# Patient Record
Sex: FEMALE | Race: WHITE | NOT HISPANIC OR LATINO | Employment: OTHER | URBAN - METROPOLITAN AREA
[De-identification: names, ages, dates, MRNs, and addresses within clinical notes are randomized per-mention and may not be internally consistent; named-entity substitution may affect disease eponyms.]

---

## 2020-10-19 ENCOUNTER — HOSPITAL ENCOUNTER (OUTPATIENT)
Facility: HOSPITAL | Age: 70
Discharge: HOME | End: 2020-10-20
Attending: EMERGENCY MEDICINE
Payer: MEDICARE

## 2020-10-19 DIAGNOSIS — I48.91 AF (ATRIAL FIBRILLATION): ICD-10-CM

## 2020-10-19 DIAGNOSIS — R06.02 SOB (SHORTNESS OF BREATH): ICD-10-CM

## 2020-10-19 DIAGNOSIS — R07.9 CHEST PAIN, UNSPECIFIED TYPE: Primary | ICD-10-CM

## 2020-10-19 DIAGNOSIS — R00.2 PALPITATIONS: ICD-10-CM

## 2020-10-19 DIAGNOSIS — I48.91 ATRIAL FIBRILLATION: ICD-10-CM

## 2020-10-19 LAB
ALBUMIN SERPL BCP-MCNC: 3.8 G/DL (ref 3.5–5.2)
ALP SERPL-CCNC: 56 U/L (ref 55–135)
ALT SERPL W/O P-5'-P-CCNC: 15 U/L (ref 10–44)
ANION GAP SERPL CALC-SCNC: 9 MMOL/L (ref 8–16)
AORTIC ROOT ANNULUS: 3.1 CM
AST SERPL-CCNC: 14 U/L (ref 10–40)
AV INDEX (PROSTH): 0.87
AV MEAN GRADIENT: 7 MMHG
AV PEAK GRADIENT: 14 MMHG
AV VALVE AREA: 2.72 CM2
AV VELOCITY RATIO: 0.72
BACTERIA #/AREA URNS AUTO: NORMAL /HPF
BASOPHILS # BLD AUTO: 0.04 K/UL (ref 0–0.2)
BASOPHILS NFR BLD: 0.4 % (ref 0–1.9)
BILIRUB SERPL-MCNC: 0.5 MG/DL (ref 0.1–1)
BILIRUB UR QL STRIP: NEGATIVE
BNP SERPL-MCNC: 127 PG/ML (ref 0–99)
BSA FOR ECHO PROCEDURE: 2.11 M2
BUN SERPL-MCNC: 17 MG/DL (ref 8–23)
CALCIUM SERPL-MCNC: 9.3 MG/DL (ref 8.7–10.5)
CHLORIDE SERPL-SCNC: 108 MMOL/L (ref 95–110)
CK SERPL-CCNC: 49 U/L (ref 20–180)
CLARITY UR REFRACT.AUTO: CLEAR
CO2 SERPL-SCNC: 27 MMOL/L (ref 23–29)
COLOR UR AUTO: YELLOW
CREAT SERPL-MCNC: 1.1 MG/DL (ref 0.5–1.4)
CV ECHO LV RWT: 0.47 CM
DIFFERENTIAL METHOD: ABNORMAL
DOP CALC AO PEAK VEL: 1.89 M/S
DOP CALC AO VTI: 35.38 CM
DOP CALC LVOT AREA: 3.1 CM2
DOP CALC LVOT DIAMETER: 2 CM
DOP CALC LVOT PEAK VEL: 1.37 M/S
DOP CALC LVOT STROKE VOLUME: 96.24 CM3
DOP CALCLVOT PEAK VEL VTI: 30.65 CM
E WAVE DECELERATION TIME: 222.92 MSEC
E/A RATIO: 0.8
E/E' RATIO: 7.88 M/S
ECHO LV POSTERIOR WALL: 1.08 CM (ref 0.6–1.1)
EOSINOPHIL # BLD AUTO: 0.4 K/UL (ref 0–0.5)
EOSINOPHIL NFR BLD: 4.2 % (ref 0–8)
ERYTHROCYTE [DISTWIDTH] IN BLOOD BY AUTOMATED COUNT: 13.3 % (ref 11.5–14.5)
EST. GFR  (AFRICAN AMERICAN): 58.4 ML/MIN/1.73 M^2
EST. GFR  (NON AFRICAN AMERICAN): 50.6 ML/MIN/1.73 M^2
FRACTIONAL SHORTENING: 47 % (ref 28–44)
GLUCOSE SERPL-MCNC: 88 MG/DL (ref 70–110)
GLUCOSE UR QL STRIP: NEGATIVE
HCT VFR BLD AUTO: 43.8 % (ref 37–48.5)
HGB BLD-MCNC: 13.9 G/DL (ref 12–16)
HGB UR QL STRIP: NEGATIVE
IMM GRANULOCYTES # BLD AUTO: 0.02 K/UL (ref 0–0.04)
IMM GRANULOCYTES NFR BLD AUTO: 0.2 % (ref 0–0.5)
INTERVENTRICULAR SEPTUM: 0.59 CM (ref 0.6–1.1)
KETONES UR QL STRIP: NEGATIVE
LA MAJOR: 6 CM
LA MINOR: 5.3 CM
LA WIDTH: 3.99 CM
LEFT ATRIUM SIZE: 4.04 CM
LEFT ATRIUM VOLUME INDEX: 38.3 ML/M2
LEFT ATRIUM VOLUME: 77.12 CM3
LEFT INTERNAL DIMENSION IN SYSTOLE: 2.4 CM (ref 2.1–4)
LEFT VENTRICLE DIASTOLIC VOLUME INDEX: 47 ML/M2
LEFT VENTRICLE DIASTOLIC VOLUME: 94.65 ML
LEFT VENTRICLE MASS INDEX: 61 G/M2
LEFT VENTRICLE SYSTOLIC VOLUME INDEX: 10 ML/M2
LEFT VENTRICLE SYSTOLIC VOLUME: 20.07 ML
LEFT VENTRICULAR INTERNAL DIMENSION IN DIASTOLE: 4.55 CM (ref 3.5–6)
LEFT VENTRICULAR MASS: 122.44 G
LEUKOCYTE ESTERASE UR QL STRIP: ABNORMAL
LV LATERAL E/E' RATIO: 9 M/S
LV SEPTAL E/E' RATIO: 7 M/S
LYMPHOCYTES # BLD AUTO: 1.9 K/UL (ref 1–4.8)
LYMPHOCYTES NFR BLD: 19.3 % (ref 18–48)
MCH RBC QN AUTO: 27.9 PG (ref 27–31)
MCHC RBC AUTO-ENTMCNC: 31.7 G/DL (ref 32–36)
MCV RBC AUTO: 88 FL (ref 82–98)
MICROSCOPIC COMMENT: NORMAL
MONOCYTES # BLD AUTO: 0.8 K/UL (ref 0.3–1)
MONOCYTES NFR BLD: 8.1 % (ref 4–15)
MV PEAK A VEL: 0.79 M/S
MV PEAK E VEL: 0.63 M/S
MV STENOSIS PRESSURE HALF TIME: 64.65 MS
MV VALVE AREA P 1/2 METHOD: 3.4 CM2
NEUTROPHILS # BLD AUTO: 6.8 K/UL (ref 1.8–7.7)
NEUTROPHILS NFR BLD: 67.8 % (ref 38–73)
NITRITE UR QL STRIP: NEGATIVE
NRBC BLD-RTO: 0 /100 WBC
PH UR STRIP: 5 [PH] (ref 5–8)
PISA TR MAX VEL: 2.54 M/S
PLATELET # BLD AUTO: 200 K/UL (ref 150–350)
PMV BLD AUTO: 10.7 FL (ref 9.2–12.9)
POTASSIUM SERPL-SCNC: 4.2 MMOL/L (ref 3.5–5.1)
PROT SERPL-MCNC: 6.5 G/DL (ref 6–8.4)
PROT UR QL STRIP: NEGATIVE
RA MAJOR: 4.73 CM
RA PRESSURE: 3 MMHG
RA WIDTH: 4.43 CM
RBC # BLD AUTO: 4.98 M/UL (ref 4–5.4)
SARS-COV-2 RDRP RESP QL NAA+PROBE: NEGATIVE
SODIUM SERPL-SCNC: 144 MMOL/L (ref 136–145)
SP GR UR STRIP: 1.01 (ref 1–1.03)
SQUAMOUS #/AREA URNS AUTO: 2 /HPF
STJ: 2.32 CM
TDI LATERAL: 0.07 M/S
TDI SEPTAL: 0.09 M/S
TDI: 0.08 M/S
TR MAX PG: 26 MMHG
TRICUSPID ANNULAR PLANE SYSTOLIC EXCURSION: 1.77 CM
TROPONIN I SERPL DL<=0.01 NG/ML-MCNC: 0.01 NG/ML (ref 0–0.03)
TROPONIN I SERPL DL<=0.01 NG/ML-MCNC: <0.006 NG/ML (ref 0–0.03)
TV REST PULMONARY ARTERY PRESSURE: 29 MMHG
URN SPEC COLLECT METH UR: ABNORMAL
UROBILINOGEN UR STRIP-ACNC: NEGATIVE EU/DL
WBC # BLD AUTO: 9.99 K/UL (ref 3.9–12.7)
WBC #/AREA URNS AUTO: 5 /HPF (ref 0–5)
WBC CLUMPS UR QL AUTO: NORMAL

## 2020-10-19 PROCEDURE — 94640 AIRWAY INHALATION TREATMENT: CPT | Mod: HCWC

## 2020-10-19 PROCEDURE — 93010 ELECTROCARDIOGRAM REPORT: CPT | Mod: HCWC,,, | Performed by: INTERNAL MEDICINE

## 2020-10-19 PROCEDURE — 81000 URINALYSIS NONAUTO W/SCOPE: CPT | Mod: HCWC,ER

## 2020-10-19 PROCEDURE — 85025 COMPLETE CBC W/AUTO DIFF WBC: CPT | Mod: HCWC,ER

## 2020-10-19 PROCEDURE — U0002 COVID-19 LAB TEST NON-CDC: HCPCS | Mod: HCWC,ER

## 2020-10-19 PROCEDURE — 25000003 PHARM REV CODE 250: Mod: HCWC | Performed by: NURSE PRACTITIONER

## 2020-10-19 PROCEDURE — G0378 HOSPITAL OBSERVATION PER HR: HCPCS | Mod: HCWC,CS

## 2020-10-19 PROCEDURE — 94799 UNLISTED PULMONARY SVC/PX: CPT | Mod: HCWC

## 2020-10-19 PROCEDURE — 82550 ASSAY OF CK (CPK): CPT | Mod: HCWC,ER

## 2020-10-19 PROCEDURE — 93010 EKG 12-LEAD: ICD-10-PCS | Mod: HCWC,,, | Performed by: INTERNAL MEDICINE

## 2020-10-19 PROCEDURE — 84484 ASSAY OF TROPONIN QUANT: CPT | Mod: 91,HCWC,ER

## 2020-10-19 PROCEDURE — 93005 ELECTROCARDIOGRAM TRACING: CPT | Mod: HCWC,ER

## 2020-10-19 PROCEDURE — 25000003 PHARM REV CODE 250: Mod: HCWC,ER | Performed by: EMERGENCY MEDICINE

## 2020-10-19 PROCEDURE — 84484 ASSAY OF TROPONIN QUANT: CPT | Mod: HCWC

## 2020-10-19 PROCEDURE — 83880 ASSAY OF NATRIURETIC PEPTIDE: CPT | Mod: HCWC,ER

## 2020-10-19 PROCEDURE — 80053 COMPREHEN METABOLIC PANEL: CPT | Mod: HCWC,ER

## 2020-10-19 PROCEDURE — 94761 N-INVAS EAR/PLS OXIMETRY MLT: CPT | Mod: HCWC

## 2020-10-19 PROCEDURE — 25000242 PHARM REV CODE 250 ALT 637 W/ HCPCS: Mod: HCWC | Performed by: INTERNAL MEDICINE

## 2020-10-19 PROCEDURE — 36415 COLL VENOUS BLD VENIPUNCTURE: CPT | Mod: HCWC

## 2020-10-19 PROCEDURE — 27000221 HC OXYGEN, UP TO 24 HOURS: Mod: HCWC

## 2020-10-19 PROCEDURE — 99285 EMERGENCY DEPT VISIT HI MDM: CPT | Mod: 25,HCWC,ER

## 2020-10-19 PROCEDURE — G0378 HOSPITAL OBSERVATION PER HR: HCPCS | Mod: HCWC,ER

## 2020-10-19 RX ORDER — IBUPROFEN 400 MG/1
400 TABLET ORAL EVERY 12 HOURS
Status: COMPLETED | OUTPATIENT
Start: 2020-10-19 | End: 2020-10-20

## 2020-10-19 RX ORDER — PRAVASTATIN SODIUM 20 MG/1
40 TABLET ORAL DAILY
Status: DISCONTINUED | OUTPATIENT
Start: 2020-10-20 | End: 2020-10-20 | Stop reason: HOSPADM

## 2020-10-19 RX ORDER — LORAZEPAM 0.5 MG/1
0.5 TABLET ORAL NIGHTLY PRN
Status: DISCONTINUED | OUTPATIENT
Start: 2020-10-19 | End: 2020-10-20 | Stop reason: HOSPADM

## 2020-10-19 RX ORDER — ARFORMOTEROL TARTRATE 15 UG/2ML
15 SOLUTION RESPIRATORY (INHALATION) EVERY 12 HOURS
Status: DISCONTINUED | OUTPATIENT
Start: 2020-10-19 | End: 2020-10-20 | Stop reason: HOSPADM

## 2020-10-19 RX ORDER — ARFORMOTEROL TARTRATE 15 UG/2ML
15 SOLUTION RESPIRATORY (INHALATION) EVERY 12 HOURS
Status: DISCONTINUED | OUTPATIENT
Start: 2020-10-19 | End: 2020-10-19

## 2020-10-19 RX ORDER — DRONEDARONE 400 MG/1
400 TABLET, FILM COATED ORAL 2 TIMES DAILY WITH MEALS
COMMUNITY
End: 2020-11-11 | Stop reason: SDUPTHER

## 2020-10-19 RX ORDER — ARFORMOTEROL TARTRATE 15 UG/2ML
15 SOLUTION RESPIRATORY (INHALATION)
Status: DISCONTINUED | OUTPATIENT
Start: 2020-10-19 | End: 2020-10-19

## 2020-10-19 RX ORDER — BUDESONIDE 0.5 MG/2ML
0.5 INHALANT ORAL EVERY 12 HOURS
Status: DISCONTINUED | OUTPATIENT
Start: 2020-10-19 | End: 2020-10-19

## 2020-10-19 RX ORDER — MAG HYDROX/ALUMINUM HYD/SIMETH 200-200-20
30 SUSPENSION, ORAL (FINAL DOSE FORM) ORAL
Status: COMPLETED | OUTPATIENT
Start: 2020-10-19 | End: 2020-10-19

## 2020-10-19 RX ORDER — AMOXICILLIN 500 MG
1 CAPSULE ORAL DAILY
COMMUNITY

## 2020-10-19 RX ORDER — MORPHINE SULFATE 2 MG/ML
2 INJECTION, SOLUTION INTRAMUSCULAR; INTRAVENOUS EVERY 4 HOURS PRN
Status: DISCONTINUED | OUTPATIENT
Start: 2020-10-19 | End: 2020-10-20 | Stop reason: HOSPADM

## 2020-10-19 RX ORDER — LISINOPRIL 10 MG/1
10 TABLET ORAL NIGHTLY
Status: DISCONTINUED | OUTPATIENT
Start: 2020-10-19 | End: 2020-10-20 | Stop reason: HOSPADM

## 2020-10-19 RX ORDER — LISINOPRIL 10 MG/1
10 TABLET ORAL DAILY
COMMUNITY
End: 2021-01-22

## 2020-10-19 RX ORDER — PRAVASTATIN SODIUM 40 MG/1
40 TABLET ORAL DAILY
COMMUNITY
End: 2020-11-09 | Stop reason: SDUPTHER

## 2020-10-19 RX ORDER — LORAZEPAM 0.5 MG/1
0.5 TABLET ORAL DAILY PRN
COMMUNITY

## 2020-10-19 RX ORDER — CETIRIZINE HYDROCHLORIDE 10 MG/1
10 TABLET ORAL DAILY
Status: DISCONTINUED | OUTPATIENT
Start: 2020-10-20 | End: 2020-10-20 | Stop reason: HOSPADM

## 2020-10-19 RX ORDER — BUDESONIDE 0.5 MG/2ML
0.5 INHALANT ORAL EVERY 12 HOURS
Status: DISCONTINUED | OUTPATIENT
Start: 2020-10-19 | End: 2020-10-20 | Stop reason: HOSPADM

## 2020-10-19 RX ADMIN — ALUMINUM HYDROXIDE, MAGNESIUM HYDROXIDE, AND SIMETHICONE 30 ML: 200; 200; 20 SUSPENSION ORAL at 07:10

## 2020-10-19 RX ADMIN — IBUPROFEN 400 MG: 400 TABLET, FILM COATED ORAL at 04:10

## 2020-10-19 RX ADMIN — APIXABAN 5 MG: 2.5 TABLET, FILM COATED ORAL at 08:10

## 2020-10-19 RX ADMIN — DRONEDARONE 400 MG: 400 TABLET, FILM COATED ORAL at 05:10

## 2020-10-19 RX ADMIN — BUDESONIDE 0.5 MG: 0.5 INHALANT RESPIRATORY (INHALATION) at 07:10

## 2020-10-19 RX ADMIN — LORAZEPAM 0.5 MG: 0.5 TABLET ORAL at 08:10

## 2020-10-19 RX ADMIN — LISINOPRIL 10 MG: 10 TABLET ORAL at 08:10

## 2020-10-19 RX ADMIN — ARFORMOTEROL TARTRATE 15 MCG: 15 SOLUTION RESPIRATORY (INHALATION) at 07:10

## 2020-10-19 NOTE — ED PROVIDER NOTES
Encounter Date: 10/19/2020       History     Chief Complaint   Patient presents with    Shortness of Breath     The history is provided by the patient.   Shortness of Breath  This is a new problem. The current episode started 1 to 2 hours ago. Associated symptoms include chest pain. Pertinent negatives include no fever, no headaches, no rhinorrhea, no sore throat, no cough, no sputum production, no wheezing, no orthopnea, no abdominal pain, no rash, no leg pain, no leg swelling and no claudication.     Review of patient's allergies indicates:   Allergen Reactions    Lasix [furosemide]      Past Medical History:   Diagnosis Date    A-fib     A-fib     Pace maker put in 5/12/20    Allergy     Anxiety     Asthma     cough variant    Cancer 2018    basal cell c    Colon polyp     COPD (chronic obstructive pulmonary disease)     Hypertension      Past Surgical History:   Procedure Laterality Date    IMPLANTATION OF BIVENTRICULAR HEART PACEMAKER  05/12/2020    Loop recorder also in but not working right now     Family History   Problem Relation Age of Onset    Cancer Mother     Cancer Father     Cancer Brother      Social History     Tobacco Use    Smoking status: Former Smoker    Smokeless tobacco: Never Used    Tobacco comment: Quit 30 - 40 years ago   Substance Use Topics    Alcohol use: Yes     Comment: Wine Occasionally     Drug use: Never     Review of Systems   Constitutional: Negative for fever.   HENT: Negative for rhinorrhea and sore throat.    Respiratory: Positive for shortness of breath. Negative for cough, sputum production and wheezing.    Cardiovascular: Positive for chest pain. Negative for orthopnea, claudication and leg swelling.   Gastrointestinal: Negative for abdominal pain and nausea.   Genitourinary: Negative for dysuria.   Musculoskeletal: Negative for back pain.   Skin: Negative for rash.   Neurological: Negative for weakness and headaches.   Hematological: Does not  bruise/bleed easily.       Physical Exam     Initial Vitals [10/19/20 0634]   BP Pulse Resp Temp SpO2   (!) 130/97 76 (!) 28 98.9 °F (37.2 °C) 97 %      MAP       --         Physical Exam    Nursing note and vitals reviewed.  Constitutional: She appears well-developed and well-nourished. No distress.   HENT:   Head: Normocephalic and atraumatic.   Mouth/Throat: Oropharynx is clear and moist.   Eyes: Conjunctivae and EOM are normal. Pupils are equal, round, and reactive to light.   Neck: Normal range of motion. Neck supple.   Cardiovascular: Normal heart sounds. An irregularly irregular rhythm present.  Tachycardia present.  Exam reveals no gallop and no friction rub.    No murmur heard.  Pulmonary/Chest: Breath sounds normal. No respiratory distress. She has no wheezes. She has no rhonchi. She has no rales.   Abdominal: Soft. Bowel sounds are normal. She exhibits no distension and no mass. There is no abdominal tenderness. There is no rebound and no guarding.   Musculoskeletal: Normal range of motion. Edema (Trace bilateral LE) present. No tenderness.   Neurological: She is alert and oriented to person, place, and time. She has normal strength.   Skin: Skin is warm and dry. No rash noted.   Psychiatric: She has a normal mood and affect. Thought content normal.         ED Course   Procedures  Labs Reviewed   CBC W/ AUTO DIFFERENTIAL - Abnormal; Notable for the following components:       Result Value    Mean Corpuscular Hemoglobin Conc 31.7 (*)     All other components within normal limits   COMPREHENSIVE METABOLIC PANEL - Abnormal; Notable for the following components:    eGFR if  58.4 (*)     eGFR if non  50.6 (*)     All other components within normal limits   URINALYSIS, REFLEX TO URINE CULTURE - Abnormal; Notable for the following components:    Leukocytes, UA Trace (*)     All other components within normal limits    Narrative:     Specimen Source->Urine   B-TYPE NATRIURETIC  "PEPTIDE - Abnormal; Notable for the following components:     (*)     All other components within normal limits   CK   TROPONIN I   URINALYSIS MICROSCOPIC    Narrative:     Specimen Source->Urine   SARS-COV-2 RNA AMPLIFICATION, QUAL     EKG Readings: (Independently Interpreted)   Rhythm: Atrial Fibrillation. Heart Rate: 87. Ectopy: No Ectopy. Conduction: Normal. ST Segments: Normal ST Segments. T Waves: Normal. Clinical Impression: Atrial Fibrillation       Imaging Results          X-Ray Chest AP Portable (Final result)  Result time 10/19/20 07:28:31    Final result by Arnulfo Tinoco MD (10/19/20 07:28:31)                 Impression:      No acute process seen.      Electronically signed by: Arnulfo Tinoco MD  Date:    10/19/2020  Time:    07:28             Narrative:    EXAMINATION:  XR CHEST AP PORTABLE    CLINICAL HISTORY:  palpitations;    FINDINGS:  Single view of the chest.    Cardiac silhouette is enlarged.  Aorta demonstrates atherosclerotic disease.  Left-sided pacing wires are noted.  The lungs demonstrate no evidence of active disease.  No evidence of pleural effusion or pneumothorax.  Bones demonstrate degenerative change.                                  ED Vital Signs:  Vitals:    10/19/20 0634 10/19/20 0640 10/19/20 0648 10/19/20 0725   BP: (!) 130/97  (!) 143/64    Pulse: 76 89 93 88   Resp: (!) 28  (!) 22 20   Temp: 98.9 °F (37.2 °C)      TempSrc: Oral      SpO2: 97%  95% (!) 93%   Weight: 100 kg (220 lb 7.4 oz)      Height: 5' 3" (1.6 m)       10/19/20 0731 10/19/20 0738 10/19/20 0801   BP:      Pulse: 60 61 68   Resp: 19 18 19   Temp:      TempSrc:      SpO2: 95% (!) 94% 95%   Weight:      Height:            Abnormal Lab Results:  Labs Reviewed   CBC W/ AUTO DIFFERENTIAL - Abnormal; Notable for the following components:       Result Value    Mean Corpuscular Hemoglobin Conc 31.7 (*)     All other components within normal limits   COMPREHENSIVE METABOLIC PANEL - Abnormal; Notable for the " following components:    eGFR if  58.4 (*)     eGFR if non  50.6 (*)     All other components within normal limits   URINALYSIS, REFLEX TO URINE CULTURE - Abnormal; Notable for the following components:    Leukocytes, UA Trace (*)     All other components within normal limits    Narrative:     Specimen Source->Urine   B-TYPE NATRIURETIC PEPTIDE - Abnormal; Notable for the following components:     (*)     All other components within normal limits   CK   TROPONIN I   URINALYSIS MICROSCOPIC    Narrative:     Specimen Source->Urine   SARS-COV-2 RNA AMPLIFICATION, QUAL          All Lab Results:  Results for orders placed or performed during the hospital encounter of 10/19/20   CBC auto differential   Result Value Ref Range    WBC 9.99 3.90 - 12.70 K/uL    RBC 4.98 4.00 - 5.40 M/uL    Hemoglobin 13.9 12.0 - 16.0 g/dL    Hematocrit 43.8 37.0 - 48.5 %    Mean Corpuscular Volume 88 82 - 98 fL    Mean Corpuscular Hemoglobin 27.9 27.0 - 31.0 pg    Mean Corpuscular Hemoglobin Conc 31.7 (L) 32.0 - 36.0 g/dL    RDW 13.3 11.5 - 14.5 %    Platelets 200 150 - 350 K/uL    MPV 10.7 9.2 - 12.9 fL    Immature Granulocytes 0.2 0.0 - 0.5 %    Gran # (ANC) 6.8 1.8 - 7.7 K/uL    Immature Grans (Abs) 0.02 0.00 - 0.04 K/uL    Lymph # 1.9 1.0 - 4.8 K/uL    Mono # 0.8 0.3 - 1.0 K/uL    Eos # 0.4 0.0 - 0.5 K/uL    Baso # 0.04 0.00 - 0.20 K/uL    nRBC 0 0 /100 WBC    Gran% 67.8 38.0 - 73.0 %    Lymph% 19.3 18.0 - 48.0 %    Mono% 8.1 4.0 - 15.0 %    Eosinophil% 4.2 0.0 - 8.0 %    Basophil% 0.4 0.0 - 1.9 %    Differential Method Automated    Comprehensive Metabolic Panel   Result Value Ref Range    Sodium 144 136 - 145 mmol/L    Potassium 4.2 3.5 - 5.1 mmol/L    Chloride 108 95 - 110 mmol/L    CO2 27 23 - 29 mmol/L    Glucose 88 70 - 110 mg/dL    BUN, Bld 17 8 - 23 mg/dL    Creatinine 1.1 0.5 - 1.4 mg/dL    Calcium 9.3 8.7 - 10.5 mg/dL    Total Protein 6.5 6.0 - 8.4 g/dL    Albumin 3.8 3.5 - 5.2 g/dL    Total  Bilirubin 0.5 0.1 - 1.0 mg/dL    Alkaline Phosphatase 56 55 - 135 U/L    AST 14 10 - 40 U/L    ALT 15 10 - 44 U/L    Anion Gap 9 8 - 16 mmol/L    eGFR if African American 58.4 (A) >60 mL/min/1.73 m^2    eGFR if non African American 50.6 (A) >60 mL/min/1.73 m^2   Urinalysis, Reflex to Urine Culture Urine, Clean Catch    Specimen: Urine   Result Value Ref Range    Specimen UA Urine, Clean Catch     Color, UA Yellow Yellow, Straw, Tena    Appearance, UA Clear Clear    pH, UA 5.0 5.0 - 8.0    Specific Gravity, UA 1.010 1.005 - 1.030    Protein, UA Negative Negative    Glucose, UA Negative Negative    Ketones, UA Negative Negative    Bilirubin (UA) Negative Negative    Occult Blood UA Negative Negative    Nitrite, UA Negative Negative    Urobilinogen, UA Negative <2.0 EU/dL    Leukocytes, UA Trace (A) Negative   BNP   Result Value Ref Range     (H) 0 - 99 pg/mL   CK   Result Value Ref Range    CPK 49 20 - 180 U/L   Troponin I   Result Value Ref Range    Troponin I 0.014 0.000 - 0.026 ng/mL   Urinalysis Microscopic   Result Value Ref Range    WBC, UA 5 0 - 5 /hpf    WBC Clumps, UA Rare None-Rare    Bacteria Rare None-Occ /hpf    Squam Epithel, UA 2 /hpf    Microscopic Comment SEE COMMENT            Imaging Results:  Imaging Results          X-Ray Chest AP Portable (Final result)  Result time 10/19/20 07:28:31    Final result by Arnulfo Tinoco MD (10/19/20 07:28:31)                 Impression:      No acute process seen.      Electronically signed by: Anrulfo Tinoco MD  Date:    10/19/2020  Time:    07:28             Narrative:    EXAMINATION:  XR CHEST AP PORTABLE    CLINICAL HISTORY:  palpitations;    FINDINGS:  Single view of the chest.    Cardiac silhouette is enlarged.  Aorta demonstrates atherosclerotic disease.  Left-sided pacing wires are noted.  The lungs demonstrate no evidence of active disease.  No evidence of pleural effusion or pneumothorax.  Bones demonstrate degenerative change.                                    The Emergency Provider reviewed the vital signs and test results, which are outlined above.    ED Discussions:  8:18 AM: Re-evaluated pt. I have discussed test results, shared treatment plan, and the need for admission with patient and family at bedside. Pt and family express understanding at this time and agree with all information. All questions answered. Pt and family have no further questions or concerns at this time. Pt is ready for admit.      All historical, clinical, radiographic, and laboratory findings were reviewed with the patient/family in detail along with the indications for transport to the facility in [place] in order to receive repeat troponin and cardiac monitoring.  All remaining questions and concerns were addressed at this time and the patient/family communicates understanding and agrees to proceed accordingly.  Similarly, all pertinent details of the encounter were discussed with Dr. Resendez who agrees to receive the patient at [facility] for further care as outlined above.  The patient will be transferred by [facility] ambulance services secondary to a need for ongoing cardiac monitoring en route.  Tre Izquierdo MD  8:19 AM                                    Clinical Impression:       ICD-10-CM ICD-9-CM   1. Chest pain, unspecified type  R07.9 786.50   2. AF (atrial fibrillation)  I48.91 427.31   3. Palpitations  R00.2 785.1   4. SOB (shortness of breath)  R06.02 786.05                      Disposition:   Disposition: Placed in Observation  Condition: Fair     ED Disposition Condition    Observation                             Tre Izquierdo MD  10/19/20 0819

## 2020-10-19 NOTE — HPI
Antonio Urena is a 70 year old female with history of asthma, atrial fibrillation,  S/p PPM, COPD, and hypertension who presents to ED for further evaluation of chest pain onset of symptoms that began 1 day ago. Pain is located mid chest and is described as sharp. Worsens with deep breath and is reproducible. She's denies injury, incrased activity, fever, chills, and cough.She recently moved from [place], [place] to [place]. She is also looking to establish a Cardiologist as well.     In ED, intiial troponin negative. Initial EKG shows atrial fibrillation. Labs are stable. Last Stress test and Echocardiogram in 2018. She was noted to have a low grade fever of 100.4. Hospital medicine has been asked to place patient in observation for further work up.

## 2020-10-19 NOTE — SUBJECTIVE & OBJECTIVE
Past Medical History:   Diagnosis Date    A-fib     A-fib     Pace maker put in 5/12/20    Allergy     Anxiety     Asthma     cough variant    Cancer 2018    basal cell c    Colon polyp     COPD (chronic obstructive pulmonary disease)     Hypertension        Past Surgical History:   Procedure Laterality Date    IMPLANTATION OF BIVENTRICULAR HEART PACEMAKER  05/12/2020    Loop recorder also in but not working right now       Review of patient's allergies indicates:   Allergen Reactions    Lasix [furosemide]        No current facility-administered medications on file prior to encounter.      Current Outpatient Medications on File Prior to Encounter   Medication Sig    apixaban (ELIQUIS) 5 mg Tab Take 5 mg by mouth 2 (two) times daily.    dronedarone (MULTAQ) 400 mg Tab Take 400 mg by mouth 2 (two) times daily with meals.    lisinopriL 10 MG tablet Take 10 mg by mouth once daily.    LORazepam (ATIVAN) 0.5 MG tablet Take 0.5 mg by mouth daily as needed for Anxiety.    omega-3 fatty acids/fish oil (FISH OIL-OMEGA-3 FATTY ACIDS) 300-1,000 mg capsule Take 1 capsule by mouth once daily.    pravastatin (PRAVACHOL) 40 MG tablet Take 40 mg by mouth once daily.    albuterol (PROVENTIL/VENTOLIN HFA) 90 mcg/actuation inhaler Inhale into the lungs.    cetirizine (ZYRTEC) 10 MG tablet Take 10 mg by mouth.    fluticasone-salmeterol diskus inhaler 250-50 mcg Inhale 1 puff into the lungs.    [DISCONTINUED] aspirin (ECOTRIN) 81 MG EC tablet Take 81 mg by mouth.    [DISCONTINUED] diltiaZEM (CARDIZEM) 30 MG tablet Take 30 mg by mouth.     Family History     Problem Relation (Age of Onset)    Cancer Mother, Father, Brother        Tobacco Use    Smoking status: Former Smoker    Smokeless tobacco: Never Used    Tobacco comment: Quit 30 - 40 years ago   Substance and Sexual Activity    Alcohol use: Yes     Comment: Wine Occasionally     Drug use: Never    Sexual activity: Not Currently     Review of Systems    Constitutional: Negative for activity change, diaphoresis, fatigue and unexpected weight change.   HENT: Negative for congestion, ear pain and sore throat.    Eyes: Negative.    Respiratory: Negative for shortness of breath (shallow breaths related to pleurisy) and wheezing.    Cardiovascular: Positive for chest pain. Negative for palpitations.   Gastrointestinal: Negative for abdominal pain, constipation, diarrhea and vomiting.   Endocrine: Negative.    Genitourinary: Negative for flank pain, hematuria and urgency.   Musculoskeletal: Negative for joint swelling and neck pain.   Skin: Negative for pallor.   Neurological: Negative for seizures, syncope and light-headedness.   Hematological: Negative.    Psychiatric/Behavioral: Negative.      Objective:     Vital Signs (Most Recent):  Temp: 99.6 °F (37.6 °C) (10/19/20 1522)  Pulse: 76 (10/19/20 1522)  Resp: 18 (10/19/20 1522)  BP: (!) 117/59 (10/19/20 1522)  SpO2: 96 % (10/19/20 1522) Vital Signs (24h Range):  Temp:  [98.9 °F (37.2 °C)-100.4 °F (38 °C)] 99.6 °F (37.6 °C)  Pulse:  [60-93] 76  Resp:  [18-28] 18  SpO2:  [93 %-97 %] 96 %  BP: (106-143)/(55-97) 117/59     Weight: 99.8 kg (220 lb)  Body mass index is 38.97 kg/m².    Physical Exam  Constitutional:       Appearance: She is well-developed.   HENT:      Head: Normocephalic and atraumatic.   Neck:      Musculoskeletal: Normal range of motion and neck supple.   Cardiovascular:      Rate and Rhythm: Normal rate. Rhythm irregularly irregular.      Heart sounds: Normal heart sounds. No murmur.   Pulmonary:      Effort: Pulmonary effort is normal. No respiratory distress.      Breath sounds: Normal breath sounds.   Abdominal:      General: Bowel sounds are normal. There is no distension.      Palpations: Abdomen is soft.      Tenderness: There is no abdominal tenderness.   Musculoskeletal: Normal range of motion.   Skin:     General: Skin is warm and dry.   Neurological:      Mental Status: She is alert and  oriented to person, place, and time.             Significant Labs:   CBC:   Recent Labs   Lab 10/19/20  0642   WBC 9.99   HGB 13.9   HCT 43.8        CMP:   Recent Labs   Lab 10/19/20  0642      K 4.2      CO2 27   GLU 88   BUN 17   CREATININE 1.1   CALCIUM 9.3   PROT 6.5   ALBUMIN 3.8   BILITOT 0.5   ALKPHOS 56   AST 14   ALT 15   ANIONGAP 9   EGFRNONAA 50.6*       Significant Imaging:   Imaging Results          X-Ray Chest AP Portable (Final result)  Result time 10/19/20 07:28:31    Final result by Arnulfo Tinoco MD (10/19/20 07:28:31)                 Impression:      No acute process seen.      Electronically signed by: Arnulfo Tinoco MD  Date:    10/19/2020  Time:    07:28             Narrative:    EXAMINATION:  XR CHEST AP PORTABLE    CLINICAL HISTORY:  palpitations;    FINDINGS:  Single view of the chest.    Cardiac silhouette is enlarged.  Aorta demonstrates atherosclerotic disease.  Left-sided pacing wires are noted.  The lungs demonstrate no evidence of active disease.  No evidence of pleural effusion or pneumothorax.  Bones demonstrate degenerative change.

## 2020-10-19 NOTE — ASSESSMENT & PLAN NOTE
Atypical in presentation with pleurisy and is reproducile.   --CXR negative, but has low grade fever; IS hourly  --initial cardiac enzymes negative   --trend troponin  --Echocardiogram  --cardiac monitoring  --AFID rate currently controlled  --scheduled antiinflammatories x 2 doses

## 2020-10-20 VITALS
WEIGHT: 222.88 LBS | DIASTOLIC BLOOD PRESSURE: 57 MMHG | TEMPERATURE: 98 F | HEIGHT: 63 IN | RESPIRATION RATE: 18 BRPM | SYSTOLIC BLOOD PRESSURE: 100 MMHG | HEART RATE: 104 BPM | BODY MASS INDEX: 39.49 KG/M2 | OXYGEN SATURATION: 93 %

## 2020-10-20 LAB
ANION GAP SERPL CALC-SCNC: 10 MMOL/L (ref 8–16)
BASOPHILS # BLD AUTO: 0.02 K/UL (ref 0–0.2)
BASOPHILS NFR BLD: 0.2 % (ref 0–1.9)
BUN SERPL-MCNC: 17 MG/DL (ref 8–23)
CALCIUM SERPL-MCNC: 8.8 MG/DL (ref 8.7–10.5)
CHLORIDE SERPL-SCNC: 107 MMOL/L (ref 95–110)
CO2 SERPL-SCNC: 21 MMOL/L (ref 23–29)
CREAT SERPL-MCNC: 1 MG/DL (ref 0.5–1.4)
DIFFERENTIAL METHOD: ABNORMAL
EOSINOPHIL # BLD AUTO: 0.2 K/UL (ref 0–0.5)
EOSINOPHIL NFR BLD: 2.8 % (ref 0–8)
ERYTHROCYTE [DISTWIDTH] IN BLOOD BY AUTOMATED COUNT: 13.8 % (ref 11.5–14.5)
EST. GFR  (AFRICAN AMERICAN): >60 ML/MIN/1.73 M^2
EST. GFR  (NON AFRICAN AMERICAN): 57 ML/MIN/1.73 M^2
GLUCOSE SERPL-MCNC: 85 MG/DL (ref 70–110)
HCT VFR BLD AUTO: 40.1 % (ref 37–48.5)
HGB BLD-MCNC: 12.2 G/DL (ref 12–16)
IMM GRANULOCYTES # BLD AUTO: 0.02 K/UL (ref 0–0.04)
IMM GRANULOCYTES NFR BLD AUTO: 0.2 % (ref 0–0.5)
LYMPHOCYTES # BLD AUTO: 1.8 K/UL (ref 1–4.8)
LYMPHOCYTES NFR BLD: 20.6 % (ref 18–48)
MCH RBC QN AUTO: 27.4 PG (ref 27–31)
MCHC RBC AUTO-ENTMCNC: 30.4 G/DL (ref 32–36)
MCV RBC AUTO: 90 FL (ref 82–98)
MONOCYTES # BLD AUTO: 0.8 K/UL (ref 0.3–1)
MONOCYTES NFR BLD: 9.4 % (ref 4–15)
NEUTROPHILS # BLD AUTO: 5.7 K/UL (ref 1.8–7.7)
NEUTROPHILS NFR BLD: 66.8 % (ref 38–73)
NRBC BLD-RTO: 0 /100 WBC
PLATELET # BLD AUTO: 154 K/UL (ref 150–350)
PMV BLD AUTO: 11.3 FL (ref 9.2–12.9)
POTASSIUM SERPL-SCNC: 4.4 MMOL/L (ref 3.5–5.1)
RBC # BLD AUTO: 4.46 M/UL (ref 4–5.4)
SODIUM SERPL-SCNC: 138 MMOL/L (ref 136–145)
WBC # BLD AUTO: 8.5 K/UL (ref 3.9–12.7)

## 2020-10-20 PROCEDURE — 94640 AIRWAY INHALATION TREATMENT: CPT | Mod: HCWC

## 2020-10-20 PROCEDURE — 63600175 PHARM REV CODE 636 W HCPCS: Mod: HCWC | Performed by: INTERNAL MEDICINE

## 2020-10-20 PROCEDURE — 80048 BASIC METABOLIC PNL TOTAL CA: CPT | Mod: HCWC

## 2020-10-20 PROCEDURE — 90670 PCV13 VACCINE IM: CPT | Mod: HCWC | Performed by: INTERNAL MEDICINE

## 2020-10-20 PROCEDURE — 85025 COMPLETE CBC W/AUTO DIFF WBC: CPT | Mod: HCWC

## 2020-10-20 PROCEDURE — 90471 IMMUNIZATION ADMIN: CPT | Mod: HCWC | Performed by: INTERNAL MEDICINE

## 2020-10-20 PROCEDURE — 25000003 PHARM REV CODE 250: Mod: HCWC | Performed by: NURSE PRACTITIONER

## 2020-10-20 PROCEDURE — 90694 VACC AIIV4 NO PRSRV 0.5ML IM: CPT | Mod: HCWC | Performed by: INTERNAL MEDICINE

## 2020-10-20 PROCEDURE — G0378 HOSPITAL OBSERVATION PER HR: HCPCS | Mod: HCWC

## 2020-10-20 PROCEDURE — G0008 ADMIN INFLUENZA VIRUS VAC: HCPCS | Mod: HCWC | Performed by: INTERNAL MEDICINE

## 2020-10-20 PROCEDURE — G0009 ADMIN PNEUMOCOCCAL VACCINE: HCPCS | Mod: HCWC | Performed by: INTERNAL MEDICINE

## 2020-10-20 PROCEDURE — 94799 UNLISTED PULMONARY SVC/PX: CPT | Mod: HCWC

## 2020-10-20 PROCEDURE — 25000242 PHARM REV CODE 250 ALT 637 W/ HCPCS: Mod: HCWC | Performed by: INTERNAL MEDICINE

## 2020-10-20 PROCEDURE — 36415 COLL VENOUS BLD VENIPUNCTURE: CPT | Mod: HCWC

## 2020-10-20 PROCEDURE — 90472 IMMUNIZATION ADMIN EACH ADD: CPT | Mod: HCWC | Performed by: INTERNAL MEDICINE

## 2020-10-20 RX ADMIN — A/SINGAPORE/GP1908/2015 IVR-180 (AN A/MICHIGAN/45/2015 (H1N1)PDM09-LIKE VIRUS, A/HONG KONG/4801/2014, NYMC X-263B (H3N2) (AN A/HONG KONG/4801/2014-LIKE VIRUS), AND B/BRISBANE/60/2008, WILD TYPE (A B/BRISBANE/60/2008-LIKE VIRUS) 0.5 ML: 15; 15; 15 INJECTION, SUSPENSION INTRAMUSCULAR at 02:10

## 2020-10-20 RX ADMIN — BUDESONIDE 0.5 MG: 0.5 INHALANT RESPIRATORY (INHALATION) at 08:10

## 2020-10-20 RX ADMIN — IBUPROFEN 400 MG: 400 TABLET, FILM COATED ORAL at 08:10

## 2020-10-20 RX ADMIN — PNEUMOCOCCAL 13-VALENT CONJUGATE VACCINE 0.5 ML: 2.2; 2.2; 2.2; 2.2; 2.2; 4.4; 2.2; 2.2; 2.2; 2.2; 2.2; 2.2; 2.2 INJECTION, SUSPENSION INTRAMUSCULAR at 02:10

## 2020-10-20 RX ADMIN — PRAVASTATIN SODIUM 40 MG: 20 TABLET ORAL at 08:10

## 2020-10-20 RX ADMIN — APIXABAN 5 MG: 2.5 TABLET, FILM COATED ORAL at 08:10

## 2020-10-20 RX ADMIN — DRONEDARONE 400 MG: 400 TABLET, FILM COATED ORAL at 08:10

## 2020-10-20 RX ADMIN — CETIRIZINE HYDROCHLORIDE 10 MG: 10 TABLET, FILM COATED ORAL at 08:10

## 2020-10-20 RX ADMIN — ARFORMOTEROL TARTRATE 15 MCG: 15 SOLUTION RESPIRATORY (INHALATION) at 08:10

## 2020-10-20 NOTE — NURSING
Discharge instructions, medications and appointments reviewed with patient, pt verbalized understanding of all instructions given and all questions answered  PIV and Telemetry removed pt karel well, VSS, denies any discomfort and appears to be in no distress  Pt left floor via wheelchair per hospital staff

## 2020-10-20 NOTE — PLAN OF CARE
SW met with pt at bedside to complete discharge assessment. Pt lives at home alone. Pt help at home is her daughter. Pt daughter will pick her up when she discharge. Pt is ambulatory and ADLs. No needs identified at this time. SW provided a transitional care folder, information on advanced directives, information on pharmacy bedside delivery, and discharge planning begins on admission with contact information for any needs/questions. Pt board updated with CM name and number.     Payor: HU[employer] MANAGED MEDICARE / Plan: HU[employer] TOTAL CARE ADVANTAGE / Product Type: Medicare Advantage /   PCP: Primary Doctor No  Pharmacy:   [id number] - [place], [place] - [address] AT [facility] & [address]  [place] [address]  Phone: [phone] Fax: [phone]  Bedside Delivery: Yes  MyChart: Active       10/20/20 1018   Discharge Assessment   Assessment Type Discharge Planning Assessment   Confirmed/corrected address and phone number on facesheet? Yes   Assessment information obtained from? Patient   Expected Length of Stay (days)   (TBD)   Communicated expected length of stay with patient/caregiver yes   Prior to hospitilization cognitive status: Alert/Oriented   Prior to hospitalization functional status: Independent   Current cognitive status: Alert/Oriented   Current Functional Status: Independent   Facility Arrived From: home   Lives With alone   Able to Return to Prior Arrangements yes   Is patient able to care for self after discharge? Yes   Who are your caregiver(s) and their phone number(s)? Charmaine Rashid (daughter) [phone]   Patient's perception of discharge disposition home or selfcare   Readmission Within the Last 30 Days no previous admission in last 30 days   Patient currently being followed by outpatient case management? No   Patient currently receives any other outside agency services? No   Equipment Currently Used at Home none   Part D Coverage n/a   Do you have  any problems affording any of your prescribed medications? No   Is the patient taking medications as prescribed? yes   Does the patient have transportation home? Yes   Transportation Anticipated family or friend will provide   Dialysis Name and Scheduled days n/a   Does the patient receive services at the [facility]? No   Discharge Plan A Home   Discharge Plan B Home   DME Needed Upon Discharge  none   Patient/Family in Agreement with Plan yes       Rocky Sotelo LMSW 10/20/2020 11:22 AM

## 2020-10-20 NOTE — PLAN OF CARE
Ongoing (interventions implemented as appropriate)   VSS  Pt able to make needs known.  Pt remained afebrile throughout this shift.   Pt remained free of falls this shift.   Pt denies pain this shift.  Plan of care reviewed. Patient verbalizes understanding.   Pt moving/turing independent. Frequent weight shifting encouraged.  Patient sinus rhythm/ and Afib at times on monitor.   Bed low, side rails up x 2, wheels locked, call light in reach.   Hourly rounding completed.   Will continue to monitor.

## 2020-10-20 NOTE — H&P
[facility] Hospital Medicine  History & Physical    Patient Name: Antonio Urena  MRN: [id number]  Admission Date: 10/19/2020  Attending Physician: Kwabena Augustine MD   Primary Care Provider: Primary Doctor No      Patient information was obtained from patient and ER records.     Subjective:     Principal Problem:<principal problem not specified>    Chief Complaint:   Chief Complaint   Patient presents with    Shortness of Breath        HPI: Antonio Urena is a 70 year old female with history of asthma, atrial fibrillation,  S/p PPM, COPD, and hypertension who presents to ED for further evaluation of chest pain onset of symptoms that began 1 day ago. Pain is located mid chest and is described as sharp. Worsens with deep breath and is reproducible. She's denies injury, incrased activity, fever, chills, and cough.She recently moved from [place], [place] to [place]. She is also looking to establish a Cardiologist as well.     In ED, intiial troponin negative. Initial EKG shows atrial fibrillation. Labs are stable. Last Stress test and Echocardiogram in 2018. She was noted to have a low grade fever of 100.4. Hospital medicine has been asked to place patient in observation for further work up.     Past Medical History:   Diagnosis Date    A-fib     A-fib     Pace maker put in 5/12/20    Allergy     Anxiety     Asthma     cough variant    Cancer 2018    basal cell c    Colon polyp     COPD (chronic obstructive pulmonary disease)     Hypertension        Past Surgical History:   Procedure Laterality Date    IMPLANTATION OF BIVENTRICULAR HEART PACEMAKER  05/12/2020    Loop recorder also in but not working right now       Review of patient's allergies indicates:   Allergen Reactions    Lasix [furosemide]        No current facility-administered medications on file prior to encounter.      Current Outpatient Medications on File Prior to Encounter   Medication Sig    apixaban (ELIQUIS)  5 mg Tab Take 5 mg by mouth 2 (two) times daily.    dronedarone (MULTAQ) 400 mg Tab Take 400 mg by mouth 2 (two) times daily with meals.    lisinopriL 10 MG tablet Take 10 mg by mouth once daily.    LORazepam (ATIVAN) 0.5 MG tablet Take 0.5 mg by mouth daily as needed for Anxiety.    omega-3 fatty acids/fish oil (FISH OIL-OMEGA-3 FATTY ACIDS) 300-1,000 mg capsule Take 1 capsule by mouth once daily.    pravastatin (PRAVACHOL) 40 MG tablet Take 40 mg by mouth once daily.    albuterol (PROVENTIL/VENTOLIN HFA) 90 mcg/actuation inhaler Inhale into the lungs.    cetirizine (ZYRTEC) 10 MG tablet Take 10 mg by mouth.    fluticasone-salmeterol diskus inhaler 250-50 mcg Inhale 1 puff into the lungs.    [DISCONTINUED] aspirin (ECOTRIN) 81 MG EC tablet Take 81 mg by mouth.    [DISCONTINUED] diltiaZEM (CARDIZEM) 30 MG tablet Take 30 mg by mouth.     Family History     Problem Relation (Age of Onset)    Cancer Mother, Father, Brother        Tobacco Use    Smoking status: Former Smoker    Smokeless tobacco: Never Used    Tobacco comment: Quit 30 - 40 years ago   Substance and Sexual Activity    Alcohol use: Yes     Comment: Wine Occasionally     Drug use: Never    Sexual activity: Not Currently     Review of Systems   Constitutional: Negative for activity change, diaphoresis, fatigue and unexpected weight change.   HENT: Negative for congestion, ear pain and sore throat.    Eyes: Negative.    Respiratory: Negative for shortness of breath (shallow breaths related to pleurisy) and wheezing.    Cardiovascular: Positive for chest pain. Negative for palpitations.   Gastrointestinal: Negative for abdominal pain, constipation, diarrhea and vomiting.   Endocrine: Negative.    Genitourinary: Negative for flank pain, hematuria and urgency.   Musculoskeletal: Negative for joint swelling and neck pain.   Skin: Negative for pallor.   Neurological: Negative for seizures, syncope and light-headedness.   Hematological: Negative.     Psychiatric/Behavioral: Negative.      Objective:     Vital Signs (Most Recent):  Temp: 99.6 °F (37.6 °C) (10/19/20 1522)  Pulse: 76 (10/19/20 1522)  Resp: 18 (10/19/20 1522)  BP: (!) 117/59 (10/19/20 1522)  SpO2: 96 % (10/19/20 1522) Vital Signs (24h Range):  Temp:  [98.9 °F (37.2 °C)-100.4 °F (38 °C)] 99.6 °F (37.6 °C)  Pulse:  [60-93] 76  Resp:  [18-28] 18  SpO2:  [93 %-97 %] 96 %  BP: (106-143)/(55-97) 117/59     Weight: 99.8 kg (220 lb)  Body mass index is 38.97 kg/m².    Physical Exam  Constitutional:       Appearance: She is well-developed.   HENT:      Head: Normocephalic and atraumatic.   Neck:      Musculoskeletal: Normal range of motion and neck supple.   Cardiovascular:      Rate and Rhythm: Normal rate. Rhythm irregularly irregular.      Heart sounds: Normal heart sounds. No murmur.   Pulmonary:      Effort: Pulmonary effort is normal. No respiratory distress.      Breath sounds: Normal breath sounds.   Abdominal:      General: Bowel sounds are normal. There is no distension.      Palpations: Abdomen is soft.      Tenderness: There is no abdominal tenderness.   Musculoskeletal: Normal range of motion.   Skin:     General: Skin is warm and dry.   Neurological:      Mental Status: She is alert and oriented to person, place, and time.             Significant Labs:   CBC:   Recent Labs   Lab 10/19/20  0642   WBC 9.99   HGB 13.9   HCT 43.8        CMP:   Recent Labs   Lab 10/19/20  0642      K 4.2      CO2 27   GLU 88   BUN 17   CREATININE 1.1   CALCIUM 9.3   PROT 6.5   ALBUMIN 3.8   BILITOT 0.5   ALKPHOS 56   AST 14   ALT 15   ANIONGAP 9   EGFRNONAA 50.6*       Significant Imaging:   Imaging Results          X-Ray Chest AP Portable (Final result)  Result time 10/19/20 07:28:31    Final result by Arnulfo Tinoco MD (10/19/20 07:28:31)                 Impression:      No acute process seen.      Electronically signed by: Arnulfo Tinoco MD  Date:    10/19/2020  Time:    07:28              Narrative:    EXAMINATION:  XR CHEST AP PORTABLE    CLINICAL HISTORY:  palpitations;    FINDINGS:  Single view of the chest.    Cardiac silhouette is enlarged.  Aorta demonstrates atherosclerotic disease.  Left-sided pacing wires are noted.  The lungs demonstrate no evidence of active disease.  No evidence of pleural effusion or pneumothorax.  Bones demonstrate degenerative change.                                  Assessment/Plan:     Atrial fibrillation  --resume Multaq and Eliquis  --will need to establish a cardiologist outpatient        Chest pain  Atypical in presentation with pleurisy and is reproducile.   --CXR negative, but has low grade fever; IS hourly  --initial cardiac enzymes negative   --trend troponin  --Echocardiogram  --cardiac monitoring  --AFID rate currently controlled  --scheduled antiinflammatories x 2 doses      HTN (hypertension)  --resume lisinopril      Hyperlipidemia  --resume STATIN        VTE Risk Mitigation (From admission, onward)         Ordered     apixaban tablet 5 mg  2 times daily      10/19/20 2326                Jesús De Los Santos NP  Department of Hospital Medicine   [facility]

## 2020-10-20 NOTE — PLAN OF CARE
POC reviewed with pt; pt able to verbalize understanding  Pt able to verbalize needs; denies pain or discomfort at this time  AAOx4; VSS; NSR on tele monitor  NC 2L  Pacemaker in place since 5/2020  Pt free of falls  Safety precautions maintained

## 2020-10-21 NOTE — DISCHARGE SUMMARY
[facility] Hospital Medicine  Discharge Summary      Patient Name: Antonio Urena  MRN: [id number]  Admission Date: 10/19/2020  Hospital Length of Stay: 0 days  Discharge Date and Time: 10/20/2020  3:38 PM  Attending Physician:  Kwabena Augustine MD  Discharging Provider: Kwabena Augustine MD  Primary Care Provider: Primary Doctor No      HPI:   Antonio Urena is a 70 year old female with history of asthma, atrial fibrillation,  S/p PPM, COPD, and hypertension who presents to ED for further evaluation of chest pain onset of symptoms that began 1 day ago. Pain is located mid chest and is described as sharp. Worsens with deep breath and is reproducible. She's denies injury, incrased activity, fever, chills, and cough.She recently moved from [place], [place] to [place]. She is also looking to establish a Cardiologist as well.     In ED, intiial troponin negative. Initial EKG shows atrial fibrillation. Labs are stable. Last Stress test and Echocardiogram in 2018. She was noted to have a low grade fever of 100.4. Hospital medicine has been asked to place patient in observation for further work up.     * No surgery found *      Hospital Course:   Place an observation for evaluation and treatment of acute chest pain.  EKG showed atrial fibrillation. No ischemic changes. Serial cardiac enzymes remained within normal limits. Patient's chest pain resolved. Discharge plan to return home resume home medication regimen follow up with cardiology to establish care.     Consults:     No new Assessment & Plan notes have been filed under this hospital service since the last note was generated.  Service: Hospital Medicine    Final Active Diagnoses:    Diagnosis Date Noted POA    PRINCIPAL PROBLEM:  Chest pain [R07.9] 10/19/2020 Yes    Atrial fibrillation [I48.91] 10/19/2020 Yes    HTN (hypertension) [I10] 09/10/2012 Yes    Hyperlipidemia [E78.5] 09/10/2012 Yes      Problems Resolved During this  Admission:       Discharged Condition: good    Disposition: Home or Self Care    Follow Up:  Follow-up Information     Reza Ling MD In 1 week.    Specialty: Cardiology  Why: Hospital follow up management of atrial fibrillation.  Contact information:  [address] [facility]  [place] [address] [phone]                 Patient Instructions:      Diet Cardiac     Activity as tolerated       Significant Diagnostic Studies: Labs: All labs within the past 24 hours have been reviewed    Pending Diagnostic Studies:     None         Medications:  Reconciled Home Medications:      Medication List      CONTINUE taking these medications    albuterol 90 mcg/actuation inhaler  Commonly known as: PROVENTIL/VENTOLIN HFA  Inhale into the lungs.     cetirizine 10 MG tablet  Commonly known as: ZYRTEC  Take 10 mg by mouth.     ELIQUIS 5 mg Tab  Generic drug: apixaban  Take 5 mg by mouth 2 (two) times daily.     fish oil-omega-3 fatty acids 300-1,000 mg capsule  Take 1 capsule by mouth once daily.     fluticasone-salmeterol 250-50 mcg/dose 250-50 mcg/dose diskus inhaler  Commonly known as: ADVAIR  Inhale 1 puff into the lungs.     lisinopriL 10 MG tablet  Take 10 mg by mouth once daily.     LORazepam 0.5 MG tablet  Commonly known as: ATIVAN  Take 0.5 mg by mouth daily as needed for Anxiety.     MULTAQ 400 mg Tab  Generic drug: dronedarone  Take 400 mg by mouth 2 (two) times daily with meals.     pravastatin 40 MG tablet  Commonly known as: PRAVACHOL  Take 40 mg by mouth once daily.        STOP taking these medications    aspirin 81 MG EC tablet  Commonly known as: ECOTRIN            Indwelling Lines/Drains at time of discharge:   Lines/Drains/Airways     None                 Time spent on the discharge of patient: This30 minutes  Patient was seen and examined on the date of discharge and determined to be suitable for discharge.         Kwabena Augustine MD  Department of Hospital Medicine  [facility]

## 2020-10-21 NOTE — HOSPITAL COURSE
Place an observation for evaluation and treatment of acute chest pain.  EKG showed atrial fibrillation. No ischemic changes. Serial cardiac enzymes remained within normal limits. Patient's chest pain resolved. Discharge plan to return home resume home medication regimen follow up with cardiology to establish care.

## 2020-10-23 NOTE — PROGRESS NOTES
Subjective:   Patient ID:  Antonio Urena is a 70 y.o. female who presents for evaluation of No chief complaint on file.  Patient denies chest pain, angina or symptoms associated with anginal equivalent.  Patient otherwise doing well on chronic medications.  Patient doing well on current medications and recheck a pacemaker today shows normal function. PAF and on multaq.  Intermittent irregular heart rate since moving to this area. Also, complains of chest pain with PAF  And some other times.    Pleasant patient presents for new patient visit as she has moved from [place]. Stable with history of afib  And pacemaker.    Family History   Problem Relation Age of Onset    Cancer Mother     Cancer Father     Cancer Brother      Past Medical History:   Diagnosis Date    A-fib     A-fib     Pace maker put in 5/12/20    Allergy     Anxiety     Asthma     cough variant    Cancer 2018    basal cell c    Colon polyp     COPD (chronic obstructive pulmonary disease)     Hypertension      Social History     Socioeconomic History    Marital status:      Spouse name: Not on file    Number of children: Not on file    Years of education: Not on file    Highest education level: Not on file   Occupational History    Not on file   Social Needs    Financial resource strain: Not on file    Food insecurity     Worry: Not on file     Inability: Not on file    Transportation needs     Medical: Not on file     Non-medical: Not on file   Tobacco Use    Smoking status: Former Smoker    Smokeless tobacco: Never Used    Tobacco comment: Quit 30 - 40 years ago   Substance and Sexual Activity    Alcohol use: Yes     Comment: Wine Occasionally     Drug use: Never    Sexual activity: Not Currently   Lifestyle    Physical activity     Days per week: Not on file     Minutes per session: Not on file    Stress: Not on file   Relationships    Social connections     Talks on phone: Not on file     Gets together: Not  on file     Attends [employer] service: Not on file     Active member of club or organization: Not on file     Attends meetings of clubs or organizations: Not on file     Relationship status: Not on file   Other Topics Concern    Not on file   Social History Narrative    Not on file     Current Outpatient Medications on File Prior to Visit   Medication Sig Dispense Refill    albuterol (PROVENTIL/VENTOLIN HFA) 90 mcg/actuation inhaler Inhale into the lungs.      apixaban (ELIQUIS) 5 mg Tab Take 5 mg by mouth 2 (two) times daily.      cetirizine (ZYRTEC) 10 MG tablet Take 10 mg by mouth.      dronedarone (MULTAQ) 400 mg Tab Take 400 mg by mouth 2 (two) times daily with meals.      fluticasone-salmeterol diskus inhaler 250-50 mcg Inhale 1 puff into the lungs.      lisinopriL 10 MG tablet Take 10 mg by mouth once daily.      LORazepam (ATIVAN) 0.5 MG tablet Take 0.5 mg by mouth daily as needed for Anxiety.      omega-3 fatty acids/fish oil (FISH OIL-OMEGA-3 FATTY ACIDS) 300-1,000 mg capsule Take 1 capsule by mouth once daily.      pravastatin (PRAVACHOL) 40 MG tablet Take 40 mg by mouth once daily.       No current facility-administered medications on file prior to visit.      Review of patient's allergies indicates:   Allergen Reactions    Lasix [furosemide]        Review of Systems   Constitution: Negative for chills, diaphoresis, night sweats, weight gain and weight loss.   HENT: Negative for congestion, hoarse voice, sore throat and stridor.    Eyes: Negative for double vision and pain.   Cardiovascular: Negative for chest pain, claudication, cyanosis, dyspnea on exertion, irregular heartbeat, leg swelling, near-syncope, orthopnea, palpitations, paroxysmal nocturnal dyspnea and syncope.   Respiratory: Negative for cough, hemoptysis, shortness of breath, sleep disturbances due to breathing, snoring, sputum production and wheezing.    Endocrine: Negative for cold intolerance, heat intolerance and  polydipsia.   Hematologic/Lymphatic: Negative for bleeding problem. Does not bruise/bleed easily.   Skin: Negative for color change, dry skin and rash.   Musculoskeletal: Negative for joint swelling and muscle cramps.   Gastrointestinal: Negative for bloating, abdominal pain, constipation, diarrhea, dysphagia, melena, nausea and vomiting.   Genitourinary: Negative for flank pain and urgency.   Neurological: Negative for dizziness, focal weakness, headaches, light-headedness, loss of balance, seizures and weakness.   Psychiatric/Behavioral: Negative for altered mental status and memory loss. The patient is not nervous/anxious.          Objective:     Physical Exam   Constitutional: She is oriented to person, place, and time. She appears well-developed and well-nourished.   HENT:   Head: Normocephalic.   Eyes: Pupils are equal, round, and reactive to light.   Neck: Normal range of motion. No tracheal deviation present.   Cardiovascular: Normal rate, regular rhythm, normal heart sounds and intact distal pulses. Exam reveals no gallop and no friction rub.   No murmur heard.  Pulses:       Carotid pulses are 2+ on the right side and 2+ on the left side.       Radial pulses are 2+ on the right side and 2+ on the left side.        Femoral pulses are 2+ on the right side and 2+ on the left side.       Popliteal pulses are 2+ on the right side and 2+ on the left side.        Dorsalis pedis pulses are 2+ on the right side and 2+ on the left side.        Posterior tibial pulses are 2+ on the right side and 2+ on the left side.   Pulmonary/Chest: Effort normal and breath sounds normal. No stridor. No respiratory distress. She has no wheezes. She has no rales. She exhibits no tenderness.   Abdominal: She exhibits no distension. There is no abdominal tenderness. There is no rebound.   Musculoskeletal:         General: No tenderness or edema.   Neurological: She is alert and oriented to person, place, and time.   Skin: Skin is  warm and dry.      Conclusion Cardiac echo 10/20    · The left ventricle is normal in size with normal systolic function. The estimated ejection fraction is 55%.  · There is left ventricular concentric remodeling.  · Mild left atrial enlargement.  · Indeterminate diastolic function.  · Normal right ventricular systolic function.  · Normal central venous pressure (3 mmHg).  · The estimated PA systolic pressure is 29 mmHg.  · Small circumferential pericardial effusion. Effusion is fluid.            Assessment:     1. Permanent atrial fibrillation    2. Palpitations    3. Chest pain, unspecified type    4. SOB (shortness of breath)    5. Cardiac pacemaker in situ    6. Hyperlipidemia, unspecified hyperlipidemia type          Plan:     1.  Permanent atrial fibrillation:  Patient will continue on current rate control medications and anticoagulation.  2.  2.  Palpitations patient under control this time, will follow  3.  Chest discomfort nonspecified:  Planned follow-up stress test with stress echo in the future.  The patient has had stress testing done in the past which has shown normal function.  4.  Cardiac pacemaker:  Evaluation today reveals normal function, patient entered a cardiac pacemaker clinic.  This is a Biotronik pacemaker.  Holter monitor be done to assess irregularity.  5.  Hyperlipidemia:  Plan recheck a lipid profile.  Patient might have irregular heart rhythms and PAF and will decide on medical management in the near future.  The patient was on metoprolol in the past and this was held due to recurrence of asthmatic events.  I will see the patient back within several weeks.  Will evaluate heart rate response.Also evaluation of the Biotronik pacemaker.

## 2020-10-26 ENCOUNTER — OFFICE VISIT (OUTPATIENT)
Dept: CARDIOLOGY | Facility: CLINIC | Age: 70
End: 2020-10-26
Payer: MEDICARE

## 2020-10-26 ENCOUNTER — OFFICE VISIT (OUTPATIENT)
Dept: DERMATOLOGY | Facility: CLINIC | Age: 70
End: 2020-10-26
Payer: MEDICARE

## 2020-10-26 VITALS
BODY MASS INDEX: 39.18 KG/M2 | OXYGEN SATURATION: 97 % | SYSTOLIC BLOOD PRESSURE: 140 MMHG | HEIGHT: 63 IN | RESPIRATION RATE: 16 BRPM | DIASTOLIC BLOOD PRESSURE: 88 MMHG | HEART RATE: 84 BPM | WEIGHT: 221.13 LBS

## 2020-10-26 DIAGNOSIS — R00.2 PALPITATIONS: ICD-10-CM

## 2020-10-26 DIAGNOSIS — R06.02 SOB (SHORTNESS OF BREATH): ICD-10-CM

## 2020-10-26 DIAGNOSIS — T82.9XXS DISORDER OF CARDIAC PACEMAKER SYSTEM, SEQUELA: ICD-10-CM

## 2020-10-26 DIAGNOSIS — Z85.828 HISTORY OF NONMELANOMA SKIN CANCER: Primary | ICD-10-CM

## 2020-10-26 DIAGNOSIS — D18.00 HEMANGIOMA, UNSPECIFIED SITE: ICD-10-CM

## 2020-10-26 DIAGNOSIS — Z95.0 CARDIAC PACEMAKER IN SITU: Primary | ICD-10-CM

## 2020-10-26 DIAGNOSIS — R07.9 CHEST PAIN, UNSPECIFIED TYPE: ICD-10-CM

## 2020-10-26 DIAGNOSIS — Z12.83 SCREENING EXAM FOR SKIN CANCER: ICD-10-CM

## 2020-10-26 DIAGNOSIS — L82.1 SEBORRHEIC KERATOSES: ICD-10-CM

## 2020-10-26 DIAGNOSIS — I48.21 PERMANENT ATRIAL FIBRILLATION: ICD-10-CM

## 2020-10-26 DIAGNOSIS — E78.5 HYPERLIPIDEMIA, UNSPECIFIED HYPERLIPIDEMIA TYPE: ICD-10-CM

## 2020-10-26 DIAGNOSIS — D22.9 MULTIPLE BENIGN NEVI: ICD-10-CM

## 2020-10-26 DIAGNOSIS — L81.4 SOLAR LENTIGO: ICD-10-CM

## 2020-10-26 PROCEDURE — 99205 OFFICE O/P NEW HI 60 MIN: CPT | Mod: HCWC,S$GLB,, | Performed by: INTERNAL MEDICINE

## 2020-10-26 PROCEDURE — 99203 PR OFFICE/OUTPT VISIT, NEW, LEVL III, 30-44 MIN: ICD-10-PCS | Mod: HCWC,S$GLB,, | Performed by: STUDENT IN AN ORGANIZED HEALTH CARE EDUCATION/TRAINING PROGRAM

## 2020-10-26 PROCEDURE — 1159F PR MEDICATION LIST DOCUMENTED IN MEDICAL RECORD: ICD-10-PCS | Mod: HCWC,S$GLB,, | Performed by: STUDENT IN AN ORGANIZED HEALTH CARE EDUCATION/TRAINING PROGRAM

## 2020-10-26 PROCEDURE — 1159F PR MEDICATION LIST DOCUMENTED IN MEDICAL RECORD: ICD-10-PCS | Mod: HCWC,S$GLB,, | Performed by: INTERNAL MEDICINE

## 2020-10-26 PROCEDURE — 99203 OFFICE O/P NEW LOW 30 MIN: CPT | Mod: HCWC,S$GLB,, | Performed by: STUDENT IN AN ORGANIZED HEALTH CARE EDUCATION/TRAINING PROGRAM

## 2020-10-26 PROCEDURE — 3008F PR BODY MASS INDEX (BMI) DOCUMENTED: ICD-10-PCS | Mod: HCWC,CPTII,S$GLB, | Performed by: INTERNAL MEDICINE

## 2020-10-26 PROCEDURE — 99999 PR PBB SHADOW E&M-EST. PATIENT-LVL III: ICD-10-PCS | Mod: PBBFAC,HCWC,, | Performed by: STUDENT IN AN ORGANIZED HEALTH CARE EDUCATION/TRAINING PROGRAM

## 2020-10-26 PROCEDURE — 99999 PR PBB SHADOW E&M-EST. PATIENT-LVL IV: ICD-10-PCS | Mod: PBBFAC,HCWC,, | Performed by: INTERNAL MEDICINE

## 2020-10-26 PROCEDURE — 1159F MED LIST DOCD IN RCRD: CPT | Mod: HCWC,S$GLB,, | Performed by: INTERNAL MEDICINE

## 2020-10-26 PROCEDURE — 99205 PR OFFICE/OUTPT VISIT, NEW, LEVL V, 60-74 MIN: ICD-10-PCS | Mod: HCWC,S$GLB,, | Performed by: INTERNAL MEDICINE

## 2020-10-26 PROCEDURE — 99999 PR PBB SHADOW E&M-EST. PATIENT-LVL III: CPT | Mod: PBBFAC,HCWC,, | Performed by: STUDENT IN AN ORGANIZED HEALTH CARE EDUCATION/TRAINING PROGRAM

## 2020-10-26 PROCEDURE — 99999 PR PBB SHADOW E&M-EST. PATIENT-LVL IV: CPT | Mod: PBBFAC,HCWC,, | Performed by: INTERNAL MEDICINE

## 2020-10-26 PROCEDURE — 3008F BODY MASS INDEX DOCD: CPT | Mod: HCWC,CPTII,S$GLB, | Performed by: INTERNAL MEDICINE

## 2020-10-26 PROCEDURE — 1159F MED LIST DOCD IN RCRD: CPT | Mod: HCWC,S$GLB,, | Performed by: STUDENT IN AN ORGANIZED HEALTH CARE EDUCATION/TRAINING PROGRAM

## 2020-10-26 NOTE — LETTER
October 26, 2020      Delmar Méndez MD  [id number] [facility] [address] [facility] [place] [address]           [facility]  [address]  [place] [address]  Phone: [phone]  Fax: [phone]          Patient: Antonio Urena   MR Number: [id number]   YOB: 1950   Date of Visit: 10/26/2020       Dear Dr. Delmar Méndez:    Thank you for referring Antonio Urena to me for evaluation. Attached you will find relevant portions of my assessment and plan of care.    If you have questions, please do not hesitate to call me. I look forward to following Antonio Urena along with you.    Sincerely,    Reza Ling MD    Enclosure  CC:  No Recipients    If you would like to receive this communication electronically, please contact [email or web address] or [phone] to request more information on [employer] Link access.    For providers and/or their staff who would like to refer a patient to [facility], please contact us through our one-stop-shop provider referral line, [facility], at [phone].    If you feel you have received this communication in error or would no longer like to receive these types of communications, please e-mail [email or web address]

## 2020-10-26 NOTE — LETTER
October 26, 2020      Delmar Méndez MD  [address] [facility] [place] [address]           [facility]at[facility]  [address] [facility] [address]  Phone: [phone]  Fax: [phone]          Patient: Antonio Urena   MR Number: [id number]   YOB: 1950   Date of Visit: 10/26/2020       Dear Dr. Delmar Méndez:    Thank you for referring Antonio Urena to me for evaluation. Attached you will find relevant portions of my assessment and plan of care.    If you have questions, please do not hesitate to call me. I look forward to following Antonio Urena along with you.    Sincerely,    Ansley Cobos MD    Enclosure  CC:  No Recipients    If you would like to receive this communication electronically, please contact [email or web address] or [phone] to request more information on [employer] Link access.    For providers and/or their staff who would like to refer a patient to [facility], please contact us through our one-stop-shop provider referral line, [facility], at [phone].    If you feel you have received this communication in error or would no longer like to receive these types of communications, please e-mail [email or web address]

## 2020-10-26 NOTE — PATIENT INSTRUCTIONS
Living with a Pacemaker  When you have a pacemaker, you can do almost everything you did before your surgery. Here are tips for living well with a pacemaker.    Carry an ID card  When you first get your pacemaker, youll be given an ID card to carry. This card contains important information about the device. Show it to any doctor, dentist, or other provider you visit. Pacemakers may set off metal detectors. So you may need to show your card to security personnel, such as those in the airport security checkpoint.  What to avoid  · Be careful when using a cell phone. Hold it to the ear farthest from your pacemaker, or use a headset. Dont carry the phone in your breast pocket, over the pacemaker, even when its turned off.  · Avoid very strong magnets. These include those used for an MRI or in hand-held security wands. Some pacemaker devices are considered safer for having an MRI (MR-conditional devices) but safety precautions must still be used. Show your ID card when you go through security.  · Avoid strong electrical fields. These are made by radio transmitting towers and ham radios. They are also made by heavy-duty electrical equipment. A running engine makes an electrical field. Don't lean over the open leung of a running car. Most household and yard appliances will not cause any problems. If you use any large power tools, such as an industrial , talk to your doctor.   · Call your doctor if you have any symptoms. These include dizziness or palpitations.  Whats OK  Below are some of the many things that are safe to use:  · Microwave ovens  · Computers  · Hair dryers  · Power tools  · Radios, televisions, and CD players  · Electric blankets and heating pads  · Vacuum   · Cars  Follow up  Plan to have periodic checkps with your healthcare provider to evaluate the battery life of your pacemaker. Depending on your device and how much your body uses the pacing functions of the pacemaker, you will  need a new pacemaker generator implanted at some point, usually about every 5 to 7 years. On average, this monitoring should happen every 6 months, or as advised by your healthcare provider.  For some devices, the monitoring of the device function and battery-life can be done with a remote monitor that can be set up in your home. Remote monitoring systems use the internet or telephone to communicate the information from your device to your healthcare provider.  Date Last Reviewed: 5/1/2016  © 7863-3147 [employer] [address] [place] [address]. All rights reserved. This information is not intended as a substitute for professional medical care. Always follow your healthcare professional's instructions.

## 2020-10-26 NOTE — PROGRESS NOTES
Subjective:       Patient ID:  Antonio Urena is a 70 y.o. female who presents for   Chief Complaint   Patient presents with    Skin Check     History of Present Illness: The patient presents with chief complaint of skin check.  Location: right arm  Duration: 1 year  Signs/Symptoms: scaly  Prior treatments: none    Patient with hx of BCCs (one on left temple, and one on left jawline). Patient has had long hx of heavy sun exposure. This is a high risk patient here to check for the development of new lesions.        Review of Systems   Skin: Positive for daily sunscreen use (SPF 30), activity-related sunscreen use and wears hat. Negative for itching, rash and recent sunburn.   Hematologic/Lymphatic: Bruises/bleeds easily.        Objective:    Physical Exam   Constitutional: She appears well-developed and well-nourished. No distress.   Neurological: She is alert and oriented to person, place, and time. She is not disoriented.   Psychiatric: She has a normal mood and affect.   Skin:   Areas Examined (abnormalities noted in diagram):   Scalp / Hair Palpated and Inspected  Head / Face Inspection Performed  Neck Inspection Performed  Chest / Axilla Inspection Performed  Abdomen Inspection Performed  Genitals / Buttocks / Groin Inspection Performed  Back Inspection Performed  RUE Inspected  LUE Inspection Performed  RLE Inspected  LLE Inspection Performed  Nails and Digits Inspection Performed                   Diagram Legend     Erythematous scaling macule/papule c/w actinic keratosis       Vascular papule c/w angioma      Pigmented verrucoid papule/plaque c/w seborrheic keratosis      Yellow umbilicated papule c/w sebaceous hyperplasia      Irregularly shaped tan macule c/w lentigo     1-2 mm smooth white papules consistent with Milia      Movable subcutaneous cyst with punctum c/w epidermal inclusion cyst      Subcutaneous movable cyst c/w pilar cyst      Firm pink to brown papule c/w dermatofibroma       Pedunculated fleshy papule(s) c/w skin tag(s)      Evenly pigmented macule c/w junctional nevus     Mildly variegated pigmented, slightly irregular-bordered macule c/w mildly atypical nevus      Flesh colored to evenly pigmented papule c/w intradermal nevus       Pink pearly papule/plaque c/w basal cell carcinoma      Erythematous hyperkeratotic cursted plaque c/w SCC      Surgical scar with no sign of skin cancer recurrence      Open and closed comedones      Inflammatory papules and pustules      Verrucoid papule consistent consistent with wart     Erythematous eczematous patches and plaques     Dystrophic onycholytic nail with subungual debris c/w onychomycosis     Umbilicated papule    Erythematous-base heme-crusted tan verrucoid plaque consistent with inflamed seborrheic keratosis     Erythematous Silvery Scaling Plaque c/w Psoriasis     See annotation      Assessment / Plan:        History of nonmelanoma skin cancer  Area of previous nonmelanoma examined. Site well healed with no signs of recurrence.  Total body skin examination performed today including at least 12 points as noted in physical examination. No lesions suspicious for malignancy noted.    Screening exam for skin cancer  - Continue sun protection    Hemangioma, unspecified site  This is a benign vascular lesion. Reassurance given. No treatment required.     Solar lentigo  This is a benign hyperpigmented sun induced lesion. Daily sun protection will reduce the number of new lesions. Treatment of these benign lesions are considered cosmetic.    Seborrheic keratoses  These are benign inherited growths without a malignant potential. Reassurance given to patient. No treatment is necessary.     Multiple benign nevi  Discussed ABCDE's of nevi.  Monitor for new mole or moles that are becoming bigger, darker, irritated, or developing irregular borders. Brochure provided.             Follow up in about 1 year (around 10/26/2021).

## 2020-10-29 ENCOUNTER — TELEPHONE (OUTPATIENT)
Dept: CARDIOLOGY | Facility: CLINIC | Age: 70
End: 2020-10-29

## 2020-10-29 NOTE — TELEPHONE ENCOUNTER
Talked to pt about starting steroids, per Dr Ling she could start steroids, pt vu        ----- Message from Reza Ling MD sent at 10/29/2020  8:12 AM CDT -----  Called , no answer, she can use steroids if you can reach the patient.  ----- Message -----  From: Arlet Morales LPN  Sent: 10/28/2020  10:32 AM CDT  To: Reza Ling MD    Please advise, Thanks      ----- Message -----  From: Hayde Art  Sent: 10/28/2020  10:14 AM CDT  To: Sushil MORGAN Staff    Pt would like to know if she can start of steroids for SOB. She wants to know if this ok? Please call pt back  [phone].

## 2020-11-02 ENCOUNTER — HOSPITAL ENCOUNTER (OUTPATIENT)
Dept: CARDIOLOGY | Facility: HOSPITAL | Age: 70
Discharge: HOME | End: 2020-11-02
Attending: INTERNAL MEDICINE
Payer: MEDICARE

## 2020-11-02 DIAGNOSIS — Z95.0 CARDIAC PACEMAKER IN SITU: ICD-10-CM

## 2020-11-02 DIAGNOSIS — R00.2 PALPITATIONS: ICD-10-CM

## 2020-11-02 DIAGNOSIS — R07.9 CHEST PAIN, UNSPECIFIED TYPE: ICD-10-CM

## 2020-11-02 DIAGNOSIS — T82.9XXS DISORDER OF CARDIAC PACEMAKER SYSTEM, SEQUELA: ICD-10-CM

## 2020-11-02 DIAGNOSIS — I48.21 PERMANENT ATRIAL FIBRILLATION: ICD-10-CM

## 2020-11-02 DIAGNOSIS — E78.5 HYPERLIPIDEMIA, UNSPECIFIED HYPERLIPIDEMIA TYPE: ICD-10-CM

## 2020-11-02 DIAGNOSIS — R06.02 SOB (SHORTNESS OF BREATH): ICD-10-CM

## 2020-11-02 PROCEDURE — 93225 XTRNL ECG REC<48 HRS REC: CPT | Mod: HCWC,PO

## 2020-11-02 PROCEDURE — 93227 XTRNL ECG REC<48 HR R&I: CPT | Mod: HCWC,,, | Performed by: INTERNAL MEDICINE

## 2020-11-02 PROCEDURE — 93227 HOLTER MONITOR - 48 HOUR (CUPID ONLY): ICD-10-PCS | Mod: HCWC,,, | Performed by: INTERNAL MEDICINE

## 2020-11-03 NOTE — PROGRESS NOTES
Subjective:   Patient ID:  Antonio Urena is a 70 y.o. female who presents for follow-up of No chief complaint on file.  Patient denies chest pain, angina or symptoms associated with anginal equivalent.  Has greatly improved with less shortness of breath.  She has been on steroid medication and this has improved her asthma.  No recurrence symptoms chest pain or acute shortness breath.  Holter monitor is pending.  The patient also in a enters into the pacemaker clinic.    This patient presents for evaluation of cardiac echo which showed normal LV function and Holter monitor which is in process.      Review of Systems   Constitution: Negative for chills, diaphoresis, night sweats, weight gain and weight loss.   HENT: Negative for congestion, hoarse voice, sore throat and stridor.    Eyes: Negative for double vision and pain.   Cardiovascular: Negative for chest pain, claudication, cyanosis, dyspnea on exertion, irregular heartbeat, leg swelling, near-syncope, orthopnea, palpitations, paroxysmal nocturnal dyspnea and syncope.   Respiratory: Negative for cough, hemoptysis, shortness of breath, sleep disturbances due to breathing, snoring, sputum production and wheezing.    Endocrine: Negative for cold intolerance, heat intolerance and polydipsia.   Hematologic/Lymphatic: Negative for bleeding problem. Does not bruise/bleed easily.   Skin: Negative for color change, dry skin and rash.   Musculoskeletal: Negative for joint swelling and muscle cramps.   Gastrointestinal: Negative for bloating, abdominal pain, constipation, diarrhea, dysphagia, melena, nausea and vomiting.   Genitourinary: Negative for flank pain and urgency.   Neurological: Negative for dizziness, focal weakness, headaches, light-headedness, loss of balance, seizures and weakness.   Psychiatric/Behavioral: Negative for altered mental status and memory loss. The patient is not nervous/anxious.      Family History   Problem Relation Age of Onset    Cancer  Mother     Cancer Father     Cancer Brother      Past Medical History:   Diagnosis Date    A-fib     A-fib     Pace maker put in 5/12/20    Allergy     Anxiety     Asthma     cough variant    Cancer 2018    basal cell c    Colon polyp     COPD (chronic obstructive pulmonary disease)     Hypertension      Social History     Socioeconomic History    Marital status:      Spouse name: Not on file    Number of children: Not on file    Years of education: Not on file    Highest education level: Not on file   Occupational History    Not on file   Social Needs    Financial resource strain: Not on file    Food insecurity     Worry: Not on file     Inability: Not on file    Transportation needs     Medical: Not on file     Non-medical: Not on file   Tobacco Use    Smoking status: Former Smoker    Smokeless tobacco: Never Used    Tobacco comment: Quit 30 - 40 years ago   Substance and Sexual Activity    Alcohol use: Yes     Comment: Wine Occasionally     Drug use: Never    Sexual activity: Not Currently   Lifestyle    Physical activity     Days per week: Not on file     Minutes per session: Not on file    Stress: Not on file   Relationships    Social connections     Talks on phone: Not on file     Gets together: Not on file     Attends Synagogue service: Not on file     Active member of club or organization: Not on file     Attends meetings of clubs or organizations: Not on file     Relationship status: Not on file   Other Topics Concern    Not on file   Social History Narrative    Not on file     Current Outpatient Medications on File Prior to Visit   Medication Sig Dispense Refill    albuterol (PROVENTIL/VENTOLIN HFA) 90 mcg/actuation inhaler Inhale into the lungs.      apixaban (ELIQUIS) 5 mg Tab Take 5 mg by mouth 2 (two) times daily.      cetirizine (ZYRTEC) 10 MG tablet Take 10 mg by mouth.      dronedarone (MULTAQ) 400 mg Tab Take 400 mg by mouth 2 (two) times daily with meals.       fluticasone-salmeterol diskus inhaler 250-50 mcg Inhale 1 puff into the lungs.      lisinopriL 10 MG tablet Take 10 mg by mouth once daily.      LORazepam (ATIVAN) 0.5 MG tablet Take 0.5 mg by mouth daily as needed for Anxiety.      omega-3 fatty acids/fish oil (FISH OIL-OMEGA-3 FATTY ACIDS) 300-1,000 mg capsule Take 1 capsule by mouth once daily.      pravastatin (PRAVACHOL) 40 MG tablet Take 40 mg by mouth once daily.       No current facility-administered medications on file prior to visit.      Review of patient's allergies indicates:   Allergen Reactions    Flecainide      Intolerance    Lasix [furosemide]        Objective:     Physical Exam   Constitutional: She is oriented to person, place, and time.   Eyes: Pupils are equal, round, and reactive to light.   Neck: Normal range of motion. No tracheal deviation present.   Cardiovascular: Normal rate, regular rhythm, normal heart sounds and intact distal pulses. Exam reveals no gallop and no friction rub.   No murmur heard.  Pulses:       Carotid pulses are 2+ on the right side and 2+ on the left side.       Radial pulses are 2+ on the right side and 2+ on the left side.        Femoral pulses are 2+ on the right side and 2+ on the left side.       Popliteal pulses are 2+ on the right side and 2+ on the left side.        Dorsalis pedis pulses are 2+ on the right side and 2+ on the left side.        Posterior tibial pulses are 2+ on the right side and 2+ on the left side.   Pulmonary/Chest: Effort normal and breath sounds normal. No stridor. No respiratory distress. She has no wheezes. She has no rales. She exhibits no tenderness.   Abdominal: She exhibits no distension. There is no abdominal tenderness. There is no rebound.   Musculoskeletal:         General: No tenderness or edema.   Neurological: She is alert and oriented to person, place, and time.   Skin: Skin is warm and dry.       Conclusion cardiac echo of 10/19/2020:    · The left ventricle is  normal in size with normal systolic function. The estimated ejection fraction is 55%.  · There is left ventricular concentric remodeling.  · Mild left atrial enlargement.  · Indeterminate diastolic function.  · Normal right ventricular systolic function.  · Normal central venous pressure (3 mmHg).  · The estimated PA systolic pressure is 29 mmHg.  · Small circumferential pericardial effusion. Effusion is fluid.             Troponin I 0.000 - 0.026 ng/mL <0.006  0.014 CM          Assessment:     1. Permanent atrial fibrillation    2. Hyperlipidemia, unspecified hyperlipidemia type    3. Palpitations    4. Disorder of cardiac pacemaker system, sequela    5. Chest pain, unspecified type    6. SOB (shortness of breath)    7. Cardiac pacemaker in situ        Plan:     Permanent atrial fibrillation    Hyperlipidemia, unspecified hyperlipidemia type    Palpitations    Disorder of cardiac pacemaker system, sequela    Chest pain, unspecified type    SOB (shortness of breath)    Cardiac pacemaker in situ      Patient stable and improved.  Less short of breath with no chest discomfort.  Cardiac echo did show small pericardial effusion I think it might been some slight degree of pericarditis involved as well.  Clinically patient is stable this time.  Patient will whole results of the Holter monitor today and I will call her with the results.  I will enter the patient pacemaker clinic.  Due to the tachycardia that she has had also started on diltiazem CD 1 20 mg daily.  Follow-up in about a month.

## 2020-11-05 ENCOUNTER — OFFICE VISIT (OUTPATIENT)
Dept: CARDIOLOGY | Facility: CLINIC | Age: 70
End: 2020-11-05
Payer: MEDICARE

## 2020-11-05 VITALS
WEIGHT: 223.13 LBS | DIASTOLIC BLOOD PRESSURE: 80 MMHG | OXYGEN SATURATION: 94 % | SYSTOLIC BLOOD PRESSURE: 130 MMHG | BODY MASS INDEX: 39.52 KG/M2 | HEART RATE: 109 BPM

## 2020-11-05 DIAGNOSIS — T82.9XXS DISORDER OF CARDIAC PACEMAKER SYSTEM, SEQUELA: ICD-10-CM

## 2020-11-05 DIAGNOSIS — I48.21 PERMANENT ATRIAL FIBRILLATION: ICD-10-CM

## 2020-11-05 DIAGNOSIS — E78.5 HYPERLIPIDEMIA, UNSPECIFIED HYPERLIPIDEMIA TYPE: ICD-10-CM

## 2020-11-05 DIAGNOSIS — R00.2 PALPITATIONS: ICD-10-CM

## 2020-11-05 DIAGNOSIS — R07.9 CHEST PAIN, UNSPECIFIED TYPE: ICD-10-CM

## 2020-11-05 DIAGNOSIS — Z95.0 CARDIAC PACEMAKER IN SITU: Primary | ICD-10-CM

## 2020-11-05 DIAGNOSIS — R06.02 SOB (SHORTNESS OF BREATH): ICD-10-CM

## 2020-11-05 PROCEDURE — 1159F PR MEDICATION LIST DOCUMENTED IN MEDICAL RECORD: ICD-10-PCS | Mod: HCWC,S$GLB,, | Performed by: INTERNAL MEDICINE

## 2020-11-05 PROCEDURE — 3008F BODY MASS INDEX DOCD: CPT | Mod: HCWC,CPTII,S$GLB, | Performed by: INTERNAL MEDICINE

## 2020-11-05 PROCEDURE — 1159F MED LIST DOCD IN RCRD: CPT | Mod: HCWC,S$GLB,, | Performed by: INTERNAL MEDICINE

## 2020-11-05 PROCEDURE — 3008F PR BODY MASS INDEX (BMI) DOCUMENTED: ICD-10-PCS | Mod: HCWC,CPTII,S$GLB, | Performed by: INTERNAL MEDICINE

## 2020-11-05 PROCEDURE — 99999 PR PBB SHADOW E&M-EST. PATIENT-LVL IV: CPT | Mod: PBBFAC,HCWC,, | Performed by: INTERNAL MEDICINE

## 2020-11-05 PROCEDURE — 99214 PR OFFICE/OUTPT VISIT, EST, LEVL IV, 30-39 MIN: ICD-10-PCS | Mod: HCWC,S$GLB,, | Performed by: INTERNAL MEDICINE

## 2020-11-05 PROCEDURE — 99214 OFFICE O/P EST MOD 30 MIN: CPT | Mod: HCWC,S$GLB,, | Performed by: INTERNAL MEDICINE

## 2020-11-05 PROCEDURE — 99999 PR PBB SHADOW E&M-EST. PATIENT-LVL IV: ICD-10-PCS | Mod: PBBFAC,HCWC,, | Performed by: INTERNAL MEDICINE

## 2020-11-05 RX ORDER — DILTIAZEM HYDROCHLORIDE 120 MG/1
120 CAPSULE, EXTENDED RELEASE ORAL DAILY
Qty: 30 CAPSULE | Refills: 11 | Status: SHIPPED | OUTPATIENT
Start: 2020-11-05 | End: 2020-11-11 | Stop reason: SDUPTHER

## 2020-11-05 RX ORDER — LANOLIN ALCOHOL/MO/W.PET/CERES
1 CREAM (GRAM) TOPICAL 2 TIMES DAILY
COMMUNITY
End: 2021-08-29 | Stop reason: CLARIF

## 2020-11-10 ENCOUNTER — TELEPHONE (OUTPATIENT)
Dept: CARDIOLOGY | Facility: CLINIC | Age: 70
End: 2020-11-10

## 2020-11-10 LAB
OHS CV EVENT MONITOR DAY: 2
OHS CV HOLTER LENGTH DECIMAL HOURS: 96
OHS CV HOLTER LENGTH HOURS: 48
OHS CV HOLTER LENGTH MINUTES: 0

## 2020-11-10 NOTE — TELEPHONE ENCOUNTER
Contacted pt about Holter monitor results, and that Dr Ling will go over at next appointment, pt vu        ----- Message from Reza Ling MD sent at 11/10/2020 12:59 PM CST -----  The patient know that the heart rate was elevated at times.  I will address at next visit

## 2020-11-11 DIAGNOSIS — I10 ESSENTIAL HYPERTENSION: Primary | ICD-10-CM

## 2020-11-11 DIAGNOSIS — T82.9XXS DISORDER OF CARDIAC PACEMAKER SYSTEM, SEQUELA: ICD-10-CM

## 2020-11-11 DIAGNOSIS — I48.21 PERMANENT ATRIAL FIBRILLATION: ICD-10-CM

## 2020-11-11 DIAGNOSIS — Z95.0 CARDIAC PACEMAKER IN SITU: ICD-10-CM

## 2020-11-11 DIAGNOSIS — E78.5 HYPERLIPIDEMIA, UNSPECIFIED HYPERLIPIDEMIA TYPE: ICD-10-CM

## 2020-11-11 DIAGNOSIS — R07.9 CHEST PAIN, UNSPECIFIED TYPE: ICD-10-CM

## 2020-11-11 DIAGNOSIS — R00.2 PALPITATIONS: ICD-10-CM

## 2020-11-11 DIAGNOSIS — R06.02 SOB (SHORTNESS OF BREATH): ICD-10-CM

## 2020-11-11 RX ORDER — DILTIAZEM HYDROCHLORIDE 120 MG/1
120 CAPSULE, EXTENDED RELEASE ORAL DAILY
Qty: 90 CAPSULE | Refills: 3 | Status: SHIPPED | OUTPATIENT
Start: 2020-11-11 | End: 2020-11-18

## 2020-11-11 RX ORDER — DRONEDARONE 400 MG/1
400 TABLET, FILM COATED ORAL 2 TIMES DAILY WITH MEALS
Qty: 180 TABLET | Refills: 3 | Status: SHIPPED | OUTPATIENT
Start: 2020-11-11 | End: 2020-11-18 | Stop reason: DRUGHIGH

## 2020-11-13 ENCOUNTER — TELEPHONE (OUTPATIENT)
Dept: CARDIOLOGY | Facility: CLINIC | Age: 70
End: 2020-11-13

## 2020-11-13 ENCOUNTER — CLINICAL SUPPORT (OUTPATIENT)
Dept: CARDIOLOGY | Facility: HOSPITAL | Age: 70
End: 2020-11-13
Attending: INTERNAL MEDICINE
Payer: MEDICARE

## 2020-11-13 DIAGNOSIS — Z95.0 CARDIAC PACEMAKER IN SITU: ICD-10-CM

## 2020-11-13 DIAGNOSIS — I48.0 PAROXYSMAL ATRIAL FIBRILLATION: Primary | ICD-10-CM

## 2020-11-13 LAB
IMPEDANCE RA LEAD: 546 OHMS
OHS CV DC PP MS1: 0.4 MS
OHS CV DC PP V1: NORMAL V
P/R-WAVE RA LEAD: 10.5 MV
THRESHOLD MS RA LEAD: 0.4 MS
THRESHOLD V RA LEAD: 0.9 V

## 2020-11-13 PROCEDURE — 99999 PR PBB SHADOW E&M-EST. PATIENT-LVL I: CPT | Mod: PBBFAC,HCWC,,

## 2020-11-13 PROCEDURE — 93280 PM DEVICE PROGR EVAL DUAL: CPT | Mod: 26,HCWC,, | Performed by: INTERNAL MEDICINE

## 2020-11-13 PROCEDURE — 93280 CARDIAC DEVICE CHECK - IN CLINIC & HOSPITAL: ICD-10-PCS | Mod: 26,HCWC,, | Performed by: INTERNAL MEDICINE

## 2020-11-13 PROCEDURE — 99999 PR PBB SHADOW E&M-EST. PATIENT-LVL I: ICD-10-PCS | Mod: PBBFAC,HCWC,,

## 2020-11-13 PROCEDURE — 93280 PM DEVICE PROGR EVAL DUAL: CPT | Mod: HCWC

## 2020-11-13 NOTE — PROGRESS NOTES
Received a note from pacemaker clinic.  Heart rate is under better control by changing parameter on the pacemaker.  Atrial lead seems to be out of position.  She is in chronic atrial fibrillation.

## 2020-11-13 NOTE — TELEPHONE ENCOUNTER
Contacted pt about f/u appointment after pacemaker check, on Wednesday Nove 18, pt vu      ----- Message from Reza Ling MD sent at 11/13/2020 11:46 AM CST -----  This patient had a pacemaker check in the lead is at a position.  On the to see her in the office early next week.  Tell her that we will recheck the pacemaker by EKG and heart rate on new medication.

## 2020-11-16 ENCOUNTER — TELEPHONE (OUTPATIENT)
Dept: CARDIOLOGY | Facility: CLINIC | Age: 70
End: 2020-11-16

## 2020-11-16 NOTE — TELEPHONE ENCOUNTER
Talked to pt about continuing diltiazem and to recheck BP, if any problems to call clinic, pt vu      ----- Message from Reza Ling MD sent at 11/16/2020  9:57 AM CST -----  Contact: pt  Have the patient continue current diltiazem recheck blood pressure.  Follow-up this week thank you  ----- Message -----  From: Arlet Morales LPN  Sent: 11/13/2020   3:49 PM CST  To: Reza Ling MD    Please advise, Thanks      ----- Message -----  From: Dana Vazquez  Sent: 11/13/2020   9:24 AM CST  To: Sushil MORGAN Staff    Pt requesting a call back regarding her dilteziam (per pt). She states that the medication is lowering her BP too low, last read was 106/57. Pt is scheduled to see the pacemaker clinic at 11. She would like to be called before or after that appt. Please call pt back at [phone]

## 2020-11-17 NOTE — PROGRESS NOTES
Subjective:   Patient ID:  Antonio Urena is a 70 y.o. female who presents for follow-up of No chief complaint on file.   The patient intermittently feels like she is having tachycardia.  No chest discomfort noted.  No lightheadedness or dizziness.  In the office today heart rate is 68 beats per minute blood pressure 130/80.  Evaluation of patient with atrial fibrillation.  Patient has had a pacemaker with now finding of lead dislodgement of the atrial lead.  Evaluation for potential removal or continue with medical management.      Review of Systems   Constitution: Negative for chills, diaphoresis, night sweats, weight gain and weight loss.   HENT: Negative for congestion, hoarse voice, sore throat and stridor.    Eyes: Negative for double vision and pain.   Cardiovascular: Negative for chest pain, claudication, cyanosis, dyspnea on exertion, irregular heartbeat, leg swelling, near-syncope, orthopnea, palpitations, paroxysmal nocturnal dyspnea and syncope.   Respiratory: Negative for cough, hemoptysis, shortness of breath, sleep disturbances due to breathing, snoring, sputum production and wheezing.    Endocrine: Negative for cold intolerance, heat intolerance and polydipsia.   Hematologic/Lymphatic: Negative for bleeding problem. Does not bruise/bleed easily.   Skin: Negative for color change, dry skin and rash.   Musculoskeletal: Negative for joint swelling and muscle cramps.   Gastrointestinal: Negative for bloating, abdominal pain, constipation, diarrhea, dysphagia, melena, nausea and vomiting.   Genitourinary: Negative for flank pain and urgency.   Neurological: Negative for dizziness, focal weakness, headaches, light-headedness, loss of balance, seizures and weakness.   Psychiatric/Behavioral: Negative for altered mental status and memory loss. The patient is not nervous/anxious.      Family History   Problem Relation Age of Onset    Cancer Mother     Cancer Father     Cancer Brother      Past Medical  History:   Diagnosis Date    A-fib     A-fib     Pace maker put in 5/12/20    Allergy     Anxiety     Asthma     cough variant    Cancer 2018    basal cell c    Colon polyp     COPD (chronic obstructive pulmonary disease)     Hypertension      Social History     Socioeconomic History    Marital status:      Spouse name: Not on file    Number of children: Not on file    Years of education: Not on file    Highest education level: Not on file   Occupational History    Not on file   Social Needs    Financial resource strain: Not on file    Food insecurity     Worry: Not on file     Inability: Not on file    Transportation needs     Medical: Not on file     Non-medical: Not on file   Tobacco Use    Smoking status: Former Smoker    Smokeless tobacco: Never Used    Tobacco comment: Quit 30 - 40 years ago   Substance and Sexual Activity    Alcohol use: Yes     Comment: Wine Occasionally     Drug use: Never    Sexual activity: Not Currently   Lifestyle    Physical activity     Days per week: Not on file     Minutes per session: Not on file    Stress: Not on file   Relationships    Social connections     Talks on phone: Not on file     Gets together: Not on file     Attends [employer] service: Not on file     Active member of club or organization: Not on file     Attends meetings of clubs or organizations: Not on file     Relationship status: Not on file   Other Topics Concern    Not on file   Social History Narrative    Not on file     Current Outpatient Medications on File Prior to Visit   Medication Sig Dispense Refill    albuterol (PROVENTIL/VENTOLIN HFA) 90 mcg/actuation inhaler Inhale into the lungs.      apixaban (ELIQUIS) 5 mg Tab Take 1 tablet (5 mg total) by mouth 2 (two) times daily. 180 tablet 3    calcium citrate-vitamin D3 315-200 mg (CITRACAL+D) 315 mg-5 mcg (200 unit) per tablet Take 1 tablet by mouth 2 (two) times daily.      cetirizine (ZYRTEC) 10 MG tablet Take 10 mg  by mouth.      diltiaZEM (DILACOR XR) 120 MG CDCR Take 1 capsule (120 mg total) by mouth once daily. 90 capsule 3    diltiaZEM HCl (TIAZAC) 120 mg 24 hr capsule       dronedarone (MULTAQ) 400 mg Tab Take 1 tablet (400 mg total) by mouth 2 (two) times daily with meals. 180 tablet 3    fluticasone propionate (FLONASE) 50 mcg/actuation nasal spray 2 sprays (100 mcg total) by Each Nostril route daily as needed. 16 g 3    fluticasone-salmeterol diskus inhaler 250-50 mcg Inhale 1 puff into the lungs.      lisinopriL 10 MG tablet Take 10 mg by mouth once daily.      LORazepam (ATIVAN) 0.5 MG tablet Take 0.5 mg by mouth daily as needed for Anxiety.      omega-3 fatty acids/fish oil (FISH OIL-OMEGA-3 FATTY ACIDS) 300-1,000 mg capsule Take 1 capsule by mouth once daily.      pravastatin (PRAVACHOL) 40 MG tablet Take 1 tablet (40 mg total) by mouth once daily. 90 tablet 1     No current facility-administered medications on file prior to visit.      Review of patient's allergies indicates:   Allergen Reactions    Flecainide      Intolerance    Lasix [furosemide]        Objective:     Physical Exam   Constitutional: She is oriented to person, place, and time.   Eyes: Pupils are equal, round, and reactive to light.   Neck: Normal range of motion. No tracheal deviation present.   Cardiovascular: Normal rate, regular rhythm, normal heart sounds and intact distal pulses. Exam reveals no gallop and no friction rub.   No murmur heard.  Pulses:       Carotid pulses are 2+ on the right side and 2+ on the left side.       Radial pulses are 2+ on the right side and 2+ on the left side.        Femoral pulses are 2+ on the right side and 2+ on the left side.       Popliteal pulses are 2+ on the right side and 2+ on the left side.        Dorsalis pedis pulses are 2+ on the right side and 2+ on the left side.        Posterior tibial pulses are 2+ on the right side and 2+ on the left side.   Pulmonary/Chest: Effort normal and breath  sounds normal. No stridor. No respiratory distress. She has no wheezes. She has no rales. She exhibits no tenderness.   Abdominal: She exhibits no distension. There is no abdominal tenderness. There is no rebound.   Musculoskeletal:         General: No tenderness or edema.   Neurological: She is alert and oriented to person, place, and time.   Skin: Skin is warm and dry.       Assessment:     1. Paroxysmal atrial fibrillation    2. Cardiac pacemaker in situ    3. Permanent atrial fibrillation    4. Disorder of cardiac pacemaker system, sequela    5. Chest pain, unspecified type    6. SOB (shortness of breath)    7. Hyperlipidemia, unspecified hyperlipidemia type    8. Palpitations    9. Displacement of atrial pacemaker leads, initial encounter    10. Tachycardia        Plan:     Paroxysmal atrial fibrillation    Cardiac pacemaker in situ    Permanent atrial fibrillation    Disorder of cardiac pacemaker system, sequela    Chest pain, unspecified type    SOB (shortness of breath)    Hyperlipidemia, unspecified hyperlipidemia type    Palpitations    Displacement of atrial pacemaker leads, initial encounter    Tachycardia      At this point the patient has improved overall.  Will discontinue Multaq.  I will increase the diltiazem CD to 180 mg daily.  Follow-up in 2 weeks for review heart rate blood pressure response.  The patient will continue with Eliquis anticoagulation.  The patient has choices involving taking the atrial lead out if to remain in atrial fibrillation or readjusting lead if we proceed with cardioversion to try to control heart rate blood pressure response better in normal sinus rhythm.  The patient was trialed on Multaq and had failed that medication.  We will have a discussion again when she comes back in 2 weeks.  This point pacemaker is working on ventricular lead only in is stable.  Plan EKG when she returns in 2 weeks.

## 2020-11-18 ENCOUNTER — OFFICE VISIT (OUTPATIENT)
Dept: CARDIOLOGY | Facility: CLINIC | Age: 70
End: 2020-11-18
Payer: MEDICARE

## 2020-11-18 VITALS
SYSTOLIC BLOOD PRESSURE: 122 MMHG | BODY MASS INDEX: 38.9 KG/M2 | HEART RATE: 85 BPM | OXYGEN SATURATION: 96 % | DIASTOLIC BLOOD PRESSURE: 70 MMHG | WEIGHT: 219.56 LBS

## 2020-11-18 DIAGNOSIS — T82.9XXS DISORDER OF CARDIAC PACEMAKER SYSTEM, SEQUELA: ICD-10-CM

## 2020-11-18 DIAGNOSIS — Z95.0 CARDIAC PACEMAKER IN SITU: ICD-10-CM

## 2020-11-18 DIAGNOSIS — I10 ESSENTIAL HYPERTENSION: ICD-10-CM

## 2020-11-18 DIAGNOSIS — I48.21 PERMANENT ATRIAL FIBRILLATION: Primary | ICD-10-CM

## 2020-11-18 DIAGNOSIS — I48.0 PAROXYSMAL ATRIAL FIBRILLATION: ICD-10-CM

## 2020-11-18 DIAGNOSIS — T82.120A DISPLACEMENT OF ATRIAL PACEMAKER LEADS, INITIAL ENCOUNTER: ICD-10-CM

## 2020-11-18 DIAGNOSIS — R00.2 PALPITATIONS: ICD-10-CM

## 2020-11-18 DIAGNOSIS — R00.0 TACHYCARDIA: ICD-10-CM

## 2020-11-18 DIAGNOSIS — R06.02 SOB (SHORTNESS OF BREATH): ICD-10-CM

## 2020-11-18 DIAGNOSIS — E78.5 HYPERLIPIDEMIA, UNSPECIFIED HYPERLIPIDEMIA TYPE: ICD-10-CM

## 2020-11-18 DIAGNOSIS — R07.9 CHEST PAIN, UNSPECIFIED TYPE: ICD-10-CM

## 2020-11-18 PROCEDURE — 3008F PR BODY MASS INDEX (BMI) DOCUMENTED: ICD-10-PCS | Mod: HCWC,CPTII,S$GLB, | Performed by: INTERNAL MEDICINE

## 2020-11-18 PROCEDURE — 99999 PR PBB SHADOW E&M-EST. PATIENT-LVL IV: ICD-10-PCS | Mod: PBBFAC,HCWC,, | Performed by: INTERNAL MEDICINE

## 2020-11-18 PROCEDURE — 3008F BODY MASS INDEX DOCD: CPT | Mod: HCWC,CPTII,S$GLB, | Performed by: INTERNAL MEDICINE

## 2020-11-18 PROCEDURE — 1159F MED LIST DOCD IN RCRD: CPT | Mod: HCWC,S$GLB,, | Performed by: INTERNAL MEDICINE

## 2020-11-18 PROCEDURE — 1159F PR MEDICATION LIST DOCUMENTED IN MEDICAL RECORD: ICD-10-PCS | Mod: HCWC,S$GLB,, | Performed by: INTERNAL MEDICINE

## 2020-11-18 PROCEDURE — 99214 OFFICE O/P EST MOD 30 MIN: CPT | Mod: HCWC,S$GLB,, | Performed by: INTERNAL MEDICINE

## 2020-11-18 PROCEDURE — 99214 PR OFFICE/OUTPT VISIT, EST, LEVL IV, 30-39 MIN: ICD-10-PCS | Mod: HCWC,S$GLB,, | Performed by: INTERNAL MEDICINE

## 2020-11-18 PROCEDURE — 99999 PR PBB SHADOW E&M-EST. PATIENT-LVL IV: CPT | Mod: PBBFAC,HCWC,, | Performed by: INTERNAL MEDICINE

## 2020-11-18 RX ORDER — DILTIAZEM HYDROCHLORIDE 180 MG/1
180 CAPSULE, EXTENDED RELEASE ORAL DAILY
Qty: 90 CAPSULE | Refills: 3 | Status: SHIPPED | OUTPATIENT
Start: 2020-11-18 | End: 2021-09-29

## 2020-11-18 NOTE — PATIENT INSTRUCTIONS
Controlling High Blood Pressure  High blood pressure (hypertension) is often called the silent killer. This is because many people who have it dont know it. High blood pressure is defined as 140/90 mm Hg or higher. Know your blood pressure and remember to check it regularly. Doing so can save your life. Here are some things you can do to help control your blood pressure.    Choose heart-healthy foods  · Select low-salt, low-fat foods. Limit sodium intake to 2,400 mg per day or the amount suggested by your healthcare provider.  · Limit canned, dried, cured, packaged, and fast foods. These can contain a lot of salt.  · Eat 8 to 10 servings of fruits and vegetables every day.  · Choose lean meats, fish, or chicken.  · Eat whole-grain pasta, brown rice, and beans.  · Eat 2 to 3 servings of low-fat or fat-free dairy products.  · Ask your doctor about the DASH eating plan. This plan helps reduce blood pressure.  · When you go to a restaurant, ask that your meal be prepared with no added salt.  Maintain a healthy weight  · Ask your healthcare provider how many calories to eat a day. Then stick to that number.  · Ask your healthcare provider what weight range is healthiest for you. If you are overweight, a weight loss of only 3% to 5% of your body weight can help lower blood pressure. Generally, a good weight loss goal is to lose 10% of your body weight in a year.  · Limit snacks and sweets.  · Get regular exercise.  Get up and get active  · Choose activities you enjoy. Find ones you can do with friends or family. This includes bicycling, dancing, walking, and jogging.  · Park farther away from building entrances.  · Use stairs instead of the elevator.  · When you can, walk or bike instead of driving.  · [place] leaves, garden, or do household repairs.  · Be active at a moderate to vigorous level of physical activity for at least 40 minutes for a minimum of 3 to 4 days a week.   Manage stress  · Make time to relax and enjoy  life. Find time to laugh.  · Communicate your concerns with your loved ones and your healthcare provider.  · Visit with family and friends, and keep up with hobbies.  Limit alcohol and quit smoking  · Men should have no more than 2 drinks per day.  · Women should have no more than 1 drink per day.  · Talk with your healthcare provider about quitting smoking. Smoking significantly increases your risk for heart disease and stroke. Ask your healthcare provider about community smoking cessation programs and other options.  Medicines  If lifestyle changes arent enough, your healthcare provider may prescribe high blood pressure medicine. Take all medicines as prescribed. If you have any questions about your medicines, ask your healthcare provider before stopping or changing them.   Date Last Reviewed: 4/27/2016  © 0011-7459 [employer]. [address] [place] [address], [place] [address]. All rights reserved. This information is not intended as a substitute for professional medical care. Always follow your healthcare professional's instructions.

## 2020-11-19 ENCOUNTER — OFFICE VISIT (OUTPATIENT)
Dept: PULMONOLOGY | Facility: CLINIC | Age: 70
End: 2020-11-19
Payer: MEDICARE

## 2020-11-19 VITALS
BODY MASS INDEX: 38.45 KG/M2 | SYSTOLIC BLOOD PRESSURE: 128 MMHG | HEART RATE: 58 BPM | WEIGHT: 217 LBS | DIASTOLIC BLOOD PRESSURE: 63 MMHG | HEIGHT: 63 IN | OXYGEN SATURATION: 92 % | RESPIRATION RATE: 18 BRPM

## 2020-11-19 DIAGNOSIS — E66.01 SEVERE OBESITY (BMI 35.0-39.9) WITH COMORBIDITY: ICD-10-CM

## 2020-11-19 DIAGNOSIS — Z95.0 CARDIAC PACEMAKER IN SITU: ICD-10-CM

## 2020-11-19 DIAGNOSIS — R06.02 SHORTNESS OF BREATH: ICD-10-CM

## 2020-11-19 DIAGNOSIS — J44.89 ASTHMA WITH COPD: Primary | ICD-10-CM

## 2020-11-19 DIAGNOSIS — J44.9 CHRONIC OBSTRUCTIVE PULMONARY DISEASE, UNSPECIFIED COPD TYPE: ICD-10-CM

## 2020-11-19 PROCEDURE — 99999 PR PBB SHADOW E&M-EST. PATIENT-LVL IV: CPT | Mod: PBBFAC,HCWC,, | Performed by: NURSE PRACTITIONER

## 2020-11-19 PROCEDURE — 1101F PR PT FALLS ASSESS DOC 0-1 FALLS W/OUT INJ PAST YR: ICD-10-PCS | Mod: HCWC,CPTII,S$GLB, | Performed by: NURSE PRACTITIONER

## 2020-11-19 PROCEDURE — 1101F PT FALLS ASSESS-DOCD LE1/YR: CPT | Mod: HCWC,CPTII,S$GLB, | Performed by: NURSE PRACTITIONER

## 2020-11-19 PROCEDURE — 99204 OFFICE O/P NEW MOD 45 MIN: CPT | Mod: HCWC,S$GLB,, | Performed by: NURSE PRACTITIONER

## 2020-11-19 PROCEDURE — 1159F MED LIST DOCD IN RCRD: CPT | Mod: HCWC,S$GLB,, | Performed by: NURSE PRACTITIONER

## 2020-11-19 PROCEDURE — 1159F PR MEDICATION LIST DOCUMENTED IN MEDICAL RECORD: ICD-10-PCS | Mod: HCWC,S$GLB,, | Performed by: NURSE PRACTITIONER

## 2020-11-19 PROCEDURE — 3008F PR BODY MASS INDEX (BMI) DOCUMENTED: ICD-10-PCS | Mod: HCWC,CPTII,S$GLB, | Performed by: NURSE PRACTITIONER

## 2020-11-19 PROCEDURE — 99204 PR OFFICE/OUTPT VISIT, NEW, LEVL IV, 45-59 MIN: ICD-10-PCS | Mod: HCWC,S$GLB,, | Performed by: NURSE PRACTITIONER

## 2020-11-19 PROCEDURE — 3288F FALL RISK ASSESSMENT DOCD: CPT | Mod: HCWC,CPTII,S$GLB, | Performed by: NURSE PRACTITIONER

## 2020-11-19 PROCEDURE — 3008F BODY MASS INDEX DOCD: CPT | Mod: HCWC,CPTII,S$GLB, | Performed by: NURSE PRACTITIONER

## 2020-11-19 PROCEDURE — 3288F PR FALLS RISK ASSESSMENT DOCUMENTED: ICD-10-PCS | Mod: HCWC,CPTII,S$GLB, | Performed by: NURSE PRACTITIONER

## 2020-11-19 PROCEDURE — 99999 PR PBB SHADOW E&M-EST. PATIENT-LVL IV: ICD-10-PCS | Mod: PBBFAC,HCWC,, | Performed by: NURSE PRACTITIONER

## 2020-11-19 RX ORDER — TIOTROPIUM BROMIDE INHALATION SPRAY 3.12 UG/1
2 SPRAY, METERED RESPIRATORY (INHALATION) DAILY
Qty: 12 G | Refills: 3 | Status: SHIPPED | OUTPATIENT
Start: 2020-11-19 | End: 2021-08-02 | Stop reason: SDUPTHER

## 2020-11-19 RX ORDER — METHYLPREDNISOLONE 4 MG/1
TABLET ORAL
Qty: 1 PACKAGE | Refills: 0 | Status: SHIPPED | OUTPATIENT
Start: 2020-11-19 | End: 2021-04-15

## 2020-11-19 RX ORDER — FLUTICASONE PROPIONATE 250 UG/1
1 POWDER, METERED RESPIRATORY (INHALATION) 2 TIMES DAILY
Qty: 90 EACH | Refills: 3 | Status: SHIPPED | OUTPATIENT
Start: 2020-11-19 | End: 2021-08-02 | Stop reason: ALTCHOICE

## 2020-11-19 NOTE — PROGRESS NOTES
"Subjective:      Patient ID: Antonio Urena is a 70 y.o. female.    Chief Complaint: COPD    HPI  Patient referred for asthma with COPD.  Patient has an extensive history of asthma.  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  She has had multiple exacerbations and hospital visits.  Positive allergy testing.  Allergy shots as a teenager and in the 1990s.  She has been on long-term prednisone for years in the 1990s.  Pacemaker insertion May 2020.  She is planning on repair dislodged lead on her pacemaker soon.She states she has a hard time differentiating between cardiac and pulmonary dyspnea  She states over the last few years asthma more controlled but takes xopenex daily. Her regimen at this time is Flovent, spiriva, xopenex.     Patient Active Problem List   Diagnosis    Hypertension    COPD (chronic obstructive pulmonary disease)    Anxiety    Allergy    Hyperlipidemia    HTN (hypertension)    Chest pain    Atrial fibrillation         /63   Pulse (!) 58   Resp 18   Ht 5' 3" (1.6 m)   Wt 98.4 kg (217 lb)   SpO2 (!) 92%   BMI 38.44 kg/m²   Body mass index is 38.44 kg/m².    Review of Systems   Constitutional: Negative.    HENT: Negative.    Respiratory: Positive for dyspnea on extertion.    Cardiovascular: Negative.    Musculoskeletal: Negative.    Gastrointestinal: Negative.    Neurological: Negative.    Psychiatric/Behavioral: Negative.      Objective:      Physical Exam  Constitutional:       Appearance: She is well-developed.   HENT:      Head: Normocephalic and atraumatic.      Mouth/Throat:      Comments: Wearing mask due to covid concerns  Neck:      Musculoskeletal: Normal range of motion and neck supple.   Cardiovascular:      Rate and Rhythm: Normal rate and regular rhythm.      Heart sounds: No murmur. No gallop.    Pulmonary:      Effort: Pulmonary effort is normal.      Breath sounds: Normal breath sounds.   Abdominal:      Palpations: Abdomen is soft. There is no mass.      " Tenderness: There is no abdominal tenderness.   Musculoskeletal: Normal range of motion.      Right lower leg: Edema present.   Skin:     General: Skin is warm and dry.   Neurological:      Mental Status: She is alert and oriented to person, place, and time.   Psychiatric:         Mood and Affect: Mood normal.         Behavior: Behavior normal.       Personal Diagnostic Review    Details    Reading Physician Reading Date Result Priority   Arnulfo Tinoco MD  [phone]  [phone] 10/19/2020 STAT      Narrative & Impression     EXAMINATION:  XR CHEST AP PORTABLE     CLINICAL HISTORY:  palpitations;     FINDINGS:  Single view of the chest.     Cardiac silhouette is enlarged.  Aorta demonstrates atherosclerotic disease.  Left-sided pacing wires are noted.  The lungs demonstrate no evidence of active disease.  No evidence of pleural effusion or pneumothorax.  Bones demonstrate degenerative change.     Impression:     No acute process seen.               Assessment:       1. Asthma with COPD    2. Chronic obstructive pulmonary disease, unspecified COPD type        Outpatient Encounter Medications as of 11/19/2020   Medication Sig Dispense Refill    albuterol (PROVENTIL/VENTOLIN HFA) 90 mcg/actuation inhaler Inhale into the lungs.      apixaban (ELIQUIS) 5 mg Tab Take 1 tablet (5 mg total) by mouth 2 (two) times daily. 180 tablet 3    calcium citrate-vitamin D3 315-200 mg (CITRACAL+D) 315 mg-5 mcg (200 unit) per tablet Take 1 tablet by mouth 2 (two) times daily.      cetirizine (ZYRTEC) 10 MG tablet Take 10 mg by mouth.      diltiaZEM (DILACOR XR) 180 MG CDCR Take 1 capsule (180 mg total) by mouth once daily. 90 capsule 3    fluticasone propionate (FLONASE) 50 mcg/actuation nasal spray 2 sprays (100 mcg total) by Each Nostril route daily as needed. 16 g 3    lisinopriL 10 MG tablet Take 10 mg by mouth once daily.      LORazepam (ATIVAN) 0.5 MG tablet Take 0.5 mg by mouth daily as needed for Anxiety.       omega-3 fatty acids/fish oil (FISH OIL-OMEGA-3 FATTY ACIDS) 300-1,000 mg capsule Take 1 capsule by mouth once daily.      pravastatin (PRAVACHOL) 40 MG tablet Take 1 tablet (40 mg total) by mouth once daily. 90 tablet 1    [DISCONTINUED] fluticasone-salmeterol diskus inhaler 250-50 mcg Inhale 1 puff into the lungs.      fluticasone propionate (FLOVENT DISKUS) 250 mcg/actuation DsDv Inhale 1 puff into the lungs 2 (two) times a day. Controller 90 each 3    methylPREDNISolone (MEDROL DOSEPACK) 4 mg tablet use as directed 1 Package 0    tiotropium bromide (SPIRIVA RESPIMAT) 2.5 mcg/actuation Mist Inhale 2 puffs into the lungs once daily. 12 g 3    [DISCONTINUED] diltiaZEM (DILACOR XR) 120 MG CDCR Take 1 capsule (120 mg total) by mouth once daily. 90 capsule 3    [DISCONTINUED] diltiaZEM HCl (TIAZAC) 120 mg 24 hr capsule       [DISCONTINUED] dronedarone (MULTAQ) 400 mg Tab Take 1 tablet (400 mg total) by mouth 2 (two) times daily with meals. 180 tablet 3     No facility-administered encounter medications on file as of 11/19/2020.      Orders Placed This Encounter   Procedures    Complete PFT without bronchodilator - Clinic     Standing Status:   Future     Standing Expiration Date:   11/19/2021    PULM - Arterial Blood Gases--in addition to PFT only     Standing Status:   Future     Standing Expiration Date:   11/19/2021     Plan:   Follow up after pft, abg       Problem List Items Addressed This Visit        Pulmonary    COPD (chronic obstructive pulmonary disease)    Relevant Medications    tiotropium bromide (SPIRIVA RESPIMAT) 2.5 mcg/actuation Mist    fluticasone propionate (FLOVENT DISKUS) 250 mcg/actuation DsDv      Other Visit Diagnoses     Asthma with COPD    -  Primary    Relevant Medications    methylPREDNISolone (MEDROL DOSEPACK) 4 mg tablet    tiotropium bromide (SPIRIVA RESPIMAT) 2.5 mcg/actuation Mist    fluticasone propionate (FLOVENT DISKUS) 250 mcg/actuation DsDv    Other Relevant Orders     Complete PFT without bronchodilator - Clinic    PULM - Arterial Blood Gases--in addition to PFT only

## 2020-11-19 NOTE — LETTER
November 19, 2020      Delmar Méndez MD  [address] [facility] [place] [address]           [facility]  [address] [place] [address]  Phone: [phone]  Fax: [phone]          Patient: Antonio Urena   MR Number: [id number]   YOB: 1950   Date of Visit: 11/19/2020       Dear Dr. Delmar Méndez:    Thank you for referring Antonio Urena to me for evaluation. Attached you will find relevant portions of my assessment and plan of care.    If you have questions, please do not hesitate to call me. I look forward to following Antonio Urena along with you.    Sincerely,    Elizabeth Lejeune, NP    Enclosure  CC:  No Recipients    If you would like to receive this communication electronically, please contact [email or web address] or [phone] to request more information on [employer] access.    For providers and/or their staff who would like to refer a patient to [facility], please contact us through our one-stop-shop provider referral line, [facility], at [phone].    If you feel you have received this communication in error or would no longer like to receive these types of communications, please e-mail [email or web address]

## 2020-12-01 ENCOUNTER — LAB VISIT (OUTPATIENT)
Dept: OTOLARYNGOLOGY | Facility: CLINIC | Age: 70
End: 2020-12-01
Payer: MEDICARE

## 2020-12-01 DIAGNOSIS — R06.02 SHORTNESS OF BREATH: ICD-10-CM

## 2020-12-01 PROCEDURE — U0003 INFECTIOUS AGENT DETECTION BY NUCLEIC ACID (DNA OR RNA); SEVERE ACUTE RESPIRATORY SYNDROME CORONAVIRUS 2 (SARS-COV-2) (CORONAVIRUS DISEASE [COVID-19]), AMPLIFIED PROBE TECHNIQUE, MAKING USE OF HIGH THROUGHPUT TECHNOLOGIES AS DESCRIBED BY CMS-2020-01-R: HCPCS | Mod: HCWC

## 2020-12-02 LAB — SARS-COV-2 RNA RESP QL NAA+PROBE: NOT DETECTED

## 2020-12-03 ENCOUNTER — TELEPHONE (OUTPATIENT)
Dept: PULMONOLOGY | Facility: CLINIC | Age: 70
End: 2020-12-03

## 2020-12-03 NOTE — TELEPHONE ENCOUNTER
[facility]oned [facility] on 2020 no answer lvm    [facility]oned [facility] on 2020 office is closed due to holiday    Phoned [facility] on 2020 forwarded to voicemail; vm left of pts name,  , information needed, date release of information was faxed.    [facility]oned [facility] on 12/3/2020 to get records on pt, no answer. Lvm         ----- Message from Lily Ferraro LPN sent at 2020  1:11 PM CST -----  [facility] and last office visit note.   Phone number [phone]  Fax number [phone]

## 2020-12-03 NOTE — TELEPHONE ENCOUNTER
----- Message from Lily Ferraro LPN sent at 11/19/2020  1:11 PM CST -----  Fax sent to alex REAGAN at [facility] for  last pft and last office visit note.   Phone number [phone]  Fax number [phone]

## 2020-12-04 ENCOUNTER — CLINICAL SUPPORT (OUTPATIENT)
Dept: PULMONOLOGY | Facility: CLINIC | Age: 70
End: 2020-12-04
Payer: MEDICARE

## 2020-12-04 ENCOUNTER — OFFICE VISIT (OUTPATIENT)
Dept: PULMONOLOGY | Facility: CLINIC | Age: 70
End: 2020-12-04
Payer: MEDICARE

## 2020-12-04 VITALS
DIASTOLIC BLOOD PRESSURE: 70 MMHG | HEART RATE: 81 BPM | OXYGEN SATURATION: 97 % | HEIGHT: 63 IN | RESPIRATION RATE: 18 BRPM | SYSTOLIC BLOOD PRESSURE: 114 MMHG | BODY MASS INDEX: 38.13 KG/M2 | WEIGHT: 215.19 LBS

## 2020-12-04 DIAGNOSIS — J44.89 ASTHMA WITH COPD: ICD-10-CM

## 2020-12-04 DIAGNOSIS — Z95.0 CARDIAC PACEMAKER IN SITU: ICD-10-CM

## 2020-12-04 DIAGNOSIS — I48.0 PAROXYSMAL ATRIAL FIBRILLATION: ICD-10-CM

## 2020-12-04 DIAGNOSIS — J44.89 ASTHMA WITH COPD: Primary | ICD-10-CM

## 2020-12-04 LAB
ALLENS TEST: NORMAL
BRPFT: NORMAL
DELSYS: NORMAL
DLCO ADJ PRE: 19.17 ML/(MIN*MMHG)
DLCO SINGLE BREATH LLN: 14.78
DLCO SINGLE BREATH PRE REF: 89.8 %
DLCO SINGLE BREATH REF: 20.51
DLCOC SBVA LLN: 2.8
DLCOC SBVA PRE REF: 139 %
DLCOC SBVA REF: 4.3
DLCOC SINGLE BREATH LLN: 14.78
DLCOC SINGLE BREATH PRE REF: 93.5 %
DLCOC SINGLE BREATH REF: 20.51
DLCOVA LLN: 2.8
DLCOVA PRE REF: 133.6 %
DLCOVA PRE: 5.74 ML/(MIN*MMHG*L)
DLCOVA REF: 4.3
DLVAADJ PRE: 5.98 ML/(MIN*MMHG*L)
ERV LLN: -16449.38
ERV PRE REF: 26.2 %
ERV REF: 0.62
FEF 25 75 LLN: 0.8
FEF 25 75 PRE REF: 17.9 %
FEF 25 75 REF: 1.77
FEV1 FVC LLN: 65
FEV1 FVC PRE REF: 60.8 %
FEV1 FVC REF: 78
FEV1 LLN: 1.51
FEV1 PRE REF: 44.5 %
FEV1 REF: 2.09
FRCPLETH LLN: 1.83
FRCPLETH PREREF: 101.6 %
FRCPLETH REF: 2.66
FVC LLN: 1.95
FVC PRE REF: 72.7 %
FVC REF: 2.69
HCO3 UR-SCNC: 24.9 MMOL/L (ref 24–28)
IVC PRE: 1.98 L
IVC SINGLE BREATH LLN: 1.95
IVC SINGLE BREATH PRE REF: 73.6 %
IVC SINGLE BREATH REF: 2.69
MVV LLN: 66
MVV PRE REF: 56.6 %
MVV REF: 78
PCO2 BLDA: 42.3 MMHG (ref 35–45)
PEF LLN: 3.76
PEF PRE REF: 80.8 %
PEF REF: 5.42
PH SMN: 7.38 [PH] (ref 7.35–7.45)
PO2 BLDA: 81 MMHG (ref 80–100)
POC BE: 0 MMOL/L
POC SATURATED O2: 96 % (ref 95–100)
PRE DLCO: 18.42 ML/(MIN*MMHG)
PRE ERV: 0.16 L
PRE FEF 25 75: 0.32 L/S
PRE FET 100: 9.92 SEC
PRE FEV1 FVC: 47.45 %
PRE FEV1: 0.93 L
PRE FRC PL: 2.7 L
PRE FVC: 1.96 L
PRE MVV: 44 L/MIN
PRE PEF: 4.38 L/S
PRE RV: 2.57 L
PRE TLC: 4.64 L
RAW LLN: 3.06
RAW PRE REF: 164.5 %
RAW PRE: 5.03 CMH2O*S/L
RAW REF: 3.06
RV LLN: 1.46
RV PRE REF: 126.4 %
RV REF: 2.03
RVTLC LLN: 34
RVTLC PRE REF: 128.5 %
RVTLC PRE: 55.37 %
RVTLC REF: 43
SAMPLE: NORMAL
SITE: NORMAL
TLC LLN: 3.78
TLC PRE REF: 97.2 %
TLC REF: 4.77
VA PRE: 3.23 L
VA SINGLE BREATH LLN: 4.62
VA SINGLE BREATH PRE REF: 69.9 %
VA SINGLE BREATH REF: 4.62
VC LLN: 1.95
VC PRE REF: 76.8 %
VC PRE: 2.07 L
VC REF: 2.69
VTGRAWPRE: 3.89 L

## 2020-12-04 PROCEDURE — 82803 BLOOD GASES ANY COMBINATION: CPT | Mod: HCWC,S$GLB,, | Performed by: INTERNAL MEDICINE

## 2020-12-04 PROCEDURE — 1159F PR MEDICATION LIST DOCUMENTED IN MEDICAL RECORD: ICD-10-PCS | Mod: HCWC,S$GLB,, | Performed by: NURSE PRACTITIONER

## 2020-12-04 PROCEDURE — 3008F BODY MASS INDEX DOCD: CPT | Mod: HCWC,CPTII,S$GLB, | Performed by: NURSE PRACTITIONER

## 2020-12-04 PROCEDURE — 94010 BREATHING CAPACITY TEST: ICD-10-PCS | Mod: HCWC,S$GLB,, | Performed by: INTERNAL MEDICINE

## 2020-12-04 PROCEDURE — 1101F PR PT FALLS ASSESS DOC 0-1 FALLS W/OUT INJ PAST YR: ICD-10-PCS | Mod: HCWC,CPTII,S$GLB, | Performed by: NURSE PRACTITIONER

## 2020-12-04 PROCEDURE — 36600 WITHDRAWAL OF ARTERIAL BLOOD: CPT | Mod: HCWC,S$GLB,, | Performed by: INTERNAL MEDICINE

## 2020-12-04 PROCEDURE — 99999 PR PBB SHADOW E&M-EST. PATIENT-LVL IV: CPT | Mod: PBBFAC,HCWC,, | Performed by: NURSE PRACTITIONER

## 2020-12-04 PROCEDURE — 1101F PT FALLS ASSESS-DOCD LE1/YR: CPT | Mod: HCWC,CPTII,S$GLB, | Performed by: NURSE PRACTITIONER

## 2020-12-04 PROCEDURE — 94726 PLETHYSMOGRAPHY LUNG VOLUMES: CPT | Mod: HCWC,S$GLB,, | Performed by: INTERNAL MEDICINE

## 2020-12-04 PROCEDURE — 94729 PR C02/MEMBANE DIFFUSE CAPACITY: ICD-10-PCS | Mod: HCWC,S$GLB,, | Performed by: INTERNAL MEDICINE

## 2020-12-04 PROCEDURE — 36600 PR WITHDRAWAL OF ARTERIAL BLOOD: ICD-10-PCS | Mod: HCWC,S$GLB,, | Performed by: INTERNAL MEDICINE

## 2020-12-04 PROCEDURE — 1159F MED LIST DOCD IN RCRD: CPT | Mod: HCWC,S$GLB,, | Performed by: NURSE PRACTITIONER

## 2020-12-04 PROCEDURE — 99214 OFFICE O/P EST MOD 30 MIN: CPT | Mod: 25,HCWC,S$GLB, | Performed by: NURSE PRACTITIONER

## 2020-12-04 PROCEDURE — 94729 DIFFUSING CAPACITY: CPT | Mod: HCWC,S$GLB,, | Performed by: INTERNAL MEDICINE

## 2020-12-04 PROCEDURE — 99999 PR PBB SHADOW E&M-EST. PATIENT-LVL IV: ICD-10-PCS | Mod: PBBFAC,HCWC,, | Performed by: NURSE PRACTITIONER

## 2020-12-04 PROCEDURE — 3288F FALL RISK ASSESSMENT DOCD: CPT | Mod: HCWC,CPTII,S$GLB, | Performed by: NURSE PRACTITIONER

## 2020-12-04 PROCEDURE — 94010 BREATHING CAPACITY TEST: CPT | Mod: HCWC,S$GLB,, | Performed by: INTERNAL MEDICINE

## 2020-12-04 PROCEDURE — 99214 PR OFFICE/OUTPT VISIT, EST, LEVL IV, 30-39 MIN: ICD-10-PCS | Mod: 25,HCWC,S$GLB, | Performed by: NURSE PRACTITIONER

## 2020-12-04 PROCEDURE — 3008F PR BODY MASS INDEX (BMI) DOCUMENTED: ICD-10-PCS | Mod: HCWC,CPTII,S$GLB, | Performed by: NURSE PRACTITIONER

## 2020-12-04 PROCEDURE — 94726 PULM FUNCT TST PLETHYSMOGRAP: ICD-10-PCS | Mod: HCWC,S$GLB,, | Performed by: INTERNAL MEDICINE

## 2020-12-04 PROCEDURE — 82803 PR  BLOOD GASES: PH, PO2 & PCO2: ICD-10-PCS | Mod: HCWC,S$GLB,, | Performed by: INTERNAL MEDICINE

## 2020-12-04 PROCEDURE — 3288F PR FALLS RISK ASSESSMENT DOCUMENTED: ICD-10-PCS | Mod: HCWC,CPTII,S$GLB, | Performed by: NURSE PRACTITIONER

## 2020-12-04 NOTE — PROGRESS NOTES
"Subjective:      Patient ID: Antonio Urena is a 70 y.o. female.    Chief Complaint: COPD and Asthma    HPI  Follow up for asthma with COPD.  Patient has an extensive history of asthma.  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  She has had multiple exacerbations and hospital visits.  Positive allergy testing.  Allergy shots as a teenager and in the 1990s.  She has been on long-term prednisone for years in the 1990s.  Pacemaker insertion May 2020.  She is planning on possible repair dislodged lead on her pacemaker soon. She states she feels fantastic after adjustment in her cardiac medication.   Her regimen at this time is Flovent, spiriva, xopenex. (Possible side effect of LABA). She feels as though her asthma has improved since her original visit. Improvement on her peek flow meter- 300    Patient Active Problem List   Diagnosis    Hypertension    Asthma with COPD    Anxiety    Allergy    Hyperlipidemia    HTN (hypertension)    Chest pain    Atrial fibrillation         /70   Pulse 81   Resp 18   Ht 5' 3" (1.6 m)   Wt 97.6 kg (215 lb 2.7 oz)   SpO2 97%   BMI 38.12 kg/m²   Body mass index is 38.12 kg/m².    Review of Systems   Constitutional: Negative.    HENT: Negative.    Respiratory: Negative.    Cardiovascular: Negative.    Musculoskeletal: Negative.    Gastrointestinal: Negative.    Neurological: Negative.    Psychiatric/Behavioral: Negative.      Objective:      Physical Exam  Constitutional:       Appearance: She is well-developed.   HENT:      Head: Normocephalic and atraumatic.      Mouth/Throat:      Comments: Wearing mask due to covid concerns  Neck:      Musculoskeletal: Normal range of motion and neck supple.   Cardiovascular:      Rate and Rhythm: Normal rate and regular rhythm.      Heart sounds: No murmur. No gallop.    Pulmonary:      Effort: Pulmonary effort is normal.      Breath sounds: Normal breath sounds.   Abdominal:      Palpations: Abdomen is soft. There is no mass.      " Tenderness: There is no abdominal tenderness.   Musculoskeletal: Normal range of motion.   Skin:     General: Skin is warm and dry.   Neurological:      Mental Status: She is alert and oriented to person, place, and time.   Psychiatric:         Mood and Affect: Mood normal.         Behavior: Behavior normal.       Personal Diagnostic Review  PFT: Decreased FVC and FEV1.   FEV1 44.5% of predicted.   Normal TLC and DLCO    Assessment:       1. Asthma with COPD    2. Cardiac pacemaker in situ    3. Paroxysmal atrial fibrillation        Outpatient Encounter Medications as of 12/4/2020   Medication Sig Dispense Refill    albuterol (PROVENTIL/VENTOLIN HFA) 90 mcg/actuation inhaler Inhale into the lungs.      apixaban (ELIQUIS) 5 mg Tab Take 1 tablet (5 mg total) by mouth 2 (two) times daily. 180 tablet 3    calcium citrate-vitamin D3 315-200 mg (CITRACAL+D) 315 mg-5 mcg (200 unit) per tablet Take 1 tablet by mouth 2 (two) times daily.      cetirizine (ZYRTEC) 10 MG tablet Take 10 mg by mouth.      diltiaZEM (DILACOR XR) 180 MG CDCR Take 1 capsule (180 mg total) by mouth once daily. 90 capsule 3    fluticasone propionate (FLONASE) 50 mcg/actuation nasal spray 2 sprays (100 mcg total) by Each Nostril route daily as needed. 16 g 3    fluticasone propionate (FLOVENT DISKUS) 250 mcg/actuation DsDv Inhale 1 puff into the lungs 2 (two) times a day. Controller 90 each 3    lisinopriL 10 MG tablet Take 10 mg by mouth once daily.      LORazepam (ATIVAN) 0.5 MG tablet Take 0.5 mg by mouth daily as needed for Anxiety.      methylPREDNISolone (MEDROL DOSEPACK) 4 mg tablet use as directed 1 Package 0    omega-3 fatty acids/fish oil (FISH OIL-OMEGA-3 FATTY ACIDS) 300-1,000 mg capsule Take 1 capsule by mouth once daily.      pravastatin (PRAVACHOL) 40 MG tablet Take 1 tablet (40 mg total) by mouth once daily. 90 tablet 1    tiotropium bromide (SPIRIVA RESPIMAT) 2.5 mcg/actuation Mist Inhale 2 puffs into the lungs once daily.  12 g 3     No facility-administered encounter medications on file as of 12/4/2020.      Orders Placed This Encounter   Procedures    Spirometry without Bronchodilator     Standing Status:   Future     Standing Expiration Date:   12/4/2021     Plan:        Problem List Items Addressed This Visit        Pulmonary    Asthma with COPD - Primary    Overview     Flovent, Spiriva.          Relevant Orders    Spirometry without Bronchodilator       Cardiac/Vascular    Atrial fibrillation      Other Visit Diagnoses     Cardiac pacemaker in situ          Follow up on Monday with cardiology  Follow up 6 months with spirometry

## 2020-12-04 NOTE — PROCEDURES
PHYSICIAN INTERPRETATION AND COMMENTS:    Recent Labs     12/04/20  0818   PH 7.378   PCO2 42.3   PO2 81   HCO3 24.9   POCSATURATED 96   BE 0       Patient is acidemic.  Primary respiratory acidosis.  Secondary metabolic alkalosis.  Appropriate compensation.

## 2020-12-06 NOTE — PROGRESS NOTES
Subjective:   Patient ID:  Antonio Urena is a 70 y.o. female who presents for follow-up of No chief complaint on file.   This patient has recent pacemaker malfunctioning atrial lead which was turned off.  Regular lead is working well and atrial fibrillation.  Multaq was discontinued.  She continues on Eliquis.  The heart rate is well controlled she can stand this rhythm for now.  Otherwise we can adjust the atrial lead back into position.    Evaluation of pacemaker and chronic atrial fibrillation.      Review of Systems   Constitution: Negative for chills, diaphoresis, night sweats, weight gain and weight loss.   HENT: Negative for congestion, hoarse voice, sore throat and stridor.    Eyes: Negative for double vision and pain.   Cardiovascular: Negative for chest pain, claudication, cyanosis, dyspnea on exertion, irregular heartbeat, leg swelling, near-syncope, orthopnea, palpitations, paroxysmal nocturnal dyspnea and syncope.   Respiratory: Negative for cough, hemoptysis, shortness of breath, sleep disturbances due to breathing, snoring, sputum production and wheezing.    Endocrine: Negative for cold intolerance, heat intolerance and polydipsia.   Hematologic/Lymphatic: Negative for bleeding problem. Does not bruise/bleed easily.   Skin: Negative for color change, dry skin and rash.   Musculoskeletal: Negative for joint swelling and muscle cramps.   Gastrointestinal: Negative for bloating, abdominal pain, constipation, diarrhea, dysphagia, melena, nausea and vomiting.   Genitourinary: Negative for flank pain and urgency.   Neurological: Negative for dizziness, focal weakness, headaches, light-headedness, loss of balance, seizures and weakness.   Psychiatric/Behavioral: Negative for altered mental status and memory loss. The patient is not nervous/anxious.      Family History   Problem Relation Age of Onset    Cancer Mother     Cancer Father     Cancer Brother      Past Medical History:   Diagnosis Date     A-fib     A-fib     Pace maker put in 5/12/20    Allergy     Anxiety     Asthma     cough variant    Cancer 2018    basal cell c    Colon polyp     COPD (chronic obstructive pulmonary disease)     Hypertension      Social History     Socioeconomic History    Marital status:      Spouse name: Not on file    Number of children: Not on file    Years of education: Not on file    Highest education level: Not on file   Occupational History    Not on file   Social Needs    Financial resource strain: Not on file    Food insecurity     Worry: Not on file     Inability: Not on file    Transportation needs     Medical: Not on file     Non-medical: Not on file   Tobacco Use    Smoking status: Former Smoker    Smokeless tobacco: Never Used    Tobacco comment: Quit 30 - 40 years ago   Substance and Sexual Activity    Alcohol use: Yes     Comment: Wine Occasionally     Drug use: Never    Sexual activity: Not Currently   Lifestyle    Physical activity     Days per week: Not on file     Minutes per session: Not on file    Stress: Not on file   Relationships    Social connections     Talks on phone: Not on file     Gets together: Not on file     Attends Mosque service: Not on file     Active member of club or organization: Not on file     Attends meetings of clubs or organizations: Not on file     Relationship status: Not on file   Other Topics Concern    Not on file   Social History Narrative    Not on file     Current Outpatient Medications on File Prior to Visit   Medication Sig Dispense Refill    albuterol (PROVENTIL/VENTOLIN HFA) 90 mcg/actuation inhaler Inhale into the lungs.      apixaban (ELIQUIS) 5 mg Tab Take 1 tablet (5 mg total) by mouth 2 (two) times daily. 180 tablet 3    calcium citrate-vitamin D3 315-200 mg (CITRACAL+D) 315 mg-5 mcg (200 unit) per tablet Take 1 tablet by mouth 2 (two) times daily.      cetirizine (ZYRTEC) 10 MG tablet Take 10 mg by mouth.      diltiaZEM  (DILACOR XR) 180 MG CDCR Take 1 capsule (180 mg total) by mouth once daily. 90 capsule 3    fluticasone propionate (FLONASE) 50 mcg/actuation nasal spray 2 sprays (100 mcg total) by Each Nostril route daily as needed. 16 g 3    fluticasone propionate (FLOVENT DISKUS) 250 mcg/actuation DsDv Inhale 1 puff into the lungs 2 (two) times a day. Controller 90 each 3    lisinopriL 10 MG tablet Take 10 mg by mouth once daily.      LORazepam (ATIVAN) 0.5 MG tablet Take 0.5 mg by mouth daily as needed for Anxiety.      methylPREDNISolone (MEDROL DOSEPACK) 4 mg tablet use as directed 1 Package 0    omega-3 fatty acids/fish oil (FISH OIL-OMEGA-3 FATTY ACIDS) 300-1,000 mg capsule Take 1 capsule by mouth once daily.      pravastatin (PRAVACHOL) 40 MG tablet Take 1 tablet (40 mg total) by mouth once daily. 90 tablet 1    tiotropium bromide (SPIRIVA RESPIMAT) 2.5 mcg/actuation Mist Inhale 2 puffs into the lungs once daily. 12 g 3     No current facility-administered medications on file prior to visit.      Review of patient's allergies indicates:   Allergen Reactions    Flecainide      Intolerance    Lasix [furosemide]        Objective:     Physical Exam   Constitutional: She is oriented to person, place, and time.   Eyes: Pupils are equal, round, and reactive to light.   Neck: Normal range of motion. No tracheal deviation present.   Cardiovascular: Normal rate, regular rhythm, normal heart sounds and intact distal pulses. Exam reveals no gallop and no friction rub.   No murmur heard.  Pulses:       Carotid pulses are 2+ on the right side and 2+ on the left side.       Radial pulses are 2+ on the right side and 2+ on the left side.        Femoral pulses are 2+ on the right side and 2+ on the left side.       Popliteal pulses are 2+ on the right side and 2+ on the left side.        Dorsalis pedis pulses are 2+ on the right side and 2+ on the left side.        Posterior tibial pulses are 2+ on the right side and 2+ on the  left side.   Pulmonary/Chest: Effort normal and breath sounds normal. No stridor. No respiratory distress. She has no wheezes. She has no rales. She exhibits no tenderness.   Abdominal: She exhibits no distension. There is no abdominal tenderness. There is no rebound.   Musculoskeletal:         General: No tenderness or edema.   Neurological: She is alert and oriented to person, place, and time.   Skin: Skin is warm and dry.       Assessment:     1. Paroxysmal atrial fibrillation    2. SOB (shortness of breath)    3. Essential hypertension    4. Cardiac pacemaker in situ    5. Hyperlipidemia, unspecified hyperlipidemia type    6. Permanent atrial fibrillation    7. Disorder of cardiac pacemaker system, sequela    8. Palpitations    9. Displacement of atrial pacemaker leads, initial encounter    10. Chest pain, unspecified type    11. Tachycardia        Plan:     Paroxysmal atrial fibrillation    SOB (shortness of breath)    Essential hypertension    Cardiac pacemaker in situ    Hyperlipidemia, unspecified hyperlipidemia type    Permanent atrial fibrillation    Disorder of cardiac pacemaker system, sequela    Palpitations    Displacement of atrial pacemaker leads, initial encounter    Chest pain, unspecified type    Tachycardia    Stable and improved, will continue all medications and start to walk.  Will consider lead extraction or lead revision after the Holidays in January.

## 2020-12-07 ENCOUNTER — OFFICE VISIT (OUTPATIENT)
Dept: CARDIOLOGY | Facility: CLINIC | Age: 70
End: 2020-12-07
Payer: MEDICARE

## 2020-12-07 ENCOUNTER — HOSPITAL ENCOUNTER (OUTPATIENT)
Dept: CARDIOLOGY | Facility: HOSPITAL | Age: 70
Discharge: HOME | End: 2020-12-07
Attending: INTERNAL MEDICINE
Payer: MEDICARE

## 2020-12-07 VITALS
WEIGHT: 215.38 LBS | DIASTOLIC BLOOD PRESSURE: 66 MMHG | SYSTOLIC BLOOD PRESSURE: 110 MMHG | OXYGEN SATURATION: 94 % | BODY MASS INDEX: 38.15 KG/M2 | HEART RATE: 80 BPM

## 2020-12-07 DIAGNOSIS — R00.0 TACHYCARDIA: ICD-10-CM

## 2020-12-07 DIAGNOSIS — I48.21 PERMANENT ATRIAL FIBRILLATION: ICD-10-CM

## 2020-12-07 DIAGNOSIS — T82.9XXS DISORDER OF CARDIAC PACEMAKER SYSTEM, SEQUELA: ICD-10-CM

## 2020-12-07 DIAGNOSIS — R00.2 PALPITATIONS: ICD-10-CM

## 2020-12-07 DIAGNOSIS — I10 ESSENTIAL HYPERTENSION: ICD-10-CM

## 2020-12-07 DIAGNOSIS — R06.02 SOB (SHORTNESS OF BREATH): ICD-10-CM

## 2020-12-07 DIAGNOSIS — E78.5 HYPERLIPIDEMIA, UNSPECIFIED HYPERLIPIDEMIA TYPE: ICD-10-CM

## 2020-12-07 DIAGNOSIS — I48.0 PAROXYSMAL ATRIAL FIBRILLATION: Primary | ICD-10-CM

## 2020-12-07 DIAGNOSIS — Z95.0 CARDIAC PACEMAKER IN SITU: ICD-10-CM

## 2020-12-07 DIAGNOSIS — T82.120A DISPLACEMENT OF ATRIAL PACEMAKER LEADS, INITIAL ENCOUNTER: ICD-10-CM

## 2020-12-07 DIAGNOSIS — R07.9 CHEST PAIN, UNSPECIFIED TYPE: ICD-10-CM

## 2020-12-07 DIAGNOSIS — R00.2 PALPITATIONS: Primary | ICD-10-CM

## 2020-12-07 PROCEDURE — 1159F PR MEDICATION LIST DOCUMENTED IN MEDICAL RECORD: ICD-10-PCS | Mod: HCWC,S$GLB,, | Performed by: INTERNAL MEDICINE

## 2020-12-07 PROCEDURE — 1159F MED LIST DOCD IN RCRD: CPT | Mod: HCWC,S$GLB,, | Performed by: INTERNAL MEDICINE

## 2020-12-07 PROCEDURE — 99999 PR PBB SHADOW E&M-EST. PATIENT-LVL IV: CPT | Mod: PBBFAC,HCWC,, | Performed by: INTERNAL MEDICINE

## 2020-12-07 PROCEDURE — 99214 OFFICE O/P EST MOD 30 MIN: CPT | Mod: HCWC,S$GLB,, | Performed by: INTERNAL MEDICINE

## 2020-12-07 PROCEDURE — 99999 PR PBB SHADOW E&M-EST. PATIENT-LVL IV: ICD-10-PCS | Mod: PBBFAC,HCWC,, | Performed by: INTERNAL MEDICINE

## 2020-12-07 PROCEDURE — 3008F BODY MASS INDEX DOCD: CPT | Mod: HCWC,CPTII,S$GLB, | Performed by: INTERNAL MEDICINE

## 2020-12-07 PROCEDURE — 93005 ELECTROCARDIOGRAM TRACING: CPT | Mod: HCWC

## 2020-12-07 PROCEDURE — 3008F PR BODY MASS INDEX (BMI) DOCUMENTED: ICD-10-PCS | Mod: HCWC,CPTII,S$GLB, | Performed by: INTERNAL MEDICINE

## 2020-12-07 PROCEDURE — 93010 EKG 12-LEAD: ICD-10-PCS | Mod: HCWC,,, | Performed by: INTERNAL MEDICINE

## 2020-12-07 PROCEDURE — 93010 ELECTROCARDIOGRAM REPORT: CPT | Mod: HCWC,,, | Performed by: INTERNAL MEDICINE

## 2020-12-07 PROCEDURE — 99214 PR OFFICE/OUTPT VISIT, EST, LEVL IV, 30-39 MIN: ICD-10-PCS | Mod: HCWC,S$GLB,, | Performed by: INTERNAL MEDICINE

## 2020-12-10 ENCOUNTER — TELEPHONE (OUTPATIENT)
Dept: PULMONOLOGY | Facility: CLINIC | Age: 70
End: 2020-12-10

## 2020-12-10 NOTE — TELEPHONE ENCOUNTER
[facility]oned [facility] on 2020 no answer lvm    [facility]oned [facility] on 2020 office is closed due to holiday    Phoned [facility] on 2020 forwarded to voicemail; vm left of pts name,  , information needed, date release of information was faxed.    Phoned [facility] on 12/3/2020 to get records on pt, no answer. Lvm    Phoned [facility] at [phone] pressed option 8 to get last pft and office visit note faxed over to [phone]. No answer. Left vm with pts name,  , fax number, info needed.             ----- Message from Lily Ferraro LPN sent at 2020  1:11 PM CST -----  Fax sent to alex REAGAN at [facility] for  last pft and last office visit note.   Phone number [phone]  Fax number [phone]

## 2021-01-14 ENCOUNTER — PATIENT MESSAGE (OUTPATIENT)
Dept: PULMONOLOGY | Facility: CLINIC | Age: 71
End: 2021-01-14

## 2021-01-21 ENCOUNTER — OFFICE VISIT (OUTPATIENT)
Dept: CARDIOLOGY | Facility: CLINIC | Age: 71
End: 2021-01-21
Payer: MEDICARE

## 2021-01-21 VITALS
HEART RATE: 70 BPM | DIASTOLIC BLOOD PRESSURE: 70 MMHG | SYSTOLIC BLOOD PRESSURE: 116 MMHG | WEIGHT: 213.19 LBS | OXYGEN SATURATION: 96 % | BODY MASS INDEX: 37.76 KG/M2

## 2021-01-21 DIAGNOSIS — I10 ESSENTIAL HYPERTENSION: ICD-10-CM

## 2021-01-21 DIAGNOSIS — R06.02 SOB (SHORTNESS OF BREATH): ICD-10-CM

## 2021-01-21 DIAGNOSIS — T82.9XXS DISORDER OF CARDIAC PACEMAKER SYSTEM, SEQUELA: ICD-10-CM

## 2021-01-21 DIAGNOSIS — E78.5 HYPERLIPIDEMIA, UNSPECIFIED HYPERLIPIDEMIA TYPE: ICD-10-CM

## 2021-01-21 DIAGNOSIS — I48.0 PAROXYSMAL ATRIAL FIBRILLATION: ICD-10-CM

## 2021-01-21 DIAGNOSIS — R00.0 TACHYCARDIA: ICD-10-CM

## 2021-01-21 DIAGNOSIS — T82.120A DISPLACEMENT OF ATRIAL PACEMAKER LEADS, INITIAL ENCOUNTER: ICD-10-CM

## 2021-01-21 DIAGNOSIS — R07.9 CHEST PAIN, UNSPECIFIED TYPE: ICD-10-CM

## 2021-01-21 DIAGNOSIS — I48.21 PERMANENT ATRIAL FIBRILLATION: ICD-10-CM

## 2021-01-21 DIAGNOSIS — Z95.0 CARDIAC PACEMAKER IN SITU: ICD-10-CM

## 2021-01-21 DIAGNOSIS — R00.2 PALPITATIONS: Primary | ICD-10-CM

## 2021-01-21 PROCEDURE — 99999 PR PBB SHADOW E&M-EST. PATIENT-LVL IV: CPT | Mod: PBBFAC,,, | Performed by: INTERNAL MEDICINE

## 2021-01-21 PROCEDURE — 3078F DIAST BP <80 MM HG: CPT | Mod: CPTII,S$GLB,, | Performed by: INTERNAL MEDICINE

## 2021-01-21 PROCEDURE — 3074F SYST BP LT 130 MM HG: CPT | Mod: CPTII,S$GLB,, | Performed by: INTERNAL MEDICINE

## 2021-01-21 PROCEDURE — 99214 OFFICE O/P EST MOD 30 MIN: CPT | Mod: S$GLB,,, | Performed by: INTERNAL MEDICINE

## 2021-01-21 PROCEDURE — 1159F PR MEDICATION LIST DOCUMENTED IN MEDICAL RECORD: ICD-10-PCS | Mod: S$GLB,,, | Performed by: INTERNAL MEDICINE

## 2021-01-21 PROCEDURE — 3078F PR MOST RECENT DIASTOLIC BLOOD PRESSURE < 80 MM HG: ICD-10-PCS | Mod: CPTII,S$GLB,, | Performed by: INTERNAL MEDICINE

## 2021-01-21 PROCEDURE — 1159F MED LIST DOCD IN RCRD: CPT | Mod: S$GLB,,, | Performed by: INTERNAL MEDICINE

## 2021-01-21 PROCEDURE — 3074F PR MOST RECENT SYSTOLIC BLOOD PRESSURE < 130 MM HG: ICD-10-PCS | Mod: CPTII,S$GLB,, | Performed by: INTERNAL MEDICINE

## 2021-01-21 PROCEDURE — 3008F PR BODY MASS INDEX (BMI) DOCUMENTED: ICD-10-PCS | Mod: CPTII,S$GLB,, | Performed by: INTERNAL MEDICINE

## 2021-01-21 PROCEDURE — 3008F BODY MASS INDEX DOCD: CPT | Mod: CPTII,S$GLB,, | Performed by: INTERNAL MEDICINE

## 2021-01-21 PROCEDURE — 99214 PR OFFICE/OUTPT VISIT, EST, LEVL IV, 30-39 MIN: ICD-10-PCS | Mod: S$GLB,,, | Performed by: INTERNAL MEDICINE

## 2021-01-21 PROCEDURE — 99999 PR PBB SHADOW E&M-EST. PATIENT-LVL IV: ICD-10-PCS | Mod: PBBFAC,,, | Performed by: INTERNAL MEDICINE

## 2021-02-03 ENCOUNTER — PATIENT MESSAGE (OUTPATIENT)
Dept: CARDIOLOGY | Facility: CLINIC | Age: 71
End: 2021-02-03

## 2021-02-03 ENCOUNTER — TELEPHONE (OUTPATIENT)
Dept: CARDIOLOGY | Facility: CLINIC | Age: 71
End: 2021-02-03

## 2021-02-11 ENCOUNTER — PATIENT MESSAGE (OUTPATIENT)
Dept: CARDIOLOGY | Facility: CLINIC | Age: 71
End: 2021-02-11

## 2021-02-17 ENCOUNTER — CLINICAL SUPPORT (OUTPATIENT)
Dept: CARDIOLOGY | Facility: HOSPITAL | Age: 71
End: 2021-02-17
Payer: MEDICARE

## 2021-02-17 DIAGNOSIS — Z95.0 PRESENCE OF CARDIAC PACEMAKER: ICD-10-CM

## 2021-02-17 DIAGNOSIS — Z95.0 CARDIAC PACEMAKER IN SITU: Primary | ICD-10-CM

## 2021-02-17 DIAGNOSIS — T82.9XXS DISORDER OF CARDIAC PACEMAKER SYSTEM, SEQUELA: ICD-10-CM

## 2021-02-17 PROCEDURE — 93296 REM INTERROG EVL PM/IDS: CPT | Performed by: INTERNAL MEDICINE

## 2021-02-17 PROCEDURE — 93294 REM INTERROG EVL PM/LDLS PM: CPT | Mod: ,,, | Performed by: INTERNAL MEDICINE

## 2021-02-17 PROCEDURE — 93294 CARDIAC DEVICE CHECK - REMOTE: ICD-10-PCS | Mod: ,,, | Performed by: INTERNAL MEDICINE

## 2021-02-17 RX ORDER — AMOXICILLIN 500 MG/1
2000 TABLET, FILM COATED ORAL
Qty: 12 TABLET | Refills: 0 | Status: SHIPPED | OUTPATIENT
Start: 2021-02-17 | End: 2021-02-17 | Stop reason: SDUPTHER

## 2021-02-17 RX ORDER — AMOXICILLIN 500 MG/1
2000 TABLET, FILM COATED ORAL
Qty: 12 TABLET | Refills: 0 | Status: SHIPPED | OUTPATIENT
Start: 2021-02-17 | End: 2021-02-27

## 2021-03-02 ENCOUNTER — PATIENT MESSAGE (OUTPATIENT)
Dept: CARDIOLOGY | Facility: CLINIC | Age: 71
End: 2021-03-02

## 2021-03-23 ENCOUNTER — TELEPHONE (OUTPATIENT)
Dept: CARDIOLOGY | Facility: CLINIC | Age: 71
End: 2021-03-23

## 2021-04-15 ENCOUNTER — OFFICE VISIT (OUTPATIENT)
Dept: INTERNAL MEDICINE | Facility: CLINIC | Age: 71
End: 2021-04-15
Payer: MEDICARE

## 2021-04-15 VITALS
TEMPERATURE: 98 F | HEIGHT: 63 IN | DIASTOLIC BLOOD PRESSURE: 72 MMHG | OXYGEN SATURATION: 97 % | HEART RATE: 71 BPM | BODY MASS INDEX: 37.62 KG/M2 | SYSTOLIC BLOOD PRESSURE: 138 MMHG | WEIGHT: 212.31 LBS

## 2021-04-15 DIAGNOSIS — Z12.11 SCREENING FOR COLON CANCER: ICD-10-CM

## 2021-04-15 DIAGNOSIS — Z13.820 SCREENING FOR OSTEOPOROSIS: ICD-10-CM

## 2021-04-15 DIAGNOSIS — E78.00 PURE HYPERCHOLESTEROLEMIA: ICD-10-CM

## 2021-04-15 DIAGNOSIS — I48.0 PAROXYSMAL ATRIAL FIBRILLATION: ICD-10-CM

## 2021-04-15 DIAGNOSIS — F41.9 ANXIETY: ICD-10-CM

## 2021-04-15 DIAGNOSIS — J30.89 NON-SEASONAL ALLERGIC RHINITIS, UNSPECIFIED TRIGGER: ICD-10-CM

## 2021-04-15 DIAGNOSIS — I10 ESSENTIAL HYPERTENSION: Primary | ICD-10-CM

## 2021-04-15 DIAGNOSIS — J44.89 ASTHMA WITH COPD: ICD-10-CM

## 2021-04-15 DIAGNOSIS — Z95.0 HISTORY OF CARDIAC PACEMAKER: ICD-10-CM

## 2021-04-15 DIAGNOSIS — Z12.31 ENCOUNTER FOR SCREENING MAMMOGRAM FOR MALIGNANT NEOPLASM OF BREAST: ICD-10-CM

## 2021-04-15 PROBLEM — R07.9 CHEST PAIN: Status: RESOLVED | Noted: 2020-10-19 | Resolved: 2021-04-15

## 2021-04-15 PROBLEM — I49.5 SICK SINUS SYNDROME: Status: ACTIVE | Noted: 2021-04-15

## 2021-04-15 PROBLEM — I49.5 SICK SINUS SYNDROME: Chronic | Status: ACTIVE | Noted: 2021-04-15

## 2021-04-15 PROBLEM — I49.5 SICK SINUS SYNDROME: Chronic | Status: RESOLVED | Noted: 2021-04-15 | Resolved: 2021-04-15

## 2021-04-15 PROCEDURE — 3075F SYST BP GE 130 - 139MM HG: CPT | Mod: CPTII,S$GLB,, | Performed by: FAMILY MEDICINE

## 2021-04-15 PROCEDURE — 99999 PR PBB SHADOW E&M-EST. PATIENT-LVL V: CPT | Mod: PBBFAC,,, | Performed by: FAMILY MEDICINE

## 2021-04-15 PROCEDURE — 1126F AMNT PAIN NOTED NONE PRSNT: CPT | Mod: S$GLB,,, | Performed by: FAMILY MEDICINE

## 2021-04-15 PROCEDURE — 3288F FALL RISK ASSESSMENT DOCD: CPT | Mod: CPTII,S$GLB,, | Performed by: FAMILY MEDICINE

## 2021-04-15 PROCEDURE — 1101F PR PT FALLS ASSESS DOC 0-1 FALLS W/OUT INJ PAST YR: ICD-10-PCS | Mod: CPTII,S$GLB,, | Performed by: FAMILY MEDICINE

## 2021-04-15 PROCEDURE — 3075F PR MOST RECENT SYSTOLIC BLOOD PRESS GE 130-139MM HG: ICD-10-PCS | Mod: CPTII,S$GLB,, | Performed by: FAMILY MEDICINE

## 2021-04-15 PROCEDURE — 1159F MED LIST DOCD IN RCRD: CPT | Mod: S$GLB,,, | Performed by: FAMILY MEDICINE

## 2021-04-15 PROCEDURE — 3288F PR FALLS RISK ASSESSMENT DOCUMENTED: ICD-10-PCS | Mod: CPTII,S$GLB,, | Performed by: FAMILY MEDICINE

## 2021-04-15 PROCEDURE — 1101F PT FALLS ASSESS-DOCD LE1/YR: CPT | Mod: CPTII,S$GLB,, | Performed by: FAMILY MEDICINE

## 2021-04-15 PROCEDURE — 3078F DIAST BP <80 MM HG: CPT | Mod: CPTII,S$GLB,, | Performed by: FAMILY MEDICINE

## 2021-04-15 PROCEDURE — 3008F PR BODY MASS INDEX (BMI) DOCUMENTED: ICD-10-PCS | Mod: CPTII,S$GLB,, | Performed by: FAMILY MEDICINE

## 2021-04-15 PROCEDURE — 99204 OFFICE O/P NEW MOD 45 MIN: CPT | Mod: S$GLB,,, | Performed by: FAMILY MEDICINE

## 2021-04-15 PROCEDURE — 1159F PR MEDICATION LIST DOCUMENTED IN MEDICAL RECORD: ICD-10-PCS | Mod: S$GLB,,, | Performed by: FAMILY MEDICINE

## 2021-04-15 PROCEDURE — 99204 PR OFFICE/OUTPT VISIT, NEW, LEVL IV, 45-59 MIN: ICD-10-PCS | Mod: S$GLB,,, | Performed by: FAMILY MEDICINE

## 2021-04-15 PROCEDURE — 3078F PR MOST RECENT DIASTOLIC BLOOD PRESSURE < 80 MM HG: ICD-10-PCS | Mod: CPTII,S$GLB,, | Performed by: FAMILY MEDICINE

## 2021-04-15 PROCEDURE — 99999 PR PBB SHADOW E&M-EST. PATIENT-LVL V: ICD-10-PCS | Mod: PBBFAC,,, | Performed by: FAMILY MEDICINE

## 2021-04-15 PROCEDURE — 1126F PR PAIN SEVERITY QUANTIFIED, NO PAIN PRESENT: ICD-10-PCS | Mod: S$GLB,,, | Performed by: FAMILY MEDICINE

## 2021-04-15 PROCEDURE — 3008F BODY MASS INDEX DOCD: CPT | Mod: CPTII,S$GLB,, | Performed by: FAMILY MEDICINE

## 2021-04-15 RX ORDER — LEVALBUTEROL TARTRATE 45 UG/1
AEROSOL, METERED ORAL DAILY PRN
COMMUNITY
End: 2021-08-02 | Stop reason: SDUPTHER

## 2021-04-16 ENCOUNTER — PATIENT MESSAGE (OUTPATIENT)
Dept: ENDOSCOPY | Facility: HOSPITAL | Age: 71
End: 2021-04-16

## 2021-04-16 ENCOUNTER — PATIENT MESSAGE (OUTPATIENT)
Dept: CARDIOLOGY | Facility: CLINIC | Age: 71
End: 2021-04-16

## 2021-05-10 ENCOUNTER — PATIENT MESSAGE (OUTPATIENT)
Dept: INTERNAL MEDICINE | Facility: CLINIC | Age: 71
End: 2021-05-10

## 2021-05-10 ENCOUNTER — HOSPITAL ENCOUNTER (OUTPATIENT)
Dept: CARDIOLOGY | Facility: HOSPITAL | Age: 71
Discharge: HOME | End: 2021-05-10
Attending: INTERNAL MEDICINE
Payer: MEDICARE

## 2021-05-10 ENCOUNTER — HOSPITAL ENCOUNTER (OUTPATIENT)
Dept: RADIOLOGY | Facility: HOSPITAL | Age: 71
Discharge: HOME | End: 2021-05-10
Attending: FAMILY MEDICINE
Payer: MEDICARE

## 2021-05-10 ENCOUNTER — OFFICE VISIT (OUTPATIENT)
Dept: CARDIOLOGY | Facility: CLINIC | Age: 71
End: 2021-05-10
Payer: MEDICARE

## 2021-05-10 VITALS
HEART RATE: 72 BPM | DIASTOLIC BLOOD PRESSURE: 86 MMHG | RESPIRATION RATE: 16 BRPM | WEIGHT: 214.75 LBS | SYSTOLIC BLOOD PRESSURE: 118 MMHG | BODY MASS INDEX: 38.05 KG/M2 | HEIGHT: 63 IN | OXYGEN SATURATION: 94 %

## 2021-05-10 DIAGNOSIS — Z95.0 PRESENCE OF CARDIAC PACEMAKER: ICD-10-CM

## 2021-05-10 DIAGNOSIS — R06.02 SOB (SHORTNESS OF BREATH): ICD-10-CM

## 2021-05-10 DIAGNOSIS — R07.9 CHEST PAIN, UNSPECIFIED TYPE: ICD-10-CM

## 2021-05-10 DIAGNOSIS — I48.21 PERMANENT ATRIAL FIBRILLATION: ICD-10-CM

## 2021-05-10 DIAGNOSIS — Z12.31 ENCOUNTER FOR SCREENING MAMMOGRAM FOR MALIGNANT NEOPLASM OF BREAST: ICD-10-CM

## 2021-05-10 DIAGNOSIS — E78.5 HYPERLIPIDEMIA, UNSPECIFIED HYPERLIPIDEMIA TYPE: ICD-10-CM

## 2021-05-10 DIAGNOSIS — R00.2 PALPITATIONS: ICD-10-CM

## 2021-05-10 DIAGNOSIS — T82.9XXS DISORDER OF CARDIAC PACEMAKER SYSTEM, SEQUELA: ICD-10-CM

## 2021-05-10 DIAGNOSIS — R00.0 TACHYCARDIA: Primary | ICD-10-CM

## 2021-05-10 DIAGNOSIS — Z95.0 CARDIAC PACEMAKER IN SITU: ICD-10-CM

## 2021-05-10 DIAGNOSIS — R07.9 CHEST PAIN, UNSPECIFIED TYPE: Primary | ICD-10-CM

## 2021-05-10 DIAGNOSIS — I48.0 PAROXYSMAL ATRIAL FIBRILLATION: ICD-10-CM

## 2021-05-10 DIAGNOSIS — I10 ESSENTIAL HYPERTENSION: ICD-10-CM

## 2021-05-10 DIAGNOSIS — T82.120A DISPLACEMENT OF ATRIAL PACEMAKER LEADS, INITIAL ENCOUNTER: ICD-10-CM

## 2021-05-10 PROCEDURE — 99215 PR OFFICE/OUTPT VISIT, EST, LEVL V, 40-54 MIN: ICD-10-PCS | Mod: S$GLB,,, | Performed by: INTERNAL MEDICINE

## 2021-05-10 PROCEDURE — 77067 SCR MAMMO BI INCL CAD: CPT | Mod: 26,,, | Performed by: RADIOLOGY

## 2021-05-10 PROCEDURE — 1159F MED LIST DOCD IN RCRD: CPT | Mod: S$GLB,,, | Performed by: INTERNAL MEDICINE

## 2021-05-10 PROCEDURE — 77067 MAMMO DIGITAL SCREENING BILAT WITH TOMO: ICD-10-PCS | Mod: 26,,, | Performed by: RADIOLOGY

## 2021-05-10 PROCEDURE — 93010 EKG 12-LEAD: ICD-10-PCS | Mod: ,,, | Performed by: INTERNAL MEDICINE

## 2021-05-10 PROCEDURE — 77067 SCR MAMMO BI INCL CAD: CPT | Mod: TC

## 2021-05-10 PROCEDURE — 77063 MAMMO DIGITAL SCREENING BILAT WITH TOMO: ICD-10-PCS | Mod: 26,,, | Performed by: RADIOLOGY

## 2021-05-10 PROCEDURE — 1101F PR PT FALLS ASSESS DOC 0-1 FALLS W/OUT INJ PAST YR: ICD-10-PCS | Mod: CPTII,S$GLB,, | Performed by: INTERNAL MEDICINE

## 2021-05-10 PROCEDURE — 93005 ELECTROCARDIOGRAM TRACING: CPT

## 2021-05-10 PROCEDURE — 1101F PT FALLS ASSESS-DOCD LE1/YR: CPT | Mod: CPTII,S$GLB,, | Performed by: INTERNAL MEDICINE

## 2021-05-10 PROCEDURE — 99215 OFFICE O/P EST HI 40 MIN: CPT | Mod: S$GLB,,, | Performed by: INTERNAL MEDICINE

## 2021-05-10 PROCEDURE — 77063 BREAST TOMOSYNTHESIS BI: CPT | Mod: 26,,, | Performed by: RADIOLOGY

## 2021-05-10 PROCEDURE — 3008F BODY MASS INDEX DOCD: CPT | Mod: CPTII,S$GLB,, | Performed by: INTERNAL MEDICINE

## 2021-05-10 PROCEDURE — 1159F PR MEDICATION LIST DOCUMENTED IN MEDICAL RECORD: ICD-10-PCS | Mod: S$GLB,,, | Performed by: INTERNAL MEDICINE

## 2021-05-10 PROCEDURE — 3288F PR FALLS RISK ASSESSMENT DOCUMENTED: ICD-10-PCS | Mod: CPTII,S$GLB,, | Performed by: INTERNAL MEDICINE

## 2021-05-10 PROCEDURE — 3008F PR BODY MASS INDEX (BMI) DOCUMENTED: ICD-10-PCS | Mod: CPTII,S$GLB,, | Performed by: INTERNAL MEDICINE

## 2021-05-10 PROCEDURE — 99999 PR PBB SHADOW E&M-EST. PATIENT-LVL IV: CPT | Mod: PBBFAC,,, | Performed by: INTERNAL MEDICINE

## 2021-05-10 PROCEDURE — 93010 ELECTROCARDIOGRAM REPORT: CPT | Mod: ,,, | Performed by: INTERNAL MEDICINE

## 2021-05-10 PROCEDURE — 3288F FALL RISK ASSESSMENT DOCD: CPT | Mod: CPTII,S$GLB,, | Performed by: INTERNAL MEDICINE

## 2021-05-10 PROCEDURE — 99999 PR PBB SHADOW E&M-EST. PATIENT-LVL IV: ICD-10-PCS | Mod: PBBFAC,,, | Performed by: INTERNAL MEDICINE

## 2021-05-11 DIAGNOSIS — M85.80 OSTEOPENIA, UNSPECIFIED LOCATION: Primary | ICD-10-CM

## 2021-05-12 ENCOUNTER — HOSPITAL ENCOUNTER (OUTPATIENT)
Dept: CARDIOLOGY | Facility: HOSPITAL | Age: 71
Discharge: HOME | End: 2021-05-12
Attending: INTERNAL MEDICINE
Payer: MEDICARE

## 2021-05-12 DIAGNOSIS — Z95.0 CARDIAC PACEMAKER IN SITU: ICD-10-CM

## 2021-05-12 DIAGNOSIS — I48.21 PERMANENT ATRIAL FIBRILLATION: ICD-10-CM

## 2021-05-12 DIAGNOSIS — R06.02 SOB (SHORTNESS OF BREATH): ICD-10-CM

## 2021-05-12 DIAGNOSIS — E78.5 HYPERLIPIDEMIA, UNSPECIFIED HYPERLIPIDEMIA TYPE: ICD-10-CM

## 2021-05-12 DIAGNOSIS — R00.0 TACHYCARDIA: ICD-10-CM

## 2021-05-12 DIAGNOSIS — T82.120A DISPLACEMENT OF ATRIAL PACEMAKER LEADS, INITIAL ENCOUNTER: ICD-10-CM

## 2021-05-12 DIAGNOSIS — I48.0 PAROXYSMAL ATRIAL FIBRILLATION: ICD-10-CM

## 2021-05-12 DIAGNOSIS — T82.9XXS DISORDER OF CARDIAC PACEMAKER SYSTEM, SEQUELA: ICD-10-CM

## 2021-05-12 DIAGNOSIS — Z95.0 PRESENCE OF CARDIAC PACEMAKER: ICD-10-CM

## 2021-05-12 DIAGNOSIS — I10 ESSENTIAL HYPERTENSION: ICD-10-CM

## 2021-05-12 DIAGNOSIS — R00.2 PALPITATIONS: ICD-10-CM

## 2021-05-12 PROCEDURE — 93227 HOLTER MONITOR - 48 HOUR (CUPID ONLY): ICD-10-PCS | Mod: ,,, | Performed by: INTERNAL MEDICINE

## 2021-05-12 PROCEDURE — 93227 XTRNL ECG REC<48 HR R&I: CPT | Mod: ,,, | Performed by: INTERNAL MEDICINE

## 2021-05-12 PROCEDURE — 93226 XTRNL ECG REC<48 HR SCAN A/R: CPT | Mod: PO

## 2021-05-14 ENCOUNTER — HOSPITAL ENCOUNTER (EMERGENCY)
Facility: HOSPITAL | Age: 71
Discharge: HOME | End: 2021-05-15
Attending: EMERGENCY MEDICINE
Payer: MEDICARE

## 2021-05-14 ENCOUNTER — TELEPHONE (OUTPATIENT)
Dept: PREADMISSION TESTING | Facility: HOSPITAL | Age: 71
End: 2021-05-14

## 2021-05-14 VITALS
WEIGHT: 215.63 LBS | OXYGEN SATURATION: 98 % | RESPIRATION RATE: 17 BRPM | DIASTOLIC BLOOD PRESSURE: 66 MMHG | HEART RATE: 64 BPM | SYSTOLIC BLOOD PRESSURE: 136 MMHG | TEMPERATURE: 98 F | BODY MASS INDEX: 38.19 KG/M2

## 2021-05-14 DIAGNOSIS — I49.3 PVC (PREMATURE VENTRICULAR CONTRACTION): Primary | ICD-10-CM

## 2021-05-14 DIAGNOSIS — Z01.818 PRE-OP TESTING: ICD-10-CM

## 2021-05-14 DIAGNOSIS — I48.91 ATRIAL FIBRILLATION, UNSPECIFIED TYPE: ICD-10-CM

## 2021-05-14 DIAGNOSIS — R06.02 SHORTNESS OF BREATH: ICD-10-CM

## 2021-05-14 LAB
ALBUMIN SERPL BCP-MCNC: 3.7 G/DL (ref 3.5–5.2)
ALP SERPL-CCNC: 53 U/L (ref 55–135)
ALT SERPL W/O P-5'-P-CCNC: 12 U/L (ref 10–44)
ANION GAP SERPL CALC-SCNC: 11 MMOL/L (ref 8–16)
AST SERPL-CCNC: 11 U/L (ref 10–40)
BASOPHILS # BLD AUTO: 0.04 K/UL (ref 0–0.2)
BASOPHILS NFR BLD: 0.5 % (ref 0–1.9)
BILIRUB SERPL-MCNC: 0.3 MG/DL (ref 0.1–1)
BNP SERPL-MCNC: 147 PG/ML (ref 0–99)
BUN SERPL-MCNC: 15 MG/DL (ref 8–23)
CALCIUM SERPL-MCNC: 9.5 MG/DL (ref 8.7–10.5)
CHLORIDE SERPL-SCNC: 108 MMOL/L (ref 95–110)
CO2 SERPL-SCNC: 24 MMOL/L (ref 23–29)
CREAT SERPL-MCNC: 0.8 MG/DL (ref 0.5–1.4)
CTP QC/QA: YES
D DIMER PPP IA.FEU-MCNC: 0.38 MG/L FEU
DIFFERENTIAL METHOD: NORMAL
EOSINOPHIL # BLD AUTO: 0.3 K/UL (ref 0–0.5)
EOSINOPHIL NFR BLD: 4.2 % (ref 0–8)
ERYTHROCYTE [DISTWIDTH] IN BLOOD BY AUTOMATED COUNT: 13.3 % (ref 11.5–14.5)
EST. GFR  (AFRICAN AMERICAN): >60 ML/MIN/1.73 M^2
EST. GFR  (NON AFRICAN AMERICAN): >60 ML/MIN/1.73 M^2
GLUCOSE SERPL-MCNC: 89 MG/DL (ref 70–110)
HCT VFR BLD AUTO: 40.5 % (ref 37–48.5)
HGB BLD-MCNC: 13.2 G/DL (ref 12–16)
IMM GRANULOCYTES # BLD AUTO: 0.02 K/UL (ref 0–0.04)
IMM GRANULOCYTES NFR BLD AUTO: 0.3 % (ref 0–0.5)
LYMPHOCYTES # BLD AUTO: 1.6 K/UL (ref 1–4.8)
LYMPHOCYTES NFR BLD: 20.8 % (ref 18–48)
MCH RBC QN AUTO: 28.1 PG (ref 27–31)
MCHC RBC AUTO-ENTMCNC: 32.6 G/DL (ref 32–36)
MCV RBC AUTO: 86 FL (ref 82–98)
MONOCYTES # BLD AUTO: 0.6 K/UL (ref 0.3–1)
MONOCYTES NFR BLD: 7.3 % (ref 4–15)
NEUTROPHILS # BLD AUTO: 5.2 K/UL (ref 1.8–7.7)
NEUTROPHILS NFR BLD: 66.9 % (ref 38–73)
NRBC BLD-RTO: 0 /100 WBC
PLATELET # BLD AUTO: 207 K/UL (ref 150–450)
PMV BLD AUTO: 11.3 FL (ref 9.2–12.9)
POTASSIUM SERPL-SCNC: 4 MMOL/L (ref 3.5–5.1)
PROT SERPL-MCNC: 6.5 G/DL (ref 6–8.4)
RBC # BLD AUTO: 4.7 M/UL (ref 4–5.4)
SARS-COV-2 RDRP RESP QL NAA+PROBE: NEGATIVE
SODIUM SERPL-SCNC: 143 MMOL/L (ref 136–145)
TROPONIN I SERPL DL<=0.01 NG/ML-MCNC: <0.006 NG/ML (ref 0–0.03)
TROPONIN I SERPL DL<=0.01 NG/ML-MCNC: <0.006 NG/ML (ref 0–0.03)
WBC # BLD AUTO: 7.83 K/UL (ref 3.9–12.7)

## 2021-05-14 PROCEDURE — U0002 COVID-19 LAB TEST NON-CDC: HCPCS | Mod: ER | Performed by: EMERGENCY MEDICINE

## 2021-05-14 PROCEDURE — 85025 COMPLETE CBC W/AUTO DIFF WBC: CPT | Mod: ER | Performed by: EMERGENCY MEDICINE

## 2021-05-14 PROCEDURE — 85379 FIBRIN DEGRADATION QUANT: CPT | Mod: ER | Performed by: EMERGENCY MEDICINE

## 2021-05-14 PROCEDURE — 80053 COMPREHEN METABOLIC PANEL: CPT | Mod: ER | Performed by: EMERGENCY MEDICINE

## 2021-05-14 PROCEDURE — 93010 ELECTROCARDIOGRAM REPORT: CPT | Mod: ,,, | Performed by: INTERNAL MEDICINE

## 2021-05-14 PROCEDURE — 99285 EMERGENCY DEPT VISIT HI MDM: CPT | Mod: 25,ER

## 2021-05-14 PROCEDURE — 83880 ASSAY OF NATRIURETIC PEPTIDE: CPT | Mod: ER | Performed by: EMERGENCY MEDICINE

## 2021-05-14 PROCEDURE — 93005 ELECTROCARDIOGRAM TRACING: CPT | Mod: ER

## 2021-05-14 PROCEDURE — 93010 EKG 12-LEAD: ICD-10-PCS | Mod: ,,, | Performed by: INTERNAL MEDICINE

## 2021-05-14 PROCEDURE — 84484 ASSAY OF TROPONIN QUANT: CPT | Mod: ER | Performed by: EMERGENCY MEDICINE

## 2021-05-14 RX ORDER — POLYETHYLENE GLYCOL 3350, SODIUM SULFATE ANHYDROUS, SODIUM BICARBONATE, SODIUM CHLORIDE, POTASSIUM CHLORIDE 236; 22.74; 6.74; 5.86; 2.97 G/4L; G/4L; G/4L; G/4L; G/4L
4 POWDER, FOR SOLUTION ORAL ONCE
Qty: 4000 ML | Refills: 0 | Status: SHIPPED | OUTPATIENT
Start: 2021-05-14 | End: 2021-05-14

## 2021-05-15 ENCOUNTER — PATIENT MESSAGE (OUTPATIENT)
Dept: CARDIOLOGY | Facility: CLINIC | Age: 71
End: 2021-05-15

## 2021-05-17 ENCOUNTER — OFFICE VISIT (OUTPATIENT)
Dept: CARDIOLOGY | Facility: CLINIC | Age: 71
End: 2021-05-17
Payer: MEDICARE

## 2021-05-17 ENCOUNTER — TELEPHONE (OUTPATIENT)
Dept: CARDIOLOGY | Facility: CLINIC | Age: 71
End: 2021-05-17

## 2021-05-17 VITALS
BODY MASS INDEX: 38.66 KG/M2 | SYSTOLIC BLOOD PRESSURE: 114 MMHG | HEART RATE: 64 BPM | WEIGHT: 218.25 LBS | DIASTOLIC BLOOD PRESSURE: 70 MMHG | OXYGEN SATURATION: 98 %

## 2021-05-17 VITALS
HEART RATE: 59 BPM | OXYGEN SATURATION: 95 % | WEIGHT: 217.63 LBS | SYSTOLIC BLOOD PRESSURE: 126 MMHG | BODY MASS INDEX: 38.56 KG/M2 | HEIGHT: 63 IN | DIASTOLIC BLOOD PRESSURE: 76 MMHG

## 2021-05-17 DIAGNOSIS — Z95.0 PRESENCE OF CARDIAC PACEMAKER: Primary | ICD-10-CM

## 2021-05-17 DIAGNOSIS — I48.21 PERMANENT ATRIAL FIBRILLATION: ICD-10-CM

## 2021-05-17 DIAGNOSIS — T82.9XXS DISORDER OF CARDIAC PACEMAKER SYSTEM, SEQUELA: ICD-10-CM

## 2021-05-17 DIAGNOSIS — E78.00 PURE HYPERCHOLESTEROLEMIA: Chronic | ICD-10-CM

## 2021-05-17 DIAGNOSIS — R00.0 TACHYCARDIA: Primary | ICD-10-CM

## 2021-05-17 DIAGNOSIS — T82.120A ATRIAL PACEMAKER LEAD DISPLACEMENT, INITIAL ENCOUNTER: ICD-10-CM

## 2021-05-17 DIAGNOSIS — R00.2 PALPITATIONS: ICD-10-CM

## 2021-05-17 DIAGNOSIS — Z95.0 HISTORY OF CARDIAC PACEMAKER: Chronic | ICD-10-CM

## 2021-05-17 DIAGNOSIS — T82.120A DISPLACEMENT OF ATRIAL PACEMAKER LEADS, INITIAL ENCOUNTER: ICD-10-CM

## 2021-05-17 DIAGNOSIS — I48.0 PAROXYSMAL ATRIAL FIBRILLATION: ICD-10-CM

## 2021-05-17 DIAGNOSIS — I10 ESSENTIAL HYPERTENSION: Chronic | ICD-10-CM

## 2021-05-17 DIAGNOSIS — R06.02 SOB (SHORTNESS OF BREATH): ICD-10-CM

## 2021-05-17 DIAGNOSIS — E78.5 HYPERLIPIDEMIA, UNSPECIFIED HYPERLIPIDEMIA TYPE: ICD-10-CM

## 2021-05-17 DIAGNOSIS — Z95.0 CARDIAC PACEMAKER IN SITU: ICD-10-CM

## 2021-05-17 DIAGNOSIS — R00.0 TACHYCARDIA: ICD-10-CM

## 2021-05-17 DIAGNOSIS — I48.0 PAROXYSMAL ATRIAL FIBRILLATION: Primary | Chronic | ICD-10-CM

## 2021-05-17 DIAGNOSIS — J44.89 ASTHMA WITH COPD: Chronic | ICD-10-CM

## 2021-05-17 PROCEDURE — 3008F BODY MASS INDEX DOCD: CPT | Mod: CPTII,S$GLB,, | Performed by: INTERNAL MEDICINE

## 2021-05-17 PROCEDURE — 1159F MED LIST DOCD IN RCRD: CPT | Mod: S$GLB,,, | Performed by: INTERNAL MEDICINE

## 2021-05-17 PROCEDURE — 1126F PR PAIN SEVERITY QUANTIFIED, NO PAIN PRESENT: ICD-10-PCS | Mod: S$GLB,,, | Performed by: INTERNAL MEDICINE

## 2021-05-17 PROCEDURE — 1126F AMNT PAIN NOTED NONE PRSNT: CPT | Mod: S$GLB,,, | Performed by: INTERNAL MEDICINE

## 2021-05-17 PROCEDURE — 99214 OFFICE O/P EST MOD 30 MIN: CPT | Mod: S$GLB,,, | Performed by: INTERNAL MEDICINE

## 2021-05-17 PROCEDURE — 99999 PR PBB SHADOW E&M-EST. PATIENT-LVL IV: CPT | Mod: PBBFAC,,, | Performed by: INTERNAL MEDICINE

## 2021-05-17 PROCEDURE — 99214 PR OFFICE/OUTPT VISIT, EST, LEVL IV, 30-39 MIN: ICD-10-PCS | Mod: S$GLB,,, | Performed by: INTERNAL MEDICINE

## 2021-05-17 PROCEDURE — 99999 PR PBB SHADOW E&M-EST. PATIENT-LVL IV: ICD-10-PCS | Mod: PBBFAC,,, | Performed by: INTERNAL MEDICINE

## 2021-05-17 PROCEDURE — 99999 PR PBB SHADOW E&M-EST. PATIENT-LVL III: CPT | Mod: PBBFAC,,, | Performed by: INTERNAL MEDICINE

## 2021-05-17 PROCEDURE — 1159F PR MEDICATION LIST DOCUMENTED IN MEDICAL RECORD: ICD-10-PCS | Mod: S$GLB,,, | Performed by: INTERNAL MEDICINE

## 2021-05-17 PROCEDURE — 3008F PR BODY MASS INDEX (BMI) DOCUMENTED: ICD-10-PCS | Mod: CPTII,S$GLB,, | Performed by: INTERNAL MEDICINE

## 2021-05-17 PROCEDURE — 99999 PR PBB SHADOW E&M-EST. PATIENT-LVL III: ICD-10-PCS | Mod: PBBFAC,,, | Performed by: INTERNAL MEDICINE

## 2021-05-18 ENCOUNTER — CLINICAL SUPPORT (OUTPATIENT)
Dept: CARDIOLOGY | Facility: HOSPITAL | Age: 71
End: 2021-05-18
Payer: MEDICARE

## 2021-05-18 ENCOUNTER — PATIENT MESSAGE (OUTPATIENT)
Dept: CARDIOLOGY | Facility: CLINIC | Age: 71
End: 2021-05-18

## 2021-05-18 DIAGNOSIS — Z95.0 PRESENCE OF CARDIAC PACEMAKER: ICD-10-CM

## 2021-05-18 PROCEDURE — 93294 CARDIAC DEVICE CHECK - REMOTE: ICD-10-PCS | Mod: S$GLB,,, | Performed by: INTERNAL MEDICINE

## 2021-05-18 PROCEDURE — 93296 REM INTERROG EVL PM/IDS: CPT | Performed by: INTERNAL MEDICINE

## 2021-05-18 PROCEDURE — 93294 REM INTERROG EVL PM/LDLS PM: CPT | Mod: S$GLB,,, | Performed by: INTERNAL MEDICINE

## 2021-05-19 ENCOUNTER — TELEPHONE (OUTPATIENT)
Dept: ENDOSCOPY | Facility: HOSPITAL | Age: 71
End: 2021-05-19

## 2021-05-19 ENCOUNTER — PATIENT MESSAGE (OUTPATIENT)
Dept: ENDOSCOPY | Facility: HOSPITAL | Age: 71
End: 2021-05-19

## 2021-05-20 PROBLEM — T82.120A ATRIAL PACEMAKER LEAD DISPLACEMENT, INITIAL ENCOUNTER: Status: ACTIVE | Noted: 2021-05-20

## 2021-05-21 ENCOUNTER — TELEPHONE (OUTPATIENT)
Dept: CARDIOLOGY | Facility: CLINIC | Age: 71
End: 2021-05-21

## 2021-05-24 ENCOUNTER — PATIENT MESSAGE (OUTPATIENT)
Dept: CARDIOLOGY | Facility: CLINIC | Age: 71
End: 2021-05-24

## 2021-05-25 ENCOUNTER — TELEPHONE (OUTPATIENT)
Dept: CARDIOLOGY | Facility: CLINIC | Age: 71
End: 2021-05-25

## 2021-05-27 DIAGNOSIS — I48.91 ATRIAL FIBRILLATION AND FLUTTER: Primary | ICD-10-CM

## 2021-05-27 DIAGNOSIS — I48.92 ATRIAL FIBRILLATION AND FLUTTER: Primary | ICD-10-CM

## 2021-05-27 DIAGNOSIS — T82.120A DISPLACEMENT OF ATRIAL PACEMAKER LEADS, INITIAL ENCOUNTER: ICD-10-CM

## 2021-05-31 ENCOUNTER — LAB VISIT (OUTPATIENT)
Dept: LAB | Facility: HOSPITAL | Age: 71
End: 2021-05-31
Attending: INTERNAL MEDICINE
Payer: MEDICARE

## 2021-05-31 DIAGNOSIS — I48.21 PERMANENT ATRIAL FIBRILLATION: ICD-10-CM

## 2021-05-31 DIAGNOSIS — R00.0 TACHYCARDIA: ICD-10-CM

## 2021-05-31 DIAGNOSIS — T82.9XXS DISORDER OF CARDIAC PACEMAKER SYSTEM, SEQUELA: ICD-10-CM

## 2021-05-31 DIAGNOSIS — R00.2 PALPITATIONS: ICD-10-CM

## 2021-05-31 LAB
ALBUMIN SERPL BCP-MCNC: 3.7 G/DL (ref 3.5–5.2)
ALP SERPL-CCNC: 59 U/L (ref 55–135)
ALT SERPL W/O P-5'-P-CCNC: 13 U/L (ref 10–44)
ANION GAP SERPL CALC-SCNC: 8 MMOL/L (ref 8–16)
APTT BLDCRRT: 31.1 SEC (ref 21–32)
AST SERPL-CCNC: 12 U/L (ref 10–40)
BASOPHILS # BLD AUTO: 0.04 K/UL (ref 0–0.2)
BASOPHILS NFR BLD: 0.5 % (ref 0–1.9)
BILIRUB SERPL-MCNC: 0.4 MG/DL (ref 0.1–1)
BUN SERPL-MCNC: 16 MG/DL (ref 8–23)
CALCIUM SERPL-MCNC: 9.8 MG/DL (ref 8.7–10.5)
CHLORIDE SERPL-SCNC: 109 MMOL/L (ref 95–110)
CO2 SERPL-SCNC: 26 MMOL/L (ref 23–29)
CREAT SERPL-MCNC: 0.9 MG/DL (ref 0.5–1.4)
DIFFERENTIAL METHOD: ABNORMAL
EOSINOPHIL # BLD AUTO: 0.4 K/UL (ref 0–0.5)
EOSINOPHIL NFR BLD: 4.9 % (ref 0–8)
ERYTHROCYTE [DISTWIDTH] IN BLOOD BY AUTOMATED COUNT: 13.6 % (ref 11.5–14.5)
EST. GFR  (AFRICAN AMERICAN): >60 ML/MIN/1.73 M^2
EST. GFR  (NON AFRICAN AMERICAN): >60 ML/MIN/1.73 M^2
GLUCOSE SERPL-MCNC: 69 MG/DL (ref 70–110)
HCT VFR BLD AUTO: 41.9 % (ref 37–48.5)
HGB BLD-MCNC: 13 G/DL (ref 12–16)
IMM GRANULOCYTES # BLD AUTO: 0.02 K/UL (ref 0–0.04)
IMM GRANULOCYTES NFR BLD AUTO: 0.3 % (ref 0–0.5)
INR PPP: 1 (ref 0.8–1.2)
LYMPHOCYTES # BLD AUTO: 1.8 K/UL (ref 1–4.8)
LYMPHOCYTES NFR BLD: 23.2 % (ref 18–48)
MCH RBC QN AUTO: 27.7 PG (ref 27–31)
MCHC RBC AUTO-ENTMCNC: 31 G/DL (ref 32–36)
MCV RBC AUTO: 89 FL (ref 82–98)
MONOCYTES # BLD AUTO: 0.6 K/UL (ref 0.3–1)
MONOCYTES NFR BLD: 7.3 % (ref 4–15)
NEUTROPHILS # BLD AUTO: 4.8 K/UL (ref 1.8–7.7)
NEUTROPHILS NFR BLD: 63.8 % (ref 38–73)
NRBC BLD-RTO: 0 /100 WBC
PLATELET # BLD AUTO: 217 K/UL (ref 150–450)
PMV BLD AUTO: 12.4 FL (ref 9.2–12.9)
POTASSIUM SERPL-SCNC: 4.5 MMOL/L (ref 3.5–5.1)
PROT SERPL-MCNC: 6.7 G/DL (ref 6–8.4)
PROTHROMBIN TIME: 10.7 SEC (ref 9–12.5)
RBC # BLD AUTO: 4.69 M/UL (ref 4–5.4)
SODIUM SERPL-SCNC: 143 MMOL/L (ref 136–145)
WBC # BLD AUTO: 7.54 K/UL (ref 3.9–12.7)

## 2021-05-31 PROCEDURE — 80053 COMPREHEN METABOLIC PANEL: CPT | Performed by: INTERNAL MEDICINE

## 2021-05-31 PROCEDURE — 85730 THROMBOPLASTIN TIME PARTIAL: CPT | Performed by: INTERNAL MEDICINE

## 2021-05-31 PROCEDURE — 85025 COMPLETE CBC W/AUTO DIFF WBC: CPT | Performed by: INTERNAL MEDICINE

## 2021-05-31 PROCEDURE — 36415 COLL VENOUS BLD VENIPUNCTURE: CPT | Performed by: INTERNAL MEDICINE

## 2021-05-31 PROCEDURE — 85610 PROTHROMBIN TIME: CPT | Performed by: INTERNAL MEDICINE

## 2021-06-03 ENCOUNTER — HOSPITAL ENCOUNTER (OUTPATIENT)
Facility: HOSPITAL | Age: 71
Discharge: HOME | End: 2021-06-03
Attending: INTERNAL MEDICINE | Admitting: INTERNAL MEDICINE
Payer: MEDICARE

## 2021-06-03 ENCOUNTER — ANESTHESIA (OUTPATIENT)
Dept: CARDIOLOGY | Facility: HOSPITAL | Age: 71
End: 2021-06-03
Payer: MEDICARE

## 2021-06-03 ENCOUNTER — ANESTHESIA EVENT (OUTPATIENT)
Dept: CARDIOLOGY | Facility: HOSPITAL | Age: 71
End: 2021-06-03
Payer: MEDICARE

## 2021-06-03 DIAGNOSIS — I48.0 PAF (PAROXYSMAL ATRIAL FIBRILLATION): Primary | ICD-10-CM

## 2021-06-03 DIAGNOSIS — I48.3 TYPICAL ATRIAL FLUTTER: ICD-10-CM

## 2021-06-03 DIAGNOSIS — I48.91 ATRIAL FIBRILLATION AND FLUTTER: ICD-10-CM

## 2021-06-03 DIAGNOSIS — I48.91 ATRIAL FIBRILLATION: ICD-10-CM

## 2021-06-03 DIAGNOSIS — T82.120A DISPLACEMENT OF ATRIAL PACEMAKER LEADS, INITIAL ENCOUNTER: ICD-10-CM

## 2021-06-03 DIAGNOSIS — I48.0 PAROXYSMAL ATRIAL FIBRILLATION: Primary | ICD-10-CM

## 2021-06-03 DIAGNOSIS — I48.0 PAF (PAROXYSMAL ATRIAL FIBRILLATION): ICD-10-CM

## 2021-06-03 DIAGNOSIS — I48.92 ATRIAL FIBRILLATION AND FLUTTER: ICD-10-CM

## 2021-06-03 PROCEDURE — 93613 INTRACARDIAC EPHYS 3D MAPG: CPT | Mod: ,,, | Performed by: INTERNAL MEDICINE

## 2021-06-03 PROCEDURE — 93653 COMPRE EP EVAL TX SVT: CPT | Mod: ,,, | Performed by: INTERNAL MEDICINE

## 2021-06-03 PROCEDURE — C1730 CATH, EP, 19 OR FEW ELECT: HCPCS | Performed by: INTERNAL MEDICINE

## 2021-06-03 PROCEDURE — 27201423 OPTIME MED/SURG SUP & DEVICES STERILE SUPPLY: Performed by: INTERNAL MEDICINE

## 2021-06-03 PROCEDURE — 93613 PR INTRACARD ELECTROPHYS 3-DIMENS MAPPING: ICD-10-PCS | Mod: ,,, | Performed by: INTERNAL MEDICINE

## 2021-06-03 PROCEDURE — 99499 UNLISTED E&M SERVICE: CPT | Mod: ,,, | Performed by: INTERNAL MEDICINE

## 2021-06-03 PROCEDURE — 93613 INTRACARDIAC EPHYS 3D MAPG: CPT | Performed by: INTERNAL MEDICINE

## 2021-06-03 PROCEDURE — 37000009 HC ANESTHESIA EA ADD 15 MINS: Performed by: INTERNAL MEDICINE

## 2021-06-03 PROCEDURE — 93005 ELECTROCARDIOGRAM TRACING: CPT | Mod: 59

## 2021-06-03 PROCEDURE — 25000003 PHARM REV CODE 250: Performed by: INTERNAL MEDICINE

## 2021-06-03 PROCEDURE — 37000008 HC ANESTHESIA 1ST 15 MINUTES: Performed by: INTERNAL MEDICINE

## 2021-06-03 PROCEDURE — 93010 ELECTROCARDIOGRAM REPORT: CPT | Mod: ,,, | Performed by: INTERNAL MEDICINE

## 2021-06-03 PROCEDURE — C1894 INTRO/SHEATH, NON-LASER: HCPCS | Performed by: INTERNAL MEDICINE

## 2021-06-03 PROCEDURE — 63600175 PHARM REV CODE 636 W HCPCS: Performed by: NURSE ANESTHETIST, CERTIFIED REGISTERED

## 2021-06-03 PROCEDURE — 93621: ICD-10-PCS | Mod: 26,,, | Performed by: INTERNAL MEDICINE

## 2021-06-03 PROCEDURE — 93010 EKG 12-LEAD: ICD-10-PCS | Mod: ,,, | Performed by: INTERNAL MEDICINE

## 2021-06-03 PROCEDURE — 93621 COMP EP EVL L PAC&REC C SINS: CPT | Performed by: INTERNAL MEDICINE

## 2021-06-03 PROCEDURE — C1733 CATH, EP, OTHR THAN COOL-TIP: HCPCS | Performed by: INTERNAL MEDICINE

## 2021-06-03 PROCEDURE — 93653 COMPRE EP EVAL TX SVT: CPT | Performed by: INTERNAL MEDICINE

## 2021-06-03 PROCEDURE — 93653 PR ELECTROPHYS EVAL, COMPREHEN, W/SUPRAVENT TACHYCARD TRMT: ICD-10-PCS | Mod: ,,, | Performed by: INTERNAL MEDICINE

## 2021-06-03 PROCEDURE — 63600175 PHARM REV CODE 636 W HCPCS: Performed by: ANESTHESIOLOGY

## 2021-06-03 PROCEDURE — 25000003 PHARM REV CODE 250: Performed by: NURSE ANESTHETIST, CERTIFIED REGISTERED

## 2021-06-03 PROCEDURE — 99499 NO LOS: ICD-10-PCS | Mod: ,,, | Performed by: INTERNAL MEDICINE

## 2021-06-03 PROCEDURE — 93621 COMP EP EVL L PAC&REC C SINS: CPT | Mod: 26,,, | Performed by: INTERNAL MEDICINE

## 2021-06-03 RX ORDER — MIDAZOLAM HYDROCHLORIDE 1 MG/ML
INJECTION INTRAMUSCULAR; INTRAVENOUS
Status: DISCONTINUED | OUTPATIENT
Start: 2021-06-03 | End: 2021-06-03

## 2021-06-03 RX ORDER — FENTANYL CITRATE 50 UG/ML
INJECTION, SOLUTION INTRAMUSCULAR; INTRAVENOUS
Status: DISCONTINUED | OUTPATIENT
Start: 2021-06-03 | End: 2021-06-03

## 2021-06-03 RX ORDER — FLECAINIDE ACETATE 100 MG/1
300 TABLET ORAL ONCE
Status: COMPLETED | OUTPATIENT
Start: 2021-06-03 | End: 2021-06-03

## 2021-06-03 RX ORDER — PROPOFOL 10 MG/ML
VIAL (ML) INTRAVENOUS
Status: DISCONTINUED | OUTPATIENT
Start: 2021-06-03 | End: 2021-06-03

## 2021-06-03 RX ORDER — FLECAINIDE ACETATE 100 MG/1
100 TABLET ORAL EVERY 12 HOURS
Qty: 180 TABLET | Refills: 3 | Status: SHIPPED | OUTPATIENT
Start: 2021-06-03 | End: 2021-08-23

## 2021-06-03 RX ORDER — HYDROCODONE BITARTRATE AND ACETAMINOPHEN 10; 325 MG/1; MG/1
1 TABLET ORAL EVERY 4 HOURS PRN
Status: DISCONTINUED | OUTPATIENT
Start: 2021-06-03 | End: 2021-06-04 | Stop reason: HOSPADM

## 2021-06-03 RX ORDER — ONDANSETRON 2 MG/ML
INJECTION INTRAMUSCULAR; INTRAVENOUS
Status: DISCONTINUED | OUTPATIENT
Start: 2021-06-03 | End: 2021-06-03

## 2021-06-03 RX ORDER — HYDROCODONE BITARTRATE AND ACETAMINOPHEN 5; 325 MG/1; MG/1
1 TABLET ORAL EVERY 4 HOURS PRN
Status: DISCONTINUED | OUTPATIENT
Start: 2021-06-03 | End: 2021-06-04 | Stop reason: HOSPADM

## 2021-06-03 RX ORDER — LIDOCAINE HYDROCHLORIDE 20 MG/ML
INJECTION, SOLUTION EPIDURAL; INFILTRATION; INTRACAUDAL; PERINEURAL
Status: DISCONTINUED | OUTPATIENT
Start: 2021-06-03 | End: 2021-06-04 | Stop reason: HOSPADM

## 2021-06-03 RX ORDER — ACETAMINOPHEN 325 MG/1
650 TABLET ORAL EVERY 4 HOURS PRN
Status: DISCONTINUED | OUTPATIENT
Start: 2021-06-03 | End: 2021-06-04 | Stop reason: HOSPADM

## 2021-06-03 RX ADMIN — FENTANYL CITRATE 25 MCG: 50 INJECTION, SOLUTION INTRAMUSCULAR; INTRAVENOUS at 12:06

## 2021-06-03 RX ADMIN — SODIUM CHLORIDE: 9 INJECTION, SOLUTION INTRAVENOUS at 11:06

## 2021-06-03 RX ADMIN — FENTANYL CITRATE 50 MCG: 50 INJECTION, SOLUTION INTRAMUSCULAR; INTRAVENOUS at 12:06

## 2021-06-03 RX ADMIN — MIDAZOLAM HYDROCHLORIDE 1 MG: 1 INJECTION, SOLUTION INTRAMUSCULAR; INTRAVENOUS at 11:06

## 2021-06-03 RX ADMIN — FENTANYL CITRATE 25 MCG: 50 INJECTION, SOLUTION INTRAMUSCULAR; INTRAVENOUS at 11:06

## 2021-06-03 RX ADMIN — PROPOFOL 30 MG: 10 INJECTION, EMULSION INTRAVENOUS at 12:06

## 2021-06-03 RX ADMIN — MIDAZOLAM HYDROCHLORIDE 1 MG: 1 INJECTION, SOLUTION INTRAMUSCULAR; INTRAVENOUS at 12:06

## 2021-06-03 RX ADMIN — PROPOFOL 20 MG: 10 INJECTION, EMULSION INTRAVENOUS at 12:06

## 2021-06-03 RX ADMIN — FLECAINIDE ACETATE 300 MG: 100 TABLET ORAL at 02:06

## 2021-06-03 RX ADMIN — ONDANSETRON 4 MG: 2 INJECTION, SOLUTION INTRAMUSCULAR; INTRAVENOUS at 12:06

## 2021-06-04 VITALS
OXYGEN SATURATION: 97 % | WEIGHT: 218 LBS | RESPIRATION RATE: 12 BRPM | BODY MASS INDEX: 38.62 KG/M2 | HEIGHT: 63 IN | HEART RATE: 62 BPM | SYSTOLIC BLOOD PRESSURE: 125 MMHG | TEMPERATURE: 98 F | DIASTOLIC BLOOD PRESSURE: 80 MMHG

## 2021-06-06 ENCOUNTER — HOSPITAL ENCOUNTER (INPATIENT)
Facility: HOSPITAL | Age: 71
LOS: 1 days | Discharge: HOME | DRG: 261 | End: 2021-06-09
Attending: SPECIALIST | Admitting: INTERNAL MEDICINE
Payer: MEDICARE

## 2021-06-06 ENCOUNTER — NURSE TRIAGE (OUTPATIENT)
Dept: ADMINISTRATIVE | Facility: CLINIC | Age: 71
End: 2021-06-06

## 2021-06-06 DIAGNOSIS — I48.21 PERMANENT ATRIAL FIBRILLATION: ICD-10-CM

## 2021-06-06 DIAGNOSIS — R00.1 SYMPTOMATIC BRADYCARDIA: Primary | ICD-10-CM

## 2021-06-06 DIAGNOSIS — Z95.0 PACEMAKER: ICD-10-CM

## 2021-06-06 DIAGNOSIS — R07.9 CHEST PAIN: ICD-10-CM

## 2021-06-06 DIAGNOSIS — R00.2 PALPITATIONS: ICD-10-CM

## 2021-06-06 DIAGNOSIS — T82.9XXS DISORDER OF CARDIAC PACEMAKER SYSTEM, SEQUELA: ICD-10-CM

## 2021-06-06 DIAGNOSIS — Z95.0 CARDIAC PACEMAKER IN SITU: ICD-10-CM

## 2021-06-06 DIAGNOSIS — R07.9 CHEST PAIN, UNSPECIFIED TYPE: ICD-10-CM

## 2021-06-06 DIAGNOSIS — E78.5 HYPERLIPIDEMIA, UNSPECIFIED HYPERLIPIDEMIA TYPE: ICD-10-CM

## 2021-06-06 DIAGNOSIS — I10 ESSENTIAL HYPERTENSION: ICD-10-CM

## 2021-06-06 DIAGNOSIS — J44.89 ASTHMA WITH COPD: ICD-10-CM

## 2021-06-06 DIAGNOSIS — T82.110A PACEMAKER LEAD MALFUNCTION, INITIAL ENCOUNTER: ICD-10-CM

## 2021-06-06 DIAGNOSIS — R06.02 SOB (SHORTNESS OF BREATH): ICD-10-CM

## 2021-06-06 LAB
ALBUMIN SERPL BCP-MCNC: 3.7 G/DL (ref 3.5–5.2)
ALP SERPL-CCNC: 47 U/L (ref 55–135)
ALT SERPL W/O P-5'-P-CCNC: 18 U/L (ref 10–44)
ANION GAP SERPL CALC-SCNC: 8 MMOL/L (ref 8–16)
APTT BLDCRRT: 30.2 SEC (ref 21–32)
APTT BLDCRRT: 37.6 SEC (ref 21–32)
AST SERPL-CCNC: 16 U/L (ref 10–40)
BASOPHILS # BLD AUTO: 0.03 K/UL (ref 0–0.2)
BASOPHILS # BLD AUTO: 0.05 K/UL (ref 0–0.2)
BASOPHILS NFR BLD: 0.4 % (ref 0–1.9)
BASOPHILS NFR BLD: 0.5 % (ref 0–1.9)
BILIRUB SERPL-MCNC: 0.3 MG/DL (ref 0.1–1)
BNP SERPL-MCNC: 663 PG/ML (ref 0–99)
BUN SERPL-MCNC: 32 MG/DL (ref 8–23)
CALCIUM SERPL-MCNC: 9.1 MG/DL (ref 8.7–10.5)
CHLORIDE SERPL-SCNC: 108 MMOL/L (ref 95–110)
CO2 SERPL-SCNC: 23 MMOL/L (ref 23–29)
CREAT SERPL-MCNC: 1.1 MG/DL (ref 0.5–1.4)
CTP QC/QA: YES
DIFFERENTIAL METHOD: ABNORMAL
DIFFERENTIAL METHOD: ABNORMAL
EOSINOPHIL # BLD AUTO: 0.1 K/UL (ref 0–0.5)
EOSINOPHIL # BLD AUTO: 0.1 K/UL (ref 0–0.5)
EOSINOPHIL NFR BLD: 1.3 % (ref 0–8)
EOSINOPHIL NFR BLD: 1.6 % (ref 0–8)
ERYTHROCYTE [DISTWIDTH] IN BLOOD BY AUTOMATED COUNT: 13.2 % (ref 11.5–14.5)
ERYTHROCYTE [DISTWIDTH] IN BLOOD BY AUTOMATED COUNT: 13.3 % (ref 11.5–14.5)
EST. GFR  (AFRICAN AMERICAN): 58 ML/MIN/1.73 M^2
EST. GFR  (NON AFRICAN AMERICAN): 50 ML/MIN/1.73 M^2
GLUCOSE SERPL-MCNC: 100 MG/DL (ref 70–110)
HCT VFR BLD AUTO: 38.7 % (ref 37–48.5)
HCT VFR BLD AUTO: 39.2 % (ref 37–48.5)
HGB BLD-MCNC: 12.1 G/DL (ref 12–16)
HGB BLD-MCNC: 12.3 G/DL (ref 12–16)
IMM GRANULOCYTES # BLD AUTO: 0.04 K/UL (ref 0–0.04)
IMM GRANULOCYTES # BLD AUTO: 0.05 K/UL (ref 0–0.04)
IMM GRANULOCYTES NFR BLD AUTO: 0.4 % (ref 0–0.5)
IMM GRANULOCYTES NFR BLD AUTO: 0.6 % (ref 0–0.5)
INR PPP: 1 (ref 0.8–1.2)
LYMPHOCYTES # BLD AUTO: 1.7 K/UL (ref 1–4.8)
LYMPHOCYTES # BLD AUTO: 1.9 K/UL (ref 1–4.8)
LYMPHOCYTES NFR BLD: 17.2 % (ref 18–48)
LYMPHOCYTES NFR BLD: 23.6 % (ref 18–48)
MCH RBC QN AUTO: 28.2 PG (ref 27–31)
MCH RBC QN AUTO: 28.4 PG (ref 27–31)
MCHC RBC AUTO-ENTMCNC: 31.3 G/DL (ref 32–36)
MCHC RBC AUTO-ENTMCNC: 31.4 G/DL (ref 32–36)
MCV RBC AUTO: 90 FL (ref 82–98)
MCV RBC AUTO: 91 FL (ref 82–98)
MONOCYTES # BLD AUTO: 0.5 K/UL (ref 0.3–1)
MONOCYTES # BLD AUTO: 0.7 K/UL (ref 0.3–1)
MONOCYTES NFR BLD: 6.3 % (ref 4–15)
MONOCYTES NFR BLD: 7.1 % (ref 4–15)
NEUTROPHILS # BLD AUTO: 5.4 K/UL (ref 1.8–7.7)
NEUTROPHILS # BLD AUTO: 7.1 K/UL (ref 1.8–7.7)
NEUTROPHILS NFR BLD: 67.5 % (ref 38–73)
NEUTROPHILS NFR BLD: 73.5 % (ref 38–73)
NRBC BLD-RTO: 0 /100 WBC
NRBC BLD-RTO: 0 /100 WBC
PLATELET # BLD AUTO: 198 K/UL (ref 150–450)
PLATELET # BLD AUTO: 217 K/UL (ref 150–450)
PMV BLD AUTO: 12 FL (ref 9.2–12.9)
PMV BLD AUTO: 12 FL (ref 9.2–12.9)
POTASSIUM SERPL-SCNC: 4.7 MMOL/L (ref 3.5–5.1)
PROT SERPL-MCNC: 6.3 G/DL (ref 6–8.4)
PROTHROMBIN TIME: 11.1 SEC (ref 9–12.5)
RBC # BLD AUTO: 4.29 M/UL (ref 4–5.4)
RBC # BLD AUTO: 4.33 M/UL (ref 4–5.4)
SARS-COV-2 RDRP RESP QL NAA+PROBE: NEGATIVE
SODIUM SERPL-SCNC: 139 MMOL/L (ref 136–145)
TROPONIN I SERPL DL<=0.01 NG/ML-MCNC: 0.25 NG/ML (ref 0–0.03)
TROPONIN I SERPL DL<=0.01 NG/ML-MCNC: 0.26 NG/ML (ref 0–0.03)
WBC # BLD AUTO: 7.98 K/UL (ref 3.9–12.7)
WBC # BLD AUTO: 9.67 K/UL (ref 3.9–12.7)

## 2021-06-06 PROCEDURE — G0378 HOSPITAL OBSERVATION PER HR: HCPCS

## 2021-06-06 PROCEDURE — 85730 THROMBOPLASTIN TIME PARTIAL: CPT | Performed by: HOSPITALIST

## 2021-06-06 PROCEDURE — 36415 COLL VENOUS BLD VENIPUNCTURE: CPT | Performed by: HOSPITALIST

## 2021-06-06 PROCEDURE — 25000242 PHARM REV CODE 250 ALT 637 W/ HCPCS: Performed by: INTERNAL MEDICINE

## 2021-06-06 PROCEDURE — U0002 COVID-19 LAB TEST NON-CDC: HCPCS | Performed by: SPECIALIST

## 2021-06-06 PROCEDURE — 99223 1ST HOSP IP/OBS HIGH 75: CPT | Mod: ,,, | Performed by: INTERNAL MEDICINE

## 2021-06-06 PROCEDURE — 85025 COMPLETE CBC W/AUTO DIFF WBC: CPT | Mod: 91 | Performed by: HOSPITALIST

## 2021-06-06 PROCEDURE — 83880 ASSAY OF NATRIURETIC PEPTIDE: CPT | Performed by: SPECIALIST

## 2021-06-06 PROCEDURE — 99291 CRITICAL CARE FIRST HOUR: CPT

## 2021-06-06 PROCEDURE — 85025 COMPLETE CBC W/AUTO DIFF WBC: CPT | Performed by: SPECIALIST

## 2021-06-06 PROCEDURE — 84484 ASSAY OF TROPONIN QUANT: CPT | Mod: 91 | Performed by: SPECIALIST

## 2021-06-06 PROCEDURE — 96366 THER/PROPH/DIAG IV INF ADDON: CPT

## 2021-06-06 PROCEDURE — 94640 AIRWAY INHALATION TREATMENT: CPT

## 2021-06-06 PROCEDURE — 93005 ELECTROCARDIOGRAM TRACING: CPT

## 2021-06-06 PROCEDURE — 25000242 PHARM REV CODE 250 ALT 637 W/ HCPCS: Performed by: SPECIALIST

## 2021-06-06 PROCEDURE — 36415 COLL VENOUS BLD VENIPUNCTURE: CPT | Performed by: INTERNAL MEDICINE

## 2021-06-06 PROCEDURE — 93010 ELECTROCARDIOGRAM REPORT: CPT | Mod: ,,, | Performed by: INTERNAL MEDICINE

## 2021-06-06 PROCEDURE — 85610 PROTHROMBIN TIME: CPT | Performed by: HOSPITALIST

## 2021-06-06 PROCEDURE — 99223 PR INITIAL HOSPITAL CARE,LEVL III: ICD-10-PCS | Mod: ,,, | Performed by: INTERNAL MEDICINE

## 2021-06-06 PROCEDURE — 63600175 PHARM REV CODE 636 W HCPCS: Performed by: HOSPITALIST

## 2021-06-06 PROCEDURE — 85730 THROMBOPLASTIN TIME PARTIAL: CPT | Mod: 91 | Performed by: INTERNAL MEDICINE

## 2021-06-06 PROCEDURE — 25000003 PHARM REV CODE 250: Performed by: SPECIALIST

## 2021-06-06 PROCEDURE — 25000003 PHARM REV CODE 250: Performed by: HOSPITALIST

## 2021-06-06 PROCEDURE — 93010 EKG 12-LEAD: ICD-10-PCS | Mod: ,,, | Performed by: INTERNAL MEDICINE

## 2021-06-06 PROCEDURE — 94761 N-INVAS EAR/PLS OXIMETRY MLT: CPT

## 2021-06-06 PROCEDURE — 80053 COMPREHEN METABOLIC PANEL: CPT | Performed by: SPECIALIST

## 2021-06-06 PROCEDURE — 96365 THER/PROPH/DIAG IV INF INIT: CPT

## 2021-06-06 RX ORDER — SODIUM CHLORIDE 0.9 % (FLUSH) 0.9 %
10 SYRINGE (ML) INJECTION
Status: DISCONTINUED | OUTPATIENT
Start: 2021-06-06 | End: 2021-06-09 | Stop reason: HOSPADM

## 2021-06-06 RX ORDER — HEPARIN SODIUM,PORCINE/D5W 25000/250
0-40 INTRAVENOUS SOLUTION INTRAVENOUS CONTINUOUS
Status: DISCONTINUED | OUTPATIENT
Start: 2021-06-06 | End: 2021-06-07

## 2021-06-06 RX ORDER — PRAVASTATIN SODIUM 20 MG/1
40 TABLET ORAL NIGHTLY
Status: DISCONTINUED | OUTPATIENT
Start: 2021-06-06 | End: 2021-06-09 | Stop reason: HOSPADM

## 2021-06-06 RX ORDER — LISINOPRIL 10 MG/1
10 TABLET ORAL DAILY
Status: DISCONTINUED | OUTPATIENT
Start: 2021-06-06 | End: 2021-06-09 | Stop reason: HOSPADM

## 2021-06-06 RX ORDER — IPRATROPIUM BROMIDE 0.5 MG/2.5ML
0.5 SOLUTION RESPIRATORY (INHALATION) EVERY 6 HOURS
Status: DISCONTINUED | OUTPATIENT
Start: 2021-06-06 | End: 2021-06-09 | Stop reason: HOSPADM

## 2021-06-06 RX ORDER — FLUTICASONE PROPIONATE 250 UG/1
1 POWDER, METERED RESPIRATORY (INHALATION) 2 TIMES DAILY
Status: DISCONTINUED | OUTPATIENT
Start: 2021-06-06 | End: 2021-06-06

## 2021-06-06 RX ORDER — BUDESONIDE 0.5 MG/2ML
0.5 INHALANT ORAL EVERY 12 HOURS
Status: DISCONTINUED | OUTPATIENT
Start: 2021-06-06 | End: 2021-06-09 | Stop reason: HOSPADM

## 2021-06-06 RX ORDER — TALC
6 POWDER (GRAM) TOPICAL NIGHTLY PRN
Status: DISCONTINUED | OUTPATIENT
Start: 2021-06-06 | End: 2021-06-09 | Stop reason: HOSPADM

## 2021-06-06 RX ORDER — ASPIRIN 325 MG
325 TABLET ORAL
Status: COMPLETED | OUTPATIENT
Start: 2021-06-06 | End: 2021-06-06

## 2021-06-06 RX ADMIN — PRAVASTATIN SODIUM 40 MG: 20 TABLET ORAL at 08:06

## 2021-06-06 RX ADMIN — LISINOPRIL 10 MG: 10 TABLET ORAL at 02:06

## 2021-06-06 RX ADMIN — ASPIRIN 325 MG ORAL TABLET 325 MG: 325 PILL ORAL at 11:06

## 2021-06-06 RX ADMIN — BUDESONIDE 0.5 MG: 0.5 SUSPENSION RESPIRATORY (INHALATION) at 07:06

## 2021-06-06 RX ADMIN — HEPARIN SODIUM 12 UNITS/KG/HR: 10000 INJECTION, SOLUTION INTRAVENOUS at 02:06

## 2021-06-06 RX ADMIN — IPRATROPIUM BROMIDE 0.5 MG: 0.5 SOLUTION RESPIRATORY (INHALATION) at 07:06

## 2021-06-07 ENCOUNTER — TELEPHONE (OUTPATIENT)
Dept: CARDIOLOGY | Facility: CLINIC | Age: 71
End: 2021-06-07

## 2021-06-07 ENCOUNTER — ANESTHESIA EVENT (OUTPATIENT)
Dept: CARDIOLOGY | Facility: HOSPITAL | Age: 71
DRG: 261 | End: 2021-06-07
Payer: MEDICARE

## 2021-06-07 LAB
ALBUMIN SERPL BCP-MCNC: 3.6 G/DL (ref 3.5–5.2)
ALP SERPL-CCNC: 48 U/L (ref 55–135)
ALT SERPL W/O P-5'-P-CCNC: 17 U/L (ref 10–44)
ANION GAP SERPL CALC-SCNC: 10 MMOL/L (ref 8–16)
APTT BLDCRRT: 27.3 SEC (ref 21–32)
APTT BLDCRRT: 37.4 SEC (ref 21–32)
APTT BLDCRRT: 41.5 SEC (ref 21–32)
AST SERPL-CCNC: 15 U/L (ref 10–40)
BASOPHILS # BLD AUTO: 0.04 K/UL (ref 0–0.2)
BASOPHILS NFR BLD: 0.6 % (ref 0–1.9)
BILIRUB SERPL-MCNC: 0.5 MG/DL (ref 0.1–1)
BUN SERPL-MCNC: 20 MG/DL (ref 8–23)
CALCIUM SERPL-MCNC: 9.1 MG/DL (ref 8.7–10.5)
CHLORIDE SERPL-SCNC: 109 MMOL/L (ref 95–110)
CO2 SERPL-SCNC: 26 MMOL/L (ref 23–29)
CREAT SERPL-MCNC: 0.8 MG/DL (ref 0.5–1.4)
DIFFERENTIAL METHOD: NORMAL
EOSINOPHIL # BLD AUTO: 0.3 K/UL (ref 0–0.5)
EOSINOPHIL NFR BLD: 3.7 % (ref 0–8)
ERYTHROCYTE [DISTWIDTH] IN BLOOD BY AUTOMATED COUNT: 13.2 % (ref 11.5–14.5)
EST. GFR  (AFRICAN AMERICAN): >60 ML/MIN/1.73 M^2
EST. GFR  (NON AFRICAN AMERICAN): >60 ML/MIN/1.73 M^2
GLUCOSE SERPL-MCNC: 77 MG/DL (ref 70–110)
HCT VFR BLD AUTO: 37.5 % (ref 37–48.5)
HCT VFR BLD AUTO: 40 % (ref 37–48.5)
HGB BLD-MCNC: 12.1 G/DL (ref 12–16)
HGB BLD-MCNC: 12.8 G/DL (ref 12–16)
IMM GRANULOCYTES # BLD AUTO: 0.02 K/UL (ref 0–0.04)
IMM GRANULOCYTES NFR BLD AUTO: 0.3 % (ref 0–0.5)
LYMPHOCYTES # BLD AUTO: 2.4 K/UL (ref 1–4.8)
LYMPHOCYTES NFR BLD: 34.2 % (ref 18–48)
MCH RBC QN AUTO: 28.7 PG (ref 27–31)
MCHC RBC AUTO-ENTMCNC: 32.3 G/DL (ref 32–36)
MCV RBC AUTO: 89 FL (ref 82–98)
MONOCYTES # BLD AUTO: 0.5 K/UL (ref 0.3–1)
MONOCYTES NFR BLD: 7.2 % (ref 4–15)
NEUTROPHILS # BLD AUTO: 3.8 K/UL (ref 1.8–7.7)
NEUTROPHILS NFR BLD: 54 % (ref 38–73)
NRBC BLD-RTO: 0 /100 WBC
PLATELET # BLD AUTO: 184 K/UL (ref 150–450)
PMV BLD AUTO: 12.3 FL (ref 9.2–12.9)
POTASSIUM SERPL-SCNC: 4.3 MMOL/L (ref 3.5–5.1)
PROT SERPL-MCNC: 6.1 G/DL (ref 6–8.4)
RBC # BLD AUTO: 4.22 M/UL (ref 4–5.4)
SODIUM SERPL-SCNC: 145 MMOL/L (ref 136–145)
TROPONIN I SERPL DL<=0.01 NG/ML-MCNC: 0.15 NG/ML (ref 0–0.03)
WBC # BLD AUTO: 7.05 K/UL (ref 3.9–12.7)

## 2021-06-07 PROCEDURE — 94760 N-INVAS EAR/PLS OXIMETRY 1: CPT

## 2021-06-07 PROCEDURE — 99233 SBSQ HOSP IP/OBS HIGH 50: CPT | Mod: ,,, | Performed by: INTERNAL MEDICINE

## 2021-06-07 PROCEDURE — 25000003 PHARM REV CODE 250: Performed by: HOSPITALIST

## 2021-06-07 PROCEDURE — 85025 COMPLETE CBC W/AUTO DIFF WBC: CPT | Performed by: HOSPITALIST

## 2021-06-07 PROCEDURE — 25000242 PHARM REV CODE 250 ALT 637 W/ HCPCS: Performed by: SPECIALIST

## 2021-06-07 PROCEDURE — 99223 1ST HOSP IP/OBS HIGH 75: CPT | Mod: ,,, | Performed by: INTERNAL MEDICINE

## 2021-06-07 PROCEDURE — 80053 COMPREHEN METABOLIC PANEL: CPT | Performed by: HOSPITALIST

## 2021-06-07 PROCEDURE — 99233 PR SUBSEQUENT HOSPITAL CARE,LEVL III: ICD-10-PCS | Mod: ,,, | Performed by: INTERNAL MEDICINE

## 2021-06-07 PROCEDURE — G0378 HOSPITAL OBSERVATION PER HR: HCPCS

## 2021-06-07 PROCEDURE — 85730 THROMBOPLASTIN TIME PARTIAL: CPT | Performed by: INTERNAL MEDICINE

## 2021-06-07 PROCEDURE — 99223 PR INITIAL HOSPITAL CARE,LEVL III: ICD-10-PCS | Mod: ,,, | Performed by: INTERNAL MEDICINE

## 2021-06-07 PROCEDURE — 25000242 PHARM REV CODE 250 ALT 637 W/ HCPCS: Performed by: INTERNAL MEDICINE

## 2021-06-07 PROCEDURE — 85018 HEMOGLOBIN: CPT | Performed by: NURSE PRACTITIONER

## 2021-06-07 PROCEDURE — 96366 THER/PROPH/DIAG IV INF ADDON: CPT

## 2021-06-07 PROCEDURE — 94640 AIRWAY INHALATION TREATMENT: CPT

## 2021-06-07 PROCEDURE — 25000003 PHARM REV CODE 250: Performed by: NURSE PRACTITIONER

## 2021-06-07 PROCEDURE — 84484 ASSAY OF TROPONIN QUANT: CPT | Performed by: PHYSICIAN ASSISTANT

## 2021-06-07 PROCEDURE — 63600175 PHARM REV CODE 636 W HCPCS: Performed by: HOSPITALIST

## 2021-06-07 PROCEDURE — 85014 HEMATOCRIT: CPT | Performed by: NURSE PRACTITIONER

## 2021-06-07 PROCEDURE — 36415 COLL VENOUS BLD VENIPUNCTURE: CPT | Performed by: INTERNAL MEDICINE

## 2021-06-07 RX ORDER — POLYETHYLENE GLYCOL 3350 17 G/17G
17 POWDER, FOR SOLUTION ORAL 2 TIMES DAILY
Status: DISCONTINUED | OUTPATIENT
Start: 2021-06-07 | End: 2021-06-09 | Stop reason: HOSPADM

## 2021-06-07 RX ORDER — DOCUSATE SODIUM 100 MG/1
100 CAPSULE, LIQUID FILLED ORAL 2 TIMES DAILY
Status: DISCONTINUED | OUTPATIENT
Start: 2021-06-07 | End: 2021-06-09 | Stop reason: HOSPADM

## 2021-06-07 RX ORDER — CEFAZOLIN SODIUM 2 G/50ML
2 SOLUTION INTRAVENOUS
Status: CANCELLED | OUTPATIENT
Start: 2021-06-07

## 2021-06-07 RX ADMIN — HEPARIN SODIUM 16 UNITS/KG/HR: 10000 INJECTION, SOLUTION INTRAVENOUS at 11:06

## 2021-06-07 RX ADMIN — IPRATROPIUM BROMIDE 0.5 MG: 0.5 SOLUTION RESPIRATORY (INHALATION) at 12:06

## 2021-06-07 RX ADMIN — LISINOPRIL 10 MG: 10 TABLET ORAL at 08:06

## 2021-06-07 RX ADMIN — IPRATROPIUM BROMIDE 0.5 MG: 0.5 SOLUTION RESPIRATORY (INHALATION) at 07:06

## 2021-06-07 RX ADMIN — IPRATROPIUM BROMIDE 0.5 MG: 0.5 SOLUTION RESPIRATORY (INHALATION) at 01:06

## 2021-06-07 RX ADMIN — BUDESONIDE 0.5 MG: 0.5 SUSPENSION RESPIRATORY (INHALATION) at 07:06

## 2021-06-07 RX ADMIN — PRAVASTATIN SODIUM 40 MG: 20 TABLET ORAL at 08:06

## 2021-06-07 RX ADMIN — POLYETHYLENE GLYCOL 3350 17 G: 17 POWDER, FOR SOLUTION ORAL at 04:06

## 2021-06-07 RX ADMIN — DOCUSATE SODIUM 100 MG: 100 CAPSULE, LIQUID FILLED ORAL at 04:06

## 2021-06-08 ENCOUNTER — ANESTHESIA (OUTPATIENT)
Dept: CARDIOLOGY | Facility: HOSPITAL | Age: 71
DRG: 261 | End: 2021-06-08
Payer: MEDICARE

## 2021-06-08 DIAGNOSIS — J44.89 ASTHMA WITH COPD: Primary | ICD-10-CM

## 2021-06-08 LAB
ALBUMIN SERPL BCP-MCNC: 4 G/DL (ref 3.5–5.2)
ALP SERPL-CCNC: 59 U/L (ref 55–135)
ALT SERPL W/O P-5'-P-CCNC: 17 U/L (ref 10–44)
ANION GAP SERPL CALC-SCNC: 13 MMOL/L (ref 8–16)
AST SERPL-CCNC: 20 U/L (ref 10–40)
BASOPHILS # BLD AUTO: 0.04 K/UL (ref 0–0.2)
BASOPHILS NFR BLD: 0.4 % (ref 0–1.9)
BILIRUB SERPL-MCNC: 0.7 MG/DL (ref 0.1–1)
BUN SERPL-MCNC: 15 MG/DL (ref 8–23)
CALCIUM SERPL-MCNC: 9.6 MG/DL (ref 8.7–10.5)
CHLORIDE SERPL-SCNC: 106 MMOL/L (ref 95–110)
CO2 SERPL-SCNC: 22 MMOL/L (ref 23–29)
CREAT SERPL-MCNC: 1 MG/DL (ref 0.5–1.4)
DIFFERENTIAL METHOD: ABNORMAL
EOSINOPHIL # BLD AUTO: 0.4 K/UL (ref 0–0.5)
EOSINOPHIL NFR BLD: 3.9 % (ref 0–8)
ERYTHROCYTE [DISTWIDTH] IN BLOOD BY AUTOMATED COUNT: 13.1 % (ref 11.5–14.5)
EST. GFR  (AFRICAN AMERICAN): >60 ML/MIN/1.73 M^2
EST. GFR  (NON AFRICAN AMERICAN): 56 ML/MIN/1.73 M^2
GLUCOSE SERPL-MCNC: 84 MG/DL (ref 70–110)
HCT VFR BLD AUTO: 46.6 % (ref 37–48.5)
HGB BLD-MCNC: 14.8 G/DL (ref 12–16)
IMM GRANULOCYTES # BLD AUTO: 0.03 K/UL (ref 0–0.04)
IMM GRANULOCYTES NFR BLD AUTO: 0.3 % (ref 0–0.5)
LYMPHOCYTES # BLD AUTO: 2.2 K/UL (ref 1–4.8)
LYMPHOCYTES NFR BLD: 24 % (ref 18–48)
MCH RBC QN AUTO: 28.1 PG (ref 27–31)
MCHC RBC AUTO-ENTMCNC: 31.8 G/DL (ref 32–36)
MCV RBC AUTO: 89 FL (ref 82–98)
MONOCYTES # BLD AUTO: 0.6 K/UL (ref 0.3–1)
MONOCYTES NFR BLD: 6.6 % (ref 4–15)
NEUTROPHILS # BLD AUTO: 6 K/UL (ref 1.8–7.7)
NEUTROPHILS NFR BLD: 64.8 % (ref 38–73)
NRBC BLD-RTO: 0 /100 WBC
PLATELET # BLD AUTO: 250 K/UL (ref 150–450)
PLATELET BLD QL SMEAR: ABNORMAL
PMV BLD AUTO: 11.7 FL (ref 9.2–12.9)
POTASSIUM SERPL-SCNC: 4.9 MMOL/L (ref 3.5–5.1)
PROT SERPL-MCNC: 7.4 G/DL (ref 6–8.4)
RBC # BLD AUTO: 5.26 M/UL (ref 4–5.4)
SODIUM SERPL-SCNC: 141 MMOL/L (ref 136–145)
WBC # BLD AUTO: 9.24 K/UL (ref 3.9–12.7)

## 2021-06-08 PROCEDURE — C1894 INTRO/SHEATH, NON-LASER: HCPCS | Performed by: INTERNAL MEDICINE

## 2021-06-08 PROCEDURE — 63600175 PHARM REV CODE 636 W HCPCS: Performed by: INTERNAL MEDICINE

## 2021-06-08 PROCEDURE — 27201423 OPTIME MED/SURG SUP & DEVICES STERILE SUPPLY: Performed by: INTERNAL MEDICINE

## 2021-06-08 PROCEDURE — 93010 ELECTROCARDIOGRAM REPORT: CPT | Mod: ,,, | Performed by: INTERNAL MEDICINE

## 2021-06-08 PROCEDURE — 33234 REMOVAL OF PACEMAKER SYSTEM: CPT | Performed by: INTERNAL MEDICINE

## 2021-06-08 PROCEDURE — 85025 COMPLETE CBC W/AUTO DIFF WBC: CPT | Performed by: HOSPITALIST

## 2021-06-08 PROCEDURE — 33234 REMOVAL OF PACEMAKER SYSTEM: CPT | Mod: ,,, | Performed by: INTERNAL MEDICINE

## 2021-06-08 PROCEDURE — 25000242 PHARM REV CODE 250 ALT 637 W/ HCPCS: Performed by: INTERNAL MEDICINE

## 2021-06-08 PROCEDURE — 94760 N-INVAS EAR/PLS OXIMETRY 1: CPT

## 2021-06-08 PROCEDURE — 63600175 PHARM REV CODE 636 W HCPCS: Performed by: NURSE ANESTHETIST, CERTIFIED REGISTERED

## 2021-06-08 PROCEDURE — 21400001 HC TELEMETRY ROOM

## 2021-06-08 PROCEDURE — 93005 ELECTROCARDIOGRAM TRACING: CPT

## 2021-06-08 PROCEDURE — 25000003 PHARM REV CODE 250: Performed by: INTERNAL MEDICINE

## 2021-06-08 PROCEDURE — 94640 AIRWAY INHALATION TREATMENT: CPT

## 2021-06-08 PROCEDURE — 93010 EKG 12-LEAD: ICD-10-PCS | Mod: ,,, | Performed by: INTERNAL MEDICINE

## 2021-06-08 PROCEDURE — 25000003 PHARM REV CODE 250: Performed by: HOSPITALIST

## 2021-06-08 PROCEDURE — 25000242 PHARM REV CODE 250 ALT 637 W/ HCPCS: Performed by: SPECIALIST

## 2021-06-08 PROCEDURE — 25000003 PHARM REV CODE 250: Performed by: NURSE PRACTITIONER

## 2021-06-08 PROCEDURE — 80053 COMPREHEN METABOLIC PANEL: CPT | Performed by: HOSPITALIST

## 2021-06-08 PROCEDURE — 94761 N-INVAS EAR/PLS OXIMETRY MLT: CPT

## 2021-06-08 PROCEDURE — 63600175 PHARM REV CODE 636 W HCPCS: Performed by: ANESTHESIOLOGY

## 2021-06-08 PROCEDURE — 99232 PR SUBSEQUENT HOSPITAL CARE,LEVL II: ICD-10-PCS | Mod: ,,, | Performed by: INTERNAL MEDICINE

## 2021-06-08 PROCEDURE — 33216 INSERT 1 ELECTRODE PM-DEFIB: CPT | Performed by: INTERNAL MEDICINE

## 2021-06-08 PROCEDURE — 25000003 PHARM REV CODE 250: Performed by: NURSE ANESTHETIST, CERTIFIED REGISTERED

## 2021-06-08 PROCEDURE — 33234 PR REMV TRANSVEN PACER ELECTRODE,1 LEAD: ICD-10-PCS | Mod: ,,, | Performed by: INTERNAL MEDICINE

## 2021-06-08 PROCEDURE — C1769 GUIDE WIRE: HCPCS | Performed by: INTERNAL MEDICINE

## 2021-06-08 PROCEDURE — 36415 COLL VENOUS BLD VENIPUNCTURE: CPT | Performed by: HOSPITALIST

## 2021-06-08 PROCEDURE — 33216 PR INSERT TRANSVEN ELECTRODE,SING CHAMBER: ICD-10-PCS | Mod: 51,,, | Performed by: INTERNAL MEDICINE

## 2021-06-08 PROCEDURE — 33216 INSERT 1 ELECTRODE PM-DEFIB: CPT | Mod: 51,,, | Performed by: INTERNAL MEDICINE

## 2021-06-08 PROCEDURE — C1898 LEAD, PMKR, OTHER THAN TRANS: HCPCS | Performed by: INTERNAL MEDICINE

## 2021-06-08 PROCEDURE — 99232 SBSQ HOSP IP/OBS MODERATE 35: CPT | Mod: ,,, | Performed by: INTERNAL MEDICINE

## 2021-06-08 PROCEDURE — 37000009 HC ANESTHESIA EA ADD 15 MINS: Performed by: INTERNAL MEDICINE

## 2021-06-08 PROCEDURE — 37000008 HC ANESTHESIA 1ST 15 MINUTES: Performed by: INTERNAL MEDICINE

## 2021-06-08 DEVICE — IMPLANTABLE DEVICE
Type: IMPLANTABLE DEVICE | Site: HEART | Status: FUNCTIONAL
Brand: SOLIA

## 2021-06-08 RX ORDER — HYDROCODONE BITARTRATE AND ACETAMINOPHEN 5; 325 MG/1; MG/1
1 TABLET ORAL EVERY 4 HOURS PRN
Status: DISCONTINUED | OUTPATIENT
Start: 2021-06-08 | End: 2021-06-09 | Stop reason: HOSPADM

## 2021-06-08 RX ORDER — FENTANYL CITRATE 50 UG/ML
INJECTION, SOLUTION INTRAMUSCULAR; INTRAVENOUS
Status: DISCONTINUED | OUTPATIENT
Start: 2021-06-08 | End: 2021-06-08

## 2021-06-08 RX ORDER — CEFAZOLIN SODIUM 1 G/50ML
1 SOLUTION INTRAVENOUS
Status: COMPLETED | OUTPATIENT
Start: 2021-06-08 | End: 2021-06-09

## 2021-06-08 RX ORDER — FLECAINIDE ACETATE 100 MG/1
100 TABLET ORAL EVERY 12 HOURS
Status: DISCONTINUED | OUTPATIENT
Start: 2021-06-08 | End: 2021-06-09 | Stop reason: HOSPADM

## 2021-06-08 RX ORDER — MIDAZOLAM HYDROCHLORIDE 1 MG/ML
INJECTION INTRAMUSCULAR; INTRAVENOUS
Status: DISCONTINUED | OUTPATIENT
Start: 2021-06-08 | End: 2021-06-08

## 2021-06-08 RX ORDER — VANCOMYCIN HYDROCHLORIDE 1 G/20ML
INJECTION, POWDER, LYOPHILIZED, FOR SOLUTION INTRAVENOUS
Status: DISCONTINUED | OUTPATIENT
Start: 2021-06-08 | End: 2021-06-08 | Stop reason: HOSPADM

## 2021-06-08 RX ORDER — HYDROCODONE BITARTRATE AND ACETAMINOPHEN 10; 325 MG/1; MG/1
1 TABLET ORAL EVERY 4 HOURS PRN
Status: DISCONTINUED | OUTPATIENT
Start: 2021-06-08 | End: 2021-06-09 | Stop reason: HOSPADM

## 2021-06-08 RX ORDER — SODIUM CHLORIDE, SODIUM LACTATE, POTASSIUM CHLORIDE, CALCIUM CHLORIDE 600; 310; 30; 20 MG/100ML; MG/100ML; MG/100ML; MG/100ML
INJECTION, SOLUTION INTRAVENOUS CONTINUOUS PRN
Status: DISCONTINUED | OUTPATIENT
Start: 2021-06-08 | End: 2021-06-08

## 2021-06-08 RX ORDER — DILTIAZEM HYDROCHLORIDE 180 MG/1
180 CAPSULE, COATED, EXTENDED RELEASE ORAL DAILY
Status: DISCONTINUED | OUTPATIENT
Start: 2021-06-09 | End: 2021-06-09 | Stop reason: HOSPADM

## 2021-06-08 RX ORDER — ACETAMINOPHEN 325 MG/1
650 TABLET ORAL EVERY 4 HOURS PRN
Status: DISCONTINUED | OUTPATIENT
Start: 2021-06-08 | End: 2021-06-09 | Stop reason: HOSPADM

## 2021-06-08 RX ORDER — AMOXICILLIN 250 MG
1 CAPSULE ORAL DAILY PRN
Status: DISCONTINUED | OUTPATIENT
Start: 2021-06-08 | End: 2021-06-09 | Stop reason: HOSPADM

## 2021-06-08 RX ORDER — PROPOFOL 10 MG/ML
INJECTION, EMULSION INTRAVENOUS CONTINUOUS PRN
Status: DISCONTINUED | OUTPATIENT
Start: 2021-06-08 | End: 2021-06-08

## 2021-06-08 RX ADMIN — POLYETHYLENE GLYCOL 3350 17 G: 17 POWDER, FOR SOLUTION ORAL at 10:06

## 2021-06-08 RX ADMIN — FENTANYL CITRATE 50 MCG: 50 INJECTION, SOLUTION INTRAMUSCULAR; INTRAVENOUS at 03:06

## 2021-06-08 RX ADMIN — BUDESONIDE 0.5 MG: 0.5 SUSPENSION RESPIRATORY (INHALATION) at 07:06

## 2021-06-08 RX ADMIN — PROPOFOL 70 MCG/KG/MIN: 10 INJECTION, EMULSION INTRAVENOUS at 03:06

## 2021-06-08 RX ADMIN — STANDARDIZED SENNA CONCENTRATE AND DOCUSATE SODIUM 1 TABLET: 8.6; 5 TABLET ORAL at 10:06

## 2021-06-08 RX ADMIN — MIDAZOLAM HYDROCHLORIDE 2 MG: 1 INJECTION, SOLUTION INTRAMUSCULAR; INTRAVENOUS at 03:06

## 2021-06-08 RX ADMIN — IPRATROPIUM BROMIDE 0.5 MG: 0.5 SOLUTION RESPIRATORY (INHALATION) at 07:06

## 2021-06-08 RX ADMIN — IPRATROPIUM BROMIDE 0.5 MG: 0.5 SOLUTION RESPIRATORY (INHALATION) at 01:06

## 2021-06-08 RX ADMIN — LISINOPRIL 10 MG: 10 TABLET ORAL at 08:06

## 2021-06-08 RX ADMIN — FENTANYL CITRATE 50 MCG: 50 INJECTION, SOLUTION INTRAMUSCULAR; INTRAVENOUS at 05:06

## 2021-06-08 RX ADMIN — CEFAZOLIN 2 G: 1 INJECTION, POWDER, FOR SOLUTION INTRAMUSCULAR; INTRAVENOUS at 03:06

## 2021-06-08 RX ADMIN — DOCUSATE SODIUM 100 MG: 100 CAPSULE, LIQUID FILLED ORAL at 08:06

## 2021-06-08 RX ADMIN — IPRATROPIUM BROMIDE 0.5 MG: 0.5 SOLUTION RESPIRATORY (INHALATION) at 08:06

## 2021-06-08 RX ADMIN — SODIUM CHLORIDE, SODIUM LACTATE, POTASSIUM CHLORIDE, AND CALCIUM CHLORIDE: .6; .31; .03; .02 INJECTION, SOLUTION INTRAVENOUS at 03:06

## 2021-06-08 RX ADMIN — DOCUSATE SODIUM 100 MG: 100 CAPSULE, LIQUID FILLED ORAL at 10:06

## 2021-06-08 RX ADMIN — PRAVASTATIN SODIUM 40 MG: 20 TABLET ORAL at 10:06

## 2021-06-08 RX ADMIN — FLECAINIDE ACETATE 100 MG: 100 TABLET ORAL at 10:06

## 2021-06-08 RX ADMIN — CEFAZOLIN SODIUM 1 G: 1 SOLUTION INTRAVENOUS at 10:06

## 2021-06-08 RX ADMIN — BUDESONIDE 0.5 MG: 0.5 SUSPENSION RESPIRATORY (INHALATION) at 08:06

## 2021-06-09 ENCOUNTER — TELEPHONE (OUTPATIENT)
Dept: CARDIOLOGY | Facility: HOSPITAL | Age: 71
End: 2021-06-09

## 2021-06-09 VITALS
SYSTOLIC BLOOD PRESSURE: 102 MMHG | BODY MASS INDEX: 37.73 KG/M2 | HEART RATE: 69 BPM | TEMPERATURE: 97 F | HEIGHT: 63 IN | WEIGHT: 212.94 LBS | RESPIRATION RATE: 16 BRPM | DIASTOLIC BLOOD PRESSURE: 57 MMHG | OXYGEN SATURATION: 94 %

## 2021-06-09 LAB
ALBUMIN SERPL BCP-MCNC: 3.9 G/DL (ref 3.5–5.2)
ALP SERPL-CCNC: 58 U/L (ref 55–135)
ALT SERPL W/O P-5'-P-CCNC: 11 U/L (ref 10–44)
ANION GAP SERPL CALC-SCNC: 13 MMOL/L (ref 8–16)
AST SERPL-CCNC: 14 U/L (ref 10–40)
BASOPHILS # BLD AUTO: 0.04 K/UL (ref 0–0.2)
BASOPHILS NFR BLD: 0.4 % (ref 0–1.9)
BILIRUB SERPL-MCNC: 0.7 MG/DL (ref 0.1–1)
BUN SERPL-MCNC: 13 MG/DL (ref 8–23)
CALCIUM SERPL-MCNC: 9.2 MG/DL (ref 8.7–10.5)
CHLORIDE SERPL-SCNC: 104 MMOL/L (ref 95–110)
CO2 SERPL-SCNC: 23 MMOL/L (ref 23–29)
CREAT SERPL-MCNC: 0.8 MG/DL (ref 0.5–1.4)
DIFFERENTIAL METHOD: ABNORMAL
EOSINOPHIL # BLD AUTO: 0.3 K/UL (ref 0–0.5)
EOSINOPHIL NFR BLD: 2.6 % (ref 0–8)
ERYTHROCYTE [DISTWIDTH] IN BLOOD BY AUTOMATED COUNT: 13.2 % (ref 11.5–14.5)
EST. GFR  (AFRICAN AMERICAN): >60 ML/MIN/1.73 M^2
EST. GFR  (NON AFRICAN AMERICAN): >60 ML/MIN/1.73 M^2
GLUCOSE SERPL-MCNC: 76 MG/DL (ref 70–110)
HCT VFR BLD AUTO: 46.2 % (ref 37–48.5)
HGB BLD-MCNC: 14.8 G/DL (ref 12–16)
IMM GRANULOCYTES # BLD AUTO: 0.03 K/UL (ref 0–0.04)
IMM GRANULOCYTES NFR BLD AUTO: 0.3 % (ref 0–0.5)
LYMPHOCYTES # BLD AUTO: 1.4 K/UL (ref 1–4.8)
LYMPHOCYTES NFR BLD: 13.5 % (ref 18–48)
MCH RBC QN AUTO: 28.5 PG (ref 27–31)
MCHC RBC AUTO-ENTMCNC: 32 G/DL (ref 32–36)
MCV RBC AUTO: 89 FL (ref 82–98)
MONOCYTES # BLD AUTO: 0.7 K/UL (ref 0.3–1)
MONOCYTES NFR BLD: 6.4 % (ref 4–15)
NEUTROPHILS # BLD AUTO: 8.1 K/UL (ref 1.8–7.7)
NEUTROPHILS NFR BLD: 76.8 % (ref 38–73)
NRBC BLD-RTO: 0 /100 WBC
PLATELET # BLD AUTO: 247 K/UL (ref 150–450)
PMV BLD AUTO: 11.9 FL (ref 9.2–12.9)
POTASSIUM SERPL-SCNC: 4.2 MMOL/L (ref 3.5–5.1)
PROT SERPL-MCNC: 6.9 G/DL (ref 6–8.4)
RBC # BLD AUTO: 5.2 M/UL (ref 4–5.4)
SODIUM SERPL-SCNC: 140 MMOL/L (ref 136–145)
WBC # BLD AUTO: 10.57 K/UL (ref 3.9–12.7)

## 2021-06-09 PROCEDURE — 99232 SBSQ HOSP IP/OBS MODERATE 35: CPT | Mod: ,,, | Performed by: INTERNAL MEDICINE

## 2021-06-09 PROCEDURE — 63600175 PHARM REV CODE 636 W HCPCS: Performed by: INTERNAL MEDICINE

## 2021-06-09 PROCEDURE — 25000242 PHARM REV CODE 250 ALT 637 W/ HCPCS: Performed by: INTERNAL MEDICINE

## 2021-06-09 PROCEDURE — 80053 COMPREHEN METABOLIC PANEL: CPT | Performed by: HOSPITALIST

## 2021-06-09 PROCEDURE — 25000003 PHARM REV CODE 250: Performed by: INTERNAL MEDICINE

## 2021-06-09 PROCEDURE — 25000003 PHARM REV CODE 250: Performed by: HOSPITALIST

## 2021-06-09 PROCEDURE — 85025 COMPLETE CBC W/AUTO DIFF WBC: CPT | Performed by: HOSPITALIST

## 2021-06-09 PROCEDURE — 36415 COLL VENOUS BLD VENIPUNCTURE: CPT | Performed by: HOSPITALIST

## 2021-06-09 PROCEDURE — 94640 AIRWAY INHALATION TREATMENT: CPT

## 2021-06-09 PROCEDURE — 25000242 PHARM REV CODE 250 ALT 637 W/ HCPCS: Performed by: SPECIALIST

## 2021-06-09 PROCEDURE — 94760 N-INVAS EAR/PLS OXIMETRY 1: CPT

## 2021-06-09 PROCEDURE — 99232 PR SUBSEQUENT HOSPITAL CARE,LEVL II: ICD-10-PCS | Mod: ,,, | Performed by: INTERNAL MEDICINE

## 2021-06-09 PROCEDURE — 25000003 PHARM REV CODE 250: Performed by: NURSE PRACTITIONER

## 2021-06-09 RX ORDER — DOCUSATE SODIUM 100 MG/1
100 CAPSULE, LIQUID FILLED ORAL 2 TIMES DAILY
Qty: 20 CAPSULE | Refills: 0 | Status: SHIPPED | OUTPATIENT
Start: 2021-06-09 | End: 2021-06-19

## 2021-06-09 RX ORDER — ADHESIVE BANDAGE
30 BANDAGE TOPICAL DAILY PRN
Status: DISCONTINUED | OUTPATIENT
Start: 2021-06-09 | End: 2021-06-09 | Stop reason: HOSPADM

## 2021-06-09 RX ADMIN — DOCUSATE SODIUM 100 MG: 100 CAPSULE, LIQUID FILLED ORAL at 08:06

## 2021-06-09 RX ADMIN — FLECAINIDE ACETATE 100 MG: 100 TABLET ORAL at 08:06

## 2021-06-09 RX ADMIN — IPRATROPIUM BROMIDE 0.5 MG: 0.5 SOLUTION RESPIRATORY (INHALATION) at 12:06

## 2021-06-09 RX ADMIN — SODIUM PHOSPHATE, DIBASIC AND SODIUM PHOSPHATE, MONOBASIC 1 ENEMA: 7; 19 ENEMA RECTAL at 02:06

## 2021-06-09 RX ADMIN — MAGNESIUM HYDROXIDE 2400 MG: 400 SUSPENSION ORAL at 10:06

## 2021-06-09 RX ADMIN — BUDESONIDE 0.5 MG: 0.5 SUSPENSION RESPIRATORY (INHALATION) at 08:06

## 2021-06-09 RX ADMIN — DILTIAZEM HYDROCHLORIDE 180 MG: 180 CAPSULE, COATED, EXTENDED RELEASE ORAL at 08:06

## 2021-06-09 RX ADMIN — POLYETHYLENE GLYCOL 3350 17 G: 17 POWDER, FOR SOLUTION ORAL at 08:06

## 2021-06-09 RX ADMIN — CEFAZOLIN SODIUM 1 G: 1 SOLUTION INTRAVENOUS at 08:06

## 2021-06-09 RX ADMIN — LISINOPRIL 10 MG: 10 TABLET ORAL at 08:06

## 2021-06-09 RX ADMIN — IPRATROPIUM BROMIDE 0.5 MG: 0.5 SOLUTION RESPIRATORY (INHALATION) at 08:06

## 2021-06-10 ENCOUNTER — OUTPATIENT CASE MANAGEMENT (OUTPATIENT)
Dept: ADMINISTRATIVE | Facility: OTHER | Age: 71
End: 2021-06-10

## 2021-06-10 ENCOUNTER — TELEPHONE (OUTPATIENT)
Dept: PULMONOLOGY | Facility: CLINIC | Age: 71
End: 2021-06-10

## 2021-06-10 ENCOUNTER — TELEPHONE (OUTPATIENT)
Dept: CARDIOLOGY | Facility: CLINIC | Age: 71
End: 2021-06-10

## 2021-06-11 ENCOUNTER — TELEPHONE (OUTPATIENT)
Dept: CARDIOLOGY | Facility: HOSPITAL | Age: 71
End: 2021-06-11

## 2021-06-11 ENCOUNTER — PATIENT MESSAGE (OUTPATIENT)
Dept: CARDIOLOGY | Facility: CLINIC | Age: 71
End: 2021-06-11

## 2021-06-14 ENCOUNTER — CLINICAL SUPPORT (OUTPATIENT)
Dept: CARDIOLOGY | Facility: HOSPITAL | Age: 71
End: 2021-06-14
Attending: INTERNAL MEDICINE
Payer: MEDICARE

## 2021-06-14 ENCOUNTER — CLINICAL SUPPORT (OUTPATIENT)
Dept: PULMONOLOGY | Facility: CLINIC | Age: 71
End: 2021-06-14
Payer: MEDICARE

## 2021-06-14 ENCOUNTER — TELEPHONE (OUTPATIENT)
Dept: PULMONOLOGY | Facility: CLINIC | Age: 71
End: 2021-06-14

## 2021-06-14 ENCOUNTER — DOCUMENTATION ONLY (OUTPATIENT)
Dept: CARDIOLOGY | Facility: HOSPITAL | Age: 71
End: 2021-06-14

## 2021-06-14 DIAGNOSIS — J44.89 ASTHMA WITH COPD: ICD-10-CM

## 2021-06-14 DIAGNOSIS — Z95.0 CARDIAC PACEMAKER IN SITU: ICD-10-CM

## 2021-06-14 PROCEDURE — 93280 CARDIAC DEVICE CHECK - IN CLINIC & HOSPITAL: ICD-10-PCS | Mod: 26,,, | Performed by: INTERNAL MEDICINE

## 2021-06-14 PROCEDURE — 99999 PR PBB SHADOW E&M-EST. PATIENT-LVL I: CPT | Mod: PBBFAC,,,

## 2021-06-14 PROCEDURE — 99999 PR PBB SHADOW E&M-EST. PATIENT-LVL I: ICD-10-PCS | Mod: PBBFAC,,,

## 2021-06-14 PROCEDURE — 93280 PM DEVICE PROGR EVAL DUAL: CPT | Mod: 26,,, | Performed by: INTERNAL MEDICINE

## 2021-06-14 PROCEDURE — 93280 PM DEVICE PROGR EVAL DUAL: CPT

## 2021-06-15 ENCOUNTER — TELEPHONE (OUTPATIENT)
Dept: CARDIOLOGY | Facility: CLINIC | Age: 71
End: 2021-06-15

## 2021-06-16 ENCOUNTER — PATIENT MESSAGE (OUTPATIENT)
Dept: CARDIOLOGY | Facility: CLINIC | Age: 71
End: 2021-06-16

## 2021-06-16 ENCOUNTER — TELEPHONE (OUTPATIENT)
Dept: CARDIOLOGY | Facility: CLINIC | Age: 71
End: 2021-06-16

## 2021-06-16 ENCOUNTER — DOCUMENTATION ONLY (OUTPATIENT)
Dept: CARDIOLOGY | Facility: HOSPITAL | Age: 71
End: 2021-06-16

## 2021-06-16 ENCOUNTER — OUTPATIENT CASE MANAGEMENT (OUTPATIENT)
Dept: ADMINISTRATIVE | Facility: OTHER | Age: 71
End: 2021-06-16

## 2021-06-16 DIAGNOSIS — R06.02 SOB (SHORTNESS OF BREATH): ICD-10-CM

## 2021-06-16 DIAGNOSIS — T82.110D PACEMAKER LEAD MALFUNCTION, SUBSEQUENT ENCOUNTER: Primary | ICD-10-CM

## 2021-06-28 ENCOUNTER — OUTPATIENT CASE MANAGEMENT (OUTPATIENT)
Dept: ADMINISTRATIVE | Facility: OTHER | Age: 71
End: 2021-06-28

## 2021-07-01 ENCOUNTER — TELEPHONE (OUTPATIENT)
Dept: PULMONOLOGY | Facility: CLINIC | Age: 71
End: 2021-07-01

## 2021-07-12 ENCOUNTER — HOSPITAL ENCOUNTER (EMERGENCY)
Facility: HOSPITAL | Age: 71
Discharge: HOME | End: 2021-07-12
Attending: STUDENT IN AN ORGANIZED HEALTH CARE EDUCATION/TRAINING PROGRAM
Payer: MEDICARE

## 2021-07-12 VITALS
BODY MASS INDEX: 38.62 KG/M2 | TEMPERATURE: 98 F | HEIGHT: 63 IN | OXYGEN SATURATION: 95 % | WEIGHT: 218 LBS | DIASTOLIC BLOOD PRESSURE: 83 MMHG | HEART RATE: 65 BPM | RESPIRATION RATE: 18 BRPM | SYSTOLIC BLOOD PRESSURE: 135 MMHG

## 2021-07-12 DIAGNOSIS — S89.91XA RIGHT KNEE INJURY, INITIAL ENCOUNTER: ICD-10-CM

## 2021-07-12 PROCEDURE — 29515 APPLICATION SHORT LEG SPLINT: CPT

## 2021-07-12 PROCEDURE — 99283 EMERGENCY DEPT VISIT LOW MDM: CPT | Mod: 25,ER

## 2021-07-13 ENCOUNTER — TELEPHONE (OUTPATIENT)
Dept: CARDIOLOGY | Facility: CLINIC | Age: 71
End: 2021-07-13

## 2021-07-13 ENCOUNTER — OUTPATIENT CASE MANAGEMENT (OUTPATIENT)
Dept: ADMINISTRATIVE | Facility: OTHER | Age: 71
End: 2021-07-13

## 2021-07-14 DIAGNOSIS — M25.569 KNEE PAIN, UNSPECIFIED CHRONICITY, UNSPECIFIED LATERALITY: Primary | ICD-10-CM

## 2021-07-14 DIAGNOSIS — T82.120D: Primary | ICD-10-CM

## 2021-07-14 DIAGNOSIS — T82.120A: ICD-10-CM

## 2021-07-15 ENCOUNTER — ANESTHESIA EVENT (OUTPATIENT)
Dept: CARDIOLOGY | Facility: HOSPITAL | Age: 71
End: 2021-07-15
Payer: MEDICARE

## 2021-07-15 ENCOUNTER — ANESTHESIA (OUTPATIENT)
Dept: CARDIOLOGY | Facility: HOSPITAL | Age: 71
End: 2021-07-15
Payer: MEDICARE

## 2021-07-15 ENCOUNTER — HOSPITAL ENCOUNTER (OUTPATIENT)
Facility: HOSPITAL | Age: 71
Discharge: HOME | End: 2021-07-15
Attending: INTERNAL MEDICINE | Admitting: INTERNAL MEDICINE
Payer: MEDICARE

## 2021-07-15 DIAGNOSIS — I49.9 ARRHYTHMIA: ICD-10-CM

## 2021-07-15 DIAGNOSIS — T82.120A: ICD-10-CM

## 2021-07-15 DIAGNOSIS — T82.120D: ICD-10-CM

## 2021-07-15 DIAGNOSIS — Z95.0 S/P CARDIAC PACEMAKER PROCEDURE: ICD-10-CM

## 2021-07-15 DIAGNOSIS — T82.110S FAILURE OF PACEMAKER LEAD, SEQUELA: ICD-10-CM

## 2021-07-15 PROCEDURE — 33235 REMOVAL PACEMAKER ELECTRODE: CPT | Mod: 78,,, | Performed by: INTERNAL MEDICINE

## 2021-07-15 PROCEDURE — C1898 LEAD, PMKR, OTHER THAN TRANS: HCPCS | Performed by: INTERNAL MEDICINE

## 2021-07-15 PROCEDURE — 27201423 OPTIME MED/SURG SUP & DEVICES STERILE SUPPLY: Performed by: INTERNAL MEDICINE

## 2021-07-15 PROCEDURE — 93005 ELECTROCARDIOGRAM TRACING: CPT | Mod: 59

## 2021-07-15 PROCEDURE — 63600175 PHARM REV CODE 636 W HCPCS: Performed by: NURSE ANESTHETIST, CERTIFIED REGISTERED

## 2021-07-15 PROCEDURE — 93010 EKG 12-LEAD: ICD-10-PCS | Mod: ,,, | Performed by: INTERNAL MEDICINE

## 2021-07-15 PROCEDURE — C1894 INTRO/SHEATH, NON-LASER: HCPCS | Performed by: INTERNAL MEDICINE

## 2021-07-15 PROCEDURE — C1769 GUIDE WIRE: HCPCS | Performed by: INTERNAL MEDICINE

## 2021-07-15 PROCEDURE — 99214 PR OFFICE/OUTPT VISIT, EST, LEVL IV, 30-39 MIN: ICD-10-PCS | Mod: ,,, | Performed by: INTERNAL MEDICINE

## 2021-07-15 PROCEDURE — 99214 OFFICE O/P EST MOD 30 MIN: CPT | Mod: ,,, | Performed by: INTERNAL MEDICINE

## 2021-07-15 PROCEDURE — 99024 PR POST-OP FOLLOW-UP VISIT: ICD-10-PCS | Mod: ,,, | Performed by: INTERNAL MEDICINE

## 2021-07-15 PROCEDURE — 33235 REMOVAL PACEMAKER ELECTRODE: CPT | Mod: 78 | Performed by: INTERNAL MEDICINE

## 2021-07-15 PROCEDURE — 37000008 HC ANESTHESIA 1ST 15 MINUTES: Performed by: INTERNAL MEDICINE

## 2021-07-15 PROCEDURE — 33216 INSERT 1 ELECTRODE PM-DEFIB: CPT | Mod: 78 | Performed by: INTERNAL MEDICINE

## 2021-07-15 PROCEDURE — 25000003 PHARM REV CODE 250: Performed by: INTERNAL MEDICINE

## 2021-07-15 PROCEDURE — 63600175 PHARM REV CODE 636 W HCPCS: Performed by: INTERNAL MEDICINE

## 2021-07-15 PROCEDURE — 33235 PR REMV TRANSVEN PACER ELECTRODE,2 LEAD: ICD-10-PCS | Mod: 78,,, | Performed by: INTERNAL MEDICINE

## 2021-07-15 PROCEDURE — 93010 ELECTROCARDIOGRAM REPORT: CPT | Mod: 76,,, | Performed by: INTERNAL MEDICINE

## 2021-07-15 PROCEDURE — 33216 INSERT 1 ELECTRODE PM-DEFIB: CPT | Mod: 78,51,, | Performed by: INTERNAL MEDICINE

## 2021-07-15 PROCEDURE — 37000009 HC ANESTHESIA EA ADD 15 MINS: Performed by: INTERNAL MEDICINE

## 2021-07-15 PROCEDURE — 33216 PR INSERT TRANSVEN ELECTRODE,SING CHAMBER: ICD-10-PCS | Mod: 78,51,, | Performed by: INTERNAL MEDICINE

## 2021-07-15 PROCEDURE — 25000003 PHARM REV CODE 250: Performed by: NURSE ANESTHETIST, CERTIFIED REGISTERED

## 2021-07-15 PROCEDURE — 99024 POSTOP FOLLOW-UP VISIT: CPT | Mod: ,,, | Performed by: INTERNAL MEDICINE

## 2021-07-15 DEVICE — STEROX BIPOLAR IS-1 ATRIAL/VENTRICULAR
Type: IMPLANTABLE DEVICE | Site: HEART | Status: FUNCTIONAL
Brand: FINELINE® II EZ STEROX

## 2021-07-15 RX ORDER — SODIUM CHLORIDE, SODIUM LACTATE, POTASSIUM CHLORIDE, CALCIUM CHLORIDE 600; 310; 30; 20 MG/100ML; MG/100ML; MG/100ML; MG/100ML
INJECTION, SOLUTION INTRAVENOUS CONTINUOUS PRN
Status: DISCONTINUED | OUTPATIENT
Start: 2021-07-15 | End: 2021-07-15

## 2021-07-15 RX ORDER — HYDROCODONE BITARTRATE AND ACETAMINOPHEN 5; 325 MG/1; MG/1
1 TABLET ORAL EVERY 4 HOURS PRN
Status: DISCONTINUED | OUTPATIENT
Start: 2021-07-15 | End: 2021-07-15 | Stop reason: HOSPADM

## 2021-07-15 RX ORDER — CEFADROXIL 500 MG/1
500 CAPSULE ORAL EVERY 12 HOURS
Qty: 6 CAPSULE | Refills: 0 | Status: SHIPPED | OUTPATIENT
Start: 2021-07-15 | End: 2021-07-18

## 2021-07-15 RX ORDER — LIDOCAINE HYDROCHLORIDE 20 MG/ML
INJECTION, SOLUTION EPIDURAL; INFILTRATION; INTRACAUDAL; PERINEURAL
Status: DISCONTINUED | OUTPATIENT
Start: 2021-07-15 | End: 2021-07-15 | Stop reason: HOSPADM

## 2021-07-15 RX ORDER — FENTANYL CITRATE 50 UG/ML
INJECTION, SOLUTION INTRAMUSCULAR; INTRAVENOUS
Status: DISCONTINUED | OUTPATIENT
Start: 2021-07-15 | End: 2021-07-15

## 2021-07-15 RX ORDER — MIDAZOLAM HYDROCHLORIDE 1 MG/ML
INJECTION INTRAMUSCULAR; INTRAVENOUS
Status: DISCONTINUED | OUTPATIENT
Start: 2021-07-15 | End: 2021-07-15

## 2021-07-15 RX ORDER — KETOROLAC TROMETHAMINE 10 MG/1
10 TABLET, FILM COATED ORAL EVERY 6 HOURS
Qty: 20 TABLET | Refills: 0 | Status: SHIPPED | OUTPATIENT
Start: 2021-07-15 | End: 2021-07-20

## 2021-07-15 RX ORDER — HYDROCODONE BITARTRATE AND ACETAMINOPHEN 10; 325 MG/1; MG/1
1 TABLET ORAL EVERY 4 HOURS PRN
Status: DISCONTINUED | OUTPATIENT
Start: 2021-07-15 | End: 2021-07-15 | Stop reason: HOSPADM

## 2021-07-15 RX ORDER — PHENYLEPHRINE HYDROCHLORIDE 10 MG/ML
INJECTION INTRAVENOUS
Status: DISCONTINUED | OUTPATIENT
Start: 2021-07-15 | End: 2021-07-15

## 2021-07-15 RX ORDER — ACETAMINOPHEN 325 MG/1
650 TABLET ORAL EVERY 4 HOURS PRN
Status: DISCONTINUED | OUTPATIENT
Start: 2021-07-15 | End: 2021-07-15 | Stop reason: HOSPADM

## 2021-07-15 RX ORDER — CEFAZOLIN SODIUM 1 G/50ML
1 SOLUTION INTRAVENOUS
Status: DISCONTINUED | OUTPATIENT
Start: 2021-07-15 | End: 2021-07-15 | Stop reason: HOSPADM

## 2021-07-15 RX ORDER — LIDOCAINE HCL/PF 100 MG/5ML
SYRINGE (ML) INTRAVENOUS
Status: DISCONTINUED | OUTPATIENT
Start: 2021-07-15 | End: 2021-07-15

## 2021-07-15 RX ORDER — ONDANSETRON 2 MG/ML
INJECTION INTRAMUSCULAR; INTRAVENOUS
Status: DISCONTINUED | OUTPATIENT
Start: 2021-07-15 | End: 2021-07-15

## 2021-07-15 RX ORDER — PROPOFOL 10 MG/ML
INJECTION, EMULSION INTRAVENOUS
Status: DISCONTINUED | OUTPATIENT
Start: 2021-07-15 | End: 2021-07-15

## 2021-07-15 RX ORDER — VANCOMYCIN HYDROCHLORIDE 1 G/20ML
INJECTION, POWDER, LYOPHILIZED, FOR SOLUTION INTRAVENOUS
Status: DISCONTINUED | OUTPATIENT
Start: 2021-07-15 | End: 2021-07-15 | Stop reason: HOSPADM

## 2021-07-15 RX ORDER — PROPOFOL 10 MG/ML
INJECTION, EMULSION INTRAVENOUS CONTINUOUS PRN
Status: DISCONTINUED | OUTPATIENT
Start: 2021-07-15 | End: 2021-07-15

## 2021-07-15 RX ORDER — CEFAZOLIN SODIUM 2 G/50ML
2 SOLUTION INTRAVENOUS
Status: COMPLETED | OUTPATIENT
Start: 2021-07-15 | End: 2021-07-15

## 2021-07-15 RX ADMIN — SODIUM CHLORIDE, SODIUM LACTATE, POTASSIUM CHLORIDE, AND CALCIUM CHLORIDE: .6; .31; .03; .02 INJECTION, SOLUTION INTRAVENOUS at 08:07

## 2021-07-15 RX ADMIN — FENTANYL CITRATE 50 MCG: 50 INJECTION, SOLUTION INTRAMUSCULAR; INTRAVENOUS at 08:07

## 2021-07-15 RX ADMIN — PHENYLEPHRINE HYDROCHLORIDE 100 MCG: 10 INJECTION INTRAVENOUS at 08:07

## 2021-07-15 RX ADMIN — MIDAZOLAM HYDROCHLORIDE 4 MG: 1 INJECTION, SOLUTION INTRAMUSCULAR; INTRAVENOUS at 08:07

## 2021-07-15 RX ADMIN — PROPOFOL 20 MG: 10 INJECTION, EMULSION INTRAVENOUS at 08:07

## 2021-07-15 RX ADMIN — CEFAZOLIN SODIUM 2 G: 2 SOLUTION INTRAVENOUS at 08:07

## 2021-07-15 RX ADMIN — FENTANYL CITRATE 50 MCG: 50 INJECTION, SOLUTION INTRAMUSCULAR; INTRAVENOUS at 09:07

## 2021-07-15 RX ADMIN — ACETAMINOPHEN 650 MG: 325 TABLET ORAL at 11:07

## 2021-07-15 RX ADMIN — ONDANSETRON 4 MG: 2 INJECTION, SOLUTION INTRAMUSCULAR; INTRAVENOUS at 08:07

## 2021-07-15 RX ADMIN — LIDOCAINE HYDROCHLORIDE 60 MG: 20 INJECTION, SOLUTION INTRAVENOUS at 08:07

## 2021-07-15 RX ADMIN — PROPOFOL 70 MCG/KG/MIN: 10 INJECTION, EMULSION INTRAVENOUS at 08:07

## 2021-07-16 VITALS
OXYGEN SATURATION: 97 % | HEART RATE: 56 BPM | HEIGHT: 63 IN | BODY MASS INDEX: 38.62 KG/M2 | WEIGHT: 218 LBS | TEMPERATURE: 98 F | RESPIRATION RATE: 15 BRPM | DIASTOLIC BLOOD PRESSURE: 60 MMHG | SYSTOLIC BLOOD PRESSURE: 114 MMHG

## 2021-07-19 ENCOUNTER — CLINICAL SUPPORT (OUTPATIENT)
Dept: CARDIOLOGY | Facility: HOSPITAL | Age: 71
End: 2021-07-19
Attending: INTERNAL MEDICINE
Payer: MEDICARE

## 2021-07-19 ENCOUNTER — DOCUMENTATION ONLY (OUTPATIENT)
Dept: CARDIOLOGY | Facility: HOSPITAL | Age: 71
End: 2021-07-19

## 2021-07-19 DIAGNOSIS — Z95.0 CARDIAC PACEMAKER IN SITU: ICD-10-CM

## 2021-07-19 PROCEDURE — 99999 PR PBB SHADOW E&M-EST. PATIENT-LVL I: CPT | Mod: PBBFAC,,,

## 2021-07-19 PROCEDURE — 93280 PM DEVICE PROGR EVAL DUAL: CPT

## 2021-07-19 PROCEDURE — 93280 PM DEVICE PROGR EVAL DUAL: CPT | Mod: 26,,, | Performed by: INTERNAL MEDICINE

## 2021-07-19 PROCEDURE — 99999 PR PBB SHADOW E&M-EST. PATIENT-LVL I: ICD-10-PCS | Mod: PBBFAC,,,

## 2021-07-19 PROCEDURE — 93280 CARDIAC DEVICE CHECK - IN CLINIC & HOSPITAL: ICD-10-PCS | Mod: 26,,, | Performed by: INTERNAL MEDICINE

## 2021-07-21 ENCOUNTER — PATIENT MESSAGE (OUTPATIENT)
Dept: CARDIOLOGY | Facility: CLINIC | Age: 71
End: 2021-07-21

## 2021-07-21 ENCOUNTER — TELEPHONE (OUTPATIENT)
Dept: CARDIOLOGY | Facility: HOSPITAL | Age: 71
End: 2021-07-21

## 2021-07-21 RX ORDER — DOXYCYCLINE 100 MG/1
100 CAPSULE ORAL EVERY 12 HOURS
Qty: 30 CAPSULE | Refills: 0 | Status: SHIPPED | OUTPATIENT
Start: 2021-07-21 | End: 2021-08-29 | Stop reason: CLARIF

## 2021-07-23 ENCOUNTER — OUTPATIENT CASE MANAGEMENT (OUTPATIENT)
Dept: ADMINISTRATIVE | Facility: OTHER | Age: 71
End: 2021-07-23

## 2021-07-23 ENCOUNTER — DOCUMENTATION ONLY (OUTPATIENT)
Dept: CARDIOLOGY | Facility: HOSPITAL | Age: 71
End: 2021-07-23

## 2021-07-23 ENCOUNTER — HOSPITAL ENCOUNTER (OUTPATIENT)
Dept: CARDIOLOGY | Facility: HOSPITAL | Age: 71
Discharge: HOME | End: 2021-07-23
Attending: INTERNAL MEDICINE
Payer: MEDICARE

## 2021-07-23 DIAGNOSIS — Z95.0 CARDIAC PACEMAKER IN SITU: ICD-10-CM

## 2021-07-26 ENCOUNTER — DOCUMENTATION ONLY (OUTPATIENT)
Dept: CARDIOLOGY | Facility: HOSPITAL | Age: 71
End: 2021-07-26

## 2021-07-26 ENCOUNTER — PATIENT MESSAGE (OUTPATIENT)
Dept: CARDIOLOGY | Facility: HOSPITAL | Age: 71
End: 2021-07-26

## 2021-07-27 ENCOUNTER — TELEPHONE (OUTPATIENT)
Dept: PULMONOLOGY | Facility: CLINIC | Age: 71
End: 2021-07-27

## 2021-08-02 ENCOUNTER — OFFICE VISIT (OUTPATIENT)
Dept: INTERNAL MEDICINE | Facility: CLINIC | Age: 71
End: 2021-08-02
Payer: MEDICARE

## 2021-08-02 ENCOUNTER — PATIENT MESSAGE (OUTPATIENT)
Dept: INTERNAL MEDICINE | Facility: CLINIC | Age: 71
End: 2021-08-02

## 2021-08-02 ENCOUNTER — OFFICE VISIT (OUTPATIENT)
Dept: CARDIOLOGY | Facility: CLINIC | Age: 71
End: 2021-08-02
Payer: MEDICARE

## 2021-08-02 ENCOUNTER — HOSPITAL ENCOUNTER (OUTPATIENT)
Dept: CARDIOLOGY | Facility: HOSPITAL | Age: 71
Discharge: HOME | End: 2021-08-02
Attending: INTERNAL MEDICINE
Payer: MEDICARE

## 2021-08-02 ENCOUNTER — OFFICE VISIT (OUTPATIENT)
Dept: PULMONOLOGY | Facility: CLINIC | Age: 71
End: 2021-08-02
Payer: MEDICARE

## 2021-08-02 VITALS
BODY MASS INDEX: 38.05 KG/M2 | HEIGHT: 63 IN | SYSTOLIC BLOOD PRESSURE: 132 MMHG | OXYGEN SATURATION: 98 % | DIASTOLIC BLOOD PRESSURE: 77 MMHG | WEIGHT: 214.75 LBS | HEART RATE: 66 BPM | TEMPERATURE: 96 F

## 2021-08-02 VITALS
HEIGHT: 63 IN | HEART RATE: 60 BPM | SYSTOLIC BLOOD PRESSURE: 120 MMHG | DIASTOLIC BLOOD PRESSURE: 62 MMHG | RESPIRATION RATE: 18 BRPM | BODY MASS INDEX: 38.16 KG/M2 | WEIGHT: 215.38 LBS | OXYGEN SATURATION: 97 %

## 2021-08-02 VITALS
DIASTOLIC BLOOD PRESSURE: 77 MMHG | WEIGHT: 215.38 LBS | HEIGHT: 63 IN | SYSTOLIC BLOOD PRESSURE: 132 MMHG | OXYGEN SATURATION: 97 % | BODY MASS INDEX: 38.16 KG/M2 | HEART RATE: 51 BPM

## 2021-08-02 DIAGNOSIS — E78.00 PURE HYPERCHOLESTEROLEMIA: ICD-10-CM

## 2021-08-02 DIAGNOSIS — Z95.0 HISTORY OF CARDIAC PACEMAKER: ICD-10-CM

## 2021-08-02 DIAGNOSIS — T82.120D: ICD-10-CM

## 2021-08-02 DIAGNOSIS — Z86.010 HISTORY OF COLON POLYPS: ICD-10-CM

## 2021-08-02 DIAGNOSIS — J44.89 ASTHMA WITH COPD: Primary | ICD-10-CM

## 2021-08-02 DIAGNOSIS — J44.9 CHRONIC OBSTRUCTIVE PULMONARY DISEASE, UNSPECIFIED COPD TYPE: ICD-10-CM

## 2021-08-02 DIAGNOSIS — I49.9 CARDIAC ARRHYTHMIA, UNSPECIFIED CARDIAC ARRHYTHMIA TYPE: ICD-10-CM

## 2021-08-02 DIAGNOSIS — Z09 HOSPITAL DISCHARGE FOLLOW-UP: Primary | ICD-10-CM

## 2021-08-02 DIAGNOSIS — J30.89 NON-SEASONAL ALLERGIC RHINITIS, UNSPECIFIED TRIGGER: ICD-10-CM

## 2021-08-02 DIAGNOSIS — Z80.0 FAMILY HISTORY OF COLON CANCER: ICD-10-CM

## 2021-08-02 DIAGNOSIS — R07.9 CHEST PAIN, UNSPECIFIED TYPE: Primary | ICD-10-CM

## 2021-08-02 DIAGNOSIS — R06.02 SOB (SHORTNESS OF BREATH): ICD-10-CM

## 2021-08-02 DIAGNOSIS — J44.89 ASTHMA WITH COPD: ICD-10-CM

## 2021-08-02 DIAGNOSIS — R07.9 CHEST PAIN, UNSPECIFIED TYPE: ICD-10-CM

## 2021-08-02 DIAGNOSIS — R00.2 PALPITATIONS: ICD-10-CM

## 2021-08-02 DIAGNOSIS — I10 ESSENTIAL HYPERTENSION: ICD-10-CM

## 2021-08-02 DIAGNOSIS — I48.0 PAROXYSMAL ATRIAL FIBRILLATION: ICD-10-CM

## 2021-08-02 DIAGNOSIS — J30.89 NON-SEASONAL ALLERGIC RHINITIS DUE TO OTHER ALLERGIC TRIGGER: ICD-10-CM

## 2021-08-02 PROCEDURE — 99214 PR OFFICE/OUTPT VISIT, EST, LEVL IV, 30-39 MIN: ICD-10-PCS | Mod: S$GLB,,, | Performed by: INTERNAL MEDICINE

## 2021-08-02 PROCEDURE — 3288F FALL RISK ASSESSMENT DOCD: CPT | Mod: CPTII,S$GLB,, | Performed by: INTERNAL MEDICINE

## 2021-08-02 PROCEDURE — 3288F FALL RISK ASSESSMENT DOCD: CPT | Mod: CPTII,S$GLB,, | Performed by: FAMILY MEDICINE

## 2021-08-02 PROCEDURE — 3078F DIAST BP <80 MM HG: CPT | Mod: CPTII,S$GLB,, | Performed by: INTERNAL MEDICINE

## 2021-08-02 PROCEDURE — 99999 PR PBB SHADOW E&M-EST. PATIENT-LVL IV: ICD-10-PCS | Mod: PBBFAC,,, | Performed by: INTERNAL MEDICINE

## 2021-08-02 PROCEDURE — 3075F SYST BP GE 130 - 139MM HG: CPT | Mod: CPTII,S$GLB,, | Performed by: INTERNAL MEDICINE

## 2021-08-02 PROCEDURE — 3075F SYST BP GE 130 - 139MM HG: CPT | Mod: CPTII,S$GLB,, | Performed by: FAMILY MEDICINE

## 2021-08-02 PROCEDURE — 99999 PR PBB SHADOW E&M-EST. PATIENT-LVL III: ICD-10-PCS | Mod: PBBFAC,,, | Performed by: INTERNAL MEDICINE

## 2021-08-02 PROCEDURE — 1101F PT FALLS ASSESS-DOCD LE1/YR: CPT | Mod: CPTII,S$GLB,, | Performed by: FAMILY MEDICINE

## 2021-08-02 PROCEDURE — 4010F PR ACE/ARB THEARPY RXD/TAKEN: ICD-10-PCS | Mod: CPTII,S$GLB,, | Performed by: FAMILY MEDICINE

## 2021-08-02 PROCEDURE — 1160F PR REVIEW ALL MEDS BY PRESCRIBER/CLIN PHARMACIST DOCUMENTED: ICD-10-PCS | Mod: CPTII,S$GLB,, | Performed by: INTERNAL MEDICINE

## 2021-08-02 PROCEDURE — 3075F PR MOST RECENT SYSTOLIC BLOOD PRESS GE 130-139MM HG: ICD-10-PCS | Mod: CPTII,S$GLB,, | Performed by: INTERNAL MEDICINE

## 2021-08-02 PROCEDURE — 4010F ACE/ARB THERAPY RXD/TAKEN: CPT | Mod: CPTII,S$GLB,, | Performed by: FAMILY MEDICINE

## 2021-08-02 PROCEDURE — 93010 EKG 12-LEAD: ICD-10-PCS | Mod: ,,, | Performed by: INTERNAL MEDICINE

## 2021-08-02 PROCEDURE — 3008F BODY MASS INDEX DOCD: CPT | Mod: CPTII,S$GLB,, | Performed by: FAMILY MEDICINE

## 2021-08-02 PROCEDURE — 1101F PR PT FALLS ASSESS DOC 0-1 FALLS W/OUT INJ PAST YR: ICD-10-PCS | Mod: CPTII,S$GLB,, | Performed by: FAMILY MEDICINE

## 2021-08-02 PROCEDURE — 1101F PT FALLS ASSESS-DOCD LE1/YR: CPT | Mod: CPTII,S$GLB,, | Performed by: INTERNAL MEDICINE

## 2021-08-02 PROCEDURE — 93010 ELECTROCARDIOGRAM REPORT: CPT | Mod: ,,, | Performed by: INTERNAL MEDICINE

## 2021-08-02 PROCEDURE — 3078F PR MOST RECENT DIASTOLIC BLOOD PRESSURE < 80 MM HG: ICD-10-PCS | Mod: CPTII,S$GLB,, | Performed by: INTERNAL MEDICINE

## 2021-08-02 PROCEDURE — 3078F DIAST BP <80 MM HG: CPT | Mod: CPTII,S$GLB,, | Performed by: FAMILY MEDICINE

## 2021-08-02 PROCEDURE — 3008F BODY MASS INDEX DOCD: CPT | Mod: CPTII,S$GLB,, | Performed by: INTERNAL MEDICINE

## 2021-08-02 PROCEDURE — 99215 PR OFFICE/OUTPT VISIT, EST, LEVL V, 40-54 MIN: ICD-10-PCS | Mod: S$GLB,,, | Performed by: INTERNAL MEDICINE

## 2021-08-02 PROCEDURE — 1160F RVW MEDS BY RX/DR IN RCRD: CPT | Mod: CPTII,S$GLB,, | Performed by: INTERNAL MEDICINE

## 2021-08-02 PROCEDURE — 1126F AMNT PAIN NOTED NONE PRSNT: CPT | Mod: CPTII,S$GLB,, | Performed by: FAMILY MEDICINE

## 2021-08-02 PROCEDURE — 99214 PR OFFICE/OUTPT VISIT, EST, LEVL IV, 30-39 MIN: ICD-10-PCS | Mod: S$GLB,,, | Performed by: FAMILY MEDICINE

## 2021-08-02 PROCEDURE — 3075F PR MOST RECENT SYSTOLIC BLOOD PRESS GE 130-139MM HG: ICD-10-PCS | Mod: CPTII,S$GLB,, | Performed by: FAMILY MEDICINE

## 2021-08-02 PROCEDURE — 99214 OFFICE O/P EST MOD 30 MIN: CPT | Mod: S$GLB,,, | Performed by: INTERNAL MEDICINE

## 2021-08-02 PROCEDURE — 3074F SYST BP LT 130 MM HG: CPT | Mod: CPTII,S$GLB,, | Performed by: INTERNAL MEDICINE

## 2021-08-02 PROCEDURE — 3008F PR BODY MASS INDEX (BMI) DOCUMENTED: ICD-10-PCS | Mod: CPTII,S$GLB,, | Performed by: INTERNAL MEDICINE

## 2021-08-02 PROCEDURE — 1159F PR MEDICATION LIST DOCUMENTED IN MEDICAL RECORD: ICD-10-PCS | Mod: CPTII,S$GLB,, | Performed by: INTERNAL MEDICINE

## 2021-08-02 PROCEDURE — 1159F MED LIST DOCD IN RCRD: CPT | Mod: CPTII,S$GLB,, | Performed by: INTERNAL MEDICINE

## 2021-08-02 PROCEDURE — 3008F PR BODY MASS INDEX (BMI) DOCUMENTED: ICD-10-PCS | Mod: CPTII,S$GLB,, | Performed by: FAMILY MEDICINE

## 2021-08-02 PROCEDURE — 3288F PR FALLS RISK ASSESSMENT DOCUMENTED: ICD-10-PCS | Mod: CPTII,S$GLB,, | Performed by: INTERNAL MEDICINE

## 2021-08-02 PROCEDURE — 99999 PR PBB SHADOW E&M-EST. PATIENT-LVL V: CPT | Mod: PBBFAC,,, | Performed by: FAMILY MEDICINE

## 2021-08-02 PROCEDURE — 99215 OFFICE O/P EST HI 40 MIN: CPT | Mod: S$GLB,,, | Performed by: INTERNAL MEDICINE

## 2021-08-02 PROCEDURE — 1126F PR PAIN SEVERITY QUANTIFIED, NO PAIN PRESENT: ICD-10-PCS | Mod: CPTII,S$GLB,, | Performed by: FAMILY MEDICINE

## 2021-08-02 PROCEDURE — 99999 PR PBB SHADOW E&M-EST. PATIENT-LVL IV: CPT | Mod: PBBFAC,,, | Performed by: INTERNAL MEDICINE

## 2021-08-02 PROCEDURE — 1101F PR PT FALLS ASSESS DOC 0-1 FALLS W/OUT INJ PAST YR: ICD-10-PCS | Mod: CPTII,S$GLB,, | Performed by: INTERNAL MEDICINE

## 2021-08-02 PROCEDURE — 3078F PR MOST RECENT DIASTOLIC BLOOD PRESSURE < 80 MM HG: ICD-10-PCS | Mod: CPTII,S$GLB,, | Performed by: FAMILY MEDICINE

## 2021-08-02 PROCEDURE — 3288F PR FALLS RISK ASSESSMENT DOCUMENTED: ICD-10-PCS | Mod: CPTII,S$GLB,, | Performed by: FAMILY MEDICINE

## 2021-08-02 PROCEDURE — 93005 ELECTROCARDIOGRAM TRACING: CPT

## 2021-08-02 PROCEDURE — 3074F PR MOST RECENT SYSTOLIC BLOOD PRESSURE < 130 MM HG: ICD-10-PCS | Mod: CPTII,S$GLB,, | Performed by: INTERNAL MEDICINE

## 2021-08-02 PROCEDURE — 99999 PR PBB SHADOW E&M-EST. PATIENT-LVL V: ICD-10-PCS | Mod: PBBFAC,,, | Performed by: FAMILY MEDICINE

## 2021-08-02 PROCEDURE — 99999 PR PBB SHADOW E&M-EST. PATIENT-LVL III: CPT | Mod: PBBFAC,,, | Performed by: INTERNAL MEDICINE

## 2021-08-02 PROCEDURE — 99214 OFFICE O/P EST MOD 30 MIN: CPT | Mod: S$GLB,,, | Performed by: FAMILY MEDICINE

## 2021-08-02 RX ORDER — TIOTROPIUM BROMIDE INHALATION SPRAY 3.12 UG/1
2 SPRAY, METERED RESPIRATORY (INHALATION) DAILY
Qty: 12 G | Refills: 3 | Status: SHIPPED | OUTPATIENT
Start: 2021-08-02 | End: 2022-08-16

## 2021-08-02 RX ORDER — FLUTICASONE PROPIONATE AND SALMETEROL 250; 50 UG/1; UG/1
1 POWDER RESPIRATORY (INHALATION) 2 TIMES DAILY
Qty: 180 EACH | Refills: 3 | Status: SHIPPED | OUTPATIENT
Start: 2021-08-02 | End: 2022-06-22

## 2021-08-02 RX ORDER — FLUTICASONE PROPIONATE 50 MCG
2 SPRAY, SUSPENSION (ML) NASAL DAILY PRN
Qty: 48 G | Refills: 3 | Status: SHIPPED | OUTPATIENT
Start: 2021-08-02 | End: 2021-10-25 | Stop reason: ALTCHOICE

## 2021-08-02 RX ORDER — LEVALBUTEROL TARTRATE 45 UG/1
2 AEROSOL, METERED ORAL EVERY 6 HOURS PRN
Qty: 45 G | Refills: 3 | Status: SHIPPED | OUTPATIENT
Start: 2021-08-02 | End: 2024-01-30 | Stop reason: SDUPTHER

## 2021-08-03 ENCOUNTER — TELEPHONE (OUTPATIENT)
Dept: CARDIOLOGY | Facility: HOSPITAL | Age: 71
End: 2021-08-03

## 2021-08-06 ENCOUNTER — OUTPATIENT CASE MANAGEMENT (OUTPATIENT)
Dept: ADMINISTRATIVE | Facility: OTHER | Age: 71
End: 2021-08-06

## 2021-08-11 ENCOUNTER — TELEPHONE (OUTPATIENT)
Dept: CARDIOLOGY | Facility: CLINIC | Age: 71
End: 2021-08-11

## 2021-08-11 ENCOUNTER — HOSPITAL ENCOUNTER (EMERGENCY)
Facility: HOSPITAL | Age: 71
Discharge: HOME | End: 2021-08-11
Attending: EMERGENCY MEDICINE
Payer: MEDICARE

## 2021-08-11 VITALS
RESPIRATION RATE: 18 BRPM | BODY MASS INDEX: 38.51 KG/M2 | HEART RATE: 64 BPM | TEMPERATURE: 99 F | SYSTOLIC BLOOD PRESSURE: 195 MMHG | DIASTOLIC BLOOD PRESSURE: 91 MMHG | OXYGEN SATURATION: 95 % | WEIGHT: 217.38 LBS

## 2021-08-11 DIAGNOSIS — R07.9 CHEST PAIN: Primary | ICD-10-CM

## 2021-08-11 LAB
ALBUMIN SERPL BCP-MCNC: 4 G/DL (ref 3.5–5.2)
ALP SERPL-CCNC: 53 U/L (ref 55–135)
ALT SERPL W/O P-5'-P-CCNC: 16 U/L (ref 10–44)
ANION GAP SERPL CALC-SCNC: 12 MMOL/L (ref 8–16)
AST SERPL-CCNC: 17 U/L (ref 10–40)
BASOPHILS # BLD AUTO: 0.03 K/UL (ref 0–0.2)
BASOPHILS NFR BLD: 0.4 % (ref 0–1.9)
BILIRUB SERPL-MCNC: 0.4 MG/DL (ref 0.1–1)
BNP SERPL-MCNC: 205 PG/ML (ref 0–99)
BUN SERPL-MCNC: 14 MG/DL (ref 8–23)
CALCIUM SERPL-MCNC: 9.7 MG/DL (ref 8.7–10.5)
CHLORIDE SERPL-SCNC: 106 MMOL/L (ref 95–110)
CK SERPL-CCNC: 52 U/L (ref 20–180)
CO2 SERPL-SCNC: 27 MMOL/L (ref 23–29)
CREAT SERPL-MCNC: 1 MG/DL (ref 0.5–1.4)
DIFFERENTIAL METHOD: ABNORMAL
EOSINOPHIL # BLD AUTO: 0.3 K/UL (ref 0–0.5)
EOSINOPHIL NFR BLD: 4.5 % (ref 0–8)
ERYTHROCYTE [DISTWIDTH] IN BLOOD BY AUTOMATED COUNT: 13.6 % (ref 11.5–14.5)
EST. GFR  (AFRICAN AMERICAN): >60 ML/MIN/1.73 M^2
EST. GFR  (NON AFRICAN AMERICAN): 56.4 ML/MIN/1.73 M^2
GLUCOSE SERPL-MCNC: 84 MG/DL (ref 70–110)
HCT VFR BLD AUTO: 40 % (ref 37–48.5)
HGB BLD-MCNC: 12.7 G/DL (ref 12–16)
IMM GRANULOCYTES # BLD AUTO: 0.03 K/UL (ref 0–0.04)
IMM GRANULOCYTES NFR BLD AUTO: 0.4 % (ref 0–0.5)
LYMPHOCYTES # BLD AUTO: 1.4 K/UL (ref 1–4.8)
LYMPHOCYTES NFR BLD: 18.6 % (ref 18–48)
MCH RBC QN AUTO: 27.8 PG (ref 27–31)
MCHC RBC AUTO-ENTMCNC: 31.8 G/DL (ref 32–36)
MCV RBC AUTO: 88 FL (ref 82–98)
MONOCYTES # BLD AUTO: 0.6 K/UL (ref 0.3–1)
MONOCYTES NFR BLD: 8.1 % (ref 4–15)
NEUTROPHILS # BLD AUTO: 5 K/UL (ref 1.8–7.7)
NEUTROPHILS NFR BLD: 68 % (ref 38–73)
NRBC BLD-RTO: 0 /100 WBC
PLATELET # BLD AUTO: 209 K/UL (ref 150–450)
PMV BLD AUTO: 11.1 FL (ref 9.2–12.9)
POTASSIUM SERPL-SCNC: 4.3 MMOL/L (ref 3.5–5.1)
PROT SERPL-MCNC: 7 G/DL (ref 6–8.4)
RBC # BLD AUTO: 4.57 M/UL (ref 4–5.4)
SODIUM SERPL-SCNC: 145 MMOL/L (ref 136–145)
TROPONIN I SERPL DL<=0.01 NG/ML-MCNC: 0.01 NG/ML (ref 0–0.03)
WBC # BLD AUTO: 7.41 K/UL (ref 3.9–12.7)

## 2021-08-11 PROCEDURE — 85025 COMPLETE CBC W/AUTO DIFF WBC: CPT | Mod: ER | Performed by: EMERGENCY MEDICINE

## 2021-08-11 PROCEDURE — 93005 ELECTROCARDIOGRAM TRACING: CPT | Mod: ER

## 2021-08-11 PROCEDURE — 80053 COMPREHEN METABOLIC PANEL: CPT | Mod: ER | Performed by: EMERGENCY MEDICINE

## 2021-08-11 PROCEDURE — 93010 ELECTROCARDIOGRAM REPORT: CPT | Mod: ,,, | Performed by: INTERNAL MEDICINE

## 2021-08-11 PROCEDURE — 99285 EMERGENCY DEPT VISIT HI MDM: CPT | Mod: 25,ER

## 2021-08-11 PROCEDURE — 83880 ASSAY OF NATRIURETIC PEPTIDE: CPT | Mod: ER | Performed by: EMERGENCY MEDICINE

## 2021-08-11 PROCEDURE — 36000 PLACE NEEDLE IN VEIN: CPT | Mod: ER

## 2021-08-11 PROCEDURE — 82550 ASSAY OF CK (CPK): CPT | Mod: ER | Performed by: EMERGENCY MEDICINE

## 2021-08-11 PROCEDURE — 84484 ASSAY OF TROPONIN QUANT: CPT | Mod: ER | Performed by: EMERGENCY MEDICINE

## 2021-08-11 PROCEDURE — 93010 EKG 12-LEAD: ICD-10-PCS | Mod: ,,, | Performed by: INTERNAL MEDICINE

## 2021-08-12 ENCOUNTER — PATIENT MESSAGE (OUTPATIENT)
Dept: ADMINISTRATIVE | Facility: OTHER | Age: 71
End: 2021-08-12

## 2021-08-12 ENCOUNTER — PATIENT MESSAGE (OUTPATIENT)
Dept: CARDIOLOGY | Facility: CLINIC | Age: 71
End: 2021-08-12

## 2021-08-12 ENCOUNTER — OFFICE VISIT (OUTPATIENT)
Dept: CARDIOLOGY | Facility: CLINIC | Age: 71
End: 2021-08-12
Payer: MEDICARE

## 2021-08-12 DIAGNOSIS — T82.120D: ICD-10-CM

## 2021-08-12 DIAGNOSIS — T82.110S FAILURE OF PACEMAKER LEAD, SEQUELA: ICD-10-CM

## 2021-08-12 DIAGNOSIS — T82.110D PACEMAKER LEAD MALFUNCTION, SUBSEQUENT ENCOUNTER: ICD-10-CM

## 2021-08-12 DIAGNOSIS — R07.9 CHEST PAIN, UNSPECIFIED TYPE: Primary | ICD-10-CM

## 2021-08-12 DIAGNOSIS — Z95.0 CARDIAC PACEMAKER IN SITU: ICD-10-CM

## 2021-08-12 DIAGNOSIS — R06.02 SOB (SHORTNESS OF BREATH): ICD-10-CM

## 2021-08-12 PROCEDURE — 99214 OFFICE O/P EST MOD 30 MIN: CPT | Mod: 95,,, | Performed by: INTERNAL MEDICINE

## 2021-08-12 PROCEDURE — 99214 PR OFFICE/OUTPT VISIT, EST, LEVL IV, 30-39 MIN: ICD-10-PCS | Mod: 95,,, | Performed by: INTERNAL MEDICINE

## 2021-08-16 ENCOUNTER — CLINICAL SUPPORT (OUTPATIENT)
Dept: CARDIOLOGY | Facility: HOSPITAL | Age: 71
End: 2021-08-16
Payer: MEDICARE

## 2021-08-16 DIAGNOSIS — Z95.0 PRESENCE OF CARDIAC PACEMAKER: ICD-10-CM

## 2021-08-16 PROCEDURE — 93294 CARDIAC DEVICE CHECK - REMOTE: ICD-10-PCS | Mod: S$GLB,,, | Performed by: INTERNAL MEDICINE

## 2021-08-16 PROCEDURE — 93294 REM INTERROG EVL PM/LDLS PM: CPT | Mod: S$GLB,,, | Performed by: INTERNAL MEDICINE

## 2021-08-16 PROCEDURE — 93296 REM INTERROG EVL PM/IDS: CPT | Performed by: INTERNAL MEDICINE

## 2021-08-19 ENCOUNTER — OUTPATIENT CASE MANAGEMENT (OUTPATIENT)
Dept: ADMINISTRATIVE | Facility: OTHER | Age: 71
End: 2021-08-19

## 2021-08-29 ENCOUNTER — HOSPITAL ENCOUNTER (EMERGENCY)
Facility: HOSPITAL | Age: 71
Discharge: HOME | End: 2021-08-29
Attending: EMERGENCY MEDICINE
Payer: MEDICARE

## 2021-08-29 VITALS
TEMPERATURE: 98 F | BODY MASS INDEX: 38.87 KG/M2 | HEIGHT: 63 IN | WEIGHT: 219.38 LBS | HEART RATE: 57 BPM | RESPIRATION RATE: 20 BRPM | DIASTOLIC BLOOD PRESSURE: 67 MMHG | OXYGEN SATURATION: 96 % | SYSTOLIC BLOOD PRESSURE: 157 MMHG

## 2021-08-29 DIAGNOSIS — R00.2 PALPITATIONS: ICD-10-CM

## 2021-08-29 DIAGNOSIS — I63.9 STROKE: ICD-10-CM

## 2021-08-29 PROBLEM — G45.1 TIA INVOLVING CAROTID ARTERY: Status: ACTIVE | Noted: 2021-08-29

## 2021-08-29 LAB
ALBUMIN SERPL BCP-MCNC: 3.7 G/DL (ref 3.5–5.2)
ALP SERPL-CCNC: 55 U/L (ref 55–135)
ALT SERPL W/O P-5'-P-CCNC: 17 U/L (ref 10–44)
ANION GAP SERPL CALC-SCNC: 12 MMOL/L (ref 8–16)
APTT BLDCRRT: 34.6 SEC (ref 21–32)
AST SERPL-CCNC: 16 U/L (ref 10–40)
BASOPHILS # BLD AUTO: 0.04 K/UL (ref 0–0.2)
BASOPHILS NFR BLD: 0.4 % (ref 0–1.9)
BILIRUB SERPL-MCNC: 0.3 MG/DL (ref 0.1–1)
BUN SERPL-MCNC: 17 MG/DL (ref 8–23)
CALCIUM SERPL-MCNC: 10 MG/DL (ref 8.7–10.5)
CHLORIDE SERPL-SCNC: 108 MMOL/L (ref 95–110)
CO2 SERPL-SCNC: 22 MMOL/L (ref 23–29)
CREAT SERPL-MCNC: 0.9 MG/DL (ref 0.5–1.4)
DIFFERENTIAL METHOD: ABNORMAL
EOSINOPHIL # BLD AUTO: 0.3 K/UL (ref 0–0.5)
EOSINOPHIL NFR BLD: 3.1 % (ref 0–8)
ERYTHROCYTE [DISTWIDTH] IN BLOOD BY AUTOMATED COUNT: 14.1 % (ref 11.5–14.5)
EST. GFR  (AFRICAN AMERICAN): >60 ML/MIN/1.73 M^2
EST. GFR  (NON AFRICAN AMERICAN): >60 ML/MIN/1.73 M^2
GLUCOSE SERPL-MCNC: 93 MG/DL (ref 70–110)
HCT VFR BLD AUTO: 39.2 % (ref 37–48.5)
HGB BLD-MCNC: 12.6 G/DL (ref 12–16)
IMM GRANULOCYTES # BLD AUTO: 0.03 K/UL (ref 0–0.04)
IMM GRANULOCYTES NFR BLD AUTO: 0.3 % (ref 0–0.5)
INR PPP: 1.1 (ref 0.8–1.2)
LYMPHOCYTES # BLD AUTO: 1.5 K/UL (ref 1–4.8)
LYMPHOCYTES NFR BLD: 16.4 % (ref 18–48)
MCH RBC QN AUTO: 28.1 PG (ref 27–31)
MCHC RBC AUTO-ENTMCNC: 32.1 G/DL (ref 32–36)
MCV RBC AUTO: 88 FL (ref 82–98)
MONOCYTES # BLD AUTO: 0.7 K/UL (ref 0.3–1)
MONOCYTES NFR BLD: 7.1 % (ref 4–15)
NEUTROPHILS # BLD AUTO: 6.6 K/UL (ref 1.8–7.7)
NEUTROPHILS NFR BLD: 72.7 % (ref 38–73)
NRBC BLD-RTO: 0 /100 WBC
PLATELET # BLD AUTO: 227 K/UL (ref 150–450)
PMV BLD AUTO: 10.9 FL (ref 9.2–12.9)
POTASSIUM SERPL-SCNC: 4.2 MMOL/L (ref 3.5–5.1)
PROT SERPL-MCNC: 6.6 G/DL (ref 6–8.4)
PROTHROMBIN TIME: 11.3 SEC (ref 9–12.5)
RBC # BLD AUTO: 4.48 M/UL (ref 4–5.4)
SODIUM SERPL-SCNC: 142 MMOL/L (ref 136–145)
TROPONIN I SERPL DL<=0.01 NG/ML-MCNC: <0.006 NG/ML (ref 0–0.03)
TSH SERPL DL<=0.005 MIU/L-ACNC: 1.79 UIU/ML (ref 0.4–4)
WBC # BLD AUTO: 9.14 K/UL (ref 3.9–12.7)

## 2021-08-29 PROCEDURE — G0425 PR INPT TELEHEALTH CONSULT 30M: ICD-10-PCS | Mod: 95,,, | Performed by: STUDENT IN AN ORGANIZED HEALTH CARE EDUCATION/TRAINING PROGRAM

## 2021-08-29 PROCEDURE — 36000 PLACE NEEDLE IN VEIN: CPT | Mod: ER,59

## 2021-08-29 PROCEDURE — 80053 COMPREHEN METABOLIC PANEL: CPT | Mod: ER | Performed by: EMERGENCY MEDICINE

## 2021-08-29 PROCEDURE — 85730 THROMBOPLASTIN TIME PARTIAL: CPT | Mod: ER | Performed by: EMERGENCY MEDICINE

## 2021-08-29 PROCEDURE — 85025 COMPLETE CBC W/AUTO DIFF WBC: CPT | Mod: ER | Performed by: EMERGENCY MEDICINE

## 2021-08-29 PROCEDURE — 85610 PROTHROMBIN TIME: CPT | Mod: ER | Performed by: EMERGENCY MEDICINE

## 2021-08-29 PROCEDURE — 84484 ASSAY OF TROPONIN QUANT: CPT | Mod: ER | Performed by: EMERGENCY MEDICINE

## 2021-08-29 PROCEDURE — 80061 LIPID PANEL: CPT | Performed by: EMERGENCY MEDICINE

## 2021-08-29 PROCEDURE — G0425 INPT/ED TELECONSULT30: HCPCS | Mod: 95,,, | Performed by: STUDENT IN AN ORGANIZED HEALTH CARE EDUCATION/TRAINING PROGRAM

## 2021-08-29 PROCEDURE — 82962 GLUCOSE BLOOD TEST: CPT | Mod: ER

## 2021-08-29 PROCEDURE — 84443 ASSAY THYROID STIM HORMONE: CPT | Mod: ER | Performed by: EMERGENCY MEDICINE

## 2021-08-29 PROCEDURE — 93010 ELECTROCARDIOGRAM REPORT: CPT | Mod: ,,, | Performed by: INTERNAL MEDICINE

## 2021-08-29 PROCEDURE — 93010 EKG 12-LEAD: ICD-10-PCS | Mod: ,,, | Performed by: INTERNAL MEDICINE

## 2021-08-29 PROCEDURE — 25500020 PHARM REV CODE 255: Mod: ER | Performed by: EMERGENCY MEDICINE

## 2021-08-29 PROCEDURE — 93005 ELECTROCARDIOGRAM TRACING: CPT | Mod: ER

## 2021-08-29 PROCEDURE — 99285 EMERGENCY DEPT VISIT HI MDM: CPT | Mod: 25,ER

## 2021-08-29 RX ORDER — MIDAZOLAM HYDROCHLORIDE 1 MG/ML
INJECTION INTRAMUSCULAR; INTRAVENOUS
COMMUNITY
Start: 2021-06-03 | End: 2021-08-29 | Stop reason: CLARIF

## 2021-08-29 RX ORDER — FENTANYL CITRATE 50 UG/ML
INJECTION, SOLUTION INTRAMUSCULAR; INTRAVENOUS
COMMUNITY
Start: 2021-06-03 | End: 2021-08-29 | Stop reason: CLARIF

## 2021-08-29 RX ADMIN — IOHEXOL 100 ML: 350 INJECTION, SOLUTION INTRAVENOUS at 03:08

## 2021-08-31 ENCOUNTER — TELEPHONE (OUTPATIENT)
Dept: EMERGENCY MEDICINE | Facility: HOSPITAL | Age: 71
End: 2021-08-31

## 2021-08-31 ENCOUNTER — TELEPHONE (OUTPATIENT)
Dept: CARDIOLOGY | Facility: CLINIC | Age: 71
End: 2021-08-31

## 2021-08-31 LAB — POCT GLUCOSE: 91 MG/DL (ref 70–110)

## 2021-09-01 LAB
CHOLEST SERPL-MCNC: 181 MG/DL (ref 120–199)
CHOLEST/HDLC SERPL: 3 {RATIO} (ref 2–5)
HDLC SERPL-MCNC: 61 MG/DL (ref 40–75)
HDLC SERPL: 33.7 % (ref 20–50)
LDLC SERPL CALC-MCNC: 99.4 MG/DL (ref 63–159)
NONHDLC SERPL-MCNC: 120 MG/DL
TRIGL SERPL-MCNC: 103 MG/DL (ref 30–150)

## 2021-09-07 ENCOUNTER — CLINICAL SUPPORT (OUTPATIENT)
Dept: CARDIOLOGY | Facility: HOSPITAL | Age: 71
End: 2021-09-07
Attending: INTERNAL MEDICINE
Payer: MEDICARE

## 2021-09-07 ENCOUNTER — OFFICE VISIT (OUTPATIENT)
Dept: CARDIOLOGY | Facility: CLINIC | Age: 71
End: 2021-09-07
Payer: MEDICARE

## 2021-09-07 VITALS
HEART RATE: 50 BPM | DIASTOLIC BLOOD PRESSURE: 69 MMHG | OXYGEN SATURATION: 97 % | BODY MASS INDEX: 38.79 KG/M2 | SYSTOLIC BLOOD PRESSURE: 124 MMHG | WEIGHT: 219 LBS

## 2021-09-07 DIAGNOSIS — Z95.0 PRESENCE OF CARDIAC PACEMAKER: Primary | ICD-10-CM

## 2021-09-07 DIAGNOSIS — T82.120D: ICD-10-CM

## 2021-09-07 DIAGNOSIS — R00.2 PALPITATIONS: ICD-10-CM

## 2021-09-07 DIAGNOSIS — R07.9 CHEST PAIN, UNSPECIFIED TYPE: ICD-10-CM

## 2021-09-07 DIAGNOSIS — I49.9 CARDIAC ARRHYTHMIA, UNSPECIFIED CARDIAC ARRHYTHMIA TYPE: ICD-10-CM

## 2021-09-07 DIAGNOSIS — R06.02 SOB (SHORTNESS OF BREATH): ICD-10-CM

## 2021-09-07 PROCEDURE — 99999 PR PBB SHADOW E&M-EST. PATIENT-LVL III: ICD-10-PCS | Mod: PBBFAC,,, | Performed by: INTERNAL MEDICINE

## 2021-09-07 PROCEDURE — 99214 OFFICE O/P EST MOD 30 MIN: CPT | Mod: S$GLB,,, | Performed by: INTERNAL MEDICINE

## 2021-09-07 PROCEDURE — 1159F MED LIST DOCD IN RCRD: CPT | Mod: CPTII,S$GLB,, | Performed by: INTERNAL MEDICINE

## 2021-09-07 PROCEDURE — 3078F DIAST BP <80 MM HG: CPT | Mod: CPTII,S$GLB,, | Performed by: INTERNAL MEDICINE

## 2021-09-07 PROCEDURE — 1160F PR REVIEW ALL MEDS BY PRESCRIBER/CLIN PHARMACIST DOCUMENTED: ICD-10-PCS | Mod: CPTII,S$GLB,, | Performed by: INTERNAL MEDICINE

## 2021-09-07 PROCEDURE — 1159F PR MEDICATION LIST DOCUMENTED IN MEDICAL RECORD: ICD-10-PCS | Mod: CPTII,S$GLB,, | Performed by: INTERNAL MEDICINE

## 2021-09-07 PROCEDURE — 3074F SYST BP LT 130 MM HG: CPT | Mod: CPTII,S$GLB,, | Performed by: INTERNAL MEDICINE

## 2021-09-07 PROCEDURE — 4010F PR ACE/ARB THEARPY RXD/TAKEN: ICD-10-PCS | Mod: CPTII,S$GLB,, | Performed by: INTERNAL MEDICINE

## 2021-09-07 PROCEDURE — 93280 PM DEVICE PROGR EVAL DUAL: CPT

## 2021-09-07 PROCEDURE — 99999 PR PBB SHADOW E&M-EST. PATIENT-LVL III: CPT | Mod: PBBFAC,,, | Performed by: INTERNAL MEDICINE

## 2021-09-07 PROCEDURE — 99214 PR OFFICE/OUTPT VISIT, EST, LEVL IV, 30-39 MIN: ICD-10-PCS | Mod: S$GLB,,, | Performed by: INTERNAL MEDICINE

## 2021-09-07 PROCEDURE — 3074F PR MOST RECENT SYSTOLIC BLOOD PRESSURE < 130 MM HG: ICD-10-PCS | Mod: CPTII,S$GLB,, | Performed by: INTERNAL MEDICINE

## 2021-09-07 PROCEDURE — 4010F ACE/ARB THERAPY RXD/TAKEN: CPT | Mod: CPTII,S$GLB,, | Performed by: INTERNAL MEDICINE

## 2021-09-07 PROCEDURE — 1160F RVW MEDS BY RX/DR IN RCRD: CPT | Mod: CPTII,S$GLB,, | Performed by: INTERNAL MEDICINE

## 2021-09-07 PROCEDURE — 3008F PR BODY MASS INDEX (BMI) DOCUMENTED: ICD-10-PCS | Mod: CPTII,S$GLB,, | Performed by: INTERNAL MEDICINE

## 2021-09-07 PROCEDURE — 3008F BODY MASS INDEX DOCD: CPT | Mod: CPTII,S$GLB,, | Performed by: INTERNAL MEDICINE

## 2021-09-07 PROCEDURE — 3078F PR MOST RECENT DIASTOLIC BLOOD PRESSURE < 80 MM HG: ICD-10-PCS | Mod: CPTII,S$GLB,, | Performed by: INTERNAL MEDICINE

## 2021-09-09 ENCOUNTER — PATIENT MESSAGE (OUTPATIENT)
Dept: CARDIOLOGY | Facility: CLINIC | Age: 71
End: 2021-09-09

## 2021-09-09 ENCOUNTER — OFFICE VISIT (OUTPATIENT)
Dept: CARDIOLOGY | Facility: CLINIC | Age: 71
End: 2021-09-09
Payer: MEDICARE

## 2021-09-09 VITALS — SYSTOLIC BLOOD PRESSURE: 151 MMHG | HEART RATE: 54 BPM | DIASTOLIC BLOOD PRESSURE: 74 MMHG

## 2021-09-09 DIAGNOSIS — Z98.890 HISTORY OF CARDIAC RADIOFREQUENCY ABLATION (RFA): ICD-10-CM

## 2021-09-09 DIAGNOSIS — I48.0 PAF (PAROXYSMAL ATRIAL FIBRILLATION): Primary | ICD-10-CM

## 2021-09-09 DIAGNOSIS — R00.2 PALPITATIONS: Primary | ICD-10-CM

## 2021-09-09 DIAGNOSIS — G43.109 MIGRAINE WITH AURA AND WITHOUT STATUS MIGRAINOSUS, NOT INTRACTABLE: ICD-10-CM

## 2021-09-09 DIAGNOSIS — I48.3 TYPICAL ATRIAL FLUTTER: ICD-10-CM

## 2021-09-09 DIAGNOSIS — R00.1 SYMPTOMATIC BRADYCARDIA: ICD-10-CM

## 2021-09-09 DIAGNOSIS — I49.5 SSS (SICK SINUS SYNDROME): ICD-10-CM

## 2021-09-09 PROBLEM — T82.120A ATRIAL PACEMAKER LEAD DISPLACEMENT, INITIAL ENCOUNTER: Status: RESOLVED | Noted: 2021-05-20 | Resolved: 2021-09-09

## 2021-09-09 PROBLEM — T82.120A DISPLACEMENT OF ELECTRODE LEAD OF CARDIAC PACEMAKER: Status: RESOLVED | Noted: 2021-07-15 | Resolved: 2021-09-09

## 2021-09-09 PROCEDURE — 1160F PR REVIEW ALL MEDS BY PRESCRIBER/CLIN PHARMACIST DOCUMENTED: ICD-10-PCS | Mod: CPTII,95,, | Performed by: INTERNAL MEDICINE

## 2021-09-09 PROCEDURE — 99215 PR OFFICE/OUTPT VISIT, EST, LEVL V, 40-54 MIN: ICD-10-PCS | Mod: 95,,, | Performed by: INTERNAL MEDICINE

## 2021-09-09 PROCEDURE — 1159F MED LIST DOCD IN RCRD: CPT | Mod: CPTII,95,, | Performed by: INTERNAL MEDICINE

## 2021-09-09 PROCEDURE — 4010F ACE/ARB THERAPY RXD/TAKEN: CPT | Mod: CPTII,95,, | Performed by: INTERNAL MEDICINE

## 2021-09-09 PROCEDURE — 3077F PR MOST RECENT SYSTOLIC BLOOD PRESSURE >= 140 MM HG: ICD-10-PCS | Mod: CPTII,95,, | Performed by: INTERNAL MEDICINE

## 2021-09-09 PROCEDURE — 3077F SYST BP >= 140 MM HG: CPT | Mod: CPTII,95,, | Performed by: INTERNAL MEDICINE

## 2021-09-09 PROCEDURE — 1159F PR MEDICATION LIST DOCUMENTED IN MEDICAL RECORD: ICD-10-PCS | Mod: CPTII,95,, | Performed by: INTERNAL MEDICINE

## 2021-09-09 PROCEDURE — 99499 UNLISTED E&M SERVICE: CPT | Mod: HCNC,95,, | Performed by: INTERNAL MEDICINE

## 2021-09-09 PROCEDURE — 4010F PR ACE/ARB THEARPY RXD/TAKEN: ICD-10-PCS | Mod: CPTII,95,, | Performed by: INTERNAL MEDICINE

## 2021-09-09 PROCEDURE — 3078F DIAST BP <80 MM HG: CPT | Mod: CPTII,95,, | Performed by: INTERNAL MEDICINE

## 2021-09-09 PROCEDURE — 99499 RISK ADDL DX/OHS AUDIT: ICD-10-PCS | Mod: HCNC,95,, | Performed by: INTERNAL MEDICINE

## 2021-09-09 PROCEDURE — 3078F PR MOST RECENT DIASTOLIC BLOOD PRESSURE < 80 MM HG: ICD-10-PCS | Mod: CPTII,95,, | Performed by: INTERNAL MEDICINE

## 2021-09-09 PROCEDURE — 99215 OFFICE O/P EST HI 40 MIN: CPT | Mod: 95,,, | Performed by: INTERNAL MEDICINE

## 2021-09-09 PROCEDURE — 1160F RVW MEDS BY RX/DR IN RCRD: CPT | Mod: CPTII,95,, | Performed by: INTERNAL MEDICINE

## 2021-09-10 ENCOUNTER — PATIENT MESSAGE (OUTPATIENT)
Dept: CARDIOLOGY | Facility: CLINIC | Age: 71
End: 2021-09-10

## 2021-09-10 ENCOUNTER — NURSE TRIAGE (OUTPATIENT)
Dept: ADMINISTRATIVE | Facility: CLINIC | Age: 71
End: 2021-09-10

## 2021-09-13 ENCOUNTER — PATIENT MESSAGE (OUTPATIENT)
Dept: CARDIOLOGY | Facility: CLINIC | Age: 71
End: 2021-09-13

## 2021-09-14 DIAGNOSIS — Z98.890 HISTORY OF CARDIAC RADIOFREQUENCY ABLATION (RFA): ICD-10-CM

## 2021-09-14 DIAGNOSIS — Z95.0 CARDIAC PACEMAKER IN SITU: Primary | ICD-10-CM

## 2021-09-16 RX ORDER — AMOXICILLIN 500 MG/1
2000 CAPSULE ORAL
Qty: 4 CAPSULE | Refills: 0 | Status: SHIPPED | OUTPATIENT
Start: 2021-09-16 | End: 2021-09-17

## 2021-09-22 ENCOUNTER — HOSPITAL ENCOUNTER (OUTPATIENT)
Dept: CARDIOLOGY | Facility: HOSPITAL | Age: 71
Discharge: HOME | End: 2021-09-22
Attending: INTERNAL MEDICINE
Payer: MEDICARE

## 2021-09-22 DIAGNOSIS — R00.2 PALPITATIONS: ICD-10-CM

## 2021-09-22 PROCEDURE — 93227 XTRNL ECG REC<48 HR R&I: CPT | Mod: ,,, | Performed by: INTERNAL MEDICINE

## 2021-09-22 PROCEDURE — 93227 HOLTER MONITOR - 48 HOUR (CUPID ONLY): ICD-10-PCS | Mod: ,,, | Performed by: INTERNAL MEDICINE

## 2021-09-22 PROCEDURE — 93226 XTRNL ECG REC<48 HR SCAN A/R: CPT | Mod: PO

## 2021-09-29 ENCOUNTER — PATIENT MESSAGE (OUTPATIENT)
Dept: CARDIOLOGY | Facility: HOSPITAL | Age: 71
End: 2021-09-29

## 2021-09-29 DIAGNOSIS — R00.2 PALPITATIONS: ICD-10-CM

## 2021-09-29 DIAGNOSIS — R07.9 CHEST PAIN, UNSPECIFIED TYPE: ICD-10-CM

## 2021-09-29 DIAGNOSIS — E78.5 HYPERLIPIDEMIA, UNSPECIFIED HYPERLIPIDEMIA TYPE: ICD-10-CM

## 2021-09-29 DIAGNOSIS — Z95.0 CARDIAC PACEMAKER IN SITU: ICD-10-CM

## 2021-09-29 DIAGNOSIS — I10 ESSENTIAL HYPERTENSION: ICD-10-CM

## 2021-09-29 DIAGNOSIS — I48.21 PERMANENT ATRIAL FIBRILLATION: ICD-10-CM

## 2021-09-29 DIAGNOSIS — T82.9XXS DISORDER OF CARDIAC PACEMAKER SYSTEM, SEQUELA: ICD-10-CM

## 2021-09-29 DIAGNOSIS — R06.02 SOB (SHORTNESS OF BREATH): Primary | ICD-10-CM

## 2021-09-29 LAB
OHS CV EVENT MONITOR DAY: 2
OHS CV HOLTER LENGTH DECIMAL HOURS: 96
OHS CV HOLTER LENGTH HOURS: 48
OHS CV HOLTER LENGTH MINUTES: 0
OHS CV HOLTER SINUS AVERAGE HR: 59
OHS CV HOLTER SINUS MAX HR: 101
OHS CV HOLTER SINUS MIN HR: 48

## 2021-09-29 RX ORDER — DILTIAZEM HYDROCHLORIDE 240 MG/1
240 CAPSULE, EXTENDED RELEASE ORAL DAILY
Qty: 30 CAPSULE | Refills: 3 | Status: SHIPPED | OUTPATIENT
Start: 2021-09-29 | End: 2021-11-08 | Stop reason: SDUPTHER

## 2021-10-01 ENCOUNTER — PATIENT MESSAGE (OUTPATIENT)
Dept: CARDIOLOGY | Facility: HOSPITAL | Age: 71
End: 2021-10-01

## 2021-10-05 ENCOUNTER — TELEPHONE (OUTPATIENT)
Dept: CARDIOLOGY | Facility: HOSPITAL | Age: 71
End: 2021-10-05

## 2021-10-11 ENCOUNTER — CLINICAL SUPPORT (OUTPATIENT)
Dept: CARDIOLOGY | Facility: HOSPITAL | Age: 71
End: 2021-10-11
Attending: INTERNAL MEDICINE
Payer: MEDICARE

## 2021-10-11 ENCOUNTER — PATIENT MESSAGE (OUTPATIENT)
Dept: INTERNAL MEDICINE | Facility: CLINIC | Age: 71
End: 2021-10-11

## 2021-10-11 DIAGNOSIS — Z95.0 CARDIAC PACEMAKER IN SITU: ICD-10-CM

## 2021-10-11 PROCEDURE — 93280 PM DEVICE PROGR EVAL DUAL: CPT | Mod: HCNC

## 2021-10-11 RX ORDER — PRAVASTATIN SODIUM 40 MG/1
40 TABLET ORAL NIGHTLY
Qty: 90 TABLET | Refills: 3 | Status: SHIPPED | OUTPATIENT
Start: 2021-10-11 | End: 2021-12-15 | Stop reason: ALTCHOICE

## 2021-10-12 DIAGNOSIS — R00.2 PALPITATIONS: ICD-10-CM

## 2021-10-12 DIAGNOSIS — Z95.0 CARDIAC PACEMAKER IN SITU: Primary | ICD-10-CM

## 2021-10-12 DIAGNOSIS — I48.21 PERMANENT ATRIAL FIBRILLATION: ICD-10-CM

## 2021-10-12 DIAGNOSIS — R06.02 SOB (SHORTNESS OF BREATH): ICD-10-CM

## 2021-10-12 DIAGNOSIS — I48.3 TYPICAL ATRIAL FLUTTER: ICD-10-CM

## 2021-10-25 ENCOUNTER — IMMUNIZATION (OUTPATIENT)
Dept: INTERNAL MEDICINE | Facility: CLINIC | Age: 71
End: 2021-10-25
Payer: MEDICARE

## 2021-10-25 ENCOUNTER — IMMUNIZATION (OUTPATIENT)
Dept: PHARMACY | Facility: CLINIC | Age: 71
End: 2021-10-25
Payer: MEDICARE

## 2021-10-25 ENCOUNTER — OFFICE VISIT (OUTPATIENT)
Dept: DERMATOLOGY | Facility: CLINIC | Age: 71
End: 2021-10-25
Payer: MEDICARE

## 2021-10-25 ENCOUNTER — PATIENT OUTREACH (OUTPATIENT)
Dept: ADMINISTRATIVE | Facility: OTHER | Age: 71
End: 2021-10-25
Payer: MEDICARE

## 2021-10-25 ENCOUNTER — OFFICE VISIT (OUTPATIENT)
Dept: CARDIOLOGY | Facility: CLINIC | Age: 71
End: 2021-10-25
Payer: MEDICARE

## 2021-10-25 VITALS
BODY MASS INDEX: 39.52 KG/M2 | WEIGHT: 223.13 LBS | OXYGEN SATURATION: 95 % | SYSTOLIC BLOOD PRESSURE: 132 MMHG | HEART RATE: 70 BPM | DIASTOLIC BLOOD PRESSURE: 80 MMHG

## 2021-10-25 DIAGNOSIS — T82.9XXS DISORDER OF CARDIAC PACEMAKER SYSTEM, SEQUELA: ICD-10-CM

## 2021-10-25 DIAGNOSIS — Z23 NEED FOR VACCINATION: Primary | ICD-10-CM

## 2021-10-25 DIAGNOSIS — D18.00 HEMANGIOMA, UNSPECIFIED SITE: ICD-10-CM

## 2021-10-25 DIAGNOSIS — R06.02 SOB (SHORTNESS OF BREATH): ICD-10-CM

## 2021-10-25 DIAGNOSIS — I48.3 TYPICAL ATRIAL FLUTTER: ICD-10-CM

## 2021-10-25 DIAGNOSIS — E78.5 HYPERLIPIDEMIA, UNSPECIFIED HYPERLIPIDEMIA TYPE: ICD-10-CM

## 2021-10-25 DIAGNOSIS — Z85.828 HISTORY OF NONMELANOMA SKIN CANCER: Primary | ICD-10-CM

## 2021-10-25 DIAGNOSIS — L81.4 SOLAR LENTIGO: ICD-10-CM

## 2021-10-25 DIAGNOSIS — I48.21 PERMANENT ATRIAL FIBRILLATION: ICD-10-CM

## 2021-10-25 DIAGNOSIS — I48.0 PAF (PAROXYSMAL ATRIAL FIBRILLATION): ICD-10-CM

## 2021-10-25 DIAGNOSIS — L82.1 SEBORRHEIC KERATOSES: ICD-10-CM

## 2021-10-25 DIAGNOSIS — I10 ESSENTIAL HYPERTENSION: ICD-10-CM

## 2021-10-25 DIAGNOSIS — R00.2 PALPITATIONS: ICD-10-CM

## 2021-10-25 DIAGNOSIS — Z98.890 HISTORY OF CARDIAC RADIOFREQUENCY ABLATION (RFA): ICD-10-CM

## 2021-10-25 DIAGNOSIS — L30.9 DERMATITIS: ICD-10-CM

## 2021-10-25 DIAGNOSIS — Z95.0 CARDIAC PACEMAKER IN SITU: Primary | ICD-10-CM

## 2021-10-25 DIAGNOSIS — R07.9 CHEST PAIN, UNSPECIFIED TYPE: ICD-10-CM

## 2021-10-25 DIAGNOSIS — D48.5 NEOPLASM OF UNCERTAIN BEHAVIOR OF SKIN: ICD-10-CM

## 2021-10-25 PROCEDURE — 11102 PR TANGENTIAL BIOPSY, SKIN, SINGLE LESION: ICD-10-PCS | Mod: HCNC,S$GLB,, | Performed by: STUDENT IN AN ORGANIZED HEALTH CARE EDUCATION/TRAINING PROGRAM

## 2021-10-25 PROCEDURE — 99214 PR OFFICE/OUTPT VISIT, EST, LEVL IV, 30-39 MIN: ICD-10-PCS | Mod: HCNC,S$GLB,, | Performed by: INTERNAL MEDICINE

## 2021-10-25 PROCEDURE — 1101F PT FALLS ASSESS-DOCD LE1/YR: CPT | Mod: HCNC,CPTII,S$GLB, | Performed by: STUDENT IN AN ORGANIZED HEALTH CARE EDUCATION/TRAINING PROGRAM

## 2021-10-25 PROCEDURE — 3288F FALL RISK ASSESSMENT DOCD: CPT | Mod: HCNC,CPTII,S$GLB, | Performed by: STUDENT IN AN ORGANIZED HEALTH CARE EDUCATION/TRAINING PROGRAM

## 2021-10-25 PROCEDURE — 99999 PR PBB SHADOW E&M-EST. PATIENT-LVL III: CPT | Mod: PBBFAC,HCNC,, | Performed by: STUDENT IN AN ORGANIZED HEALTH CARE EDUCATION/TRAINING PROGRAM

## 2021-10-25 PROCEDURE — 3079F DIAST BP 80-89 MM HG: CPT | Mod: HCNC,CPTII,S$GLB, | Performed by: INTERNAL MEDICINE

## 2021-10-25 PROCEDURE — 99214 OFFICE O/P EST MOD 30 MIN: CPT | Mod: 25,HCNC,S$GLB, | Performed by: STUDENT IN AN ORGANIZED HEALTH CARE EDUCATION/TRAINING PROGRAM

## 2021-10-25 PROCEDURE — 99999 PR PBB SHADOW E&M-EST. PATIENT-LVL III: CPT | Mod: PBBFAC,HCNC,, | Performed by: INTERNAL MEDICINE

## 2021-10-25 PROCEDURE — 4010F PR ACE/ARB THEARPY RXD/TAKEN: ICD-10-PCS | Mod: HCNC,CPTII,S$GLB, | Performed by: INTERNAL MEDICINE

## 2021-10-25 PROCEDURE — 4010F PR ACE/ARB THEARPY RXD/TAKEN: ICD-10-PCS | Mod: HCNC,CPTII,S$GLB, | Performed by: STUDENT IN AN ORGANIZED HEALTH CARE EDUCATION/TRAINING PROGRAM

## 2021-10-25 PROCEDURE — 3075F PR MOST RECENT SYSTOLIC BLOOD PRESS GE 130-139MM HG: ICD-10-PCS | Mod: HCNC,CPTII,S$GLB, | Performed by: INTERNAL MEDICINE

## 2021-10-25 PROCEDURE — 1159F PR MEDICATION LIST DOCUMENTED IN MEDICAL RECORD: ICD-10-PCS | Mod: HCNC,CPTII,S$GLB, | Performed by: INTERNAL MEDICINE

## 2021-10-25 PROCEDURE — 3079F PR MOST RECENT DIASTOLIC BLOOD PRESSURE 80-89 MM HG: ICD-10-PCS | Mod: HCNC,CPTII,S$GLB, | Performed by: INTERNAL MEDICINE

## 2021-10-25 PROCEDURE — 3008F BODY MASS INDEX DOCD: CPT | Mod: HCNC,CPTII,S$GLB, | Performed by: INTERNAL MEDICINE

## 2021-10-25 PROCEDURE — 1126F AMNT PAIN NOTED NONE PRSNT: CPT | Mod: HCNC,CPTII,S$GLB, | Performed by: STUDENT IN AN ORGANIZED HEALTH CARE EDUCATION/TRAINING PROGRAM

## 2021-10-25 PROCEDURE — 99214 OFFICE O/P EST MOD 30 MIN: CPT | Mod: HCNC,S$GLB,, | Performed by: INTERNAL MEDICINE

## 2021-10-25 PROCEDURE — 1126F AMNT PAIN NOTED NONE PRSNT: CPT | Mod: HCNC,CPTII,S$GLB, | Performed by: INTERNAL MEDICINE

## 2021-10-25 PROCEDURE — 3008F PR BODY MASS INDEX (BMI) DOCUMENTED: ICD-10-PCS | Mod: HCNC,CPTII,S$GLB, | Performed by: INTERNAL MEDICINE

## 2021-10-25 PROCEDURE — 88305 TISSUE EXAM BY PATHOLOGIST: CPT | Mod: HCNC | Performed by: PATHOLOGY

## 2021-10-25 PROCEDURE — 1160F RVW MEDS BY RX/DR IN RCRD: CPT | Mod: HCNC,CPTII,S$GLB, | Performed by: STUDENT IN AN ORGANIZED HEALTH CARE EDUCATION/TRAINING PROGRAM

## 2021-10-25 PROCEDURE — 99999 PR PBB SHADOW E&M-EST. PATIENT-LVL III: ICD-10-PCS | Mod: PBBFAC,HCNC,, | Performed by: INTERNAL MEDICINE

## 2021-10-25 PROCEDURE — 1160F PR REVIEW ALL MEDS BY PRESCRIBER/CLIN PHARMACIST DOCUMENTED: ICD-10-PCS | Mod: HCNC,CPTII,S$GLB, | Performed by: STUDENT IN AN ORGANIZED HEALTH CARE EDUCATION/TRAINING PROGRAM

## 2021-10-25 PROCEDURE — 88305 TISSUE EXAM BY PATHOLOGIST: CPT | Mod: 26,HCNC,, | Performed by: PATHOLOGY

## 2021-10-25 PROCEDURE — 1160F PR REVIEW ALL MEDS BY PRESCRIBER/CLIN PHARMACIST DOCUMENTED: ICD-10-PCS | Mod: HCNC,CPTII,S$GLB, | Performed by: INTERNAL MEDICINE

## 2021-10-25 PROCEDURE — 1159F MED LIST DOCD IN RCRD: CPT | Mod: HCNC,CPTII,S$GLB, | Performed by: STUDENT IN AN ORGANIZED HEALTH CARE EDUCATION/TRAINING PROGRAM

## 2021-10-25 PROCEDURE — 11102 TANGNTL BX SKIN SINGLE LES: CPT | Mod: HCNC,S$GLB,, | Performed by: STUDENT IN AN ORGANIZED HEALTH CARE EDUCATION/TRAINING PROGRAM

## 2021-10-25 PROCEDURE — 90694 VACC AIIV4 NO PRSRV 0.5ML IM: CPT | Mod: HCNC,S$GLB,, | Performed by: FAMILY MEDICINE

## 2021-10-25 PROCEDURE — 1101F PR PT FALLS ASSESS DOC 0-1 FALLS W/OUT INJ PAST YR: ICD-10-PCS | Mod: HCNC,CPTII,S$GLB, | Performed by: STUDENT IN AN ORGANIZED HEALTH CARE EDUCATION/TRAINING PROGRAM

## 2021-10-25 PROCEDURE — 4010F ACE/ARB THERAPY RXD/TAKEN: CPT | Mod: HCNC,CPTII,S$GLB, | Performed by: INTERNAL MEDICINE

## 2021-10-25 PROCEDURE — G0008 ADMIN INFLUENZA VIRUS VAC: HCPCS | Mod: HCNC,S$GLB,, | Performed by: FAMILY MEDICINE

## 2021-10-25 PROCEDURE — 1159F PR MEDICATION LIST DOCUMENTED IN MEDICAL RECORD: ICD-10-PCS | Mod: HCNC,CPTII,S$GLB, | Performed by: STUDENT IN AN ORGANIZED HEALTH CARE EDUCATION/TRAINING PROGRAM

## 2021-10-25 PROCEDURE — 99214 PR OFFICE/OUTPT VISIT, EST, LEVL IV, 30-39 MIN: ICD-10-PCS | Mod: 25,HCNC,S$GLB, | Performed by: STUDENT IN AN ORGANIZED HEALTH CARE EDUCATION/TRAINING PROGRAM

## 2021-10-25 PROCEDURE — 3288F PR FALLS RISK ASSESSMENT DOCUMENTED: ICD-10-PCS | Mod: HCNC,CPTII,S$GLB, | Performed by: STUDENT IN AN ORGANIZED HEALTH CARE EDUCATION/TRAINING PROGRAM

## 2021-10-25 PROCEDURE — G0008 FLU VACCINE - QUADRIVALENT - ADJUVANTED: ICD-10-PCS | Mod: HCNC,S$GLB,, | Performed by: FAMILY MEDICINE

## 2021-10-25 PROCEDURE — 1126F PR PAIN SEVERITY QUANTIFIED, NO PAIN PRESENT: ICD-10-PCS | Mod: HCNC,CPTII,S$GLB, | Performed by: STUDENT IN AN ORGANIZED HEALTH CARE EDUCATION/TRAINING PROGRAM

## 2021-10-25 PROCEDURE — 3075F SYST BP GE 130 - 139MM HG: CPT | Mod: HCNC,CPTII,S$GLB, | Performed by: INTERNAL MEDICINE

## 2021-10-25 PROCEDURE — 1160F RVW MEDS BY RX/DR IN RCRD: CPT | Mod: HCNC,CPTII,S$GLB, | Performed by: INTERNAL MEDICINE

## 2021-10-25 PROCEDURE — 88305 TISSUE EXAM BY PATHOLOGIST: ICD-10-PCS | Mod: 26,HCNC,, | Performed by: PATHOLOGY

## 2021-10-25 PROCEDURE — 90694 FLU VACCINE - QUADRIVALENT - ADJUVANTED: ICD-10-PCS | Mod: HCNC,S$GLB,, | Performed by: FAMILY MEDICINE

## 2021-10-25 PROCEDURE — 1159F MED LIST DOCD IN RCRD: CPT | Mod: HCNC,CPTII,S$GLB, | Performed by: INTERNAL MEDICINE

## 2021-10-25 PROCEDURE — 1126F PR PAIN SEVERITY QUANTIFIED, NO PAIN PRESENT: ICD-10-PCS | Mod: HCNC,CPTII,S$GLB, | Performed by: INTERNAL MEDICINE

## 2021-10-25 PROCEDURE — 4010F ACE/ARB THERAPY RXD/TAKEN: CPT | Mod: HCNC,CPTII,S$GLB, | Performed by: STUDENT IN AN ORGANIZED HEALTH CARE EDUCATION/TRAINING PROGRAM

## 2021-10-25 PROCEDURE — 99999 PR PBB SHADOW E&M-EST. PATIENT-LVL III: ICD-10-PCS | Mod: PBBFAC,HCNC,, | Performed by: STUDENT IN AN ORGANIZED HEALTH CARE EDUCATION/TRAINING PROGRAM

## 2021-10-25 RX ORDER — TRIAMCINOLONE ACETONIDE 1 MG/G
OINTMENT TOPICAL 2 TIMES DAILY
Qty: 80 G | Refills: 1 | Status: SHIPPED | OUTPATIENT
Start: 2021-10-25 | End: 2022-06-01

## 2021-10-29 LAB
FINAL PATHOLOGIC DIAGNOSIS: NORMAL
GROSS: NORMAL
Lab: NORMAL
MICROSCOPIC EXAM: NORMAL

## 2021-11-01 ENCOUNTER — TELEPHONE (OUTPATIENT)
Dept: DERMATOLOGY | Facility: CLINIC | Age: 71
End: 2021-11-01
Payer: MEDICARE

## 2021-11-01 DIAGNOSIS — C44.310 BASAL CELL CARCINOMA, FACE: Primary | ICD-10-CM

## 2021-11-08 ENCOUNTER — PATIENT MESSAGE (OUTPATIENT)
Dept: CARDIOLOGY | Facility: CLINIC | Age: 71
End: 2021-11-08
Payer: MEDICARE

## 2021-11-08 DIAGNOSIS — I10 ESSENTIAL HYPERTENSION: ICD-10-CM

## 2021-11-08 DIAGNOSIS — E78.5 HYPERLIPIDEMIA, UNSPECIFIED HYPERLIPIDEMIA TYPE: ICD-10-CM

## 2021-11-08 DIAGNOSIS — I48.21 PERMANENT ATRIAL FIBRILLATION: ICD-10-CM

## 2021-11-08 DIAGNOSIS — T82.9XXS DISORDER OF CARDIAC PACEMAKER SYSTEM, SEQUELA: ICD-10-CM

## 2021-11-08 DIAGNOSIS — R00.2 PALPITATIONS: ICD-10-CM

## 2021-11-08 DIAGNOSIS — Z95.0 CARDIAC PACEMAKER IN SITU: ICD-10-CM

## 2021-11-08 DIAGNOSIS — R06.02 SOB (SHORTNESS OF BREATH): ICD-10-CM

## 2021-11-08 DIAGNOSIS — R07.9 CHEST PAIN, UNSPECIFIED TYPE: ICD-10-CM

## 2021-11-08 RX ORDER — DILTIAZEM HYDROCHLORIDE 240 MG/1
240 CAPSULE, EXTENDED RELEASE ORAL DAILY
Qty: 30 CAPSULE | Refills: 3 | Status: SHIPPED | OUTPATIENT
Start: 2021-11-08 | End: 2021-11-17 | Stop reason: SDUPTHER

## 2021-11-14 ENCOUNTER — CLINICAL SUPPORT (OUTPATIENT)
Dept: CARDIOLOGY | Facility: HOSPITAL | Age: 71
End: 2021-11-14
Payer: MEDICARE

## 2021-11-14 DIAGNOSIS — Z95.0 PRESENCE OF CARDIAC PACEMAKER: ICD-10-CM

## 2021-11-14 PROCEDURE — 93294 CARDIAC DEVICE CHECK - REMOTE: ICD-10-PCS | Mod: HCNC,S$GLB,, | Performed by: INTERNAL MEDICINE

## 2021-11-14 PROCEDURE — 93296 REM INTERROG EVL PM/IDS: CPT | Mod: HCNC | Performed by: INTERNAL MEDICINE

## 2021-11-14 PROCEDURE — 93294 REM INTERROG EVL PM/LDLS PM: CPT | Mod: HCNC,S$GLB,, | Performed by: INTERNAL MEDICINE

## 2021-11-17 ENCOUNTER — PATIENT MESSAGE (OUTPATIENT)
Dept: ADMINISTRATIVE | Facility: OTHER | Age: 71
End: 2021-11-17
Payer: MEDICARE

## 2021-12-08 ENCOUNTER — HOSPITAL ENCOUNTER (EMERGENCY)
Facility: HOSPITAL | Age: 71
Discharge: HOME | End: 2021-12-08
Attending: EMERGENCY MEDICINE
Payer: MEDICARE

## 2021-12-08 ENCOUNTER — NURSE TRIAGE (OUTPATIENT)
Dept: ADMINISTRATIVE | Facility: CLINIC | Age: 71
End: 2021-12-08
Payer: MEDICARE

## 2021-12-08 ENCOUNTER — TELEPHONE (OUTPATIENT)
Dept: CARDIOLOGY | Facility: CLINIC | Age: 71
End: 2021-12-08
Payer: MEDICARE

## 2021-12-08 VITALS
WEIGHT: 224 LBS | RESPIRATION RATE: 19 BRPM | DIASTOLIC BLOOD PRESSURE: 71 MMHG | HEART RATE: 56 BPM | BODY MASS INDEX: 39.69 KG/M2 | TEMPERATURE: 98 F | OXYGEN SATURATION: 98 % | HEIGHT: 63 IN | SYSTOLIC BLOOD PRESSURE: 158 MMHG

## 2021-12-08 DIAGNOSIS — R42 DIZZINESS: Primary | ICD-10-CM

## 2021-12-08 LAB
ALBUMIN SERPL BCP-MCNC: 4 G/DL (ref 3.5–5.2)
ALP SERPL-CCNC: 56 U/L (ref 55–135)
ALT SERPL W/O P-5'-P-CCNC: 18 U/L (ref 10–44)
ANION GAP SERPL CALC-SCNC: 9 MMOL/L (ref 8–16)
AST SERPL-CCNC: 18 U/L (ref 10–40)
BASOPHILS # BLD AUTO: 0.04 K/UL (ref 0–0.2)
BASOPHILS NFR BLD: 0.6 % (ref 0–1.9)
BILIRUB SERPL-MCNC: 0.5 MG/DL (ref 0.1–1)
BILIRUB UR QL STRIP: NEGATIVE
BNP SERPL-MCNC: 101 PG/ML (ref 0–99)
BUN SERPL-MCNC: 14 MG/DL (ref 8–23)
CALCIUM SERPL-MCNC: 9.6 MG/DL (ref 8.7–10.5)
CHLORIDE SERPL-SCNC: 107 MMOL/L (ref 95–110)
CK SERPL-CCNC: 73 U/L (ref 20–180)
CLARITY UR REFRACT.AUTO: CLEAR
CO2 SERPL-SCNC: 26 MMOL/L (ref 23–29)
COLOR UR AUTO: YELLOW
CREAT SERPL-MCNC: 0.9 MG/DL (ref 0.5–1.4)
DIFFERENTIAL METHOD: ABNORMAL
EOSINOPHIL # BLD AUTO: 0.2 K/UL (ref 0–0.5)
EOSINOPHIL NFR BLD: 2.9 % (ref 0–8)
ERYTHROCYTE [DISTWIDTH] IN BLOOD BY AUTOMATED COUNT: 14.5 % (ref 11.5–14.5)
EST. GFR  (AFRICAN AMERICAN): >60 ML/MIN/1.73 M^2
EST. GFR  (NON AFRICAN AMERICAN): >60 ML/MIN/1.73 M^2
GLUCOSE SERPL-MCNC: 85 MG/DL (ref 70–110)
GLUCOSE UR QL STRIP: NEGATIVE
HCT VFR BLD AUTO: 42.7 % (ref 37–48.5)
HGB BLD-MCNC: 13.7 G/DL (ref 12–16)
HGB UR QL STRIP: NEGATIVE
IMM GRANULOCYTES # BLD AUTO: 0.03 K/UL (ref 0–0.04)
IMM GRANULOCYTES NFR BLD AUTO: 0.4 % (ref 0–0.5)
KETONES UR QL STRIP: NEGATIVE
LEUKOCYTE ESTERASE UR QL STRIP: ABNORMAL
LYMPHOCYTES # BLD AUTO: 1.2 K/UL (ref 1–4.8)
LYMPHOCYTES NFR BLD: 17.5 % (ref 18–48)
MAGNESIUM SERPL-MCNC: 2.1 MG/DL (ref 1.6–2.6)
MCH RBC QN AUTO: 28.1 PG (ref 27–31)
MCHC RBC AUTO-ENTMCNC: 32.1 G/DL (ref 32–36)
MCV RBC AUTO: 88 FL (ref 82–98)
MICROSCOPIC COMMENT: NORMAL
MONOCYTES # BLD AUTO: 0.4 K/UL (ref 0.3–1)
MONOCYTES NFR BLD: 6.3 % (ref 4–15)
NEUTROPHILS # BLD AUTO: 5.1 K/UL (ref 1.8–7.7)
NEUTROPHILS NFR BLD: 72.3 % (ref 38–73)
NITRITE UR QL STRIP: NEGATIVE
NRBC BLD-RTO: 0 /100 WBC
PH UR STRIP: 8 [PH] (ref 5–8)
PLATELET # BLD AUTO: 223 K/UL (ref 150–450)
PMV BLD AUTO: 10.6 FL (ref 9.2–12.9)
POTASSIUM SERPL-SCNC: 4.5 MMOL/L (ref 3.5–5.1)
PROT SERPL-MCNC: 7 G/DL (ref 6–8.4)
PROT UR QL STRIP: NEGATIVE
RBC # BLD AUTO: 4.87 M/UL (ref 4–5.4)
SODIUM SERPL-SCNC: 142 MMOL/L (ref 136–145)
SP GR UR STRIP: 1.01 (ref 1–1.03)
TROPONIN I SERPL DL<=0.01 NG/ML-MCNC: <0.006 NG/ML (ref 0–0.03)
URN SPEC COLLECT METH UR: ABNORMAL
UROBILINOGEN UR STRIP-ACNC: NEGATIVE EU/DL
WBC # BLD AUTO: 6.99 K/UL (ref 3.9–12.7)
WBC #/AREA URNS AUTO: 2 /HPF (ref 0–5)

## 2021-12-08 PROCEDURE — 83880 ASSAY OF NATRIURETIC PEPTIDE: CPT | Mod: HCNC,ER | Performed by: EMERGENCY MEDICINE

## 2021-12-08 PROCEDURE — 99285 EMERGENCY DEPT VISIT HI MDM: CPT | Mod: 25,HCNC,ER

## 2021-12-08 PROCEDURE — 93005 ELECTROCARDIOGRAM TRACING: CPT | Mod: HCNC,ER

## 2021-12-08 PROCEDURE — 83735 ASSAY OF MAGNESIUM: CPT | Mod: HCNC,ER | Performed by: EMERGENCY MEDICINE

## 2021-12-08 PROCEDURE — 82550 ASSAY OF CK (CPK): CPT | Mod: HCNC,ER | Performed by: EMERGENCY MEDICINE

## 2021-12-08 PROCEDURE — 25000003 PHARM REV CODE 250: Mod: HCNC,ER | Performed by: EMERGENCY MEDICINE

## 2021-12-08 PROCEDURE — 93010 EKG 12-LEAD: ICD-10-PCS | Mod: HCNC,,, | Performed by: INTERNAL MEDICINE

## 2021-12-08 PROCEDURE — 81000 URINALYSIS NONAUTO W/SCOPE: CPT | Mod: HCNC,ER | Performed by: EMERGENCY MEDICINE

## 2021-12-08 PROCEDURE — 93010 ELECTROCARDIOGRAM REPORT: CPT | Mod: HCNC,,, | Performed by: INTERNAL MEDICINE

## 2021-12-08 PROCEDURE — 85025 COMPLETE CBC W/AUTO DIFF WBC: CPT | Mod: HCNC,ER | Performed by: EMERGENCY MEDICINE

## 2021-12-08 PROCEDURE — 80053 COMPREHEN METABOLIC PANEL: CPT | Mod: HCNC,ER | Performed by: EMERGENCY MEDICINE

## 2021-12-08 PROCEDURE — 25500020 PHARM REV CODE 255: Mod: HCNC,ER | Performed by: EMERGENCY MEDICINE

## 2021-12-08 PROCEDURE — 86803 HEPATITIS C AB TEST: CPT | Mod: HCNC | Performed by: EMERGENCY MEDICINE

## 2021-12-08 PROCEDURE — 84484 ASSAY OF TROPONIN QUANT: CPT | Mod: HCNC,ER | Performed by: EMERGENCY MEDICINE

## 2021-12-08 RX ORDER — MECLIZINE HYDROCHLORIDE 25 MG/1
25 TABLET ORAL
Status: COMPLETED | OUTPATIENT
Start: 2021-12-08 | End: 2021-12-08

## 2021-12-08 RX ORDER — MECLIZINE HYDROCHLORIDE 25 MG/1
25 TABLET ORAL 3 TIMES DAILY PRN
Qty: 20 TABLET | Refills: 0 | Status: SHIPPED | OUTPATIENT
Start: 2021-12-08 | End: 2024-02-15

## 2021-12-08 RX ADMIN — IOHEXOL 100 ML: 350 INJECTION, SOLUTION INTRAVENOUS at 03:12

## 2021-12-08 RX ADMIN — MECLIZINE HYDROCHLORIDE 25 MG: 25 TABLET ORAL at 02:12

## 2021-12-09 LAB
HCV AB SERPL QL IA: NEGATIVE
HEP C VIRUS HOLD SPECIMEN: NORMAL

## 2021-12-15 ENCOUNTER — PATIENT MESSAGE (OUTPATIENT)
Dept: INTERNAL MEDICINE | Facility: CLINIC | Age: 71
End: 2021-12-15
Payer: MEDICARE

## 2021-12-20 ENCOUNTER — OFFICE VISIT (OUTPATIENT)
Dept: INTERNAL MEDICINE | Facility: CLINIC | Age: 71
End: 2021-12-20
Payer: MEDICARE

## 2021-12-20 DIAGNOSIS — Z79.01 CHRONIC ANTICOAGULATION: ICD-10-CM

## 2021-12-20 DIAGNOSIS — I48.0 PAROXYSMAL ATRIAL FIBRILLATION: ICD-10-CM

## 2021-12-20 DIAGNOSIS — R42 VERTIGO: Primary | ICD-10-CM

## 2021-12-20 DIAGNOSIS — Z95.0 CARDIAC PACEMAKER IN SITU: ICD-10-CM

## 2021-12-20 DIAGNOSIS — I49.5 SSS (SICK SINUS SYNDROME): ICD-10-CM

## 2021-12-20 PROCEDURE — 99215 OFFICE O/P EST HI 40 MIN: CPT | Mod: HCNC,95,, | Performed by: FAMILY MEDICINE

## 2021-12-20 PROCEDURE — 99499 RISK ADDL DX/OHS AUDIT: ICD-10-PCS | Mod: HCNC,95,, | Performed by: FAMILY MEDICINE

## 2021-12-20 PROCEDURE — 99499 UNLISTED E&M SERVICE: CPT | Mod: HCNC,95,, | Performed by: FAMILY MEDICINE

## 2021-12-20 PROCEDURE — 4010F PR ACE/ARB THEARPY RXD/TAKEN: ICD-10-PCS | Mod: HCNC,CPTII,95, | Performed by: FAMILY MEDICINE

## 2021-12-20 PROCEDURE — 99215 PR OFFICE/OUTPT VISIT, EST, LEVL V, 40-54 MIN: ICD-10-PCS | Mod: HCNC,95,, | Performed by: FAMILY MEDICINE

## 2021-12-20 PROCEDURE — 4010F ACE/ARB THERAPY RXD/TAKEN: CPT | Mod: HCNC,CPTII,95, | Performed by: FAMILY MEDICINE

## 2021-12-21 PROBLEM — Z79.01 CHRONIC ANTICOAGULATION: Status: ACTIVE | Noted: 2021-12-21

## 2022-01-10 ENCOUNTER — PROCEDURE VISIT (OUTPATIENT)
Dept: DERMATOLOGY | Facility: CLINIC | Age: 72
End: 2022-01-10
Payer: MEDICARE

## 2022-01-10 VITALS
HEART RATE: 55 BPM | BODY MASS INDEX: 39.51 KG/M2 | HEIGHT: 63 IN | DIASTOLIC BLOOD PRESSURE: 78 MMHG | WEIGHT: 223 LBS | SYSTOLIC BLOOD PRESSURE: 122 MMHG

## 2022-01-10 DIAGNOSIS — C44.310 BASAL CELL CARCINOMA, FACE: Primary | ICD-10-CM

## 2022-01-10 PROCEDURE — 13132 PR RECMPL WND HEAD,FAC,HAND 2.6-7.5 CM: ICD-10-PCS | Mod: HCNC,S$GLB,, | Performed by: DERMATOLOGY

## 2022-01-10 PROCEDURE — 99499 UNLISTED E&M SERVICE: CPT | Mod: HCNC,S$GLB,, | Performed by: DERMATOLOGY

## 2022-01-10 PROCEDURE — 17311: ICD-10-PCS | Mod: HCNC,51,S$GLB, | Performed by: DERMATOLOGY

## 2022-01-10 PROCEDURE — 13132 CMPLX RPR F/C/C/M/N/AX/G/H/F: CPT | Mod: HCNC,S$GLB,, | Performed by: DERMATOLOGY

## 2022-01-10 PROCEDURE — 17311 MOHS 1 STAGE H/N/HF/G: CPT | Mod: HCNC,51,S$GLB, | Performed by: DERMATOLOGY

## 2022-01-10 PROCEDURE — 17311 MOHS 1 STAGE H/N/HF/G: CPT | Mod: PBBFAC,PO | Performed by: DERMATOLOGY

## 2022-01-10 PROCEDURE — 99499 NO LOS: ICD-10-PCS | Mod: HCNC,S$GLB,, | Performed by: DERMATOLOGY

## 2022-01-10 PROCEDURE — 13132 CMPLX RPR F/C/C/M/N/AX/G/H/F: CPT | Mod: PBBFAC,PO,51 | Performed by: DERMATOLOGY

## 2022-01-10 NOTE — PROGRESS NOTES
REFERRING PROVIDER:  Dr. Cobos    CHIEF COMPLAINT:  Established patient being consulted for Mohs' surgery evaluation.    HISTORY OF PRESENT ILLNESS:  71 y.o. female presents with a 3 month(s) history of growth on the forehead.    Negative for scabbing.  Negative for crusting.  Negative for bleeding.  Negative for itching.    Biopsy consistent with basal cell carcinoma.     No prior treatment.    Pacemaker: Yes  Defibrillator: No  Artificial joints: No  Artificial heart valves: No    PAST MEDICAL HISTORY:  Past Medical History:   Diagnosis Date    A-fib     A-fib     Pace maker put in 5/12/20    Allergy     Anxiety     Asthma     cough variant    Cancer 2018    basal cell c    Colon polyp     COPD (chronic obstructive pulmonary disease)     Hypertension        PAST SURGICAL HISTORY:  Past Surgical History:   Procedure Laterality Date    ABLATION Bilateral 6/3/2021    Procedure: ABLATION/CTI;  Surgeon: Juan Lazo MD;  Location: [facility] CATH LAB;  Service: Cardiology;  Laterality: Bilateral;  (RFA of the CTI,    COVID-19, MRNA, LN-S, PF ([employer]) 3/20/2021, 2/27/2021   later date RA lead extraction, RA lead addition    HYSTERECTOMY      IMPLANTATION OF BIVENTRICULAR HEART PACEMAKER  05/12/2020    Loop recorder also in but not working right now    PHLEBOGRAPHY  6/8/2021    Procedure: Venogram;  Surgeon: Juan Lazo MD;  Location: [facility] CATH LAB;  Service: Cardiology;;    REVISION OF PROCEDURE INVOLVING PACEMAKER LEAD N/A 6/8/2021    Procedure: REVISION, ELECTRODE LEAD, CARDIAC PACEMAKER;  Surgeon: Juan Lazo MD;  Location: [facility] CATH LAB;  Service: Cardiology;  Laterality: N/A;    REVISION OF PROCEDURE INVOLVING PACEMAKER LEAD Bilateral 7/15/2021    Procedure: REVISION, ELECTRODE LEAD, CARDIAC PACEMAKER/A lead;  Surgeon: Juan Lazo MD;  Location: [facility] CATH LAB;  Service: Cardiology;  Laterality: Bilateral;  biotronik device    REVISION OF SKIN POCKET FOR PACEMAKER  6/8/2021     Procedure: Revision, Skin Pocket, For Cardiac Pacemaker;  Surgeon: Juan Lazo MD;  Location: [facility] CATH LAB;  Service: Cardiology;;        SOCIAL HISTORY:  Dependencies:  former smoker    PERTINENT MEDICATIONS:  See medications list.  Eliquis (apixaban)  Fish oil    ALLERGIES:  Lasix [furosemide]    ROS:  Skin: See HPI    PE:  Physical Exam    General: Mood and affect normal. Alert and orient X3. Normal appearance.    Head/Face: forehead: atrophic pink papule     IMPRESSION:  Biopsy proven nodular basal cell carcinoma of the forehead, path# Accession # [id number]    PLAN:  The diagnosis and the pathology report were discussed in detail with the patient. Treatment options were reviewed, including Mohs Micrographic Surgery, radiation, topical therapy, and standard excision.  After careful review of patient's history and physical exam, and after discussion of treatment options, the decision was made to perform Mohs micrographic surgery.    Scheduled patient for Mohs Micrographic Surgery. Procedure today. Risks, benefits, and alternatives of Mohs' surgery discussed with the patient. Discussed repair options including complex closure, skin flap, skin graft and second intention healing with the patient. Pre-operative instructions provided to the patient.        Thank you for consulting with me in the care of this patient. The patient will be returning to you after the completion of the post-operative care.    Consulting report is sent to the consulting provider.

## 2022-01-10 NOTE — PROCEDURES
Date of Service: 01/10/2022  Surgery: Mohs micrographic surgery  Tumor Type: BCC  Location: forehead  Mohs AUC: 8  Indication: tumor location  Repair Type: CLC  Repair Size: 3.5 cm  Suture Material: 4-0 monocryl; 6-0 Prolene  Derm-Path Accession #: [id number]  PreOp Size: 1.3 x 1.0 cm  PostOp Size: 1.7 x 1.4 cm  Mohs Accession #: [id number]  Level of Defect: fat  Anesthesia volume: 2 cc (Mohs), 3 cc (Reconstruction)  Stages: 1     Surgeon and Pathologist: Dr. Jesus Porras MD  Assistants: Arabella Mcpherson LPN     All components of Universal Protocol/PAUSE Rule completed. Dr. Jesus Porras MD operated in two distinct and integrated capacities as the surgeon and pathologist.     Procedure Details:  Stage 1:  The patient was placed supine on the operating table. The cancer/biopsy site was identified, outlined with a marker, and verified by the patient. A rim of normal appearing skin was marked circumferentially around the  lesion. The area was prepped with antiseptic. The area was infiltrated with local anesthesia (1% lidocaine with epinephrine 1:100,000). The tumor was first sharply debulked to remove clinically apparent tumor. A beveled incision following the standard Mohs approach was done and the specimen was removed.The layer of tissue was then transferred onto a specimen sheet maintaining the orientation of the specimen. Hemostasis was achieved with electrocoagulation, pressure and suture ligature as needed. The wound site was then covered with a pressure dressing while the tissue samples were processed for examination. The excised tissue was transported to the Mohs histology laboratory maintaining the tissue orientation. The tissue specimen was relaxed so that the entire surgical margin was in a a single horizontal plane for sectioning and inked for precise mapping. A precise reference map was drawn to reflect the sectioning of the specimen, colored inking of the margins, and orientation on the patient. The  tissue was processed using horizontal sectioning of the base and continuous peripheral margins. The histopathologic sections were reviewed in conjunction with the reference map.  Total tissue blocks: 1  Total slides: 2      Frozen section histology: No residual tumor visualized.   Histology: There were no malignant cells seen in the sections examined. Normal histologic structures noted.      No additional tumor was identified on microscopic examination, therefore Mohs surgery was complete.       Reconstruction: Complex Linear Closure   Primary Surgeon : MD Vern  Assistant Surgeon : Arabella Mcpherson LPN  The patient was taken to the operative suite and placed supine on the operating room table. The defect was identified. Appropriate markings were made with a marking pen to plan the repair. The area was infiltrated with Lidocaine 1% with epinephrine 1:100,000 and prepped with iodine and draped with sterile towels. The wound was debeveled and undermined widely. Hemostasis was obtained using bipolar electrocoagulation. The wound was narrowed and the dermis and subcutaneous tissue were then approximated using buried vertical mattress sutures of 4-0 monocryl. Care was taken to orient tension vectors of the closure to minimize distortion of free margins. Cones of redundant tissue were then excised within relaxed skin tension lines on both sides of the defect. Additional buried sutures were placed in a similar fashion where needed. Percutaneous interrupted and running 6-0 prolene sutures were carefully placed for maximum eversion and meticulous approximation of the epidermis.  Repair Size: 3.5 cm     The wound was cleansed with saline and ointment was applied along the wound surface. A sterile pressure dressing was applied. Wound care instructions were given verbally and in writing. The patient left the operating suite in stable condition. Patient was informed that additional refinement of the resulting surgical scar may be  used as a second stage of this reconstruction.        RTC 1 week for suture removal    Jesus Porras MD  Department of Dermatology, Mohs Surgery  1:11 PM  1/10/2022

## 2022-01-10 NOTE — PATIENT INSTRUCTIONS
It was a pleasure to see you today in clinic. Here is some helpful information regarding instructions following your surgery.      Stitches: will not dissolve on their own    Follow up:   * If you have a problem or concern post-operatively, please call ahead to schedule an appointment. We may not be able to accommodate a walk-in appointment *     Scars may take 3 months or longer to mature. If you have concerns about your scar at that point, please call our office to schedule a follow up appointment. There are options available to help improve the appearance.     Please continue wound care below once a day for 1 weeks:    WOUND CARE INSTRUCTIONS   *Washing your hands before touching your bandage and surgical site is extremely important to prevent post-operative infection*    1. Leave your pressure bandage on for 48 hours. You will not need to perform any wound care until this bandage is removed. Do NOT get bandage wet.     2. When you initially begin wound care, you may let the water hit the pressure bandage to loosen it from your skin.     3. Wash your hands thoroughly before starting wound care.     4. After 48 hours, begin cleaning the surgery site once daily with gentle soap (such as Cetaphil, Cerave or Dove). Use a Q-tip with the soap and water on it. Roll the Q-tip along incision line.     5. Dry the area with a fresh cotton tipped applicator/Q-tip.     6. Apply a generous amount of vaseline where you see the sutures. Avoid using Neosporin, or any bacterial ointment as this is likely to cause an allergic reaction to the site.     7. Cut a non-stick bandage to fit then use paper tape to hold in place.     8. You will be using gentle soap and water, vaseline and a bandage once daily for 1 week(s).     9. After surgery, you may restart all of your medications that were stopped (if applicable).     *If your site is on your forehead, or near your eye, you will want to use ice packs. Please apply ice packs every  hour for 20 minutes while awake. Sleep elevated for the first two nights following surgery*     *For surgical areas on your arms/legs, try to keep the area elevated above the level of your heart as much as possible. Frequent gentle rubbing of your fingers or toes in that area will prevent numbness and stiffness*    *If located on your arm/hand, we ask that you do not lift anything heavier than a gallon of milk for two weeks*    *For surgical areas on your head/neck, do not bend over or stoop. Do not drop your head, as this increases blood to the surgical area and can induce bleeding*       BATHING: Begin bathing once pressure bandage comes off. Do not let direct water pressure hit the surgery site. It is okay if it gets wet, just let the water roll over.   PAIN: You may take Tylenol only for pain in the first 24 hours following surgery. Do not take any aspirin, Ibuprofen, Motrin or Aleve as this may increase your risk for bleeding for the first 24 hours. Significant pain/discomfort is unusual and should be reported to our office.   BLEEDING: A mild amount of blood on the bandage is expected. Blood soaking through the bandage is not normal. If this occurs, apply uninterrupted pressure for 20 minutes by the clock. If this does not stop the bleeding, hold pressure for 20 minutes with an ice pack. If it does not stop after ice, please call our office for additional assistance.       Normal office hour number: [phone]    After hours Dermatologist on call: [phone] (Triage Nurse)      VERY IMPORTANT MOHS INSTRUCTIONS        Please call the nurse's line ([phone]) if you experience any of the following issues after surgery.  If no one answer, please leave a voice message and someone will return your call in 1 hour.  If you DO NOT receive a call back in 1 hour, please call [phone] and speak to the Triage Nurse and they will instruct you from there.      1. Extreme pain that routine Tylenol or Ibuprofen can  not help   2. Uncontrollable bleeding that will not stop after holding firm pressure to the area for 20 minutes   3. Signs and symptoms of infections such as draining pus or tender to the touch   4. Any issues that may be out of the normal for you prior to surgery

## 2022-01-18 ENCOUNTER — CLINICAL SUPPORT (OUTPATIENT)
Dept: DERMATOLOGY | Facility: CLINIC | Age: 72
End: 2022-01-18
Payer: MEDICARE

## 2022-01-18 DIAGNOSIS — Z48.02 VISIT FOR SUTURE REMOVAL: Primary | ICD-10-CM

## 2022-01-18 PROCEDURE — 99999 PR PBB SHADOW E&M-EST. PATIENT-LVL I: CPT | Mod: PBBFAC,HCNC,,

## 2022-01-18 PROCEDURE — 99024 POSTOP FOLLOW-UP VISIT: CPT | Mod: HCNC,S$GLB,, | Performed by: DERMATOLOGY

## 2022-01-18 PROCEDURE — 99999 PR PBB SHADOW E&M-EST. PATIENT-LVL I: ICD-10-PCS | Mod: PBBFAC,HCNC,,

## 2022-01-18 PROCEDURE — 99024 PR POST-OP FOLLOW-UP VISIT: ICD-10-PCS | Mod: HCNC,S$GLB,, | Performed by: DERMATOLOGY

## 2022-01-25 ENCOUNTER — PATIENT OUTREACH (OUTPATIENT)
Dept: ADMINISTRATIVE | Facility: OTHER | Age: 72
End: 2022-01-25
Payer: MEDICARE

## 2022-01-26 ENCOUNTER — OFFICE VISIT (OUTPATIENT)
Dept: CARDIOLOGY | Facility: CLINIC | Age: 72
End: 2022-01-26
Payer: MEDICARE

## 2022-01-26 VITALS
HEART RATE: 70 BPM | DIASTOLIC BLOOD PRESSURE: 60 MMHG | OXYGEN SATURATION: 96 % | SYSTOLIC BLOOD PRESSURE: 110 MMHG | WEIGHT: 226.88 LBS | HEIGHT: 63 IN | BODY MASS INDEX: 40.2 KG/M2

## 2022-01-26 DIAGNOSIS — Z95.0 CARDIAC PACEMAKER IN SITU: ICD-10-CM

## 2022-01-26 DIAGNOSIS — Z95.0 PRESENCE OF CARDIAC PACEMAKER: Primary | ICD-10-CM

## 2022-01-26 DIAGNOSIS — E78.5 HYPERLIPIDEMIA, UNSPECIFIED HYPERLIPIDEMIA TYPE: ICD-10-CM

## 2022-01-26 DIAGNOSIS — I10 ESSENTIAL HYPERTENSION: ICD-10-CM

## 2022-01-26 DIAGNOSIS — T82.9XXS DISORDER OF CARDIAC PACEMAKER SYSTEM, SEQUELA: ICD-10-CM

## 2022-01-26 DIAGNOSIS — R07.9 CHEST PAIN, UNSPECIFIED TYPE: ICD-10-CM

## 2022-01-26 DIAGNOSIS — R06.02 SOB (SHORTNESS OF BREATH): ICD-10-CM

## 2022-01-26 DIAGNOSIS — I48.0 PAF (PAROXYSMAL ATRIAL FIBRILLATION): ICD-10-CM

## 2022-01-26 DIAGNOSIS — R00.2 PALPITATIONS: ICD-10-CM

## 2022-01-26 DIAGNOSIS — I48.3 TYPICAL ATRIAL FLUTTER: ICD-10-CM

## 2022-01-26 DIAGNOSIS — I48.21 PERMANENT ATRIAL FIBRILLATION: ICD-10-CM

## 2022-01-26 PROCEDURE — 1101F PT FALLS ASSESS-DOCD LE1/YR: CPT | Mod: HCNC,CPTII,S$GLB, | Performed by: INTERNAL MEDICINE

## 2022-01-26 PROCEDURE — 1159F MED LIST DOCD IN RCRD: CPT | Mod: HCNC,CPTII,S$GLB, | Performed by: INTERNAL MEDICINE

## 2022-01-26 PROCEDURE — 3074F PR MOST RECENT SYSTOLIC BLOOD PRESSURE < 130 MM HG: ICD-10-PCS | Mod: HCNC,CPTII,S$GLB, | Performed by: INTERNAL MEDICINE

## 2022-01-26 PROCEDURE — 99999 PR PBB SHADOW E&M-EST. PATIENT-LVL IV: CPT | Mod: PBBFAC,HCNC,, | Performed by: INTERNAL MEDICINE

## 2022-01-26 PROCEDURE — 3288F PR FALLS RISK ASSESSMENT DOCUMENTED: ICD-10-PCS | Mod: HCNC,CPTII,S$GLB, | Performed by: INTERNAL MEDICINE

## 2022-01-26 PROCEDURE — 1159F PR MEDICATION LIST DOCUMENTED IN MEDICAL RECORD: ICD-10-PCS | Mod: HCNC,CPTII,S$GLB, | Performed by: INTERNAL MEDICINE

## 2022-01-26 PROCEDURE — 3288F FALL RISK ASSESSMENT DOCD: CPT | Mod: HCNC,CPTII,S$GLB, | Performed by: INTERNAL MEDICINE

## 2022-01-26 PROCEDURE — 99999 PR PBB SHADOW E&M-EST. PATIENT-LVL IV: ICD-10-PCS | Mod: PBBFAC,HCNC,, | Performed by: INTERNAL MEDICINE

## 2022-01-26 PROCEDURE — 99499 UNLISTED E&M SERVICE: CPT | Mod: S$GLB,,, | Performed by: INTERNAL MEDICINE

## 2022-01-26 PROCEDURE — 1160F PR REVIEW ALL MEDS BY PRESCRIBER/CLIN PHARMACIST DOCUMENTED: ICD-10-PCS | Mod: HCNC,CPTII,S$GLB, | Performed by: INTERNAL MEDICINE

## 2022-01-26 PROCEDURE — 99499 RISK ADDL DX/OHS AUDIT: ICD-10-PCS | Mod: S$GLB,,, | Performed by: INTERNAL MEDICINE

## 2022-01-26 PROCEDURE — 3008F BODY MASS INDEX DOCD: CPT | Mod: HCNC,CPTII,S$GLB, | Performed by: INTERNAL MEDICINE

## 2022-01-26 PROCEDURE — 99214 PR OFFICE/OUTPT VISIT, EST, LEVL IV, 30-39 MIN: ICD-10-PCS | Mod: HCNC,S$GLB,, | Performed by: INTERNAL MEDICINE

## 2022-01-26 PROCEDURE — 1101F PR PT FALLS ASSESS DOC 0-1 FALLS W/OUT INJ PAST YR: ICD-10-PCS | Mod: HCNC,CPTII,S$GLB, | Performed by: INTERNAL MEDICINE

## 2022-01-26 PROCEDURE — 3078F DIAST BP <80 MM HG: CPT | Mod: HCNC,CPTII,S$GLB, | Performed by: INTERNAL MEDICINE

## 2022-01-26 PROCEDURE — 3074F SYST BP LT 130 MM HG: CPT | Mod: HCNC,CPTII,S$GLB, | Performed by: INTERNAL MEDICINE

## 2022-01-26 PROCEDURE — 3008F PR BODY MASS INDEX (BMI) DOCUMENTED: ICD-10-PCS | Mod: HCNC,CPTII,S$GLB, | Performed by: INTERNAL MEDICINE

## 2022-01-26 PROCEDURE — 3078F PR MOST RECENT DIASTOLIC BLOOD PRESSURE < 80 MM HG: ICD-10-PCS | Mod: HCNC,CPTII,S$GLB, | Performed by: INTERNAL MEDICINE

## 2022-01-26 PROCEDURE — 99214 OFFICE O/P EST MOD 30 MIN: CPT | Mod: HCNC,S$GLB,, | Performed by: INTERNAL MEDICINE

## 2022-01-26 PROCEDURE — 1126F AMNT PAIN NOTED NONE PRSNT: CPT | Mod: HCNC,CPTII,S$GLB, | Performed by: INTERNAL MEDICINE

## 2022-01-26 PROCEDURE — 1160F RVW MEDS BY RX/DR IN RCRD: CPT | Mod: HCNC,CPTII,S$GLB, | Performed by: INTERNAL MEDICINE

## 2022-01-26 PROCEDURE — 1126F PR PAIN SEVERITY QUANTIFIED, NO PAIN PRESENT: ICD-10-PCS | Mod: HCNC,CPTII,S$GLB, | Performed by: INTERNAL MEDICINE

## 2022-01-26 NOTE — PROGRESS NOTES
Subjective:   Patient ID:  Antonio Urena is a 71 y.o. female who presents for follow-up of No chief complaint on file.    Patient denies chest pain, angina or symptoms associated with anginal equivalent.  Overall today the patient is feeling generally well heart rate blood pressure stable is no evidence of postural changes blood pressure 120/70 standing sitting and lying down.  Overall doing well pulse is 50 beats per minute heart rate is stable there was no other abnormalities.  Patient feeling well physical exam is normal today.     Overall patient feels well this time.  No acute symptoms.  Intermittent palpitations are very rare this time heart rate blood pressure stable and she is doing well new medication diltiazem otherwise stable this moment.  NO FOCAL CNS SYMPTOMS OR SIGNS TO SUGGEST TIA OR STROKE  NO ANGINA OR EQUIVALENT  NO UNUSUAL CASTELLON. NO ORTHOPNEA OR PND  NO PALPITATIONS  NO NEAR SYNCOPE OR SYNCOPE  NO EDEMA. NO CALVE TENDERNESS  This finds patient feeling well stable blood pressure medications otherwise doing well no acute changes.  No heart rate or blood pressure changes at this time.  Last device check on 12/22/2021 was stable.  No acute changes patient is having no arrhythmias noted.      Review of Systems   Constitutional: Negative for chills, diaphoresis, night sweats, weight gain and weight loss.   HENT: Negative for congestion, hoarse voice, sore throat and stridor.    Eyes: Negative for double vision and pain.   Cardiovascular: Negative for chest pain, claudication, cyanosis, dyspnea on exertion, irregular heartbeat, leg swelling, near-syncope, orthopnea, palpitations, paroxysmal nocturnal dyspnea and syncope.   Respiratory: Negative for cough, hemoptysis, shortness of breath, sleep disturbances due to breathing, snoring, sputum production and wheezing.    Endocrine: Negative for cold intolerance, heat intolerance and polydipsia.   Hematologic/Lymphatic: Negative for bleeding problem. Does  not bruise/bleed easily.   Skin: Negative for color change, dry skin and rash.   Musculoskeletal: Negative for joint swelling and muscle cramps.   Gastrointestinal: Negative for bloating, abdominal pain, constipation, diarrhea, dysphagia, melena, nausea and vomiting.   Genitourinary: Negative for flank pain and urgency.   Neurological: Negative for dizziness, focal weakness, headaches, light-headedness, loss of balance, seizures and weakness.   Psychiatric/Behavioral: Negative for altered mental status and memory loss. The patient is not nervous/anxious.      Family History   Problem Relation Age of Onset    Cancer Mother     Cancer Father     Cancer Brother      Past Medical History:   Diagnosis Date    A-fib     A-fib     Pace maker put in 20    Allergy     Anxiety     Asthma     cough variant    Cancer 2018    basal cell c    Colon polyp     COPD (chronic obstructive pulmonary disease)     Hypertension      Social History     Socioeconomic History    Marital status:    Tobacco Use    Smoking status: Former Smoker     Packs/day: 0.25     Years: 1.00     Pack years: 0.25     Quit date: 1985     Years since quittin.0    Smokeless tobacco: Never Used    Tobacco comment: Quit 30 - 40 years ago   Substance and Sexual Activity    Alcohol use: Yes     Comment: Wine Occasionally     Drug use: Never    Sexual activity: Not Currently     Social Determinants of Health     Financial Resource Strain: Low Risk     Difficulty of Paying Living Expenses: Not very hard   Food Insecurity: Food Insecurity Present    Worried About Running Out of Food in the Last Year: Sometimes true    Ran Out of Food in the Last Year: Never true   Transportation Needs: Unknown    Lack of Transportation (Non-Medical): No   Social Connections: Unknown    Frequency of Communication with Friends and Family: More than three times a week    Frequency of Social Gatherings with Friends and Family: Twice a week      Current Outpatient Medications on File Prior to Visit   Medication Sig Dispense Refill    cetirizine (ZYRTEC) 10 MG tablet Take 10 mg by mouth once daily.       DILT- mg CDCR TAKE 1 CAPSULE EVERY DAY 90 capsule 3    ELIQUIS 5 mg Tab TAKE 1 TABLET TWICE DAILY 180 tablet 3    flecainide (TAMBOCOR) 100 MG Tab TAKE 1 TABLET EVERY 12 HOURS 180 tablet 3    fluticasone-salmeterol diskus inhaler 250-50 mcg Inhale 1 puff into the lungs 2 (two) times daily. Controller 180 each 3    levalbuterol (XOPENEX HFA) 45 mcg/actuation inhaler Inhale 2 puffs into the lungs every 6 (six) hours as needed for Wheezing or Shortness of Breath. 45 g 3    lisinopriL (PRINIVIL,ZESTRIL) 20 MG tablet Take 1.5 tablets (30 mg total) by mouth once daily. 135 tablet 1    LORazepam (ATIVAN) 0.5 MG tablet Take 0.5 mg by mouth daily as needed for Anxiety.      meclizine (ANTIVERT) 25 mg tablet Take 1 tablet (25 mg total) by mouth 3 (three) times daily as needed. (Patient not taking: Reported on 1/10/2022) 20 tablet 0    omega-3 fatty acids/fish oil (FISH OIL-OMEGA-3 FATTY ACIDS) 300-1,000 mg capsule Take 1 capsule by mouth once daily.      rosuvastatin (CRESTOR) 40 MG Tab Take 1 tablet (40 mg total) by mouth every evening. 90 tablet 1    tiotropium bromide (SPIRIVA RESPIMAT) 2.5 mcg/actuation inhaler Inhale 2 puffs into the lungs once daily. 12 g 3    triamcinolone acetonide 0.1% (KENALOG) 0.1 % ointment Apply topically 2 (two) times daily. 80 g 1     No current facility-administered medications on file prior to visit.     Review of patient's allergies indicates:   Allergen Reactions    Lasix [furosemide]      Within 1 year of taking pt will get severe knee pain in both knees.       Objective:     Physical Exam  Eyes:      Pupils: Pupils are equal, round, and reactive to light.   Neck:      Trachea: No tracheal deviation.   Cardiovascular:      Rate and Rhythm: Normal rate and regular rhythm.      Pulses: Intact distal pulses.            Carotid pulses are 2+ on the right side and 2+ on the left side.       Radial pulses are 2+ on the right side and 2+ on the left side.        Femoral pulses are 2+ on the right side and 2+ on the left side.       Popliteal pulses are 2+ on the right side and 2+ on the left side.        Dorsalis pedis pulses are 2+ on the right side and 2+ on the left side.        Posterior tibial pulses are 2+ on the right side and 2+ on the left side.      Heart sounds: Normal heart sounds. No murmur heard.  No friction rub. No gallop.    Pulmonary:      Effort: Pulmonary effort is normal. No respiratory distress.      Breath sounds: Normal breath sounds. No stridor. No wheezing or rales.   Chest:      Chest wall: No tenderness.   Abdominal:      General: There is no distension.      Tenderness: There is no abdominal tenderness. There is no rebound.   Musculoskeletal:         General: No tenderness or edema.      Cervical back: Normal range of motion.   Skin:     General: Skin is warm and dry.   Neurological:      Mental Status: She is alert and oriented to person, place, and time.         Assessment:     1. Presence of cardiac pacemaker    2. Disorder of cardiac pacemaker system, sequela    3. SOB (shortness of breath)    4. Cardiac pacemaker in situ    5. Palpitations    6. Hyperlipidemia, unspecified hyperlipidemia type    7. Chest pain, unspecified type    8. PAF (paroxysmal atrial fibrillation)    9. Typical atrial flutter    10. Permanent atrial fibrillation    11. Essential hypertension        Plan:     Presence of cardiac pacemaker    Disorder of cardiac pacemaker system, sequela    SOB (shortness of breath)    Cardiac pacemaker in situ    Palpitations    Hyperlipidemia, unspecified hyperlipidemia type    Chest pain, unspecified type    PAF (paroxysmal atrial fibrillation)    Typical atrial flutter    Permanent atrial fibrillation    Essential hypertension    Impression 1 hypertension good control current medications    2 paroxysmal atrial fibrillation stable   3. Pacemaker stable no acute changes.  Patient doing well last device check was stable.  All questions answered patient continues on current medications including Eliquis and diltiazem.  She continues on Crestor for hyperlipidemia.

## 2022-02-02 ENCOUNTER — TELEPHONE (OUTPATIENT)
Dept: PULMONOLOGY | Facility: CLINIC | Age: 72
End: 2022-02-02
Payer: MEDICARE

## 2022-02-02 NOTE — TELEPHONE ENCOUNTER
Spoke with patient about pre procedural covid test. Instructions given and pt verbalized understanding.

## 2022-02-04 ENCOUNTER — OFFICE VISIT (OUTPATIENT)
Dept: PULMONOLOGY | Facility: CLINIC | Age: 72
End: 2022-02-04
Payer: MEDICARE

## 2022-02-04 ENCOUNTER — CLINICAL SUPPORT (OUTPATIENT)
Dept: PULMONOLOGY | Facility: CLINIC | Age: 72
End: 2022-02-04
Payer: MEDICARE

## 2022-02-04 ENCOUNTER — LAB VISIT (OUTPATIENT)
Dept: LAB | Facility: HOSPITAL | Age: 72
End: 2022-02-04
Attending: FAMILY MEDICINE
Payer: MEDICARE

## 2022-02-04 VITALS
HEIGHT: 63 IN | WEIGHT: 227.06 LBS | SYSTOLIC BLOOD PRESSURE: 130 MMHG | BODY MASS INDEX: 40.23 KG/M2 | RESPIRATION RATE: 10 BRPM | HEART RATE: 61 BPM | OXYGEN SATURATION: 95 % | DIASTOLIC BLOOD PRESSURE: 70 MMHG

## 2022-02-04 DIAGNOSIS — Z01.818 PRE-PROCEDURAL EXAMINATION: Primary | ICD-10-CM

## 2022-02-04 DIAGNOSIS — Z95.0 CARDIAC PACEMAKER IN SITU: ICD-10-CM

## 2022-02-04 DIAGNOSIS — J30.89 NON-SEASONAL ALLERGIC RHINITIS, UNSPECIFIED TRIGGER: ICD-10-CM

## 2022-02-04 DIAGNOSIS — E78.00 PURE HYPERCHOLESTEROLEMIA: ICD-10-CM

## 2022-02-04 DIAGNOSIS — J44.89 ASTHMA WITH COPD: ICD-10-CM

## 2022-02-04 DIAGNOSIS — J44.89 ASTHMA WITH COPD: Primary | Chronic | ICD-10-CM

## 2022-02-04 LAB
BRPFT: NORMAL
CHOLEST SERPL-MCNC: 134 MG/DL (ref 120–199)
CHOLEST/HDLC SERPL: 1.9 {RATIO} (ref 2–5)
CTP QC/QA: YES
FEF 25 75 LLN: 0.78
FEF 25 75 PRE REF: 15.4 %
FEF 25 75 REF: 1.73
FEV1 FVC LLN: 64
FEV1 FVC PRE REF: 59.7 %
FEV1 FVC REF: 78
FEV1 LLN: 1.48
FEV1 PRE REF: 49.5 %
FEV1 REF: 2.05
FVC LLN: 1.92
FVC PRE REF: 82.1 %
FVC REF: 2.66
HDLC SERPL-MCNC: 69 MG/DL (ref 40–75)
HDLC SERPL: 51.5 % (ref 20–50)
LDLC SERPL CALC-MCNC: 50.4 MG/DL (ref 63–159)
NONHDLC SERPL-MCNC: 65 MG/DL
PEF LLN: 3.68
PEF PRE REF: 75 %
PEF REF: 5.34
PRE FEF 25 75: 0.27 L/S
PRE FET 100: 16.04 SEC
PRE FEV1 FVC: 46.54 %
PRE FEV1: 1.02 L
PRE FVC: 2.18 L
PRE PEF: 4 L/S
SARS-COV-2 AG RESP QL IA.RAPID: NEGATIVE
TRIGL SERPL-MCNC: 73 MG/DL (ref 30–150)

## 2022-02-04 PROCEDURE — 3288F FALL RISK ASSESSMENT DOCD: CPT | Mod: HCNC,CPTII,S$GLB, | Performed by: NURSE PRACTITIONER

## 2022-02-04 PROCEDURE — 1159F MED LIST DOCD IN RCRD: CPT | Mod: HCNC,CPTII,S$GLB, | Performed by: NURSE PRACTITIONER

## 2022-02-04 PROCEDURE — 99499 UNLISTED E&M SERVICE: CPT | Mod: S$GLB,,, | Performed by: NURSE PRACTITIONER

## 2022-02-04 PROCEDURE — 3288F PR FALLS RISK ASSESSMENT DOCUMENTED: ICD-10-PCS | Mod: HCNC,CPTII,S$GLB, | Performed by: NURSE PRACTITIONER

## 2022-02-04 PROCEDURE — 3008F PR BODY MASS INDEX (BMI) DOCUMENTED: ICD-10-PCS | Mod: HCNC,CPTII,S$GLB, | Performed by: NURSE PRACTITIONER

## 2022-02-04 PROCEDURE — 1160F PR REVIEW ALL MEDS BY PRESCRIBER/CLIN PHARMACIST DOCUMENTED: ICD-10-PCS | Mod: HCNC,CPTII,S$GLB, | Performed by: NURSE PRACTITIONER

## 2022-02-04 PROCEDURE — 3078F DIAST BP <80 MM HG: CPT | Mod: HCNC,CPTII,S$GLB, | Performed by: NURSE PRACTITIONER

## 2022-02-04 PROCEDURE — 36415 COLL VENOUS BLD VENIPUNCTURE: CPT | Mod: HCNC | Performed by: FAMILY MEDICINE

## 2022-02-04 PROCEDURE — 1159F PR MEDICATION LIST DOCUMENTED IN MEDICAL RECORD: ICD-10-PCS | Mod: HCNC,CPTII,S$GLB, | Performed by: NURSE PRACTITIONER

## 2022-02-04 PROCEDURE — 99214 PR OFFICE/OUTPT VISIT, EST, LEVL IV, 30-39 MIN: ICD-10-PCS | Mod: 25,HCNC,S$GLB, | Performed by: NURSE PRACTITIONER

## 2022-02-04 PROCEDURE — 80061 LIPID PANEL: CPT | Mod: HCNC | Performed by: FAMILY MEDICINE

## 2022-02-04 PROCEDURE — 1101F PR PT FALLS ASSESS DOC 0-1 FALLS W/OUT INJ PAST YR: ICD-10-PCS | Mod: HCNC,CPTII,S$GLB, | Performed by: NURSE PRACTITIONER

## 2022-02-04 PROCEDURE — 1160F RVW MEDS BY RX/DR IN RCRD: CPT | Mod: HCNC,CPTII,S$GLB, | Performed by: NURSE PRACTITIONER

## 2022-02-04 PROCEDURE — 99499 RISK ADDL DX/OHS AUDIT: ICD-10-PCS | Mod: S$GLB,,, | Performed by: NURSE PRACTITIONER

## 2022-02-04 PROCEDURE — 1101F PT FALLS ASSESS-DOCD LE1/YR: CPT | Mod: HCNC,CPTII,S$GLB, | Performed by: NURSE PRACTITIONER

## 2022-02-04 PROCEDURE — 87811 SARS CORONAVIRUS 2 ANTIGEN POCT, MANUAL READ: ICD-10-PCS | Mod: S$GLB,,, | Performed by: INTERNAL MEDICINE

## 2022-02-04 PROCEDURE — 99999 PR PBB SHADOW E&M-EST. PATIENT-LVL IV: CPT | Mod: PBBFAC,HCNC,, | Performed by: NURSE PRACTITIONER

## 2022-02-04 PROCEDURE — 87811 SARS-COV-2 COVID19 W/OPTIC: CPT | Mod: S$GLB,,, | Performed by: INTERNAL MEDICINE

## 2022-02-04 PROCEDURE — 94010 BREATHING CAPACITY TEST: CPT | Mod: HCNC,S$GLB,, | Performed by: INTERNAL MEDICINE

## 2022-02-04 PROCEDURE — 99214 OFFICE O/P EST MOD 30 MIN: CPT | Mod: 25,HCNC,S$GLB, | Performed by: NURSE PRACTITIONER

## 2022-02-04 PROCEDURE — 3075F PR MOST RECENT SYSTOLIC BLOOD PRESS GE 130-139MM HG: ICD-10-PCS | Mod: HCNC,CPTII,S$GLB, | Performed by: NURSE PRACTITIONER

## 2022-02-04 PROCEDURE — 94010 BREATHING CAPACITY TEST: ICD-10-PCS | Mod: HCNC,S$GLB,, | Performed by: INTERNAL MEDICINE

## 2022-02-04 PROCEDURE — 3008F BODY MASS INDEX DOCD: CPT | Mod: HCNC,CPTII,S$GLB, | Performed by: NURSE PRACTITIONER

## 2022-02-04 PROCEDURE — 99999 PR PBB SHADOW E&M-EST. PATIENT-LVL IV: ICD-10-PCS | Mod: PBBFAC,HCNC,, | Performed by: NURSE PRACTITIONER

## 2022-02-04 PROCEDURE — 3078F PR MOST RECENT DIASTOLIC BLOOD PRESSURE < 80 MM HG: ICD-10-PCS | Mod: HCNC,CPTII,S$GLB, | Performed by: NURSE PRACTITIONER

## 2022-02-04 PROCEDURE — 3075F SYST BP GE 130 - 139MM HG: CPT | Mod: HCNC,CPTII,S$GLB, | Performed by: NURSE PRACTITIONER

## 2022-02-04 NOTE — PROGRESS NOTES
"Subjective:      Patient ID: Antonio Urena is a 71 y.o. female.    Chief Complaint: COPD and Asthma    HPI  Follow up asthma with COPD. Doing well on Wixela and Spiriva.   CASTELLON improved with pacemaker.    No fever, chills, or hemoptysis. No pleuritic type chest pain. Breathing is stable as compared to 6 months ago.  No chest pain.   Increased cough and congestion 6 weeks ago. Improved. Subacute    Patient Active Problem List   Diagnosis    Essential hypertension    Asthma with COPD    Anxiety    Non-seasonal allergic rhinitis    Pure hypercholesterolemia    Paroxysmal atrial fibrillation    SSS (sick sinus syndrome)    Cardiac pacemaker in situ    Atrial fibrillation and flutter    PAF (paroxysmal atrial fibrillation)    Bradycardia    Symptomatic bradycardia    TIA involving carotid artery    Typical atrial flutter    History of cardiac radiofrequency ablation (RFA)    Migraine with aura    Chronic anticoagulation         /70   Pulse 61   Resp 10   Ht 5' 3" (1.6 m)   Wt 103 kg (227 lb 1.2 oz)   SpO2 95%   BMI 40.22 kg/m²   Body mass index is 40.22 kg/m².    Review of Systems   Respiratory: Positive for cough.    All other systems reviewed and are negative.    Objective:      Physical Exam  Constitutional:       Appearance: She is well-developed. She is obese.   HENT:      Head: Normocephalic and atraumatic.      Mouth/Throat:      Comments: Wearing mask due to covid concerns  Cardiovascular:      Rate and Rhythm: Normal rate and regular rhythm.      Heart sounds: No murmur heard.  No gallop.    Pulmonary:      Effort: Pulmonary effort is normal.      Breath sounds: Normal breath sounds.   Abdominal:      Palpations: Abdomen is soft. There is no mass.      Tenderness: There is no abdominal tenderness.   Musculoskeletal:         General: Normal range of motion.      Cervical back: Normal range of motion and neck supple.   Skin:     General: Skin is warm and dry.   Neurological:      " Mental Status: She is alert and oriented to person, place, and time.   Psychiatric:         Mood and Affect: Mood normal.         Behavior: Behavior normal.       Personal Diagnostic Review  Spirometry: slight improvement  Results for orders placed or performed in visit on 02/04/22   Spirometry with/without bronchodilator   Result Value Ref Range    Pre FVC 2.18 L    Pre FEV1 1.02 L    Pre FEV1 FVC 46.54 %    Pre FEF 25 75 0.27 L/s    Pre PEF 4.00 L/s    Pre  16.04 sec    FVC Ref 2.66     FVC LLN 1.92     FVC Pre Ref 82.1 %    FEV1 Ref 2.05     FEV1 LLN 1.48     FEV1 Pre Ref 49.5 %    FEV1 FVC Ref 78     FEV1 FVC LLN 64     FEV1 FVC Pre Ref 59.7 %    FEF 25 75 Ref 1.73     FEF 25 75 LLN 0.78     FEF 25 75 Pre Ref 15.4 %    PEF Ref 5.34     PEF LLN 3.68     PEF Pre Ref 75.0 %       Details    Reading Physician Reading Date Result Priority   Rashawn Wagner MD  [phone] 12/8/2021 STAT     Narrative & Impression  EXAMINATION:  XR CHEST 1 VIEW     CLINICAL HISTORY:  Dizziness and giddiness     COMPARISON:  Studies dating back to October 19, 2020     FINDINGS:  The lungs are clear. The cardiac silhouette size is normal. The trachea is midline and the mediastinal width is normal. Negative for focal infiltrate, effusion or pneumothorax. Pulmonary vasculature is normal. Negative for osseous abnormalities. Dual lead left subclavian pacemaker.  Ectatic and tortuous aorta with calcifications of the aortic knob.  Degenerative changes of the spine with S-shaped curvature.  Eventration of the hemidiaphragms.  Implanted loop recorder noted.  Left cardiophrenic fat pad.     Impression:     1.  Negative for acute process involving the chest.     2.  Stable findings as noted above.        Electronically signed by: Rashawn Wagner MD  Date:                                            12/08/2021  Time:                                           13:51      Assessment:       1. Asthma with COPD    2. Cardiac pacemaker in situ     3. Non-seasonal allergic rhinitis, unspecified trigger        Outpatient Encounter Medications as of 2/4/2022   Medication Sig Dispense Refill    cetirizine (ZYRTEC) 10 MG tablet Take 10 mg by mouth once daily.       DILT- mg CDCR TAKE 1 CAPSULE EVERY DAY 90 capsule 3    ELIQUIS 5 mg Tab TAKE 1 TABLET TWICE DAILY 180 tablet 3    flecainide (TAMBOCOR) 100 MG Tab TAKE 1 TABLET EVERY 12 HOURS 180 tablet 3    fluticasone-salmeterol diskus inhaler 250-50 mcg Inhale 1 puff into the lungs 2 (two) times daily. Controller 180 each 3    levalbuterol (XOPENEX HFA) 45 mcg/actuation inhaler Inhale 2 puffs into the lungs every 6 (six) hours as needed for Wheezing or Shortness of Breath. 45 g 3    lisinopriL (PRINIVIL,ZESTRIL) 20 MG tablet Take 1.5 tablets (30 mg total) by mouth once daily. 135 tablet 1    omega-3 fatty acids/fish oil (FISH OIL-OMEGA-3 FATTY ACIDS) 300-1,000 mg capsule Take 1 capsule by mouth once daily.      rosuvastatin (CRESTOR) 40 MG Tab Take 1 tablet (40 mg total) by mouth every evening. 90 tablet 1    tiotropium bromide (SPIRIVA RESPIMAT) 2.5 mcg/actuation inhaler Inhale 2 puffs into the lungs once daily. 12 g 3    triamcinolone acetonide 0.1% (KENALOG) 0.1 % ointment Apply topically 2 (two) times daily. 80 g 1    LORazepam (ATIVAN) 0.5 MG tablet Take 0.5 mg by mouth daily as needed for Anxiety.      meclizine (ANTIVERT) 25 mg tablet Take 1 tablet (25 mg total) by mouth 3 (three) times daily as needed. (Patient not taking: Reported on 2/4/2022) 20 tablet 0     No facility-administered encounter medications on file as of 2/4/2022.     Orders Placed This Encounter   Procedures    X-Ray Chest PA And Lateral     Standing Status:   Future     Standing Expiration Date:   2/4/2023     Order Specific Question:   May the Radiologist modify the order per protocol to meet the clinical needs of the patient?     Answer:   Yes     Order Specific Question:   Release to patient     Answer:   Immediate     Spirometry without Bronchodilator     Standing Status:   Future     Standing Expiration Date:   2/4/2023     Order Specific Question:   Release to patient     Answer:   Immediate     Plan:     Continue current pulmonary drug regimen.  Follow up in 12 months CXR, Spirometry or call earlier if any problems   Problem List Items Addressed This Visit        Pulmonary    Asthma with COPD - Primary (Chronic)    Overview     Wixela, Spiriva.          Relevant Orders    X-Ray Chest PA And Lateral    Spirometry without Bronchodilator       Cardiac/Vascular    Cardiac pacemaker in situ       Other    Non-seasonal allergic rhinitis

## 2022-02-09 ENCOUNTER — PATIENT MESSAGE (OUTPATIENT)
Dept: CARDIOLOGY | Facility: CLINIC | Age: 72
End: 2022-02-09
Payer: MEDICARE

## 2022-02-12 ENCOUNTER — CLINICAL SUPPORT (OUTPATIENT)
Dept: CARDIOLOGY | Facility: HOSPITAL | Age: 72
End: 2022-02-12
Payer: MEDICARE

## 2022-02-12 DIAGNOSIS — Z95.0 PRESENCE OF CARDIAC PACEMAKER: ICD-10-CM

## 2022-02-12 PROCEDURE — 93296 REM INTERROG EVL PM/IDS: CPT | Mod: HCNC | Performed by: INTERNAL MEDICINE

## 2022-02-16 ENCOUNTER — PATIENT MESSAGE (OUTPATIENT)
Dept: ADMINISTRATIVE | Facility: OTHER | Age: 72
End: 2022-02-16
Payer: MEDICARE

## 2022-03-07 ENCOUNTER — OFFICE VISIT (OUTPATIENT)
Dept: INTERNAL MEDICINE | Facility: CLINIC | Age: 72
End: 2022-03-07
Payer: MEDICARE

## 2022-03-07 VITALS
HEART RATE: 91 BPM | WEIGHT: 226.63 LBS | DIASTOLIC BLOOD PRESSURE: 64 MMHG | HEIGHT: 63 IN | OXYGEN SATURATION: 96 % | SYSTOLIC BLOOD PRESSURE: 132 MMHG | BODY MASS INDEX: 40.16 KG/M2

## 2022-03-07 DIAGNOSIS — I10 ESSENTIAL HYPERTENSION: Primary | ICD-10-CM

## 2022-03-07 DIAGNOSIS — Z95.0 CARDIAC PACEMAKER IN SITU: ICD-10-CM

## 2022-03-07 DIAGNOSIS — I48.0 PAROXYSMAL ATRIAL FIBRILLATION: ICD-10-CM

## 2022-03-07 DIAGNOSIS — I49.5 SSS (SICK SINUS SYNDROME): ICD-10-CM

## 2022-03-07 DIAGNOSIS — F41.9 ANXIETY: ICD-10-CM

## 2022-03-07 DIAGNOSIS — E66.01 MORBID OBESITY WITH BMI OF 40.0-44.9, ADULT: ICD-10-CM

## 2022-03-07 DIAGNOSIS — K59.00 CONSTIPATION, UNSPECIFIED CONSTIPATION TYPE: ICD-10-CM

## 2022-03-07 DIAGNOSIS — Z12.11 SCREENING FOR COLON CANCER: ICD-10-CM

## 2022-03-07 DIAGNOSIS — Z86.010 HISTORY OF COLON POLYPS: ICD-10-CM

## 2022-03-07 DIAGNOSIS — J44.89 ASTHMA WITH COPD: ICD-10-CM

## 2022-03-07 DIAGNOSIS — J30.89 NON-SEASONAL ALLERGIC RHINITIS, UNSPECIFIED TRIGGER: ICD-10-CM

## 2022-03-07 DIAGNOSIS — Z80.0 FAMILY HISTORY OF COLON CANCER: ICD-10-CM

## 2022-03-07 DIAGNOSIS — E78.00 PURE HYPERCHOLESTEROLEMIA: ICD-10-CM

## 2022-03-07 DIAGNOSIS — Z23 NEED FOR PNEUMOCOCCAL VACCINATION: ICD-10-CM

## 2022-03-07 DIAGNOSIS — Z79.01 CHRONIC ANTICOAGULATION: ICD-10-CM

## 2022-03-07 PROBLEM — I48.92 ATRIAL FIBRILLATION AND FLUTTER: Chronic | Status: ACTIVE | Noted: 2021-06-03

## 2022-03-07 PROBLEM — I48.91 ATRIAL FIBRILLATION AND FLUTTER: Chronic | Status: ACTIVE | Noted: 2021-06-03

## 2022-03-07 PROBLEM — R00.1 BRADYCARDIA: Status: RESOLVED | Noted: 2021-06-06 | Resolved: 2022-03-07

## 2022-03-07 PROCEDURE — 1160F RVW MEDS BY RX/DR IN RCRD: CPT | Mod: CPTII,S$GLB,, | Performed by: FAMILY MEDICINE

## 2022-03-07 PROCEDURE — 1159F PR MEDICATION LIST DOCUMENTED IN MEDICAL RECORD: ICD-10-PCS | Mod: CPTII,S$GLB,, | Performed by: FAMILY MEDICINE

## 2022-03-07 PROCEDURE — 1160F PR REVIEW ALL MEDS BY PRESCRIBER/CLIN PHARMACIST DOCUMENTED: ICD-10-PCS | Mod: CPTII,S$GLB,, | Performed by: FAMILY MEDICINE

## 2022-03-07 PROCEDURE — 3008F PR BODY MASS INDEX (BMI) DOCUMENTED: ICD-10-PCS | Mod: CPTII,S$GLB,, | Performed by: FAMILY MEDICINE

## 2022-03-07 PROCEDURE — 1126F AMNT PAIN NOTED NONE PRSNT: CPT | Mod: CPTII,S$GLB,, | Performed by: FAMILY MEDICINE

## 2022-03-07 PROCEDURE — G0009 ADMIN PNEUMOCOCCAL VACCINE: HCPCS | Mod: S$GLB,,, | Performed by: FAMILY MEDICINE

## 2022-03-07 PROCEDURE — 99215 PR OFFICE/OUTPT VISIT, EST, LEVL V, 40-54 MIN: ICD-10-PCS | Mod: S$GLB,,, | Performed by: FAMILY MEDICINE

## 2022-03-07 PROCEDURE — 3288F FALL RISK ASSESSMENT DOCD: CPT | Mod: CPTII,S$GLB,, | Performed by: FAMILY MEDICINE

## 2022-03-07 PROCEDURE — 1159F MED LIST DOCD IN RCRD: CPT | Mod: CPTII,S$GLB,, | Performed by: FAMILY MEDICINE

## 2022-03-07 PROCEDURE — 99999 PR PBB SHADOW E&M-EST. PATIENT-LVL IV: ICD-10-PCS | Mod: PBBFAC,,, | Performed by: FAMILY MEDICINE

## 2022-03-07 PROCEDURE — 99215 OFFICE O/P EST HI 40 MIN: CPT | Mod: S$GLB,,, | Performed by: FAMILY MEDICINE

## 2022-03-07 PROCEDURE — 90732 PPSV23 VACC 2 YRS+ SUBQ/IM: CPT | Mod: S$GLB,,, | Performed by: FAMILY MEDICINE

## 2022-03-07 PROCEDURE — 99499 UNLISTED E&M SERVICE: CPT | Mod: HCNC,S$GLB,, | Performed by: FAMILY MEDICINE

## 2022-03-07 PROCEDURE — 1101F PR PT FALLS ASSESS DOC 0-1 FALLS W/OUT INJ PAST YR: ICD-10-PCS | Mod: CPTII,S$GLB,, | Performed by: FAMILY MEDICINE

## 2022-03-07 PROCEDURE — G0009 PNEUMOCOCCAL POLYSACCHARIDE VACCINE 23-VALENT =>2YO SQ IM: ICD-10-PCS | Mod: S$GLB,,, | Performed by: FAMILY MEDICINE

## 2022-03-07 PROCEDURE — 1101F PT FALLS ASSESS-DOCD LE1/YR: CPT | Mod: CPTII,S$GLB,, | Performed by: FAMILY MEDICINE

## 2022-03-07 PROCEDURE — 1126F PR PAIN SEVERITY QUANTIFIED, NO PAIN PRESENT: ICD-10-PCS | Mod: CPTII,S$GLB,, | Performed by: FAMILY MEDICINE

## 2022-03-07 PROCEDURE — 3008F BODY MASS INDEX DOCD: CPT | Mod: CPTII,S$GLB,, | Performed by: FAMILY MEDICINE

## 2022-03-07 PROCEDURE — 3078F PR MOST RECENT DIASTOLIC BLOOD PRESSURE < 80 MM HG: ICD-10-PCS | Mod: CPTII,S$GLB,, | Performed by: FAMILY MEDICINE

## 2022-03-07 PROCEDURE — 3078F DIAST BP <80 MM HG: CPT | Mod: CPTII,S$GLB,, | Performed by: FAMILY MEDICINE

## 2022-03-07 PROCEDURE — 3075F PR MOST RECENT SYSTOLIC BLOOD PRESS GE 130-139MM HG: ICD-10-PCS | Mod: CPTII,S$GLB,, | Performed by: FAMILY MEDICINE

## 2022-03-07 PROCEDURE — 99999 PR PBB SHADOW E&M-EST. PATIENT-LVL IV: CPT | Mod: PBBFAC,,, | Performed by: FAMILY MEDICINE

## 2022-03-07 PROCEDURE — 3075F SYST BP GE 130 - 139MM HG: CPT | Mod: CPTII,S$GLB,, | Performed by: FAMILY MEDICINE

## 2022-03-07 PROCEDURE — 90732 PNEUMOCOCCAL POLYSACCHARIDE VACCINE 23-VALENT =>2YO SQ IM: ICD-10-PCS | Mod: S$GLB,,, | Performed by: FAMILY MEDICINE

## 2022-03-07 PROCEDURE — 99499 RISK ADDL DX/OHS AUDIT: ICD-10-PCS | Mod: HCNC,S$GLB,, | Performed by: FAMILY MEDICINE

## 2022-03-07 PROCEDURE — 3288F PR FALLS RISK ASSESSMENT DOCUMENTED: ICD-10-PCS | Mod: CPTII,S$GLB,, | Performed by: FAMILY MEDICINE

## 2022-03-07 NOTE — PROGRESS NOTES
Subjective:   Patient ID: Antonio Urena is a 72 y.o. female.  Chief Complaint:  Follow-up    Presents for six-month follow-up chronic medical conditions    Last visit December 2021 telemedicine for vertigo which was felt to be a ear related  Treated with Antivert which helped symptoms  Reports vertigo has resolved  Did not feel it was related to any of her cardiac condition    Medical History  - History of cardiac pacemaker  - Paroxysmal atrial fibrillation  - Chronic anticoagulation  Remains clinically hemodynamically stable without any active cardiac complaints  Course compliance with Tambocor, diltiazem, and Eliquis  Has follow-up with Cardiology scheduled    - Essential hypertension  Controlled.  Stable.  Asymptomatic.  Lisinopril 30 mg daily dose.  Reports compliance.  Denies side effects.    - Pure hypercholesterolemia  Lipid panel done February 2022 with LDL 50  On Crestor 40 mg daily and fish oil 1 mg supplement daily  Reports compliance.  Concerned that statin is causing significant constipation despite her increased water intake and fiber in her diet  Denies any chest pain or claudication     - Asthma with COPD  Followed by pulmonology  On Advair and Spiriva maintenance inhalers  Stable.  Symptoms controlled.  Follow-up pulmonology as scheduled    - Non-seasonal allergic rhinitis, unspecified trigger  Flonase and Zyrtec daily.  Symptoms stable/controlled.     - Family history of colon cancer  - History of colon polyps  Not candidate for Cologuard testing  Discussed indication for colorectal screening  Previously a grade to colonoscopy, but defered auntil 3 months out from admission with no additional cardiac events  Agreeable to scheduling colonoscopy now  Questions what to do with her chronic anticoagulation    - Anxiety  Stable off medication  More adjustment related  Not affecting her ADLs    - Health maintenance needs  Pneumonia vaccine  Colorectal screening  Shingles vaccine      Current Outpatient  Medications:     cetirizine (ZYRTEC) 10 MG tablet, Take 10 mg by mouth once daily. , Disp: , Rfl:     DILT- mg CDCR, TAKE 1 CAPSULE EVERY DAY, Disp: 90 capsule, Rfl: 3    ELIQUIS 5 mg Tab, TAKE 1 TABLET TWICE DAILY, Disp: 180 tablet, Rfl: 3    flecainide (TAMBOCOR) 100 MG Tab, TAKE 1 TABLET EVERY 12 HOURS, Disp: 180 tablet, Rfl: 3    fluticasone-salmeterol diskus inhaler 250-50 mcg, Inhale 1 puff into the lungs 2 (two) times daily. Controller, Disp: 180 each, Rfl: 3    levalbuterol (XOPENEX HFA) 45 mcg/actuation inhaler, Inhale 2 puffs into the lungs every 6 (six) hours as needed for Wheezing or Shortness of Breath., Disp: 45 g, Rfl: 3    lisinopriL (PRINIVIL,ZESTRIL) 20 MG tablet, Take 1.5 tablets (30 mg total) by mouth once daily., Disp: 135 tablet, Rfl: 1    LORazepam (ATIVAN) 0.5 MG tablet, Take 0.5 mg by mouth daily as needed for Anxiety., Disp: , Rfl:     meclizine (ANTIVERT) 25 mg tablet, Take 1 tablet (25 mg total) by mouth 3 (three) times daily as needed., Disp: 20 tablet, Rfl: 0    omega-3 fatty acids/fish oil (FISH OIL-OMEGA-3 FATTY ACIDS) 300-1,000 mg capsule, Take 1 capsule by mouth once daily., Disp: , Rfl:     rosuvastatin (CRESTOR) 40 MG Tab, Take 1 tablet (40 mg total) by mouth every evening., Disp: 90 tablet, Rfl: 1    tiotropium bromide (SPIRIVA RESPIMAT) 2.5 mcg/actuation inhaler, Inhale 2 puffs into the lungs once daily., Disp: 12 g, Rfl: 3    triamcinolone acetonide 0.1% (KENALOG) 0.1 % ointment, Apply topically 2 (two) times daily., Disp: 80 g, Rfl: 1    Review of Systems   Constitutional: Negative for chills, fatigue and fever.   HENT: Negative for congestion, dental problem, ear pain, postnasal drip, rhinorrhea, sinus pressure and sore throat.    Eyes: Negative for visual disturbance.   Respiratory: Negative for cough, chest tightness, shortness of breath and wheezing.    Cardiovascular: Negative for chest pain, palpitations and leg swelling.   Gastrointestinal: Negative  "for abdominal pain, blood in stool, constipation, diarrhea, nausea and vomiting.   Endocrine: Negative for polydipsia, polyphagia and polyuria.   Genitourinary: Negative for difficulty urinating, dysuria, flank pain, hematuria and pelvic pain.   Musculoskeletal: Negative for myalgias.   Skin: Negative for rash.   Neurological: Negative for dizziness, syncope, weakness, light-headedness and headaches.   Hematological: Negative for adenopathy.   Psychiatric/Behavioral: Negative for decreased concentration, dysphoric mood and sleep disturbance. The patient is not nervous/anxious.      Objective:   /64   Pulse 91   Ht 5' 3" (1.6 m)   Wt 102.8 kg (226 lb 10.1 oz)   SpO2 96%   BMI 40.15 kg/m²     Physical Exam  Vitals and nursing note reviewed.   Constitutional:       General: She is not in acute distress.     Appearance: Normal appearance. She is well-developed. She is morbidly obese. She is not ill-appearing or toxic-appearing.   Eyes:      General: No scleral icterus.     Conjunctiva/sclera: Conjunctivae normal.   Neck:      Thyroid: No thyroid mass, thyromegaly or thyroid tenderness.      Vascular: Normal carotid pulses. No carotid bruit or JVD.   Cardiovascular:      Rate and Rhythm: Normal rate and regular rhythm.      Pulses:           Radial pulses are 2+ on the right side and 2+ on the left side.      Heart sounds: Normal heart sounds. No murmur heard.    No friction rub. No gallop.   Pulmonary:      Effort: Pulmonary effort is normal. No tachypnea, accessory muscle usage or respiratory distress.      Breath sounds: Normal breath sounds. No wheezing, rhonchi or rales.   Abdominal:      General: There is no distension.      Palpations: Abdomen is soft. There is no hepatomegaly.      Tenderness: There is no abdominal tenderness. There is no guarding or rebound.   Musculoskeletal:      Right lower leg: No edema.      Left lower leg: No edema.   Skin:     General: Skin is warm and dry.      Capillary " Refill: Capillary refill takes less than 2 seconds.      Findings: No abrasion, bruising, ecchymosis, rash or wound.   Neurological:      Mental Status: She is alert.      Coordination: Coordination is intact. Coordination normal.      Gait: Gait is intact. Gait normal.   Psychiatric:         Attention and Perception: Attention and perception normal.         Mood and Affect: Mood and affect normal. Mood is not anxious or depressed.         Speech: Speech normal.         Behavior: Behavior normal. Behavior is cooperative.         Thought Content: Thought content normal.         Cognition and Memory: Cognition and memory normal.         Judgment: Judgment normal.       Assessment:       ICD-10-CM ICD-9-CM   1. Essential hypertension  I10 401.9   2. Morbid obesity with BMI of 40.0-44.9, adult  E66.01 278.01    Z68.41 V85.41   3. Paroxysmal atrial fibrillation  I48.0 427.31   4. SSS (sick sinus syndrome)  I49.5 427.81   5. Cardiac pacemaker in situ  Z95.0 V45.01   6. Chronic anticoagulation  Z79.01 V58.61   7. Pure hypercholesterolemia  E78.00 272.0   8. Asthma with COPD  J44.9 493.20   9. Non-seasonal allergic rhinitis, unspecified trigger  J30.89 477.8   10. Anxiety  F41.9 300.00   11. Constipation, unspecified constipation type  K59.00 564.00   12. Screening for colon cancer  Z12.11 V76.51   13. History of colon polyps  Z86.010 V12.72   14. Family history of colon cancer  Z80.0 V16.0   15. Need for pneumococcal vaccination  Z23 V03.82     Plan:   Essential hypertension  Controlled.  Stable.  Asymptomatic.  BP at goal.  Continue lisinopril 30 mg daily    Morbid obesity with BMI of 40.0-44.9, adult  Discussed increased BMI now greater than 40  Significant iIncreased health risks at this BMI, Need for weight loss, Lifestyle modifications, and other changes to decrease BMI less than 40    Paroxysmal atrial fibrillation  SSS (sick sinus syndrome)  Cardiac pacemaker in situ  Chronic anticoagulation  Clinically  hemodynamically stable  Continue all current medications  Follow-up cardiology as scheduled    Pure hypercholesterolemia  Stable with LDL at goal  Possible side effect constipation  Try decrease dose Crestor 20 mg daily to see if symptoms improved  If not, will need discussed with Cardiology discontinuation of statin  If improved, repeat lipid panel 6-12 months to make sure LDL still at goal    Asthma with COPD  Stable.  Controlled.  Asymptomatic.  Continue Advair and Spiriva  Follow-up pulmonology as scheduled    Non-seasonal allergic rhinitis, unspecified trigger  Controlled.  Stable.  Asymptomatic.  Continue antihistamine nasal steroid as needed    Anxiety  Currently stable and not affecting ADLs  No intervention needed at this time    Constipation, unspecified constipation type  Try half dose statin as recommended above    Screening for colon cancer  History of colon polyps  Family history of colon cancer  -     Case Request Endoscopy: COLONOSCOPY  Discussed need to hold anticoagulation 2 days before, day of, and 1 day after for a total of 4 days in anticipation of probable need for repeat polypectomy    Need for pneumococcal vaccination  -     (In Office Administered) Pneumococcal Polysaccharide Vaccine (23 Valent) (SQ/IM)    Follow-up with any/all specialists scheduled    Return to clinic 1 year or sooner as needed     40+ minutes of total time spent on the encounter, which includes face to face time and non-face to face time preparing to see the patient (eg, review of tests), Obtaining and/or reviewing separately obtained history, documenting clinical information in the electronic or other health record, independently interpreting results (not separately reported) and communicating results to the patient/family/caregiver, or Care coordination (not separately reported).

## 2022-03-11 ENCOUNTER — HOSPITAL ENCOUNTER (EMERGENCY)
Facility: HOSPITAL | Age: 72
Discharge: HOME | End: 2022-03-11
Attending: FAMILY MEDICINE
Payer: MEDICARE

## 2022-03-11 ENCOUNTER — NURSE TRIAGE (OUTPATIENT)
Dept: ADMINISTRATIVE | Facility: CLINIC | Age: 72
End: 2022-03-11
Payer: MEDICARE

## 2022-03-11 VITALS
OXYGEN SATURATION: 97 % | WEIGHT: 220.44 LBS | SYSTOLIC BLOOD PRESSURE: 140 MMHG | DIASTOLIC BLOOD PRESSURE: 97 MMHG | RESPIRATION RATE: 16 BRPM | HEART RATE: 100 BPM | TEMPERATURE: 98 F | BODY MASS INDEX: 39.05 KG/M2

## 2022-03-11 DIAGNOSIS — R00.2 PALPITATIONS: Primary | ICD-10-CM

## 2022-03-11 LAB
ALBUMIN SERPL BCP-MCNC: 4 G/DL (ref 3.5–5.2)
ALP SERPL-CCNC: 59 U/L (ref 55–135)
ALT SERPL W/O P-5'-P-CCNC: 26 U/L (ref 10–44)
ANION GAP SERPL CALC-SCNC: 6 MMOL/L (ref 8–16)
AST SERPL-CCNC: 25 U/L (ref 10–40)
BASOPHILS # BLD AUTO: 0.05 K/UL (ref 0–0.2)
BASOPHILS NFR BLD: 0.5 % (ref 0–1.9)
BILIRUB SERPL-MCNC: 0.3 MG/DL (ref 0.1–1)
BILIRUB UR QL STRIP: NEGATIVE
BNP SERPL-MCNC: 155 PG/ML (ref 0–99)
BUN SERPL-MCNC: 15 MG/DL (ref 8–23)
CALCIUM SERPL-MCNC: 9.9 MG/DL (ref 8.7–10.5)
CHLORIDE SERPL-SCNC: 107 MMOL/L (ref 95–110)
CLARITY UR: CLEAR
CO2 SERPL-SCNC: 29 MMOL/L (ref 23–29)
COLOR UR: YELLOW
CREAT SERPL-MCNC: 0.9 MG/DL (ref 0.5–1.4)
DIFFERENTIAL METHOD: ABNORMAL
EOSINOPHIL # BLD AUTO: 0.3 K/UL (ref 0–0.5)
EOSINOPHIL NFR BLD: 3.4 % (ref 0–8)
ERYTHROCYTE [DISTWIDTH] IN BLOOD BY AUTOMATED COUNT: 13.3 % (ref 11.5–14.5)
EST. GFR  (AFRICAN AMERICAN): >60 ML/MIN/1.73 M^2
EST. GFR  (NON AFRICAN AMERICAN): >60 ML/MIN/1.73 M^2
GLUCOSE SERPL-MCNC: 80 MG/DL (ref 70–110)
GLUCOSE UR QL STRIP: NEGATIVE
HCT VFR BLD AUTO: 41.6 % (ref 37–48.5)
HGB BLD-MCNC: 13.3 G/DL (ref 12–16)
HGB UR QL STRIP: NEGATIVE
IMM GRANULOCYTES # BLD AUTO: 0.02 K/UL (ref 0–0.04)
IMM GRANULOCYTES NFR BLD AUTO: 0.2 % (ref 0–0.5)
KETONES UR QL STRIP: NEGATIVE
LEUKOCYTE ESTERASE UR QL STRIP: NEGATIVE
LYMPHOCYTES # BLD AUTO: 1.6 K/UL (ref 1–4.8)
LYMPHOCYTES NFR BLD: 16.5 % (ref 18–48)
MCH RBC QN AUTO: 28.2 PG (ref 27–31)
MCHC RBC AUTO-ENTMCNC: 32 G/DL (ref 32–36)
MCV RBC AUTO: 88 FL (ref 82–98)
MONOCYTES # BLD AUTO: 0.5 K/UL (ref 0.3–1)
MONOCYTES NFR BLD: 5.7 % (ref 4–15)
NEUTROPHILS # BLD AUTO: 7 K/UL (ref 1.8–7.7)
NEUTROPHILS NFR BLD: 73.7 % (ref 38–73)
NITRITE UR QL STRIP: NEGATIVE
NRBC BLD-RTO: 0 /100 WBC
PH UR STRIP: 7 [PH] (ref 5–8)
PLATELET # BLD AUTO: 217 K/UL (ref 150–450)
PMV BLD AUTO: 11 FL (ref 9.2–12.9)
POTASSIUM SERPL-SCNC: 4.5 MMOL/L (ref 3.5–5.1)
PROT SERPL-MCNC: 7.1 G/DL (ref 6–8.4)
PROT UR QL STRIP: NEGATIVE
RBC # BLD AUTO: 4.72 M/UL (ref 4–5.4)
SODIUM SERPL-SCNC: 142 MMOL/L (ref 136–145)
SP GR UR STRIP: <=1.005 (ref 1–1.03)
TROPONIN I SERPL DL<=0.01 NG/ML-MCNC: <0.006 NG/ML (ref 0–0.03)
URN SPEC COLLECT METH UR: ABNORMAL
UROBILINOGEN UR STRIP-ACNC: NEGATIVE EU/DL
WBC # BLD AUTO: 9.5 K/UL (ref 3.9–12.7)

## 2022-03-11 PROCEDURE — 80053 COMPREHEN METABOLIC PANEL: CPT | Performed by: FAMILY MEDICINE

## 2022-03-11 PROCEDURE — 81003 URINALYSIS AUTO W/O SCOPE: CPT | Performed by: FAMILY MEDICINE

## 2022-03-11 PROCEDURE — 85025 COMPLETE CBC W/AUTO DIFF WBC: CPT | Performed by: FAMILY MEDICINE

## 2022-03-11 PROCEDURE — 25000003 PHARM REV CODE 250: Performed by: FAMILY MEDICINE

## 2022-03-11 PROCEDURE — 93010 EKG 12-LEAD: ICD-10-PCS | Mod: ,,, | Performed by: INTERNAL MEDICINE

## 2022-03-11 PROCEDURE — 93005 ELECTROCARDIOGRAM TRACING: CPT

## 2022-03-11 PROCEDURE — 84484 ASSAY OF TROPONIN QUANT: CPT | Performed by: FAMILY MEDICINE

## 2022-03-11 PROCEDURE — 99284 EMERGENCY DEPT VISIT MOD MDM: CPT

## 2022-03-11 PROCEDURE — 93010 ELECTROCARDIOGRAM REPORT: CPT | Mod: ,,, | Performed by: INTERNAL MEDICINE

## 2022-03-11 PROCEDURE — 83880 ASSAY OF NATRIURETIC PEPTIDE: CPT | Performed by: FAMILY MEDICINE

## 2022-03-11 RX ORDER — LORAZEPAM 1 MG/1
0.5 TABLET ORAL EVERY 6 HOURS PRN
Qty: 10 TABLET | Refills: 0 | Status: SHIPPED | OUTPATIENT
Start: 2022-03-11 | End: 2022-04-10

## 2022-03-11 RX ORDER — LORAZEPAM 1 MG/1
1 TABLET ORAL
Status: COMPLETED | OUTPATIENT
Start: 2022-03-11 | End: 2022-03-11

## 2022-03-11 RX ADMIN — LORAZEPAM 1 MG: 1 TABLET ORAL at 07:03

## 2022-03-11 NOTE — ED PROVIDER NOTES
"SCRIBE #1 NOTE: I, Jessica Luigi, am scribing for, and in the presence of, Viktoriya Galo MD. I have scribed the entire note.       History     Chief Complaint   Patient presents with    Palpitations     [facility] reports pt is experiencing palpitations, HA, and lightheadedness, beginning today. Pt also reports hx of anxiety, which she feels is "worse than usual" today.      Review of patient's allergies indicates:   Allergen Reactions    Lasix [furosemide]      Within 1 year of taking pt will get severe knee pain in both knees.         History of Present Illness     HPI    3/11/2022, 5:37 PM  History obtained from the patient      History of Present Illness: Antonio Urena is a 72 y.o. female patient with a PMHx of A-fib, COPD, and HTN who presents to the Emergency Department for evaluation of palpitations which onset gradually today. Symptoms are constant and moderate in severity. No mitigating or exacerbating factors reported. Associated sxs include HA, cough, lightheadedness, chest tightness, dizziness, and increased anxiety. Patient denies any SOB, abd pain, leg swelling, choking, N/V/D, fever, and all other sxs at this time. No prior Tx provided. No further complaints or concerns at this time.       Arrival mode: Ambulance Service    PCP: Wayne Owens MD        Past Medical History:  Past Medical History:   Diagnosis Date    A-fib     A-fib     Pace maker put in 5/12/20    Allergy     Anxiety     Asthma     cough variant    Cancer 2018    basal cell c    Colon polyp     COPD (chronic obstructive pulmonary disease)     Hypertension        Past Surgical History:  Past Surgical History:   Procedure Laterality Date    ABLATION Bilateral 6/3/2021    Procedure: ABLATION/CTI;  Surgeon: Juan Lazo MD;  Location: [facility] CATH LAB;  Service: Cardiology;  Laterality: Bilateral;  (RFA of the CTI,    COVID-19, MRNA, LN-S, PF (Pfizer) 3/20/2021, 2/27/2021   later date RA lead extraction, RA lead " addition    HYSTERECTOMY      IMPLANTATION OF BIVENTRICULAR HEART PACEMAKER  2020    Loop recorder also in but not working right now    PHLEBOGRAPHY  2021    Procedure: Venogram;  Surgeon: Juan Lazo MD;  Location: [facility] CATH LAB;  Service: Cardiology;;    REVISION OF PROCEDURE INVOLVING PACEMAKER LEAD N/A 2021    Procedure: REVISION, ELECTRODE LEAD, CARDIAC PACEMAKER;  Surgeon: Juan Lazo MD;  Location: [facility] CATH LAB;  Service: Cardiology;  Laterality: N/A;    REVISION OF PROCEDURE INVOLVING PACEMAKER LEAD Bilateral 7/15/2021    Procedure: REVISION, ELECTRODE LEAD, CARDIAC PACEMAKER/A lead;  Surgeon: Juan Lazo MD;  Location: [facility] CATH LAB;  Service: Cardiology;  Laterality: Bilateral;  biotronik device    REVISION OF SKIN POCKET FOR PACEMAKER  2021    Procedure: Revision, Skin Pocket, For Cardiac Pacemaker;  Surgeon: Juan Lazo MD;  Location: [facility] CATH LAB;  Service: Cardiology;;         Family History:  Family History   Problem Relation Age of Onset    Cancer Mother     Cancer Father     Cancer Brother        Social History:  Social History     Tobacco Use    Smoking status: Former Smoker     Packs/day: 0.25     Years: 1.00     Pack years: 0.25     Quit date: 1985     Years since quittin.2    Smokeless tobacco: Never Used    Tobacco comment: Quit 30 - 40 years ago   Substance and Sexual Activity    Alcohol use: Yes     Comment: Wine Occasionally     Drug use: Never    Sexual activity: Not Currently        Review of Systems     Review of Systems   Constitutional: Negative for fever.   HENT: Negative for sore throat.    Respiratory: Positive for cough and chest tightness. Negative for choking and shortness of breath.    Cardiovascular: Negative for chest pain and leg swelling.   Gastrointestinal: Negative for abdominal pain, diarrhea, nausea and vomiting.   Genitourinary: Negative for dysuria.   Musculoskeletal: Negative for back pain.    Skin: Negative for rash.   Neurological: Positive for dizziness, light-headedness and headaches. Negative for weakness.   Hematological: Does not bruise/bleed easily.   Psychiatric/Behavioral: The patient is nervous/anxious.    All other systems reviewed and are negative.     Physical Exam     Initial Vitals [03/11/22 1703]   BP Pulse Resp Temp SpO2   (!) 140/97 100 16 98 °F (36.7 °C) 97 %      MAP       --          Physical Exam  Nursing Notes and Vital Signs Reviewed.  Constitutional: Patient is in no acute distress. Well-developed and well-nourished.  Head: Atraumatic. Normocephalic.  Eyes: PERRL. EOM intact. Conjunctivae are not pale. No scleral icterus.  ENT: Mucous membranes are moist. Oropharynx is clear and symmetric.    Neck: Supple. Full ROM. No lymphadenopathy.  Cardiovascular: Regular rate. Regular rhythm. No murmurs, rubs, or gallops. Distal pulses are 2+ and symmetric.  Pulmonary/Chest: No respiratory distress. Clear to auscultation bilaterally. Expiratory wheezing bilaterally.  Abdominal: Soft and non-distended.  There is no tenderness.  No rebound, guarding, or rigidity. Good bowel sounds.  Genitourinary: No CVA tenderness  Musculoskeletal: Moves all extremities. No obvious deformities. No edema. No calf tenderness.  Skin: Warm and dry.  Neurological:  Alert, awake, and appropriate.  Normal speech.  No acute focal neurological deficits are appreciated.  Psychiatric: Normal affect. Good eye contact. Appropriate in content.     ED Course   Procedures  ED Vital Signs:  Vitals:    03/11/22 1703   BP: (!) 140/97   Pulse: 100   Resp: 16   Temp: 98 °F (36.7 °C)   SpO2: 97%   Weight: 100 kg (220 lb 7.4 oz)       Abnormal Lab Results:  Labs Reviewed   CBC W/ AUTO DIFFERENTIAL - Abnormal; Notable for the following components:       Result Value    Gran % 73.7 (*)     Lymph % 16.5 (*)     All other components within normal limits   COMPREHENSIVE METABOLIC PANEL - Abnormal; Notable for the following  components:    Anion Gap 6 (*)     All other components within normal limits   URINALYSIS - Abnormal; Notable for the following components:    Specific Gravity, UA <=1.005 (*)     All other components within normal limits   B-TYPE NATRIURETIC PEPTIDE - Abnormal; Notable for the following components:     (*)     All other components within normal limits   TROPONIN I        All Lab Results:  Results for orders placed or performed during the hospital encounter of 03/11/22   CBC auto differential   Result Value Ref Range    WBC 9.50 3.90 - 12.70 K/uL    RBC 4.72 4.00 - 5.40 M/uL    Hemoglobin 13.3 12.0 - 16.0 g/dL    Hematocrit 41.6 37.0 - 48.5 %    MCV 88 82 - 98 fL    MCH 28.2 27.0 - 31.0 pg    MCHC 32.0 32.0 - 36.0 g/dL    RDW 13.3 11.5 - 14.5 %    Platelets 217 150 - 450 K/uL    MPV 11.0 9.2 - 12.9 fL    Immature Granulocytes 0.2 0.0 - 0.5 %    Gran # (ANC) 7.0 1.8 - 7.7 K/uL    Immature Grans (Abs) 0.02 0.00 - 0.04 K/uL    Lymph # 1.6 1.0 - 4.8 K/uL    Mono # 0.5 0.3 - 1.0 K/uL    Eos # 0.3 0.0 - 0.5 K/uL    Baso # 0.05 0.00 - 0.20 K/uL    nRBC 0 0 /100 WBC    Gran % 73.7 (H) 38.0 - 73.0 %    Lymph % 16.5 (L) 18.0 - 48.0 %    Mono % 5.7 4.0 - 15.0 %    Eosinophil % 3.4 0.0 - 8.0 %    Basophil % 0.5 0.0 - 1.9 %    Differential Method Automated    Comprehensive metabolic panel   Result Value Ref Range    Sodium 142 136 - 145 mmol/L    Potassium 4.5 3.5 - 5.1 mmol/L    Chloride 107 95 - 110 mmol/L    CO2 29 23 - 29 mmol/L    Glucose 80 70 - 110 mg/dL    BUN 15 8 - 23 mg/dL    Creatinine 0.9 0.5 - 1.4 mg/dL    Calcium 9.9 8.7 - 10.5 mg/dL    Total Protein 7.1 6.0 - 8.4 g/dL    Albumin 4.0 3.5 - 5.2 g/dL    Total Bilirubin 0.3 0.1 - 1.0 mg/dL    Alkaline Phosphatase 59 55 - 135 U/L    AST 25 10 - 40 U/L    ALT 26 10 - 44 U/L    Anion Gap 6 (L) 8 - 16 mmol/L    eGFR if African American >60 >60 mL/min/1.73 m^2    eGFR if non African American >60 >60 mL/min/1.73 m^2   Urinalysis - Clean Catch   Result Value Ref  Range    Specimen UA Urine, Clean Catch     Color, UA Yellow Yellow, Straw, Tena    Appearance, UA Clear Clear    pH, UA 7.0 5.0 - 8.0    Specific Gravity, UA <=1.005 (A) 1.005 - 1.030    Protein, UA Negative Negative    Glucose, UA Negative Negative    Ketones, UA Negative Negative    Bilirubin (UA) Negative Negative    Occult Blood UA Negative Negative    Nitrite, UA Negative Negative    Urobilinogen, UA Negative <2.0 EU/dL    Leukocytes, UA Negative Negative   B-Type natriuretic peptide (BNP)   Result Value Ref Range     (H) 0 - 99 pg/mL   Troponin I   Result Value Ref Range    Troponin I <0.006 0.000 - 0.026 ng/mL         Imaging Results:  Imaging Results    None          The EKG was ordered, reviewed, and independently interpreted by the ED provider.  Interpretation time: 17:25  Rate: 60 BPM  Rhythm: normal sinus rhythm  Interpretation: Atrial-paced rhythm with prolonged AV conduction. No STEMI.         The Emergency Provider reviewed the vital signs and test results, which are outlined above.     ED Discussion     7:19 PM: Reassessed pt at this time. Discussed with pt all pertinent ED information and results. Discussed pt dx and plan of tx. Gave pt all f/u and return to the ED instructions. All questions and concerns were addressed at this time. Pt expresses understanding of information and instructions, and is comfortable with plan to discharge. Pt is stable for discharge.    I discussed with patient and/or family/caretaker that evaluation in the ED does not suggest any emergent or life threatening medical conditions requiring immediate intervention beyond what was provided in the ED, and I believe patient is safe for discharge.  Regardless, an unremarkable evaluation in the ED does not preclude the development or presence of a serious of life threatening condition. As such, patient was instructed to return immediately for any worsening or change in current symptoms.         Medical Decision Making:    Clinical Tests:   Lab Tests: Ordered and Reviewed  Medical Tests: Ordered and Reviewed           ED Medication(s):  Medications   LORazepam tablet 1 mg (1 mg Oral Given 3/11/22 1925)       Discharge Medication List as of 3/11/2022  7:10 PM      START taking these medications    Details   !! LORazepam (ATIVAN) 1 MG tablet Take 0.5 tablets (0.5 mg total) by mouth every 6 (six) hours as needed for Anxiety., Starting Fri 3/11/2022, Until Sun 4/10/2022 at 2359, Print       !! - Potential duplicate medications found. Please discuss with provider.                  Scribe Attestation:   Scribe #1: I performed the above scribed service and the documentation accurately describes the services I performed. I attest to the accuracy of the note.     Attending:   Physician Attestation Statement for Scribe #1: I, Viktoriya Galo MD, personally performed the services described in this documentation, as scribed by Jessica Meyers, in my presence, and it is both accurate and complete.       Scribe Attestation:   Scribe #1: I performed the above scribed service and the documentation accurately describes the services I performed. I attest to the accuracy of the note.    Attending Attestation:           Physician Attestation for Scribe:  Physician Attestation Statement for Scribe #1: I, Viktoriya Galo MD, reviewed documentation, as scribed by Jessica Meyers in my presence, and it is both accurate and complete.              Clinical Impression       ICD-10-CM ICD-9-CM   1. Palpitations  R00.2 785.1       Disposition:   Disposition: Discharged  Condition: Stable         Viktoriya Galo MD  03/14/22 1606

## 2022-03-11 NOTE — TELEPHONE ENCOUNTER
Pt called in stating she is having BP issues. Denies CP or difficulty breathing. Gave the following information.     Tues 138/79    Today : 158/84 at 241p    155/76  At 250p    163/80 at 300p  Today    States she has been feeling dizzy so that is what has prompted her to check her bp. States she has pressure in her head. States feels too dizzy to try to stand and does not feel she could walk unassisted. Care advice per protocol. Pt states she does not have a ride to ED. Advised to call 911 for EMS assistance to bring her to hospital for eval. Verbalized understanding.     Reason for Disposition   SEVERE dizziness (e.g., unable to stand, requires support to walk, feels like passing out now)    Additional Information   Negative: SEVERE difficulty breathing (e.g., struggling for each breath, speaks in single words)   Negative: Shock suspected (e.g., cold/pale/clammy skin, too weak to stand, low BP, rapid pulse)   Negative: Difficult to awaken or acting confused (e.g., disoriented, slurred speech)   Negative: Fainted, and still feels dizzy afterwards   Negative: Overdose (accidental or intentional) of medications   Negative: New neurologic deficit that is present now: * Weakness of the face, arm, or leg on one side of the body * Numbness of the face, arm, or leg on one side of the body * Loss of speech or garbled speech   Negative: Heart beating < 50 beats per minute OR > 140 beats per minute   Negative: Sounds like a life-threatening emergency to the triager    Protocols used: DIZZINESS-A-OH

## 2022-03-16 ENCOUNTER — TELEPHONE (OUTPATIENT)
Dept: PULMONOLOGY | Facility: CLINIC | Age: 72
End: 2022-03-16
Payer: MEDICARE

## 2022-03-16 NOTE — TELEPHONE ENCOUNTER
Initiated prior authorization request with patient's insurance for -Levalbuterol Tartrate 45MCG/ACT aerosol  prescription. Submitted online via [email or web address] (request key- Awaiting decision -- PA case ID   Key: [id number] - PA Case ID: [id number] - Rx #: [id number]

## 2022-03-28 ENCOUNTER — PATIENT MESSAGE (OUTPATIENT)
Dept: PULMONOLOGY | Facility: CLINIC | Age: 72
End: 2022-03-28
Payer: MEDICARE

## 2022-04-01 ENCOUNTER — TELEPHONE (OUTPATIENT)
Dept: RHEUMATOLOGY | Facility: CLINIC | Age: 72
End: 2022-04-01
Payer: MEDICARE

## 2022-04-01 NOTE — TELEPHONE ENCOUNTER
----- Message from Jovita Palacio sent at 4/1/2022  4:51 PM CDT -----  Pt would like to schedule an appt. There were no available appts after 6/3/2022 and she need a later date. Please call her back at [phone]. Thx. El

## 2022-04-05 ENCOUNTER — TELEPHONE (OUTPATIENT)
Dept: ADMINISTRATIVE | Facility: HOSPITAL | Age: 72
End: 2022-04-05
Payer: MEDICARE

## 2022-04-05 DIAGNOSIS — Z80.0 FAMILY HISTORY OF COLON CANCER: ICD-10-CM

## 2022-04-05 DIAGNOSIS — Z12.11 SCREENING FOR COLON CANCER: Primary | ICD-10-CM

## 2022-04-05 DIAGNOSIS — Z86.010 HISTORY OF COLON POLYPS: ICD-10-CM

## 2022-04-21 ENCOUNTER — TELEPHONE (OUTPATIENT)
Dept: INTERNAL MEDICINE | Facility: CLINIC | Age: 72
End: 2022-04-21
Payer: MEDICARE

## 2022-04-21 ENCOUNTER — PATIENT OUTREACH (OUTPATIENT)
Dept: ADMINISTRATIVE | Facility: OTHER | Age: 72
End: 2022-04-21
Payer: MEDICARE

## 2022-04-21 DIAGNOSIS — Z12.31 ENCOUNTER FOR SCREENING MAMMOGRAM FOR MALIGNANT NEOPLASM OF BREAST: Primary | ICD-10-CM

## 2022-04-21 NOTE — TELEPHONE ENCOUNTER
----- Message from Sarkis Cruz sent at 4/21/2022 11:38 AM CDT -----  Contact: self  Type:  Mammogram    Caller is requesting to schedule their annual mammogram appointment.  Order is not listed in EPIC.  Please enter order and contact patient to schedule.  Name of Caller:Antonio Urena   Where would they like the mammogram performed?[facility]  Would the patient rather a call back or a response via [facility]? Call back  Best Call Back Number:[phone]  Additional Information:

## 2022-04-22 ENCOUNTER — IMMUNIZATION (OUTPATIENT)
Dept: PHARMACY | Facility: CLINIC | Age: 72
End: 2022-04-22

## 2022-04-22 ENCOUNTER — OFFICE VISIT (OUTPATIENT)
Dept: CARDIOLOGY | Facility: CLINIC | Age: 72
End: 2022-04-22
Payer: MEDICARE

## 2022-04-22 VITALS
BODY MASS INDEX: 39.61 KG/M2 | HEART RATE: 55 BPM | SYSTOLIC BLOOD PRESSURE: 120 MMHG | HEIGHT: 63 IN | WEIGHT: 223.56 LBS | DIASTOLIC BLOOD PRESSURE: 80 MMHG

## 2022-04-22 DIAGNOSIS — R00.1 SYMPTOMATIC BRADYCARDIA: ICD-10-CM

## 2022-04-22 DIAGNOSIS — Z95.0 CARDIAC PACEMAKER IN SITU: Chronic | ICD-10-CM

## 2022-04-22 DIAGNOSIS — I49.5 SSS (SICK SINUS SYNDROME): Chronic | ICD-10-CM

## 2022-04-22 DIAGNOSIS — G43.109 MIGRAINE WITH AURA AND WITHOUT STATUS MIGRAINOSUS, NOT INTRACTABLE: ICD-10-CM

## 2022-04-22 DIAGNOSIS — I10 ESSENTIAL HYPERTENSION: Chronic | ICD-10-CM

## 2022-04-22 DIAGNOSIS — Z23 NEED FOR VACCINATION: Primary | ICD-10-CM

## 2022-04-22 DIAGNOSIS — Z79.01 CHRONIC ANTICOAGULATION: Chronic | ICD-10-CM

## 2022-04-22 DIAGNOSIS — I48.91 ATRIAL FIBRILLATION AND FLUTTER: Primary | Chronic | ICD-10-CM

## 2022-04-22 DIAGNOSIS — Z98.890 HISTORY OF CARDIAC RADIOFREQUENCY ABLATION (RFA): ICD-10-CM

## 2022-04-22 DIAGNOSIS — I48.92 ATRIAL FIBRILLATION AND FLUTTER: Primary | Chronic | ICD-10-CM

## 2022-04-22 DIAGNOSIS — F41.9 ANXIETY: ICD-10-CM

## 2022-04-22 DIAGNOSIS — E66.01 MORBID OBESITY WITH BMI OF 40.0-44.9, ADULT: ICD-10-CM

## 2022-04-22 DIAGNOSIS — I48.3 TYPICAL ATRIAL FLUTTER: ICD-10-CM

## 2022-04-22 DIAGNOSIS — I48.0 PAF (PAROXYSMAL ATRIAL FIBRILLATION): Chronic | ICD-10-CM

## 2022-04-22 PROCEDURE — 3008F PR BODY MASS INDEX (BMI) DOCUMENTED: ICD-10-PCS | Mod: CPTII,S$GLB,, | Performed by: INTERNAL MEDICINE

## 2022-04-22 PROCEDURE — 3288F FALL RISK ASSESSMENT DOCD: CPT | Mod: CPTII,S$GLB,, | Performed by: INTERNAL MEDICINE

## 2022-04-22 PROCEDURE — 3008F BODY MASS INDEX DOCD: CPT | Mod: CPTII,S$GLB,, | Performed by: INTERNAL MEDICINE

## 2022-04-22 PROCEDURE — 1100F PR PT FALLS ASSESS DOC 2+ FALLS/FALL W/INJURY/YR: ICD-10-PCS | Mod: CPTII,S$GLB,, | Performed by: INTERNAL MEDICINE

## 2022-04-22 PROCEDURE — 3074F PR MOST RECENT SYSTOLIC BLOOD PRESSURE < 130 MM HG: ICD-10-PCS | Mod: CPTII,S$GLB,, | Performed by: INTERNAL MEDICINE

## 2022-04-22 PROCEDURE — 4010F PR ACE/ARB THEARPY RXD/TAKEN: ICD-10-PCS | Mod: CPTII,S$GLB,, | Performed by: INTERNAL MEDICINE

## 2022-04-22 PROCEDURE — 4010F ACE/ARB THERAPY RXD/TAKEN: CPT | Mod: CPTII,S$GLB,, | Performed by: INTERNAL MEDICINE

## 2022-04-22 PROCEDURE — 1125F AMNT PAIN NOTED PAIN PRSNT: CPT | Mod: CPTII,S$GLB,, | Performed by: INTERNAL MEDICINE

## 2022-04-22 PROCEDURE — 99999 PR PBB SHADOW E&M-EST. PATIENT-LVL IV: CPT | Mod: PBBFAC,,, | Performed by: INTERNAL MEDICINE

## 2022-04-22 PROCEDURE — 1159F PR MEDICATION LIST DOCUMENTED IN MEDICAL RECORD: ICD-10-PCS | Mod: CPTII,S$GLB,, | Performed by: INTERNAL MEDICINE

## 2022-04-22 PROCEDURE — 3079F PR MOST RECENT DIASTOLIC BLOOD PRESSURE 80-89 MM HG: ICD-10-PCS | Mod: CPTII,S$GLB,, | Performed by: INTERNAL MEDICINE

## 2022-04-22 PROCEDURE — 99999 PR PBB SHADOW E&M-EST. PATIENT-LVL IV: ICD-10-PCS | Mod: PBBFAC,,, | Performed by: INTERNAL MEDICINE

## 2022-04-22 PROCEDURE — 99215 OFFICE O/P EST HI 40 MIN: CPT | Mod: S$GLB,,, | Performed by: INTERNAL MEDICINE

## 2022-04-22 PROCEDURE — 99215 PR OFFICE/OUTPT VISIT, EST, LEVL V, 40-54 MIN: ICD-10-PCS | Mod: S$GLB,,, | Performed by: INTERNAL MEDICINE

## 2022-04-22 PROCEDURE — 1100F PTFALLS ASSESS-DOCD GE2>/YR: CPT | Mod: CPTII,S$GLB,, | Performed by: INTERNAL MEDICINE

## 2022-04-22 PROCEDURE — 1159F MED LIST DOCD IN RCRD: CPT | Mod: CPTII,S$GLB,, | Performed by: INTERNAL MEDICINE

## 2022-04-22 PROCEDURE — 3288F PR FALLS RISK ASSESSMENT DOCUMENTED: ICD-10-PCS | Mod: CPTII,S$GLB,, | Performed by: INTERNAL MEDICINE

## 2022-04-22 PROCEDURE — 3074F SYST BP LT 130 MM HG: CPT | Mod: CPTII,S$GLB,, | Performed by: INTERNAL MEDICINE

## 2022-04-22 PROCEDURE — 3079F DIAST BP 80-89 MM HG: CPT | Mod: CPTII,S$GLB,, | Performed by: INTERNAL MEDICINE

## 2022-04-22 PROCEDURE — 1125F PR PAIN SEVERITY QUANTIFIED, PAIN PRESENT: ICD-10-PCS | Mod: CPTII,S$GLB,, | Performed by: INTERNAL MEDICINE

## 2022-04-22 RX ORDER — ROSUVASTATIN CALCIUM 20 MG/1
20 TABLET, COATED ORAL DAILY
COMMUNITY
End: 2022-05-06 | Stop reason: SDUPTHER

## 2022-04-22 NOTE — PROGRESS NOTES
"  Subjective:   Patient ID:  Antonio Urena is a 72 y.o. female     Chief complaint:tbs/ppm    HPI  Initially referred by Dr Ling for AF/ PPM issues--   Chart reviewed in detail   All ECGs in Epic personally reviewed  PPM eval data personally reviewed.   71 woman  PAF on chronic anticoagulation (Eliquis)    Dxed after c/o palpitations and some weakness @ 1 year ago. Had DC PPM placed on 5/12/2020 in [place].  A lead displaced apparently shortly thereafter.   Was on Flec but then was told that it "did the opposite of what it was supposed to do"   Now on Multaq  Reviewed all ECGs and she was in AF on ECGs from Oct 2020 till now and she was in AF on ECG from 10/19/20, and 5/14/21 as well as all the time on Holter from 11/2/20 but in NSR on ECGs from 12/2/2020 and 5/10/21 as well as holter from 5/12/21  The PPM was in VVI 60 bpm and was pacing frequently.     Labs are all OK (specifically normal CBC, CMP, TSH and BNP)     Echo:  · The left ventricle is normal in size with normal systolic function. The estimated ejection fraction is 55%.  · There is left ventricular concentric remodeling.  · Mild left atrial enlargement.  · Indeterminate diastolic function.  · Normal right ventricular systolic function.  · Normal central venous pressure (3 mmHg).  · The estimated PA systolic pressure is 29 mmHg.  · Small circumferential pericardial effusion. Effusion is fluid.     Would proceed with obtaining old records and considering RFA of the CTI as a first step then removing A lead and re-implanting another (on a different date than the RFA). Would then switch from Multaq to flecainide.     The extraction should be simple and if a simple pullback is insufficient, would remove the lead via a trans-femoral approach.     I have discussed the procedures (RFA of the CTI, RA lead extraction, RA lead addition)  in detail with the patient. I described its benefits and risks. I reviewed alternative therapies and discussed their " potential value. The patient was given ample opportunity to express concerns and ask questions and I provided appropriate responses and  answers to such.The patient understands and agrees to proceed.  Consent form was signed today by patient and myself and appropriately witnessed.      In the meantime, we have also decreased PPM rate to 30 bpm         Update 9/9/2021:  She had RFA of the CTI and PPM revision and was switched from Multaq to Flec.  She felt a lot better  Since then but has had recent c/o palps but these did not correlate with any ATs on her PPM   Last PPM check - ASVS 14%, APVS 85%, APVP 1% - no arr, TI stable and little activity - sedentary pat      Clinically:  Additional c/os include palps and H/As after bending over  She has started with the digital HTN clinic  She also has had an ER visit due to dysarthria - this was similar to her past events when she was in 6th, 9th and 12th grade and w/up was neg for CVA/TIA - this was clinically a migraine manifestation.   >>    She is doing well from an EP point of view - no AF etc   Palps may well be related to cardiac awareness as part of reflex adrenalia due to hypotension caused by bending  - they may also be related to isolated PACs    Now in NSR  >>  48 hr Holter for correlation attempt to palps  This showed NSR, Some PMTs - we can perhaps program around that     Update 4/22/2022:  I am feeling 98% but occ feels rapid heartbeat after she stands up and walks around - the max is 89 bpm, then in  Few secs or a min or so, it slows down gradually to 60 bpm. Happens during the day but not late in the evening     Current Outpatient Medications   Medication Sig    cetirizine (ZYRTEC) 10 MG tablet Take 10 mg by mouth once daily.     DILT- mg CDCR TAKE 1 CAPSULE EVERY DAY    ELIQUIS 5 mg Tab TAKE 1 TABLET TWICE DAILY    flecainide (TAMBOCOR) 100 MG Tab TAKE 1 TABLET EVERY 12 HOURS    fluticasone-salmeterol diskus inhaler 250-50 mcg Inhale 1 puff into  the lungs 2 (two) times daily. Controller    levalbuterol (XOPENEX HFA) 45 mcg/actuation inhaler Inhale 2 puffs into the lungs every 6 (six) hours as needed for Wheezing or Shortness of Breath.    lisinopriL (PRINIVIL,ZESTRIL) 20 MG tablet Take 1.5 tablets (30 mg total) by mouth once daily.    LORazepam (ATIVAN) 0.5 MG tablet Take 0.5 mg by mouth daily as needed for Anxiety.    meclizine (ANTIVERT) 25 mg tablet Take 1 tablet (25 mg total) by mouth 3 (three) times daily as needed.    omega-3 fatty acids/fish oil (FISH OIL-OMEGA-3 FATTY ACIDS) 300-1,000 mg capsule Take 1 capsule by mouth once daily.    rosuvastatin (CRESTOR) 20 MG tablet Take 20 mg by mouth once daily.    tiotropium bromide (SPIRIVA RESPIMAT) 2.5 mcg/actuation inhaler Inhale 2 puffs into the lungs once daily.    triamcinolone acetonide 0.1% (KENALOG) 0.1 % ointment Apply topically 2 (two) times daily.     No current facility-administered medications for this visit.     Review of Systems   Constitutional: Negative for decreased appetite, weight gain and weight loss.   HENT: Negative for nosebleeds.    Eyes: Negative for blurred vision and visual disturbance.   Cardiovascular: Negative for chest pain, claudication, cyanosis, dyspnea on exertion, irregular heartbeat, leg swelling, near-syncope, orthopnea, palpitations, paroxysmal nocturnal dyspnea and syncope.   Respiratory: Negative for cough, shortness of breath and wheezing.    Endocrine: Negative for heat intolerance.   Skin: Negative for rash.   Musculoskeletal: Negative for muscle weakness and myalgias.   Gastrointestinal: Negative for abdominal pain, anorexia, melena, nausea and vomiting.   Genitourinary: Negative for menorrhagia.   Neurological: Negative for excessive daytime sleepiness, dizziness, headaches, loss of balance, seizures, vertigo and weakness.   Psychiatric/Behavioral: Negative for altered mental status and depression. The patient is not nervous/anxious.      Social History      Tobacco Use   Smoking Status Former Smoker    Packs/day: 0.25    Years: 1.00    Pack years: 0.25    Quit date: 1985    Years since quittin.3   Smokeless Tobacco Never Used   Tobacco Comment    Quit 30 - 40 years ago        Objective:     Physical Exam  Vitals and nursing note reviewed.   Constitutional:       Appearance: She is well-developed.      Comments: Overweight   HENT:      Head: Normocephalic and atraumatic.      Right Ear: External ear normal.      Left Ear: External ear normal.   Eyes:      General: No scleral icterus.        Left eye: No discharge.      Conjunctiva/sclera: Conjunctivae normal.      Left eye: Left conjunctiva is not injected. No hemorrhage.     Pupils: Pupils are equal, round, and reactive to light.   Neck:      Thyroid: No thyromegaly.   Cardiovascular:      Rate and Rhythm: Normal rate and regular rhythm.      Pulses: Intact distal pulses.           Carotid pulses are 2+ on the right side and 2+ on the left side.       Radial pulses are 2+ on the right side and 2+ on the left side.        Dorsalis pedis pulses are 2+ on the right side and 2+ on the left side.        Posterior tibial pulses are 2+ on the right side and 2+ on the left side.      Heart sounds: Normal heart sounds. No midsystolic click and no opening snap. No murmur heard.    No friction rub. No gallop. No S3 or S4 sounds.   Pulmonary:      Effort: Pulmonary effort is normal.      Breath sounds: Normal breath sounds.   Chest:      Comments: Device pocket is in great repair.  Abdominal:      General: There is no distension.      Palpations: Abdomen is soft. Abdomen is not rigid. There is no hepatomegaly.      Tenderness: There is no abdominal tenderness. There is no guarding.      Comments: Obese abdomen   Musculoskeletal:      Cervical back: Normal range of motion and neck supple.      Right lower leg: No swelling.      Left lower leg: No swelling.      Right ankle: No swelling.      Left ankle: No  "swelling.   Skin:     General: Skin is warm and dry.      Findings: No rash.      Nails: There is no clubbing.   Neurological:      Mental Status: She is alert and oriented to person, place, and time.      Cranial Nerves: No cranial nerve deficit.      Gait: Gait normal.   Psychiatric:         Speech: Speech normal.         Behavior: Behavior normal.         Thought Content: Thought content normal.       /80   Pulse (!) 55   Ht 5' 3" (1.6 m)   Wt 101.4 kg (223 lb 8.7 oz)   BMI 39.60 kg/m²      reports current alcohol use.  Past Medical History:   Diagnosis Date    A-fib     A-fib     Pace maker put in 5/12/20    Allergy     Anxiety     Asthma     cough variant    Cancer 2018    basal cell c    Colon polyp     COPD (chronic obstructive pulmonary disease)     Hypertension      Past Surgical History:   Procedure Laterality Date    ABLATION Bilateral 6/3/2021    Procedure: ABLATION/CTI;  Surgeon: Juan Lazo MD;  Location: [facility] CATH LAB;  Service: Cardiology;  Laterality: Bilateral;  (RFA of the CTI,    COVID-19, MRNA, LN-S, PF (Pfizer) 3/20/2021, 2/27/2021   later date RA lead extraction, RA lead addition    HYSTERECTOMY      IMPLANTATION OF BIVENTRICULAR HEART PACEMAKER  05/12/2020    Loop recorder also in but not working right now    PHLEBOGRAPHY  6/8/2021    Procedure: Venogram;  Surgeon: Juan Lazo MD;  Location: [facility] CATH LAB;  Service: Cardiology;;    REVISION OF PROCEDURE INVOLVING PACEMAKER LEAD N/A 6/8/2021    Procedure: REVISION, ELECTRODE LEAD, CARDIAC PACEMAKER;  Surgeon: Juan Lazo MD;  Location: [facility] CATH LAB;  Service: Cardiology;  Laterality: N/A;    REVISION OF PROCEDURE INVOLVING PACEMAKER LEAD Bilateral 7/15/2021    Procedure: REVISION, ELECTRODE LEAD, CARDIAC PACEMAKER/A lead;  Surgeon: Juan Lazo MD;  Location: [facility] CATH LAB;  Service: Cardiology;  Laterality: Bilateral;  biotronik device    REVISION OF SKIN POCKET FOR PACEMAKER  " 6/8/2021    Procedure: Revision, Skin Pocket, For Cardiac Pacemaker;  Surgeon: Juan Lazo MD;  Location: [facility] CATH LAB;  Service: Cardiology;;     Family History   Problem Relation Age of Onset    Cancer Mother     Cancer Father     Cancer Brother        Assessment:    rapid heartbeat likely a result of mild OH and CLS response  1. Atrial fibrillation and flutter    2. Typical atrial flutter    3. History of cardiac radiofrequency ablation (RFA)    4. PAF (paroxysmal atrial fibrillation)    5. Chronic anticoagulation    6. SSS (sick sinus syndrome)    7. Symptomatic bradycardia    8. Cardiac pacemaker in situ    9. Essential hypertension    10. Morbid obesity with BMI of 40.0-44.9, adult    11. Migraine with aura and without status migrainosus, not intractable    12. Anxiety        Plan:   RTC 6 months with N May  PPM eval now for AF   I discussed routine device follow up including quarterly to bi-annual device checks for device function as well as yearly follow up in the EP clinic. The patient  was advised to call with any concerns regarding their device. Device clinic follow up as scheduled. RTC 1y          No orders of the defined types were placed in this encounter.      Follow up in about 6 months (around 10/22/2022), or if symptoms worsen or fail to improve, for N may.    Medications Discontinued During This Encounter   Medication Reason    rosuvastatin (CRESTOR) 40 MG Tab             Medication List with Changes/Refills   Current Medications    CETIRIZINE (ZYRTEC) 10 MG TABLET    Take 10 mg by mouth once daily.     DILT- MG CDCR    TAKE 1 CAPSULE EVERY DAY    ELIQUIS 5 MG TAB    TAKE 1 TABLET TWICE DAILY    FLECAINIDE (TAMBOCOR) 100 MG TAB    TAKE 1 TABLET EVERY 12 HOURS    FLUTICASONE-SALMETEROL DISKUS INHALER 250-50 MCG    Inhale 1 puff into the lungs 2 (two) times daily. Controller    LEVALBUTEROL (XOPENEX HFA) 45 MCG/ACTUATION INHALER    Inhale 2 puffs into the lungs every 6 (six)  hours as needed for Wheezing or Shortness of Breath.    LISINOPRIL (PRINIVIL,ZESTRIL) 20 MG TABLET    Take 1.5 tablets (30 mg total) by mouth once daily.    LORAZEPAM (ATIVAN) 0.5 MG TABLET    Take 0.5 mg by mouth daily as needed for Anxiety.    MECLIZINE (ANTIVERT) 25 MG TABLET    Take 1 tablet (25 mg total) by mouth 3 (three) times daily as needed.    OMEGA-3 FATTY ACIDS/FISH OIL (FISH OIL-OMEGA-3 FATTY ACIDS) 300-1,000 MG CAPSULE    Take 1 capsule by mouth once daily.    ROSUVASTATIN (CRESTOR) 20 MG TABLET    Take 20 mg by mouth once daily.    TIOTROPIUM BROMIDE (SPIRIVA RESPIMAT) 2.5 MCG/ACTUATION INHALER    Inhale 2 puffs into the lungs once daily.    TRIAMCINOLONE ACETONIDE 0.1% (KENALOG) 0.1 % OINTMENT    Apply topically 2 (two) times daily.   Discontinued Medications    ROSUVASTATIN (CRESTOR) 40 MG TAB    Take 1 tablet (40 mg total) by mouth every evening.

## 2022-05-06 ENCOUNTER — PATIENT MESSAGE (OUTPATIENT)
Dept: INTERNAL MEDICINE | Facility: CLINIC | Age: 72
End: 2022-05-06
Payer: MEDICARE

## 2022-05-06 RX ORDER — ROSUVASTATIN CALCIUM 20 MG/1
20 TABLET, COATED ORAL DAILY
Qty: 90 TABLET | Refills: 3 | Status: SHIPPED | OUTPATIENT
Start: 2022-05-06 | End: 2023-04-03 | Stop reason: SDUPTHER

## 2022-05-13 ENCOUNTER — CLINICAL SUPPORT (OUTPATIENT)
Dept: CARDIOLOGY | Facility: HOSPITAL | Age: 72
End: 2022-05-13
Payer: MEDICARE

## 2022-05-13 DIAGNOSIS — Z95.0 PRESENCE OF CARDIAC PACEMAKER: ICD-10-CM

## 2022-05-13 PROCEDURE — 93296 REM INTERROG EVL PM/IDS: CPT | Performed by: INTERNAL MEDICINE

## 2022-05-13 PROCEDURE — 93294 REM INTERROG EVL PM/LDLS PM: CPT | Mod: S$GLB,,, | Performed by: INTERNAL MEDICINE

## 2022-05-13 PROCEDURE — 93294 CARDIAC DEVICE CHECK - REMOTE: ICD-10-PCS | Mod: S$GLB,,, | Performed by: INTERNAL MEDICINE

## 2022-05-16 ENCOUNTER — PATIENT MESSAGE (OUTPATIENT)
Dept: CARDIOLOGY | Facility: CLINIC | Age: 72
End: 2022-05-16
Payer: MEDICARE

## 2022-06-01 ENCOUNTER — OFFICE VISIT (OUTPATIENT)
Dept: DERMATOLOGY | Facility: CLINIC | Age: 72
End: 2022-06-01
Payer: MEDICARE

## 2022-06-01 ENCOUNTER — OFFICE VISIT (OUTPATIENT)
Dept: OPHTHALMOLOGY | Facility: CLINIC | Age: 72
End: 2022-06-01
Payer: MEDICARE

## 2022-06-01 ENCOUNTER — HOSPITAL ENCOUNTER (OUTPATIENT)
Dept: RADIOLOGY | Facility: HOSPITAL | Age: 72
Discharge: HOME | End: 2022-06-01
Attending: FAMILY MEDICINE
Payer: MEDICARE

## 2022-06-01 VITALS — BODY MASS INDEX: 39.51 KG/M2 | HEIGHT: 63 IN | WEIGHT: 223 LBS

## 2022-06-01 DIAGNOSIS — H52.03 HYPEROPIA WITH ASTIGMATISM AND PRESBYOPIA, BILATERAL: ICD-10-CM

## 2022-06-01 DIAGNOSIS — L40.9 PSORIASIS: ICD-10-CM

## 2022-06-01 DIAGNOSIS — Z12.31 ENCOUNTER FOR SCREENING MAMMOGRAM FOR MALIGNANT NEOPLASM OF BREAST: ICD-10-CM

## 2022-06-01 DIAGNOSIS — H25.13 NUCLEAR SCLEROSIS, BILATERAL: Primary | ICD-10-CM

## 2022-06-01 DIAGNOSIS — H52.4 HYPEROPIA WITH ASTIGMATISM AND PRESBYOPIA, BILATERAL: ICD-10-CM

## 2022-06-01 DIAGNOSIS — D22.9 MULTIPLE BENIGN NEVI: ICD-10-CM

## 2022-06-01 DIAGNOSIS — L57.0 ACTINIC KERATOSES: ICD-10-CM

## 2022-06-01 DIAGNOSIS — H52.203 HYPEROPIA WITH ASTIGMATISM AND PRESBYOPIA, BILATERAL: ICD-10-CM

## 2022-06-01 DIAGNOSIS — Z85.828 HISTORY OF NONMELANOMA SKIN CANCER: Primary | ICD-10-CM

## 2022-06-01 DIAGNOSIS — L81.4 SOLAR LENTIGO: ICD-10-CM

## 2022-06-01 DIAGNOSIS — H25.043 POSTERIOR SUBCAPSULAR AGE-RELATED CATARACT OF BOTH EYES: ICD-10-CM

## 2022-06-01 DIAGNOSIS — L82.1 SEBORRHEIC KERATOSES: ICD-10-CM

## 2022-06-01 DIAGNOSIS — H25.013 CORTICAL AGE-RELATED CATARACT OF BOTH EYES: ICD-10-CM

## 2022-06-01 DIAGNOSIS — D18.00 HEMANGIOMA, UNSPECIFIED SITE: ICD-10-CM

## 2022-06-01 PROCEDURE — 77063 BREAST TOMOSYNTHESIS BI: CPT | Mod: 26,,, | Performed by: RADIOLOGY

## 2022-06-01 PROCEDURE — 92015 PR REFRACTION: ICD-10-PCS | Mod: S$GLB,,, | Performed by: OPTOMETRIST

## 2022-06-01 PROCEDURE — 92004 PR EYE EXAM, NEW PATIENT,COMPREHESV: ICD-10-PCS | Mod: S$GLB,,, | Performed by: OPTOMETRIST

## 2022-06-01 PROCEDURE — 99999 PR PBB SHADOW E&M-EST. PATIENT-LVL II: ICD-10-PCS | Mod: PBBFAC,,, | Performed by: OPTOMETRIST

## 2022-06-01 PROCEDURE — 1160F RVW MEDS BY RX/DR IN RCRD: CPT | Mod: CPTII,S$GLB,, | Performed by: STUDENT IN AN ORGANIZED HEALTH CARE EDUCATION/TRAINING PROGRAM

## 2022-06-01 PROCEDURE — 77067 MAMMO DIGITAL SCREENING BILAT WITH TOMO: ICD-10-PCS | Mod: 26,,, | Performed by: RADIOLOGY

## 2022-06-01 PROCEDURE — 3288F FALL RISK ASSESSMENT DOCD: CPT | Mod: CPTII,S$GLB,, | Performed by: STUDENT IN AN ORGANIZED HEALTH CARE EDUCATION/TRAINING PROGRAM

## 2022-06-01 PROCEDURE — 77067 SCR MAMMO BI INCL CAD: CPT | Mod: 26,,, | Performed by: RADIOLOGY

## 2022-06-01 PROCEDURE — 4010F ACE/ARB THERAPY RXD/TAKEN: CPT | Mod: CPTII,S$GLB,, | Performed by: OPTOMETRIST

## 2022-06-01 PROCEDURE — 1160F PR REVIEW ALL MEDS BY PRESCRIBER/CLIN PHARMACIST DOCUMENTED: ICD-10-PCS | Mod: CPTII,S$GLB,, | Performed by: STUDENT IN AN ORGANIZED HEALTH CARE EDUCATION/TRAINING PROGRAM

## 2022-06-01 PROCEDURE — 1101F PR PT FALLS ASSESS DOC 0-1 FALLS W/OUT INJ PAST YR: ICD-10-PCS | Mod: CPTII,S$GLB,, | Performed by: STUDENT IN AN ORGANIZED HEALTH CARE EDUCATION/TRAINING PROGRAM

## 2022-06-01 PROCEDURE — 1159F MED LIST DOCD IN RCRD: CPT | Mod: CPTII,S$GLB,, | Performed by: STUDENT IN AN ORGANIZED HEALTH CARE EDUCATION/TRAINING PROGRAM

## 2022-06-01 PROCEDURE — 1126F AMNT PAIN NOTED NONE PRSNT: CPT | Mod: CPTII,S$GLB,, | Performed by: STUDENT IN AN ORGANIZED HEALTH CARE EDUCATION/TRAINING PROGRAM

## 2022-06-01 PROCEDURE — 17003 DESTRUCTION, PREMALIGNANT LESIONS; SECOND THROUGH 14 LESIONS: ICD-10-PCS | Mod: S$GLB,,, | Performed by: STUDENT IN AN ORGANIZED HEALTH CARE EDUCATION/TRAINING PROGRAM

## 2022-06-01 PROCEDURE — 17000 PR DESTRUCTION(LASER SURGERY,CRYOSURGERY,CHEMOSURGERY),PREMALIGNANT LESIONS,FIRST LESION: ICD-10-PCS | Mod: S$GLB,,, | Performed by: STUDENT IN AN ORGANIZED HEALTH CARE EDUCATION/TRAINING PROGRAM

## 2022-06-01 PROCEDURE — 1159F PR MEDICATION LIST DOCUMENTED IN MEDICAL RECORD: ICD-10-PCS | Mod: CPTII,S$GLB,, | Performed by: STUDENT IN AN ORGANIZED HEALTH CARE EDUCATION/TRAINING PROGRAM

## 2022-06-01 PROCEDURE — 1160F RVW MEDS BY RX/DR IN RCRD: CPT | Mod: CPTII,S$GLB,, | Performed by: OPTOMETRIST

## 2022-06-01 PROCEDURE — 1101F PT FALLS ASSESS-DOCD LE1/YR: CPT | Mod: CPTII,S$GLB,, | Performed by: STUDENT IN AN ORGANIZED HEALTH CARE EDUCATION/TRAINING PROGRAM

## 2022-06-01 PROCEDURE — 1160F PR REVIEW ALL MEDS BY PRESCRIBER/CLIN PHARMACIST DOCUMENTED: ICD-10-PCS | Mod: CPTII,S$GLB,, | Performed by: OPTOMETRIST

## 2022-06-01 PROCEDURE — 1159F MED LIST DOCD IN RCRD: CPT | Mod: CPTII,S$GLB,, | Performed by: OPTOMETRIST

## 2022-06-01 PROCEDURE — 17003 DESTRUCT PREMALG LES 2-14: CPT | Mod: S$GLB,,, | Performed by: STUDENT IN AN ORGANIZED HEALTH CARE EDUCATION/TRAINING PROGRAM

## 2022-06-01 PROCEDURE — 99999 PR PBB SHADOW E&M-EST. PATIENT-LVL III: ICD-10-PCS | Mod: PBBFAC,,, | Performed by: STUDENT IN AN ORGANIZED HEALTH CARE EDUCATION/TRAINING PROGRAM

## 2022-06-01 PROCEDURE — 3008F PR BODY MASS INDEX (BMI) DOCUMENTED: ICD-10-PCS | Mod: CPTII,S$GLB,, | Performed by: STUDENT IN AN ORGANIZED HEALTH CARE EDUCATION/TRAINING PROGRAM

## 2022-06-01 PROCEDURE — 4010F ACE/ARB THERAPY RXD/TAKEN: CPT | Mod: CPTII,S$GLB,, | Performed by: STUDENT IN AN ORGANIZED HEALTH CARE EDUCATION/TRAINING PROGRAM

## 2022-06-01 PROCEDURE — 1126F PR PAIN SEVERITY QUANTIFIED, NO PAIN PRESENT: ICD-10-PCS | Mod: CPTII,S$GLB,, | Performed by: STUDENT IN AN ORGANIZED HEALTH CARE EDUCATION/TRAINING PROGRAM

## 2022-06-01 PROCEDURE — 99999 PR PBB SHADOW E&M-EST. PATIENT-LVL III: CPT | Mod: PBBFAC,,, | Performed by: STUDENT IN AN ORGANIZED HEALTH CARE EDUCATION/TRAINING PROGRAM

## 2022-06-01 PROCEDURE — 17000 DESTRUCT PREMALG LESION: CPT | Mod: S$GLB,,, | Performed by: STUDENT IN AN ORGANIZED HEALTH CARE EDUCATION/TRAINING PROGRAM

## 2022-06-01 PROCEDURE — 99214 OFFICE O/P EST MOD 30 MIN: CPT | Mod: 25,S$GLB,, | Performed by: STUDENT IN AN ORGANIZED HEALTH CARE EDUCATION/TRAINING PROGRAM

## 2022-06-01 PROCEDURE — 1159F PR MEDICATION LIST DOCUMENTED IN MEDICAL RECORD: ICD-10-PCS | Mod: CPTII,S$GLB,, | Performed by: OPTOMETRIST

## 2022-06-01 PROCEDURE — 92004 COMPRE OPH EXAM NEW PT 1/>: CPT | Mod: S$GLB,,, | Performed by: OPTOMETRIST

## 2022-06-01 PROCEDURE — 99214 PR OFFICE/OUTPT VISIT, EST, LEVL IV, 30-39 MIN: ICD-10-PCS | Mod: 25,S$GLB,, | Performed by: STUDENT IN AN ORGANIZED HEALTH CARE EDUCATION/TRAINING PROGRAM

## 2022-06-01 PROCEDURE — 3288F PR FALLS RISK ASSESSMENT DOCUMENTED: ICD-10-PCS | Mod: CPTII,S$GLB,, | Performed by: STUDENT IN AN ORGANIZED HEALTH CARE EDUCATION/TRAINING PROGRAM

## 2022-06-01 PROCEDURE — 99999 PR PBB SHADOW E&M-EST. PATIENT-LVL II: CPT | Mod: PBBFAC,,, | Performed by: OPTOMETRIST

## 2022-06-01 PROCEDURE — 3008F BODY MASS INDEX DOCD: CPT | Mod: CPTII,S$GLB,, | Performed by: STUDENT IN AN ORGANIZED HEALTH CARE EDUCATION/TRAINING PROGRAM

## 2022-06-01 PROCEDURE — 4010F PR ACE/ARB THEARPY RXD/TAKEN: ICD-10-PCS | Mod: CPTII,S$GLB,, | Performed by: STUDENT IN AN ORGANIZED HEALTH CARE EDUCATION/TRAINING PROGRAM

## 2022-06-01 PROCEDURE — 77063 BREAST TOMOSYNTHESIS BI: CPT | Mod: TC

## 2022-06-01 PROCEDURE — 77063 MAMMO DIGITAL SCREENING BILAT WITH TOMO: ICD-10-PCS | Mod: 26,,, | Performed by: RADIOLOGY

## 2022-06-01 PROCEDURE — 4010F PR ACE/ARB THEARPY RXD/TAKEN: ICD-10-PCS | Mod: CPTII,S$GLB,, | Performed by: OPTOMETRIST

## 2022-06-01 PROCEDURE — 92015 DETERMINE REFRACTIVE STATE: CPT | Mod: S$GLB,,, | Performed by: OPTOMETRIST

## 2022-06-01 RX ORDER — TRIAMCINOLONE ACETONIDE 1 MG/G
CREAM TOPICAL 2 TIMES DAILY
Qty: 454 G | Refills: 1 | Status: SHIPPED | OUTPATIENT
Start: 2022-06-01 | End: 2023-11-10 | Stop reason: SDUPTHER

## 2022-06-01 NOTE — PROGRESS NOTES
Subjective:       Patient ID:  Antonio Urena is a 72 y.o. female who presents for   Chief Complaint   Patient presents with    Skin Check     F/u for annual skin check, full body, prev hx of skin cancer, 0 pain,     Patient with hx of BCCs. Patient has had long hx of heavy sun exposure. This is a high risk patient here to check for the development of new lesions.        Review of Systems   Skin: Positive for daily sunscreen use (SPF 30), activity-related sunscreen use and wears hat. Negative for itching, rash and recent sunburn.   Hematologic/Lymphatic: Bruises/bleeds easily.        Objective:    Physical Exam   Constitutional: She appears well-developed and well-nourished. No distress.   Neurological: She is alert and oriented to person, place, and time. She is not disoriented.   Psychiatric: She has a normal mood and affect.   Skin:   Areas Examined (abnormalities noted in diagram):   Scalp / Hair Palpated and Inspected  Head / Face Inspection Performed  Neck Inspection Performed  Chest / Axilla Inspection Performed  Abdomen Inspection Performed  Genitals / Buttocks / Groin Inspection Performed  Back Inspection Performed  RUE Inspected  LUE Inspection Performed  RLE Inspected  LLE Inspection Performed  Nails and Digits Inspection Performed                   Diagram Legend     Erythematous scaling macule/papule c/w actinic keratosis       Vascular papule c/w angioma      Pigmented verrucoid papule/plaque c/w seborrheic keratosis      Yellow umbilicated papule c/w sebaceous hyperplasia      Irregularly shaped tan macule c/w lentigo     1-2 mm smooth white papules consistent with Milia      Movable subcutaneous cyst with punctum c/w epidermal inclusion cyst      Subcutaneous movable cyst c/w pilar cyst      Firm pink to brown papule c/w dermatofibroma      Pedunculated fleshy papule(s) c/w skin tag(s)      Evenly pigmented macule c/w junctional nevus     Mildly variegated pigmented, slightly irregular-bordered  macule c/w mildly atypical nevus      Flesh colored to evenly pigmented papule c/w intradermal nevus       Pink pearly papule/plaque c/w basal cell carcinoma      Erythematous hyperkeratotic cursted plaque c/w SCC      Surgical scar with no sign of skin cancer recurrence      Open and closed comedones      Inflammatory papules and pustules      Verrucoid papule consistent consistent with wart     Erythematous eczematous patches and plaques     Dystrophic onycholytic nail with subungual debris c/w onychomycosis     Umbilicated papule    Erythematous-base heme-crusted tan verrucoid plaque consistent with inflamed seborrheic keratosis     Erythematous Silvery Scaling Plaque c/w Psoriasis     See annotation      Assessment / Plan:        History of nonmelanoma skin cancer  Area of previous nonmelanoma examined. Site well healed with no signs of recurrence.  Total body skin examination performed today including at least 12 points as noted in physical examination. No lesions suspicious for malignancy noted.    Screening exam for skin cancer  - Continue sun protection    Hemangioma, unspecified site  This is a benign vascular lesion. Reassurance given. No treatment required.     Solar lentigo  This is a benign hyperpigmented sun induced lesion. Daily sun protection will reduce the number of new lesions. Treatment of these benign lesions are considered cosmetic.    Seborrheic keratoses  These are benign inherited growths without a malignant potential. Reassurance given to patient. No treatment is necessary.     Multiple benign nevi  Discussed ABCDE's of nevi.  Monitor for new mole or moles that are becoming bigger, darker, irritated, or developing irregular borders. Brochure provided.    Psoriasis  - Rx triamcinolone 0.1% cream    Actinic keratoses  Cryosurgery Procedure Note    Verbal consent from the patient is obtained including, but not limited to, risk of hypopigmentation/hyperpigmentation, scar, recurrence of lesion.  The patient is aware of the precancerous quality and need for treatment of these lesions. Liquid nitrogen cryosurgery is applied to the 2 actinic keratoses, as detailed in the physical exam, to produce a freeze injury. The patient is aware that blisters may form and is instructed on wound care with gentle cleansing and use of vaseline ointment to keep moist until healed. The patient is supplied a handout on cryosurgery and is instructed to call if lesions do not completely resolve.      LOS NUMBER AND COMPLEXITY OF PROBLEMS    COMPLEXITY OF DATA RISK TOTAL TIME (m)   93528  80574 [] 1 self-limited or minor problem [x] Minimal to none [] No treatment recommended or patient to monitor 15-29  10-19   79577  96640 Low  [] 2 or > self limited or minor problems  [x] 1 stable chronic illness  [] 1 acute, uncomplicated illness or injury Limited (2)  [] Prior external notes from each unique source  [] Review result of each unique test  [] Order each unique test []  Low  OTC medications, minor skin biopsy 30-44  20-29   02608  99535 Moderate  []  1 or > chronic illness with progression, exacerbation or SE of treatment  [x]  2 or more stable chronic illnesses  []  1 acute illness with systemic symptoms  []  1 acute complicated injury  []  1 undiagnosed new problem with uncertain prognosis Moderate (1/3 below)  []  3 or more data items        *Now includes assessment requiring independent historian  []  Independent interpretation of a test  []  Discuss management/test with another provider Moderate  [x]  Prescription drug mgmt  []  Minor surgery with risk discussed  []  Mgmt limited by social determinates 45-59  30-39   56557  55848 High  []  1 or more chronic illness with severe exacerbation, progression or SE of treatment  []  1 acute or chronic illness/injury that poses a threat to life or bodily function Extensive (2/3 below)  []  3 or more data items        *Now includes assessment requiring independent historian.  []   Independent interpretation of a test  []  Discuss management/test with another provider High  []  Major surgery with risk discussed  []  Drug therapy requiring intensive monitoring for toxicity  []  Hospitalization  []  Decision for DNR 60-74  40-54              No follow-ups on file.

## 2022-06-01 NOTE — PROGRESS NOTES
HPI     Annual Exam     Comments: NP annual              Comments     Vision changes since last eye exam?: States that her OD has cataract in   it and wants to have surgery on it.    Any eye pain today: no    Other ocular symptoms: occasional itching from dry eyes    Interested in contact lens fitting today? no                     Last edited by Yani Graff on 6/1/2022  9:49 AM. (History)            Assessment /Plan     For exam results, see Encounter Report.    Nuclear sclerosis, bilateral  Cortical age-related cataract of both eyes  Posterior subcapsular age-related cataract of both eyes  Cataract accounts for vision change. New Rx for glasses/contacts will not improve vision.   Refer to ophthalmologist for cataract evaluation.     Hyperopia with astigmatism and presbyopia, bilateral  Hold spec rx until after cat eval    RTC for Cataract Evaluation with MGM or PRN if any problems.   Discussed above and answered questions.

## 2022-06-01 NOTE — PATIENT INSTRUCTIONS

## 2022-06-14 ENCOUNTER — OFFICE VISIT (OUTPATIENT)
Dept: OPHTHALMOLOGY | Facility: CLINIC | Age: 72
End: 2022-06-14
Payer: MEDICARE

## 2022-06-14 DIAGNOSIS — H25.041 POSTERIOR SUBCAPSULAR POLAR SENILE CATARACT, RIGHT: Primary | ICD-10-CM

## 2022-06-14 DIAGNOSIS — H25.042 POSTERIOR SUBCAPSULAR POLAR AGE-RELATED CATARACT OF LEFT EYE: ICD-10-CM

## 2022-06-14 PROCEDURE — 1160F RVW MEDS BY RX/DR IN RCRD: CPT | Mod: CPTII,S$GLB,, | Performed by: OPHTHALMOLOGY

## 2022-06-14 PROCEDURE — 99999 PR PBB SHADOW E&M-EST. PATIENT-LVL III: CPT | Mod: PBBFAC,,, | Performed by: OPHTHALMOLOGY

## 2022-06-14 PROCEDURE — 1159F MED LIST DOCD IN RCRD: CPT | Mod: CPTII,S$GLB,, | Performed by: OPHTHALMOLOGY

## 2022-06-14 PROCEDURE — 92136 OPHTHALMIC BIOMETRY: CPT | Mod: RT,S$GLB,, | Performed by: OPHTHALMOLOGY

## 2022-06-14 PROCEDURE — 4010F ACE/ARB THERAPY RXD/TAKEN: CPT | Mod: CPTII,S$GLB,, | Performed by: OPHTHALMOLOGY

## 2022-06-14 PROCEDURE — 99203 PR OFFICE/OUTPT VISIT, NEW, LEVL III, 30-44 MIN: ICD-10-PCS | Mod: S$GLB,,, | Performed by: OPHTHALMOLOGY

## 2022-06-14 PROCEDURE — 99203 OFFICE O/P NEW LOW 30 MIN: CPT | Mod: S$GLB,,, | Performed by: OPHTHALMOLOGY

## 2022-06-14 PROCEDURE — 99999 PR PBB SHADOW E&M-EST. PATIENT-LVL III: ICD-10-PCS | Mod: PBBFAC,,, | Performed by: OPHTHALMOLOGY

## 2022-06-14 PROCEDURE — 1160F PR REVIEW ALL MEDS BY PRESCRIBER/CLIN PHARMACIST DOCUMENTED: ICD-10-PCS | Mod: CPTII,S$GLB,, | Performed by: OPHTHALMOLOGY

## 2022-06-14 PROCEDURE — 1159F PR MEDICATION LIST DOCUMENTED IN MEDICAL RECORD: ICD-10-PCS | Mod: CPTII,S$GLB,, | Performed by: OPHTHALMOLOGY

## 2022-06-14 PROCEDURE — 4010F PR ACE/ARB THEARPY RXD/TAKEN: ICD-10-PCS | Mod: CPTII,S$GLB,, | Performed by: OPHTHALMOLOGY

## 2022-06-14 PROCEDURE — 92136 IOL MASTER - OD - RIGHT EYE: ICD-10-PCS | Mod: RT,S$GLB,, | Performed by: OPHTHALMOLOGY

## 2022-06-14 RX ORDER — PREDNISOLONE ACETATE 10 MG/ML
1 SUSPENSION/ DROPS OPHTHALMIC 4 TIMES DAILY
Qty: 5 ML | Refills: 1 | Status: ON HOLD | OUTPATIENT
Start: 2022-06-14 | End: 2022-10-10 | Stop reason: ALTCHOICE

## 2022-06-14 NOTE — PROGRESS NOTES
HPI     Cataract     Comments:  Cat Eval per [facility]    Patient states VA has decreased at all ranges OS>OD, images appear dull   and cloudy along with severe glare sensitivity and patient rarely drives   do to cloudy VA, patient is on  blood thinners and has a pace maker.              Comments     [facility] REFERRAL       1. CATS OU      **pace maker, on eliquis**            Last edited by Kellen Mcgee, Patient Care Assistant on 6/14/2022  9:19   AM. (History)            Assessment /Plan     For exam results, see Encounter Report.      ICD-10-CM ICD-9-CM    1. Posterior subcapsular polar senile cataract, right  H25.041 366.14 Visually Significant Cataract OD > OS  Patient reports decreased vision consistent with the clinical amount of lenticular opacity, which reaches the level of visual significance and affects activities of daily living including reading and glare. Risks, benefits, and alternatives to cataract surgery were discussed.   The pt expressed a desire to proceed with surgery with the potential for some reasonable degree of visual improvement.    Discussed IOL options and refractive outcomes for this patient.    Topography reviewed: yes, mild irregularity OU  History of Refractive surgery: no     Phaco right eye, +21.0 WF  Topical (if she can d/c Eliquis could be Block)  monofocal IOL.  Will aim for distance  Prescriptions sent for preoperative medications  Durezol QID OD   Anticoagulant status: currently on eliquis due to pace maker, sending message to Dr Altamirano for confirmation she can d/c.     Explained that patient may need glasses after surgery.  Discussed that vision may be limited by near/astigmatism    **pt denies toric lens**    Referral to [facility] for [facility]hthalmic [facility]      Schedule post op visit #2 with Dr Cueva   2. Posterior subcapsular polar age-related cataract of left eye  H25.042 366.14 Monitor

## 2022-06-14 NOTE — Clinical Note
Reza,   Is this patient cleared to d/c Eliquis use prior to cataract surgery? We are planning on scheduling her in the upcoming weeks.  Thank you! Bang Cueva

## 2022-06-20 ENCOUNTER — PATIENT MESSAGE (OUTPATIENT)
Dept: PULMONOLOGY | Facility: CLINIC | Age: 72
End: 2022-06-20
Payer: MEDICARE

## 2022-06-20 ENCOUNTER — PATIENT MESSAGE (OUTPATIENT)
Dept: INTERNAL MEDICINE | Facility: CLINIC | Age: 72
End: 2022-06-20
Payer: MEDICARE

## 2022-06-27 ENCOUNTER — TELEPHONE (OUTPATIENT)
Dept: OPHTHALMOLOGY | Facility: CLINIC | Age: 72
End: 2022-06-27
Payer: MEDICARE

## 2022-06-27 NOTE — TELEPHONE ENCOUNTER
Spoke with patient informed her she is cleared to stop Eliquis 2 days prior to surgery by Dr. Ling.

## 2022-06-30 ENCOUNTER — DOCUMENTATION ONLY (OUTPATIENT)
Dept: OPHTHALMOLOGY | Facility: CLINIC | Age: 72
End: 2022-06-30
Payer: MEDICARE

## 2022-06-30 NOTE — PROGRESS NOTES
Short Stay Record    CC: Blurry Vision     Impression: Visually significant cataract with reasonable expectation for visual improvement Right Eye    Visual Acuity (Snellen - Linear)     Right Left   Dist cc 20/40 20/60 +1     Tonometry (Applanation, 9:26 AM)     Right Left   Pressure 19 19      Pupils     Pupils Dark Light Shape React APD   Right PERRL 3.5 3 Round Minimal None   Left PERRL 3.5 3 Round Minimal None     Visual Fields     Right Left    Full Full     Extraocular Movement     Right Left    Full Full           Glare Testing (Room Lighting)     Medium   Right 20/LP   Left 20/LP      Right Left   External Normal Normal        Right Left   Lids/Lashes 1+ Blepharitis 1+ Blepharitis   Conjunctiva/Sclera White and quiet White and quiet   Cornea Clear Clear   Anterior Chamber Deep and quiet Deep and quiet   Iris Round and reactive Round and reactive   Lens 2+ to 3+ Nuclear sclerosis, 2+ Cortical spokes, Vacuoles, 2+ Posterior subcapsular cataract 2+ to 3+ Nuclear sclerosis, 1+ Cortical spokes, 3+ Posterior subcapsular cataract, Vacuoles      Right Left   Posterior Vitreous PVD PVD   Disc Normal Normal   C/D Ratio 0.2 0.3   Macula Normal Normal   Vessels Normal Normal   Periphery Normal Normal       Wearing Rx     Sphere Cylinder Axis Add   Right +0.75 +0.75 048 +2.75   Left +1.00 +1.25 146 +2.75   Age: 6yrs   Type: PAL     Manifest Refraction     Sphere Cylinder [place] Dist [place]   Right [place] +0.75 045 20/70   Left +1.00 +1.25 146 20/80           Planned Procedure:     Topography reviewed: yes, mild irregularity OU  History of Refractive surgery: no      Phaco right eye, +21.0 WF  Pt only cleared to D/C Eliquis x 2 days prior Cardio   Durezol     Topical   Will aim for distance  Prescriptions sent for preoperative medications  Durezol QID OD     (if she can d/c Eliquis could be Block)  monofocal IOL.  Anticoagulant status: currently on eliquis due to pace maker, sending message to Dr Altamirano for confirmation she  can d/c.           Lens Selection OD verified _____           Previous IOL OS _____    Patient cleared for ophthalmic surgery.     Discharge Summary:    Admitting Diagnosis: Nuclear sclerotic cataract /  Posterior Subcapsular /  OD    Discharge Diagnosis: Pseudophakia OD    Procedure: Phacoemulsification of cataract with intraocular lens implant OD    Complications: None  Discharge Condition: Stable    Discharge instructions: Follow post-operative discharge instruction sheet given by provider.    Follow-up: Return to clinic next day or as scheduled.       HPI:  Antonio Urena is a 72 y.o. female who presents for evaluation prior to ophthalmic surgery, left eye. Patient reports of blurred vision at distance and near with and without correction. Patient reports of glare at night reducing function and safety, and complains of needed additional light to read.     Past Medical History:   Diagnosis Date    A-fib     A-fib     Pace maker put in 5/12/20    Allergy     Anxiety     Asthma     cough variant    Cancer 2018    basal cell c    Colon polyp     COPD (chronic obstructive pulmonary disease)     Hypertension      Past Surgical History:   Procedure Laterality Date    ABLATION Bilateral 6/3/2021    Procedure: ABLATION/CTI;  Surgeon: Juan Lazo MD;  Location: [facility] CATH LAB;  Service: Cardiology;  Laterality: Bilateral;  (RFA of the CTI,    COVID-19, MRNA, LN-S, PF (Pfizer) 3/20/2021, 2/27/2021   later date RA lead extraction, RA lead addition    HYSTERECTOMY      IMPLANTATION OF BIVENTRICULAR HEART PACEMAKER  05/12/2020    Loop recorder also in but not working right now    PHLEBOGRAPHY  6/8/2021    Procedure: Venogram;  Surgeon: Juan Lazo MD;  Location: [facility] CATH LAB;  Service: Cardiology;;    REVISION OF PROCEDURE INVOLVING PACEMAKER LEAD N/A 6/8/2021    Procedure: REVISION, ELECTRODE LEAD, CARDIAC PACEMAKER;  Surgeon: Juan Lazo MD;  Location: [facility] CATH LAB;  Service:  Cardiology;  Laterality: N/A;    REVISION OF PROCEDURE INVOLVING PACEMAKER LEAD Bilateral 7/15/2021    Procedure: REVISION, ELECTRODE LEAD, CARDIAC PACEMAKER/A lead;  Surgeon: Juan Lazo MD;  Location: [facility] CATH LAB;  Service: Cardiology;  Laterality: Bilateral;  biotronik device    REVISION OF SKIN POCKET FOR PACEMAKER  6/8/2021    Procedure: Revision, Skin Pocket, For Cardiac Pacemaker;  Surgeon: Juan Lazo MD;  Location: [facility] CATH LAB;  Service: Cardiology;;       Review of patient's allergies indicates:   Allergen Reactions    Lasix [furosemide]      Within 1 year of taking pt will get severe knee pain in both knees.         Current Outpatient Medications:     cetirizine (ZYRTEC) 10 MG tablet, Take 10 mg by mouth once daily. , Disp: , Rfl:     COVID-19 vac, dulce,Pfizer,,PF, (PFIZER COVID-19 DULCE VACCN,PF,) 30 mcg/0.3 mL injection, Inject into the muscle., Disp: 0.3 mL, Rfl: 0    DILT- mg CDCR, TAKE 1 CAPSULE EVERY DAY, Disp: 90 capsule, Rfl: 3    ELIQUIS 5 mg Tab, TAKE 1 TABLET TWICE DAILY, Disp: 180 tablet, Rfl: 3    flecainide (TAMBOCOR) 100 MG Tab, TAKE 1 TABLET EVERY 12 HOURS, Disp: 180 tablet, Rfl: 3    fluticasone-salmeterol diskus inhaler 250-50 mcg, Inhale 1 puff into the lungs 2 (two) times daily. Controller.Wash out mouth after using., Disp: 180 each, Rfl: 3    levalbuterol (XOPENEX HFA) 45 mcg/actuation inhaler, Inhale 2 puffs into the lungs every 6 (six) hours as needed for Wheezing or Shortness of Breath., Disp: 45 g, Rfl: 3    lisinopriL (PRINIVIL,ZESTRIL) 20 MG tablet, Take 1.5 tablets (30 mg total) by mouth once daily., Disp: 135 tablet, Rfl: 1    LORazepam (ATIVAN) 0.5 MG tablet, Take 0.5 mg by mouth daily as needed for Anxiety., Disp: , Rfl:     meclizine (ANTIVERT) 25 mg tablet, Take 1 tablet (25 mg total) by mouth 3 (three) times daily as needed., Disp: 20 tablet, Rfl: 0    omega-3 fatty acids/fish oil (FISH OIL-OMEGA-3 FATTY ACIDS) 300-1,000 mg  capsule, Take 1 capsule by mouth once daily., Disp: , Rfl:     prednisoLONE acetate (PRED FORTE) 1 % DrpS, Place 1 drop into the right eye 4 (four) times daily., Disp: 5 mL, Rfl: 1    rosuvastatin (CRESTOR) 20 MG tablet, Take 1 tablet (20 mg total) by mouth once daily., Disp: 90 tablet, Rfl: 3    tiotropium bromide (SPIRIVA RESPIMAT) 2.5 mcg/actuation inhaler, Inhale 2 puffs into the lungs once daily., Disp: 12 g, Rfl: 3    triamcinolone acetonide 0.1% (KENALOG) 0.1 % cream, Apply topically 2 (two) times daily. Use up to 2 weeks at a time, Disp: 454 g, Rfl: 1    Review of Systems:  A comprehensive review of systems was negative.    Physical Exam:  General Appearance:    A&Ox3, no distress, appears stated age   Head:    Normocephalic, without obvious abnormality, atraumatic   Eyes:    PERRL, EOM's intact   Back:     Symmetric, no curvature   Lungs:     Respirations unlabored   Chest Wall:    No tenderness or deformity    Heart:  Abdomen:  Extremities:  Skin:    S1 and S2 present    Soft, non-tender    Extremities normal, atraumatic    Skin color, texture, turgor normal

## 2022-07-13 ENCOUNTER — OFFICE VISIT (OUTPATIENT)
Dept: CARDIOLOGY | Facility: CLINIC | Age: 72
End: 2022-07-13
Payer: MEDICARE

## 2022-07-13 DIAGNOSIS — I48.0 PAF (PAROXYSMAL ATRIAL FIBRILLATION): ICD-10-CM

## 2022-07-13 DIAGNOSIS — Z95.0 CARDIAC PACEMAKER IN SITU: ICD-10-CM

## 2022-07-13 DIAGNOSIS — I49.5 SSS (SICK SINUS SYNDROME): ICD-10-CM

## 2022-07-13 DIAGNOSIS — Z79.01 CHRONIC ANTICOAGULATION: ICD-10-CM

## 2022-07-13 DIAGNOSIS — I10 ESSENTIAL HYPERTENSION: ICD-10-CM

## 2022-07-13 DIAGNOSIS — I48.3 TYPICAL ATRIAL FLUTTER: ICD-10-CM

## 2022-07-13 DIAGNOSIS — Z95.0 PRESENCE OF CARDIAC PACEMAKER: Primary | ICD-10-CM

## 2022-07-13 PROCEDURE — 1159F MED LIST DOCD IN RCRD: CPT | Mod: CPTII,95,, | Performed by: INTERNAL MEDICINE

## 2022-07-13 PROCEDURE — 4010F PR ACE/ARB THEARPY RXD/TAKEN: ICD-10-PCS | Mod: CPTII,95,, | Performed by: INTERNAL MEDICINE

## 2022-07-13 PROCEDURE — 1160F PR REVIEW ALL MEDS BY PRESCRIBER/CLIN PHARMACIST DOCUMENTED: ICD-10-PCS | Mod: CPTII,95,, | Performed by: INTERNAL MEDICINE

## 2022-07-13 PROCEDURE — 1160F RVW MEDS BY RX/DR IN RCRD: CPT | Mod: CPTII,95,, | Performed by: INTERNAL MEDICINE

## 2022-07-13 PROCEDURE — 4010F ACE/ARB THERAPY RXD/TAKEN: CPT | Mod: CPTII,95,, | Performed by: INTERNAL MEDICINE

## 2022-07-13 PROCEDURE — 99214 OFFICE O/P EST MOD 30 MIN: CPT | Mod: 95,,, | Performed by: INTERNAL MEDICINE

## 2022-07-13 PROCEDURE — 99214 PR OFFICE/OUTPT VISIT, EST, LEVL IV, 30-39 MIN: ICD-10-PCS | Mod: 95,,, | Performed by: INTERNAL MEDICINE

## 2022-07-13 PROCEDURE — 1159F PR MEDICATION LIST DOCUMENTED IN MEDICAL RECORD: ICD-10-PCS | Mod: CPTII,95,, | Performed by: INTERNAL MEDICINE

## 2022-07-13 NOTE — PROGRESS NOTES
Subjective:   Patient ID:  Antonio Urena is a 72 y.o. female who presents for follow-up of No chief complaint on file.  Patient denies chest pain, angina or symptoms associated with anginal equivalent.  Overall today the patient is feeling generally well heart rate blood pressure stable is no evidence of postural changes blood pressure 120/70 standing sitting and lying down.  Overall doing well pulse is 50 beats per minute heart rate is stable there was no other abnormalities.  Patient feeling well physical exam is normal today.     Overall patient feels well this time.  No acute symptoms.  Intermittent palpitations are very rare this time heart rate blood pressure stable and she is doing well new medication diltiazem otherwise stable this moment.  NO FOCAL CNS SYMPTOMS OR SIGNS TO SUGGEST TIA OR STROKE  NO ANGINA OR EQUIVALENT  NO UNUSUAL CASTELLON. NO ORTHOPNEA OR PND  NO PALPITATIONS  NO NEAR SYNCOPE OR SYNCOPE  NO EDEMA. NO CALVE TENDERNESS  This finds patient feeling well stable blood pressure medications otherwise doing well no acute changes.  No heart rate or blood pressure changes at this time.  Last device check on 12/22/2021 was stable.  No acute changes patient is having no arrhythmias noted.          This virtual visit finds patient feeling well blood pressure on her own reveals blood pressure 122/70 pulse 70 oxygenation 98.  Patient has had no exertional symptoms chest pain shortness of breath sporadic intermittent arrhythmias however doing well and continues on all medications including apixaban for anticoagulation prevention for cardiac risk of stroke.  Patient is otherwise stable all medications all medicines reviewed renewed doing well this time device check in June was stable with no arrhythmias.  Good battery life.  Otherwise doing well.      Review of Systems   Constitutional: Negative for chills, diaphoresis, night sweats, weight gain and weight loss.   HENT: Negative for congestion, hoarse voice,  sore throat and stridor.    Eyes: Negative for double vision and pain.   Cardiovascular: Positive for palpitations. Negative for chest pain, claudication, cyanosis, dyspnea on exertion, irregular heartbeat, leg swelling, near-syncope, orthopnea, paroxysmal nocturnal dyspnea and syncope.   Respiratory: Negative for cough, hemoptysis, shortness of breath, sleep disturbances due to breathing, snoring, sputum production and wheezing.    Endocrine: Negative for cold intolerance, heat intolerance and polydipsia.   Hematologic/Lymphatic: Negative for bleeding problem. Does not bruise/bleed easily.   Skin: Negative for color change, dry skin and rash.   Musculoskeletal: Negative for joint swelling and muscle cramps.   Gastrointestinal: Negative for bloating, abdominal pain, constipation, diarrhea, dysphagia, melena, nausea and vomiting.   Genitourinary: Negative for flank pain and urgency.   Neurological: Negative for dizziness, focal weakness, headaches, light-headedness, loss of balance, seizures and weakness.   Psychiatric/Behavioral: Negative for altered mental status and memory loss. The patient is not nervous/anxious.      Family History   Problem Relation Age of Onset    Cancer Mother     Cancer Father     Cancer Brother      Past Medical History:   Diagnosis Date    A-fib     A-fib     Pace maker put in 20    Allergy     Anxiety     Asthma     cough variant    Cancer 2018    basal cell c    Colon polyp     COPD (chronic obstructive pulmonary disease)     Hypertension      Social History     Socioeconomic History    Marital status:    Tobacco Use    Smoking status: Former Smoker     Packs/day: 0.25     Years: 1.00     Pack years: 0.25     Quit date: 1985     Years since quittin.5    Smokeless tobacco: Former User    Tobacco comment: Quit 30 - 40 years ago   Substance and Sexual Activity    Alcohol use: Yes     Comment: Wine Occasionally     Drug use: Never    Sexual activity:  Not Currently     Social Determinants of Health     Financial Resource Strain: Low Risk     Difficulty of Paying Living Expenses: Not very hard   Food Insecurity: Food Insecurity Present    Worried About Running Out of Food in the Last Year: Sometimes true    Ran Out of Food in the Last Year: Never true   Transportation Needs: Unknown    Lack of Transportation (Non-Medical): No   Social Connections: Unknown    Frequency of Communication with Friends and Family: More than three times a week    Frequency of Social Gatherings with Friends and Family: Twice a week     Current Outpatient Medications on File Prior to Visit   Medication Sig Dispense Refill    cetirizine (ZYRTEC) 10 MG tablet Take 10 mg by mouth once daily.       COVID-19 vac, dulce,Pfizer,,PF, (PFIZER COVID-19 DULCE VACCN,PF,) 30 mcg/0.3 mL injection Inject into the muscle. 0.3 mL 0    DILT- mg CDCR TAKE 1 CAPSULE EVERY DAY 90 capsule 3    ELIQUIS 5 mg Tab TAKE 1 TABLET TWICE DAILY 180 tablet 3    flecainide (TAMBOCOR) 100 MG Tab TAKE 1 TABLET EVERY 12 HOURS 180 tablet 3    fluticasone-salmeterol diskus inhaler 250-50 mcg Inhale 1 puff into the lungs 2 (two) times daily. Controller.Wash out mouth after using. 180 each 3    levalbuterol (XOPENEX HFA) 45 mcg/actuation inhaler Inhale 2 puffs into the lungs every 6 (six) hours as needed for Wheezing or Shortness of Breath. 45 g 3    lisinopriL (PRINIVIL,ZESTRIL) 20 MG tablet Take 1.5 tablets (30 mg total) by mouth once daily. 135 tablet 1    LORazepam (ATIVAN) 0.5 MG tablet Take 0.5 mg by mouth daily as needed for Anxiety.      meclizine (ANTIVERT) 25 mg tablet Take 1 tablet (25 mg total) by mouth 3 (three) times daily as needed. 20 tablet 0    omega-3 fatty acids/fish oil (FISH OIL-OMEGA-3 FATTY ACIDS) 300-1,000 mg capsule Take 1 capsule by mouth once daily.      prednisoLONE acetate (PRED FORTE) 1 % DrpS Place 1 drop into the right eye 4 (four) times daily. 5 mL 1    rosuvastatin  (CRESTOR) 20 MG tablet Take 1 tablet (20 mg total) by mouth once daily. 90 tablet 3    tiotropium bromide (SPIRIVA RESPIMAT) 2.5 mcg/actuation inhaler Inhale 2 puffs into the lungs once daily. 12 g 3    triamcinolone acetonide 0.1% (KENALOG) 0.1 % cream Apply topically 2 (two) times daily. Use up to 2 weeks at a time 454 g 1     No current facility-administered medications on file prior to visit.     Review of patient's allergies indicates:   Allergen Reactions    Lasix [furosemide]      Within 1 year of taking pt will get severe knee pain in both knees.       Objective:   Physical exam not done due to virtual visit      6/20/22  : Presence of cardiac pacemaker [Z95.0 (ICD-10-CM)]         Conclusion    Battery and Leads (BL)   Auto-threshold programmed off on lead(s)   Normal parameters noted on battery and lead(s)     Presenting Rhythm (OK)   Atrial Sensing-Ventricular Sensing (AS-VS)     Arrhythmic events (AE)   No new arrhythmic events since last reset     Anticoagulation  (AC)   Patient prescribed Apixaban (Eliquis)   Current anticoagulation status obtained from data maintained by practitioner     Heart Failure (HF)   No significant change in activity level is noted on trending   No significant decrease in transthoracic impedance     Transmission Information (TI)   Device Summary Report     Follow Up (FU)   Continue remote monitoring quarterly     Additional Comments   Pacemaker Report   Last Diagnostic Reset 4/22/22       Component Ref Range & Units 5 mo ago 10 mo ago 1 yr ago   Cholesterol 120 - 199 mg/dL 134  181 CM  183 R    Comment: The National Cholesterol Education Program (NCEP) has set the   following guidelines (reference ranges) for Cholesterol:   Optimal.....................<200 mg/dL   Borderline High.............200-239 mg/dL   High........................> or = 240 mg/dL    Triglycerides 30 - 150 mg/dL 73  103 CM  154 High  R    Comment: The National Cholesterol Education Program (NCEP) has set  the   following guidelines (reference values) for triglycerides:   Normal......................<150 mg/dL   Borderline High.............150-199 mg/dL   High........................200-499 mg/dL    HDL 40 - 75 mg/dL 69  61 CM  46 R    Comment: The National Cholesterol Education Program (NCEP) has set the   following guidelines (reference values) for HDL Cholesterol:   Low...............<40 mg/dL   Optimal...........>60 mg/dL    LDL Cholesterol 63.0 - 159.0 mg/dL 50.4 Low   99.4 CM     Comment: The National Cholesterol Education Program (NCEP) has set the   following guidelines (reference values) for LDL Cholesterol:   Optimal.......................<130 mg/dL           Assessment:     1. Presence of cardiac pacemaker    2. Chronic anticoagulation    3. Essential hypertension    4. Typical atrial flutter    5. SSS (sick sinus syndrome)    6. PAF (paroxysmal atrial fibrillation)    7. Cardiac pacemaker in situ        Plan:     Presence of cardiac pacemaker    Chronic anticoagulation    Essential hypertension    Typical atrial flutter    SSS (sick sinus syndrome)    PAF (paroxysmal atrial fibrillation)    Cardiac pacemaker in situ      Impression 1.  hypertension stable on current medications including lisinopril  2. Hyperlipidemia stable on Crestor  3. Arrhythmias stable on Eliquis and flecainide.  All questions answered patient otherwise doing well follow-up evaluation again in 6 months sooner if acute recurrence symptoms.  No other changes today.

## 2022-07-25 ENCOUNTER — PATIENT MESSAGE (OUTPATIENT)
Dept: INTERNAL MEDICINE | Facility: CLINIC | Age: 72
End: 2022-07-25
Payer: MEDICARE

## 2022-07-25 DIAGNOSIS — I10 ESSENTIAL HYPERTENSION: ICD-10-CM

## 2022-07-25 NOTE — TELEPHONE ENCOUNTER
No new care gaps identified.  [facility] Embedded Care Gaps. Reference number: [id number]. 7/25/2022   3:13:10 PM CDT

## 2022-07-26 RX ORDER — LISINOPRIL 20 MG/1
30 TABLET ORAL DAILY
Qty: 135 TABLET | Refills: 0 | Status: SHIPPED | OUTPATIENT
Start: 2022-07-26 | End: 2022-10-28 | Stop reason: SDUPTHER

## 2022-07-26 NOTE — TELEPHONE ENCOUNTER
REFILL REQUEST APPROVED on behalf of her PCP, Wayne Owens MD.  Requested Prescriptions     Pending Prescriptions Disp Refills    lisinopriL (PRINIVIL,ZESTRIL) 20 MG tablet 135 tablet 0     Sig: Take 1.5 tablets (30 mg total) by mouth once daily.

## 2022-07-28 ENCOUNTER — OUTSIDE PLACE OF SERVICE (OUTPATIENT)
Dept: OPHTHALMOLOGY | Facility: CLINIC | Age: 72
End: 2022-07-28
Payer: MEDICARE

## 2022-07-28 PROCEDURE — 66984 XCAPSL CTRC RMVL W/O ECP: CPT | Mod: RT,,, | Performed by: OPHTHALMOLOGY

## 2022-07-28 PROCEDURE — 66984 PR REMOVAL, CATARACT, W/INSRT INTRAOC LENS, W/O ENDO CYCLO: ICD-10-PCS | Mod: RT,,, | Performed by: OPHTHALMOLOGY

## 2022-07-29 ENCOUNTER — OFFICE VISIT (OUTPATIENT)
Dept: OPHTHALMOLOGY | Facility: CLINIC | Age: 72
End: 2022-07-29
Payer: MEDICARE

## 2022-07-29 ENCOUNTER — PATIENT MESSAGE (OUTPATIENT)
Dept: INTERNAL MEDICINE | Facility: CLINIC | Age: 72
End: 2022-07-29
Payer: MEDICARE

## 2022-07-29 DIAGNOSIS — Z98.890 POST-OPERATIVE STATE: Primary | ICD-10-CM

## 2022-07-29 DIAGNOSIS — H25.042 POSTERIOR SUBCAPSULAR POLAR AGE-RELATED CATARACT OF LEFT EYE: ICD-10-CM

## 2022-07-29 DIAGNOSIS — Z98.41 CATARACT EXTRACTION STATUS OF EYE, RIGHT: ICD-10-CM

## 2022-07-29 PROCEDURE — 99024 POSTOP FOLLOW-UP VISIT: CPT | Mod: S$GLB,,, | Performed by: OPHTHALMOLOGY

## 2022-07-29 PROCEDURE — 99024 PR POST-OP FOLLOW-UP VISIT: ICD-10-PCS | Mod: S$GLB,,, | Performed by: OPHTHALMOLOGY

## 2022-07-29 PROCEDURE — 1160F PR REVIEW ALL MEDS BY PRESCRIBER/CLIN PHARMACIST DOCUMENTED: ICD-10-PCS | Mod: CPTII,S$GLB,, | Performed by: OPHTHALMOLOGY

## 2022-07-29 PROCEDURE — 1159F MED LIST DOCD IN RCRD: CPT | Mod: CPTII,S$GLB,, | Performed by: OPHTHALMOLOGY

## 2022-07-29 PROCEDURE — 99999 PR PBB SHADOW E&M-EST. PATIENT-LVL III: CPT | Mod: PBBFAC,,, | Performed by: OPHTHALMOLOGY

## 2022-07-29 PROCEDURE — 1160F RVW MEDS BY RX/DR IN RCRD: CPT | Mod: CPTII,S$GLB,, | Performed by: OPHTHALMOLOGY

## 2022-07-29 PROCEDURE — 4010F PR ACE/ARB THEARPY RXD/TAKEN: ICD-10-PCS | Mod: CPTII,S$GLB,, | Performed by: OPHTHALMOLOGY

## 2022-07-29 PROCEDURE — 99999 PR PBB SHADOW E&M-EST. PATIENT-LVL III: ICD-10-PCS | Mod: PBBFAC,,, | Performed by: OPHTHALMOLOGY

## 2022-07-29 PROCEDURE — 4010F ACE/ARB THERAPY RXD/TAKEN: CPT | Mod: CPTII,S$GLB,, | Performed by: OPHTHALMOLOGY

## 2022-07-29 PROCEDURE — 1159F PR MEDICATION LIST DOCUMENTED IN MEDICAL RECORD: ICD-10-PCS | Mod: CPTII,S$GLB,, | Performed by: OPHTHALMOLOGY

## 2022-07-29 NOTE — PROGRESS NOTES
HPI     Post-op Evaluation     Comments: Patient reports as 1 day PO. Denies pain or irritation at this   time. Va improvement. Using gtts as advised. 0/10 pain scale.              Comments     [facility] REFERRAL       1. PCIOL OD 7/28/22  2. Lazy eye OS DX at age 19      **pace maker, on eliquis**      Pred QID OD           Last edited by Jason Gale on 7/29/2022  8:38 AM. (History)            Assessment /Plan     For exam results, see Encounter Report.      ICD-10-CM ICD-9-CM    1. Post-operative state  Z98.890 V45.89 Doing well , see below    2. Cataract extraction status of eye, right  Z98.41 V45.61    3. Posterior subcapsular polar age-related cataract of left eye  H25.042 366.14 Will schedule at the next visit - pencil in 8/11/22       PO Day 1 S/P Phaco/IOL  right eye  Doing well.    Use Prednisolone Acetate QID    Reinstructed in importance of absolute compliance with Post-OP instructions including medications, shield at bedtime, and limitation of activities. Follow up appointments in approximately one and six weeks or call immediately for increased pain, redness or vision loss.

## 2022-08-03 ENCOUNTER — TELEPHONE (OUTPATIENT)
Dept: PREADMISSION TESTING | Facility: HOSPITAL | Age: 72
End: 2022-08-03
Payer: MEDICARE

## 2022-08-03 ENCOUNTER — HOSPITAL ENCOUNTER (OUTPATIENT)
Dept: PREADMISSION TESTING | Facility: HOSPITAL | Age: 72
Discharge: HOME | End: 2022-08-03
Attending: FAMILY MEDICINE
Payer: MEDICARE

## 2022-08-03 DIAGNOSIS — Z12.11 SCREENING FOR COLON CANCER: Primary | ICD-10-CM

## 2022-08-03 DIAGNOSIS — Z80.0 FAMILY HISTORY OF COLON CANCER: ICD-10-CM

## 2022-08-03 DIAGNOSIS — Z86.010 HISTORY OF COLON POLYPS: ICD-10-CM

## 2022-08-05 ENCOUNTER — DOCUMENTATION ONLY (OUTPATIENT)
Dept: OPHTHALMOLOGY | Facility: CLINIC | Age: 72
End: 2022-08-05

## 2022-08-05 ENCOUNTER — OFFICE VISIT (OUTPATIENT)
Dept: OPHTHALMOLOGY | Facility: CLINIC | Age: 72
End: 2022-08-05
Payer: MEDICARE

## 2022-08-05 DIAGNOSIS — Z98.890 POST-OPERATIVE STATE: Primary | ICD-10-CM

## 2022-08-05 DIAGNOSIS — Z98.41 CATARACT EXTRACTION STATUS OF EYE, RIGHT: ICD-10-CM

## 2022-08-05 DIAGNOSIS — H25.042 POSTERIOR SUBCAPSULAR POLAR AGE-RELATED CATARACT OF LEFT EYE: ICD-10-CM

## 2022-08-05 PROCEDURE — 4010F PR ACE/ARB THEARPY RXD/TAKEN: ICD-10-PCS | Mod: CPTII,S$GLB,, | Performed by: OPHTHALMOLOGY

## 2022-08-05 PROCEDURE — 92136 IOL MASTER - OS - LEFT EYE: ICD-10-PCS | Mod: 26,LT,S$GLB, | Performed by: OPHTHALMOLOGY

## 2022-08-05 PROCEDURE — 1160F RVW MEDS BY RX/DR IN RCRD: CPT | Mod: CPTII,S$GLB,, | Performed by: OPHTHALMOLOGY

## 2022-08-05 PROCEDURE — 99999 PR PBB SHADOW E&M-EST. PATIENT-LVL III: CPT | Mod: PBBFAC,,, | Performed by: OPHTHALMOLOGY

## 2022-08-05 PROCEDURE — 1159F PR MEDICATION LIST DOCUMENTED IN MEDICAL RECORD: ICD-10-PCS | Mod: CPTII,S$GLB,, | Performed by: OPHTHALMOLOGY

## 2022-08-05 PROCEDURE — 1159F MED LIST DOCD IN RCRD: CPT | Mod: CPTII,S$GLB,, | Performed by: OPHTHALMOLOGY

## 2022-08-05 PROCEDURE — 1160F PR REVIEW ALL MEDS BY PRESCRIBER/CLIN PHARMACIST DOCUMENTED: ICD-10-PCS | Mod: CPTII,S$GLB,, | Performed by: OPHTHALMOLOGY

## 2022-08-05 PROCEDURE — 99999 PR PBB SHADOW E&M-EST. PATIENT-LVL III: ICD-10-PCS | Mod: PBBFAC,,, | Performed by: OPHTHALMOLOGY

## 2022-08-05 PROCEDURE — 99024 PR POST-OP FOLLOW-UP VISIT: ICD-10-PCS | Mod: S$GLB,,, | Performed by: OPHTHALMOLOGY

## 2022-08-05 PROCEDURE — 4010F ACE/ARB THERAPY RXD/TAKEN: CPT | Mod: CPTII,S$GLB,, | Performed by: OPHTHALMOLOGY

## 2022-08-05 PROCEDURE — 99024 POSTOP FOLLOW-UP VISIT: CPT | Mod: S$GLB,,, | Performed by: OPHTHALMOLOGY

## 2022-08-05 PROCEDURE — 92136 OPHTHALMIC BIOMETRY: CPT | Mod: 26,LT,S$GLB, | Performed by: OPHTHALMOLOGY

## 2022-08-05 RX ORDER — PREDNISOLONE ACETATE 10 MG/ML
1 SUSPENSION/ DROPS OPHTHALMIC 4 TIMES DAILY
Qty: 5 ML | Refills: 1 | Status: ON HOLD | OUTPATIENT
Start: 2022-08-05 | End: 2022-10-10 | Stop reason: ALTCHOICE

## 2022-08-05 NOTE — PROGRESS NOTES
Short Stay Record    CC: Blurry Vision     Impression: Visually significant cataract with reasonable expectation for visual improvement Left Eye       Right Left   External Normal Normal      Right Left   Lids/Lashes 1+ Blepharitis 1+ Blepharitis   Conjunctiva/Sclera White and quiet White and quiet   Cornea Clear Clear   Anterior Chamber Deep and quiet Deep and quiet   Iris Round and reactive Round and reactive   Lens Posterior chamber intraocular lens 2+ to 3+ Nuclear sclerosis, 1+ Cortical spokes, 3+ Posterior subcapsular cataract, Vacuoles     Posterior Vitreous PVD PVD   Disc Normal Normal   C/D Ratio 0.2 0.3   Macula Normal Normal   Vessels Normal Normal   Periphery Normal Normal         Manifest Refraction     Sphere Cylinder Axis Dist VA   Right -0.25 Sphere  20/20-   Left +1.00 +1.25 150 20/60           Planned Procedure:     Topography reviewed: yes, mild irregularity OU  History of Refractive surgery: no     Phaco left +20.5 SY60WF  Block (able to d/c eliquis 2 days prior to surgery per Dr Altamirano)  Requests a monofocal  IOL.  Will aim for distance  Patient given prescriptions for Pred QID OS  Anticoagulant status able to d/c eliquis 2 days prior to surgery per Dr Altamirano             Lens Selection OS verified _____             Previous IOL OD +21.0 SN60WF    Patient cleared for ophthalmic surgery.     Discharge Summary:    Admitting Diagnosis:   Posterior Subcapsular /  OS    Discharge Diagnosis: Pseudophakia OS    Procedure: Phacoemulsification of cataract with intraocular lens implant OS    Complications: None  Discharge Condition: Stable    Discharge instructions: Follow post-operative discharge instruction sheet given by provider.    Follow-up: Return to clinic next day or as scheduled.       HPI:  Antonio Urena is a 72 y.o. female who presents for evaluation prior to ophthalmic surgery, left eye. Patient reports of blurred vision at distance and near with and without correction. Patient reports  of glare at night reducing function and safety, and complains of needed additional light to read.     Past Medical History:   Diagnosis Date    A-fib     A-fib     Pace maker put in 5/12/20    Allergy     Anxiety     Asthma     cough variant    Cancer 2018    basal cell c    Colon polyp     COPD (chronic obstructive pulmonary disease)     Hypertension      Past Surgical History:   Procedure Laterality Date    ABLATION Bilateral 6/3/2021    Procedure: ABLATION/CTI;  Surgeon: Juan Lazo MD;  Location: [facility] CATH LAB;  Service: Cardiology;  Laterality: Bilateral;  (RFA of the CTI,    COVID-19, MRNA, LN-S, PF (Pfizer) 3/20/2021, 2/27/2021   later date RA lead extraction, RA lead addition    CATARACT EXTRACTION Right 07/28/2022    HYSTERECTOMY      IMPLANTATION OF BIVENTRICULAR HEART PACEMAKER  05/12/2020    Loop recorder also in but not working right now    PHLEBOGRAPHY  6/8/2021    Procedure: Venogram;  Surgeon: Juan Lazo MD;  Location: [facility] CATH LAB;  Service: Cardiology;;    REVISION OF PROCEDURE INVOLVING PACEMAKER LEAD N/A 6/8/2021    Procedure: REVISION, ELECTRODE LEAD, CARDIAC PACEMAKER;  Surgeon: Juan Lazo MD;  Location: [facility] CATH LAB;  Service: Cardiology;  Laterality: N/A;    REVISION OF PROCEDURE INVOLVING PACEMAKER LEAD Bilateral 7/15/2021    Procedure: REVISION, ELECTRODE LEAD, CARDIAC PACEMAKER/A lead;  Surgeon: Juan Lazo MD;  Location: [facility] CATH LAB;  Service: Cardiology;  Laterality: Bilateral;  biotronik device    REVISION OF SKIN POCKET FOR PACEMAKER  6/8/2021    Procedure: Revision, Skin Pocket, For Cardiac Pacemaker;  Surgeon: Juan Lazo MD;  Location: [facility] CATH LAB;  Service: Cardiology;;       Review of patient's allergies indicates:   Allergen Reactions    Lasix [furosemide]      Within 1 year of taking pt will get severe knee pain in both knees.         Current Outpatient Medications:     cetirizine (ZYRTEC) 10 MG tablet, Take  10 mg by mouth once daily. , Disp: , Rfl:     COVID-19 vac, dulce,Pfizer,,PF, (PFIZER COVID-19 DULCE VACCN,PF,) 30 mcg/0.3 mL injection, Inject into the muscle., Disp: 0.3 mL, Rfl: 0    DILT- mg CDCR, TAKE 1 CAPSULE EVERY DAY, Disp: 90 capsule, Rfl: 3    ELIQUIS 5 mg Tab, TAKE 1 TABLET TWICE DAILY, Disp: 180 tablet, Rfl: 3    flecainide (TAMBOCOR) 100 MG Tab, TAKE 1 TABLET EVERY 12 HOURS, Disp: 180 tablet, Rfl: 3    fluticasone-salmeterol diskus inhaler 250-50 mcg, Inhale 1 puff into the lungs 2 (two) times daily. Controller.Wash out mouth after using., Disp: 180 each, Rfl: 3    levalbuterol (XOPENEX HFA) 45 mcg/actuation inhaler, Inhale 2 puffs into the lungs every 6 (six) hours as needed for Wheezing or Shortness of Breath., Disp: 45 g, Rfl: 3    lisinopriL (PRINIVIL,ZESTRIL) 20 MG tablet, Take 1.5 tablets (30 mg total) by mouth once daily., Disp: 135 tablet, Rfl: 0    LORazepam (ATIVAN) 0.5 MG tablet, Take 0.5 mg by mouth daily as needed for Anxiety., Disp: , Rfl:     meclizine (ANTIVERT) 25 mg tablet, Take 1 tablet (25 mg total) by mouth 3 (three) times daily as needed., Disp: 20 tablet, Rfl: 0    omega-3 fatty acids/fish oil (FISH OIL-OMEGA-3 FATTY ACIDS) 300-1,000 mg capsule, Take 1 capsule by mouth once daily., Disp: , Rfl:     prednisoLONE acetate (PRED FORTE) 1 % DrpS, Place 1 drop into the right eye 4 (four) times daily., Disp: 5 mL, Rfl: 1    prednisoLONE acetate (PRED FORTE) 1 % DrpS, Place 1 drop into the left eye 4 (four) times daily., Disp: 5 mL, Rfl: 1    rosuvastatin (CRESTOR) 20 MG tablet, Take 1 tablet (20 mg total) by mouth once daily., Disp: 90 tablet, Rfl: 3    tiotropium bromide (SPIRIVA RESPIMAT) 2.5 mcg/actuation inhaler, Inhale 2 puffs into the lungs once daily., Disp: 12 g, Rfl: 3    triamcinolone acetonide 0.1% (KENALOG) 0.1 % cream, Apply topically 2 (two) times daily. Use up to 2 weeks at a time, Disp: 454 g, Rfl: 1    Review of Systems:  A comprehensive review of  systems was negative.    Physical Exam:  General Appearance:    A&Ox3, no distress, appears stated age   Head:    Normocephalic, without obvious abnormality, atraumatic   Eyes:    PERRL, EOM's intact   Back:     Symmetric, no curvature   Lungs:     Respirations unlabored   Chest Wall:    No tenderness or deformity    Heart:  Abdomen:  Extremities:  Skin:    S1 and S2 present    Soft, non-tender    Extremities normal, atraumatic    Skin color, texture, turgor normal

## 2022-08-05 NOTE — PROGRESS NOTES
HPI     Post-op Evaluation     Comments: 1 wk PCIOL OD               Comments     Patient states that she is noticing a little ghost image from the lens   when in bright lit areas - having a hard time during the day with   difference in va between OD and OS - using Pred QID OD        DNL REFERRAL       1. PCIOL OD 7/28/22  2. Lazy eye OS DX at age 19      **pace maker, on eliquis**      Pred QID OD            Last edited by Dorothy Shah MA on 8/5/2022  9:53 AM. (History)            Assessment /Plan     For exam results, see Encounter Report.      ICD-10-CM ICD-9-CM    1. Post-operative state  Z98.890 V45.89 PO Week 1 S/P Phaco/ IOL right eye:   Doing well with no evidence of infection or abnormal inflammation.     Taper Prednisolone Acetate weekly per instruction sheet.  Resume normal activitites and d/c eye shield.  OTC reading glasses can be used until evaluated for final MR.  D/c Shield at night   2. Cataract extraction status of eye, right  Z98.41 V45.61 As above   3. Posterior subcapsular polar age-related cataract of left eye  H25.042 366.14 Patient reports decreased vision in the fellow eye consistent with the clinical amount of lenticular opacity, which reaches the level of visual significance and affects activities of daily living including reading and glare. Risks, benefits, and alternatives to cataract surgery were discussed and pt desired to schedule cataract surgery. Pt was consented and the biometry and lens options were reviewed.    Topography reviewed: yes, mild irregularity OU  History of Refractive surgery: no    Phaco left +20.5 SY60WF  Block (able to d/c eliquis 2 days prior to surgery per Dr Altamirano)  Requests a monofocal  IOL.  Will aim for distance  Patient given prescriptions for Pred QID OS  Anticoagulant status able to d/c eliquis 2 days prior to surgery per Dr Altamirano    Explained that patient may need glasses after surgery.  Discussed that vision may be limited by near    Schedule  post op visit #2 with Dr Gautam

## 2022-08-11 ENCOUNTER — OUTSIDE PLACE OF SERVICE (OUTPATIENT)
Dept: OPHTHALMOLOGY | Facility: CLINIC | Age: 72
End: 2022-08-11
Payer: MEDICARE

## 2022-08-11 ENCOUNTER — TELEPHONE (OUTPATIENT)
Dept: OPHTHALMOLOGY | Facility: CLINIC | Age: 72
End: 2022-08-11
Payer: MEDICARE

## 2022-08-11 ENCOUNTER — CLINICAL SUPPORT (OUTPATIENT)
Dept: CARDIOLOGY | Facility: HOSPITAL | Age: 72
End: 2022-08-11
Payer: MEDICARE

## 2022-08-11 DIAGNOSIS — Z95.0 PRESENCE OF CARDIAC PACEMAKER: ICD-10-CM

## 2022-08-11 PROCEDURE — 66984 XCAPSL CTRC RMVL W/O ECP: CPT | Mod: 79,LT,, | Performed by: OPHTHALMOLOGY

## 2022-08-11 PROCEDURE — 66984 PR REMOVAL, CATARACT, W/INSRT INTRAOC LENS, W/O ENDO CYCLO: ICD-10-PCS | Mod: 79,LT,, | Performed by: OPHTHALMOLOGY

## 2022-08-11 PROCEDURE — 93296 REM INTERROG EVL PM/IDS: CPT | Performed by: INTERNAL MEDICINE

## 2022-08-11 NOTE — TELEPHONE ENCOUNTER
Spoke with patient, advised blurred vision likely due to dilation at surgery time. Also discussed vision could remained blurred after surgery for a few days, and having just removed her patch. She understood.    ----- Message from Pauline Summers sent at 8/11/2022  1:05 PM CDT -----  Contact: Patient, [phone]  Calling to speak with the nurse regarding her cataract surgery this morning. Her vision is still blurry. Please call her. Thanks.

## 2022-08-12 ENCOUNTER — OFFICE VISIT (OUTPATIENT)
Dept: OPHTHALMOLOGY | Facility: CLINIC | Age: 72
End: 2022-08-12
Payer: MEDICARE

## 2022-08-12 DIAGNOSIS — Z98.42 CATARACT EXTRACTION STATUS, LEFT: ICD-10-CM

## 2022-08-12 DIAGNOSIS — Z98.890 POST-OPERATIVE STATE: Primary | ICD-10-CM

## 2022-08-12 PROCEDURE — 99024 POSTOP FOLLOW-UP VISIT: CPT | Mod: S$GLB,,, | Performed by: OPHTHALMOLOGY

## 2022-08-12 PROCEDURE — 99999 PR PBB SHADOW E&M-EST. PATIENT-LVL II: CPT | Mod: PBBFAC,,, | Performed by: OPHTHALMOLOGY

## 2022-08-12 PROCEDURE — 99999 PR PBB SHADOW E&M-EST. PATIENT-LVL II: ICD-10-PCS | Mod: PBBFAC,,, | Performed by: OPHTHALMOLOGY

## 2022-08-12 PROCEDURE — 99024 PR POST-OP FOLLOW-UP VISIT: ICD-10-PCS | Mod: S$GLB,,, | Performed by: OPHTHALMOLOGY

## 2022-08-12 PROCEDURE — 1160F PR REVIEW ALL MEDS BY PRESCRIBER/CLIN PHARMACIST DOCUMENTED: ICD-10-PCS | Mod: CPTII,S$GLB,, | Performed by: OPHTHALMOLOGY

## 2022-08-12 PROCEDURE — 4010F ACE/ARB THERAPY RXD/TAKEN: CPT | Mod: CPTII,S$GLB,, | Performed by: OPHTHALMOLOGY

## 2022-08-12 PROCEDURE — 1160F RVW MEDS BY RX/DR IN RCRD: CPT | Mod: CPTII,S$GLB,, | Performed by: OPHTHALMOLOGY

## 2022-08-12 PROCEDURE — 4010F PR ACE/ARB THEARPY RXD/TAKEN: ICD-10-PCS | Mod: CPTII,S$GLB,, | Performed by: OPHTHALMOLOGY

## 2022-08-12 PROCEDURE — 1159F MED LIST DOCD IN RCRD: CPT | Mod: CPTII,S$GLB,, | Performed by: OPHTHALMOLOGY

## 2022-08-12 PROCEDURE — 1159F PR MEDICATION LIST DOCUMENTED IN MEDICAL RECORD: ICD-10-PCS | Mod: CPTII,S$GLB,, | Performed by: OPHTHALMOLOGY

## 2022-08-12 NOTE — PROGRESS NOTES
HPI     PCIOL OS 8/11/22 Block/ SY60WF 20.5    Patient feels OU is doing well, happy with surgical results.    Last edited by Kellen Mcgee, Patient Care Assistant on 8/12/2022  8:24   AM. (History)      DNL REFERRAL       1. PCIOL OD 7/28/22/ CDE: 13.66/ SN60WF 21.0  PCIOL OS 8/11/22 Block/ SY60WF 20.5  2. Lazy eye OS DX at age 19      **pace maker, on eliquis**      Pred QID OS, Pred TID OD       Assessment /Plan     For exam results, see Encounter Report.      ICD-10-CM ICD-9-CM    1. Post-operative state  Z98.890 V45.89    2. Cataract extraction status, left  Z98.42 V45.61        PO Day 1 S/P Phaco/IOL  left eye  Doing well.    Use Prednisolone Acetate QID OS, and Pt has taper Pred A OD     Reinstructed in importance of absolute compliance with Post-OP instructions including medications, shield at bedtime, and limitation of activities. Follow up appointments in approximately one and six weeks or call immediately for increased pain, redness or vision loss.       RETURN TO CLINIC 1 week with DNL

## 2022-08-19 ENCOUNTER — OFFICE VISIT (OUTPATIENT)
Dept: OPHTHALMOLOGY | Facility: CLINIC | Age: 72
End: 2022-08-19
Payer: MEDICARE

## 2022-08-19 DIAGNOSIS — Z98.890 POST-OPERATIVE STATE: Primary | ICD-10-CM

## 2022-08-19 PROCEDURE — 1160F PR REVIEW ALL MEDS BY PRESCRIBER/CLIN PHARMACIST DOCUMENTED: ICD-10-PCS | Mod: CPTII,S$GLB,, | Performed by: OPTOMETRIST

## 2022-08-19 PROCEDURE — 99024 POSTOP FOLLOW-UP VISIT: CPT | Mod: S$GLB,,, | Performed by: OPTOMETRIST

## 2022-08-19 PROCEDURE — 1160F RVW MEDS BY RX/DR IN RCRD: CPT | Mod: CPTII,S$GLB,, | Performed by: OPTOMETRIST

## 2022-08-19 PROCEDURE — 1159F MED LIST DOCD IN RCRD: CPT | Mod: CPTII,S$GLB,, | Performed by: OPTOMETRIST

## 2022-08-19 PROCEDURE — 1159F PR MEDICATION LIST DOCUMENTED IN MEDICAL RECORD: ICD-10-PCS | Mod: CPTII,S$GLB,, | Performed by: OPTOMETRIST

## 2022-08-19 PROCEDURE — 4010F PR ACE/ARB THEARPY RXD/TAKEN: ICD-10-PCS | Mod: CPTII,S$GLB,, | Performed by: OPTOMETRIST

## 2022-08-19 PROCEDURE — 99999 PR PBB SHADOW E&M-EST. PATIENT-LVL III: ICD-10-PCS | Mod: PBBFAC,,, | Performed by: OPTOMETRIST

## 2022-08-19 PROCEDURE — 4010F ACE/ARB THERAPY RXD/TAKEN: CPT | Mod: CPTII,S$GLB,, | Performed by: OPTOMETRIST

## 2022-08-19 PROCEDURE — 99999 PR PBB SHADOW E&M-EST. PATIENT-LVL III: CPT | Mod: PBBFAC,,, | Performed by: OPTOMETRIST

## 2022-08-19 PROCEDURE — 99024 PR POST-OP FOLLOW-UP VISIT: ICD-10-PCS | Mod: S$GLB,,, | Performed by: OPTOMETRIST

## 2022-08-19 NOTE — PROGRESS NOTES
HPI     1 week S/P cataract SX OS  Dry eyes  Last eye visit 08/12/2022 [facility].  Last eye visit with DNL 06/01/2022.  Pred QID OS, Pred OD BID    Last edited by Shirin Castillo MA on 8/19/2022 10:20 AM. (History)            Assessment /Plan     For exam results, see Encounter Report.    Post-operative state        Impression: 1 week post-op phaco with PCIOL OS : Doing well    Continue Pred Forte OS with Taper schedule given to patient:  TID until 8/26/22  BID until 9/02/22  QD until 9/09/22   Ok to resume normal activities and discontinue eye shield   RTC 1 month for next post-op visit or PRN if any pain, redness, vision changes or any other concerns.  Discussed above and answered questions.

## 2022-08-30 ENCOUNTER — TELEPHONE (OUTPATIENT)
Dept: PREADMISSION TESTING | Facility: HOSPITAL | Age: 72
End: 2022-08-30
Payer: MEDICARE

## 2022-08-30 NOTE — TELEPHONE ENCOUNTER
Please advise if patient may hold Eliquis 2 days prior to endoscopy procedure. Upon your approval, our team will inform patient of medication clearance prior to procedure.

## 2022-09-01 ENCOUNTER — TELEPHONE (OUTPATIENT)
Dept: PREADMISSION TESTING | Facility: HOSPITAL | Age: 72
End: 2022-09-01
Payer: MEDICARE

## 2022-09-06 ENCOUNTER — TELEPHONE (OUTPATIENT)
Dept: PREADMISSION TESTING | Facility: HOSPITAL | Age: 72
End: 2022-09-06
Payer: MEDICARE

## 2022-09-13 ENCOUNTER — PATIENT MESSAGE (OUTPATIENT)
Dept: CARDIOLOGY | Facility: CLINIC | Age: 72
End: 2022-09-13
Payer: MEDICARE

## 2022-09-15 ENCOUNTER — PATIENT MESSAGE (OUTPATIENT)
Dept: INTERNAL MEDICINE | Facility: CLINIC | Age: 72
End: 2022-09-15
Payer: MEDICARE

## 2022-09-23 ENCOUNTER — PATIENT MESSAGE (OUTPATIENT)
Dept: INTERNAL MEDICINE | Facility: CLINIC | Age: 72
End: 2022-09-23
Payer: MEDICARE

## 2022-09-24 ENCOUNTER — PATIENT MESSAGE (OUTPATIENT)
Dept: ADMINISTRATIVE | Facility: OTHER | Age: 72
End: 2022-09-24
Payer: MEDICARE

## 2022-09-26 ENCOUNTER — OFFICE VISIT (OUTPATIENT)
Dept: OPHTHALMOLOGY | Facility: CLINIC | Age: 72
End: 2022-09-26
Payer: MEDICARE

## 2022-09-26 ENCOUNTER — IMMUNIZATION (OUTPATIENT)
Dept: PHARMACY | Facility: CLINIC | Age: 72
End: 2022-09-26
Payer: MEDICARE

## 2022-09-26 DIAGNOSIS — Z96.1 PSEUDOPHAKIA OF BOTH EYES: ICD-10-CM

## 2022-09-26 DIAGNOSIS — H26.493 PCO (POSTERIOR CAPSULAR OPACIFICATION), BILATERAL: ICD-10-CM

## 2022-09-26 DIAGNOSIS — H52.13 MYOPIA WITH ASTIGMATISM AND PRESBYOPIA, BILATERAL: ICD-10-CM

## 2022-09-26 DIAGNOSIS — H52.203 MYOPIA WITH ASTIGMATISM AND PRESBYOPIA, BILATERAL: ICD-10-CM

## 2022-09-26 DIAGNOSIS — Z98.890 POST-OPERATIVE STATE: Primary | ICD-10-CM

## 2022-09-26 DIAGNOSIS — H52.4 MYOPIA WITH ASTIGMATISM AND PRESBYOPIA, BILATERAL: ICD-10-CM

## 2022-09-26 PROCEDURE — 1159F PR MEDICATION LIST DOCUMENTED IN MEDICAL RECORD: ICD-10-PCS | Mod: CPTII,S$GLB,, | Performed by: OPTOMETRIST

## 2022-09-26 PROCEDURE — 4010F PR ACE/ARB THEARPY RXD/TAKEN: ICD-10-PCS | Mod: CPTII,S$GLB,, | Performed by: OPTOMETRIST

## 2022-09-26 PROCEDURE — 1160F PR REVIEW ALL MEDS BY PRESCRIBER/CLIN PHARMACIST DOCUMENTED: ICD-10-PCS | Mod: CPTII,S$GLB,, | Performed by: OPTOMETRIST

## 2022-09-26 PROCEDURE — 99024 POSTOP FOLLOW-UP VISIT: CPT | Mod: S$GLB,,, | Performed by: OPTOMETRIST

## 2022-09-26 PROCEDURE — 4010F ACE/ARB THERAPY RXD/TAKEN: CPT | Mod: CPTII,S$GLB,, | Performed by: OPTOMETRIST

## 2022-09-26 PROCEDURE — 1160F RVW MEDS BY RX/DR IN RCRD: CPT | Mod: CPTII,S$GLB,, | Performed by: OPTOMETRIST

## 2022-09-26 PROCEDURE — 1159F MED LIST DOCD IN RCRD: CPT | Mod: CPTII,S$GLB,, | Performed by: OPTOMETRIST

## 2022-09-26 PROCEDURE — 99999 PR PBB SHADOW E&M-EST. PATIENT-LVL III: CPT | Mod: PBBFAC,,, | Performed by: OPTOMETRIST

## 2022-09-26 PROCEDURE — 99999 PR PBB SHADOW E&M-EST. PATIENT-LVL III: ICD-10-PCS | Mod: PBBFAC,,, | Performed by: OPTOMETRIST

## 2022-09-26 PROCEDURE — 99024 PR POST-OP FOLLOW-UP VISIT: ICD-10-PCS | Mod: S$GLB,,, | Performed by: OPTOMETRIST

## 2022-09-26 NOTE — PROGRESS NOTES
"HPI     Post-op Evaluation            Comments: Pt states seeing "whisps in VA, debris through VA". OD is   brighter than OS. Pt finished post op drops, taking artificial tears.   Cannot see when turning lights off, it takes a few minutes for eyes to   adjust to darkness.         Last edited by Demetria Waters MA on 9/26/2022 12:50 PM.            Assessment /Plan     For exam results, see Encounter Report.    Post-operative state  Pseudophakia of both eyes  PCO (posterior capsular opacification), bilateral  Doing well OU  Mild PCO OU, YAG not yet indicated  Monitor 6 months    Myopia with astigmatism and presbyopia, bilateral  Eyeglass Final Rx       Eyeglass Final Rx         Sphere Cylinder Axis Add    Right -0.75 +0.75 175 +2.50    Left -0.25 +0.50 180 +2.50      Expiration Date: 9/26/2023                      RTC 6 months for dilated eye exam or PRN if any problems.   Discussed above and answered questions.                     "

## 2022-10-03 ENCOUNTER — PATIENT MESSAGE (OUTPATIENT)
Dept: INTERNAL MEDICINE | Facility: CLINIC | Age: 72
End: 2022-10-03
Payer: MEDICARE

## 2022-10-10 ENCOUNTER — ANESTHESIA EVENT (OUTPATIENT)
Dept: ENDOSCOPY | Facility: HOSPITAL | Age: 72
End: 2022-10-10
Payer: MEDICARE

## 2022-10-10 ENCOUNTER — HOSPITAL ENCOUNTER (OUTPATIENT)
Facility: HOSPITAL | Age: 72
Discharge: HOME | End: 2022-10-10
Attending: INTERNAL MEDICINE | Admitting: INTERNAL MEDICINE
Payer: MEDICARE

## 2022-10-10 ENCOUNTER — PATIENT MESSAGE (OUTPATIENT)
Dept: CARDIOLOGY | Facility: CLINIC | Age: 72
End: 2022-10-10
Payer: MEDICARE

## 2022-10-10 ENCOUNTER — ANESTHESIA (OUTPATIENT)
Dept: ENDOSCOPY | Facility: HOSPITAL | Age: 72
End: 2022-10-10
Payer: MEDICARE

## 2022-10-10 DIAGNOSIS — K63.5 COLON POLYPS: ICD-10-CM

## 2022-10-10 PROCEDURE — 37000009 HC ANESTHESIA EA ADD 15 MINS: Performed by: INTERNAL MEDICINE

## 2022-10-10 PROCEDURE — 37000008 HC ANESTHESIA 1ST 15 MINUTES: Performed by: INTERNAL MEDICINE

## 2022-10-10 PROCEDURE — 25000003 PHARM REV CODE 250: Performed by: NURSE ANESTHETIST, CERTIFIED REGISTERED

## 2022-10-10 PROCEDURE — 25000003 PHARM REV CODE 250: Performed by: INTERNAL MEDICINE

## 2022-10-10 PROCEDURE — G0105 COLORECTAL SCRN; HI RISK IND: HCPCS | Performed by: INTERNAL MEDICINE

## 2022-10-10 PROCEDURE — G0105 COLORECTAL SCRN; HI RISK IND: HCPCS | Mod: ,,, | Performed by: INTERNAL MEDICINE

## 2022-10-10 PROCEDURE — G0105 COLORECTAL SCRN; HI RISK IND: ICD-10-PCS | Mod: ,,, | Performed by: INTERNAL MEDICINE

## 2022-10-10 PROCEDURE — 63600175 PHARM REV CODE 636 W HCPCS: Performed by: NURSE ANESTHETIST, CERTIFIED REGISTERED

## 2022-10-10 RX ORDER — LIDOCAINE HYDROCHLORIDE 10 MG/ML
INJECTION, SOLUTION EPIDURAL; INFILTRATION; INTRACAUDAL; PERINEURAL
Status: DISCONTINUED | OUTPATIENT
Start: 2022-10-10 | End: 2022-10-10

## 2022-10-10 RX ORDER — SODIUM CHLORIDE, SODIUM LACTATE, POTASSIUM CHLORIDE, CALCIUM CHLORIDE 600; 310; 30; 20 MG/100ML; MG/100ML; MG/100ML; MG/100ML
INJECTION, SOLUTION INTRAVENOUS CONTINUOUS PRN
Status: DISCONTINUED | OUTPATIENT
Start: 2022-10-10 | End: 2022-10-10

## 2022-10-10 RX ORDER — DEXTROMETHORPHAN/PSEUDOEPHED 2.5-7.5/.8
DROPS ORAL
Status: COMPLETED | OUTPATIENT
Start: 2022-10-10 | End: 2022-10-10

## 2022-10-10 RX ORDER — PROPOFOL 10 MG/ML
VIAL (ML) INTRAVENOUS
Status: DISCONTINUED | OUTPATIENT
Start: 2022-10-10 | End: 2022-10-10

## 2022-10-10 RX ADMIN — PROPOFOL 40 MG: 10 INJECTION, EMULSION INTRAVENOUS at 09:10

## 2022-10-10 RX ADMIN — PROPOFOL 70 MG: 10 INJECTION, EMULSION INTRAVENOUS at 08:10

## 2022-10-10 RX ADMIN — LIDOCAINE HYDROCHLORIDE 50 MG: 10 INJECTION, SOLUTION EPIDURAL; INFILTRATION; INTRACAUDAL; PERINEURAL at 08:10

## 2022-10-10 RX ADMIN — PROPOFOL 50 MG: 10 INJECTION, EMULSION INTRAVENOUS at 08:10

## 2022-10-10 RX ADMIN — PROPOFOL 40 MG: 10 INJECTION, EMULSION INTRAVENOUS at 08:10

## 2022-10-10 RX ADMIN — SODIUM CHLORIDE, SODIUM LACTATE, POTASSIUM CHLORIDE, AND CALCIUM CHLORIDE: 600; 310; 30; 20 INJECTION, SOLUTION INTRAVENOUS at 08:10

## 2022-10-10 NOTE — PROVATION PATIENT INSTRUCTIONS
Discharge Summary/Instructions after an Endoscopic Procedure  Patient Name: Antonio Urena  Patient MRN: [id number]  Patient YOB: 1950  Monday, October 10, 2022 Nestor Sher MD  Dear patient,  As a result of recent federal legislation (The Federal Cures Act), you may   receive lab or pathology results from your procedure in your [facility]   account before your physician is able to contact you. Your physician or   their representative will relay the results to you with their   recommendations at their soonest availability.  Thank you,  RESTRICTIONS:  During your procedure today, you received medications for sedation.  These   medications may affect your judgment, balance and coordination.  Therefore,   for 24 hours, you have the following restrictions:   - DO NOT drive a car, operate machinery, make legal/financial decisions,   sign important papers or drink alcohol.    ACTIVITY:  Today: no heavy lifting, straining or running due to procedural   sedation/anesthesia.  The following day: return to full activity including work.  DIET:  Eat and drink normally unless instructed otherwise.     TREATMENT FOR COMMON SIDE EFFECTS:  - Mild abdominal pain, nausea, belching, bloating or excessive gas:  rest,   eat lightly and use a heating pad.  - Sore Throat: treat with throat lozenges and/or gargle with warm salt   water.  - Because air was used during the procedure, expelling large amounts of air   from your rectum or belching is normal.  - If a bowel prep was taken, you may not have a bowel movement for 1-3 days.    This is normal.  SYMPTOMS TO WATCH FOR AND REPORT TO YOUR PHYSICIAN:  1. Abdominal pain or bloating, other than gas cramps.  2. Chest pain.  3. Back pain.  4. Signs of infection such as: chills or fever occurring within 24 hours   after the procedure.  5. Rectal bleeding, which would show as bright red, maroon, or black stools.   (A tablespoon of blood from the rectum is not serious,  especially if   hemorrhoids are present.)  6. Vomiting.  7. Weakness or dizziness.  GO DIRECTLY TO THE NEAREST EMERGENCY ROOM IF YOU HAVE ANY OF THE FOLLOWING:      Difficulty breathing              Chills and/or fever over 101 F   Persistent vomiting and/or vomiting blood   Severe abdominal pain   Severe chest pain   Black, tarry stools   Bleeding- more than one tablespoon   Any other symptom or condition that you feel may need urgent attention  Your doctor recommends these additional instructions:  If any biopsies were taken, your doctors clinic will contact you in 1 to 2   weeks with any results.  - Discharge patient to home.   - Resume previous diet.   - Continue present medications.   - Repeat colonoscopy in 5 years for surveillance.   - Return to referring physician.   - Patient has a contact number available for emergencies.  The signs and   symptoms of potential delayed complications were discussed with the   patient.  Return to normal activities tomorrow.  Written discharge   instructions were provided to the patient.  For questions, problems or results please call your physician Nestor Sher MD at Work:  [phone]  If you have any questions about the above instructions, call the GI   department at [phone] or call the endoscopy unit at [phone]   from 7am until 3 pm.  [facility] [place], EMERGENCY ROOM PHONE NUMBER:   [phone]  IF A COMPLICATION OR EMERGENCY SITUATION ARISES AND YOU ARE UNABLE TO REACH   YOUR PHYSICIAN - GO DIRECTLY TO THE EMERGENCY ROOM.  I have read or have had read to me these discharge instructions for my   procedure and have received a written copy.  I understand these   instructions and will follow-up with my physician if I have any questions.     __________________________________       _____________________________________  Nurse Signature                                          Patient/Designated   Responsible Party  Signature  Nestor Sher MD  10/10/2022 9:13:19 AM  This report has been verified and signed electronically.  Dear patient,  As a result of recent federal legislation (The Federal Cures Act), you may   receive lab or pathology results from your procedure in your [facility]   account before your physician is able to contact you. Your physician or   their representative will relay the results to you with their   recommendations at their soonest availability.  Thank you,  PROVATION

## 2022-10-10 NOTE — PLAN OF CARE
Dr Sher came to bedside and discussed findings. NO N/V,  no abdominal pain, no GI bleeding, and vitals stable.  Pt discharged from unit.

## 2022-10-10 NOTE — ANESTHESIA PREPROCEDURE EVALUATION
10/10/2022  Antonio Urena is a 72 y.o., female.  Patient Active Problem List   Diagnosis    Essential hypertension    Asthma with COPD    Anxiety    Non-seasonal allergic rhinitis    Pure hypercholesterolemia    Paroxysmal atrial fibrillation    SSS (sick sinus syndrome)    Cardiac pacemaker in situ    Atrial fibrillation and flutter    PAF (paroxysmal atrial fibrillation)    Symptomatic bradycardia    TIA involving carotid artery    Typical atrial flutter    History of cardiac radiofrequency ablation (RFA)    Migraine with aura    Chronic anticoagulation    Morbid obesity with BMI of 40.0-44.9, adult     Pre-op Assessment    I have reviewed the Patient Summary Reports.    I have reviewed the Nursing Notes. I have reviewed the NPO Status.   I have reviewed the Medications.     Review of Systems  Anesthesia Hx:  No problems with previous Anesthesia  History of prior surgery of interest to airway management or planning: Denies Family Hx of Anesthesia complications.   Denies Personal Hx of Anesthesia complications.   Social:  Former Smoker, Alcohol Use    Hematology/Oncology:         -- Cancer in past history: Other (see Oncology comments) Oncology Comments: Skin      Cardiovascular:   Hypertension Dysrhythmias atrial fibrillation ECG has been reviewed. Vent. Rate : 060 BPM     Atrial Rate : 060 BPM      P-R Int : 214 ms          QRS Dur : 104 ms       QT Int : 462 ms       P-R-T Axes : 072 077 039 degrees      QTc Int : 462 ms     Atrial-paced rhythm with prolonged AV conduction   Abnormal ECG   When compared with ECG of 08-DEC-2021 14:19,   No significant change was found   Confirmed by ABBI SIERRA MD ([id number]) on 3/13/2022 6:43:58 PM        ECHO 10/2020    · The left ventricle is normal in size with normal systolic function. The estimated ejection fraction is 55%.  · There is left  ventricular concentric remodeling.  · Mild left atrial enlargement.  · Indeterminate diastolic function.  · Normal right ventricular systolic function.  · Normal central venous pressure (3 mmHg).  · The estimated PA systolic pressure is 29 mmHg.  · Small circumferential pericardial effusion. Effusion is fluid.   Pulmonary:   COPD Asthma    Renal/:  Renal/ Normal     Hepatic/GI:  Hepatic/GI Normal Bowel Prep.    Musculoskeletal:  Musculoskeletal Normal    Neurological:   TIA, Headaches    Endocrine:  Endocrine Normal    Dermatological:  Skin Normal    Psych:   anxiety          Physical Exam  General:  Morbid Obesity, Well nourished, Cooperative, Alert and Oriented      Airway/Jaw/Neck:  Airway Findings: Mouth Opening: Normal   Tongue: Normal   General Airway Assessment: Adult, Average Mallampati: II  TM Distance: Normal, at least 6 cm       Dental:  Dental Findings: In tact          Mental Status:  Mental Status Findings:  Cooperative, Alert and Oriented         Anesthesia Plan  Type of Anesthesia, risks & benefits discussed:  Anesthesia Type:  MAC    Patient's Preference:   Plan Factors:          Intra-op Monitoring Plan: standard ASA monitors  Intra-op Monitoring Plan Comments:   Post Op Pain Control Plan: multimodal analgesia  Post Op Pain Control Plan Comments:     Induction:    Beta Blocker:  Patient is not currently on a Beta-Blocker (No further documentation required).       Informed Consent: Informed consent signed with the Patient and all parties understand the risks and agree with anesthesia plan.  All questions answered.  Anesthesia consent signed with patient.  ASA Score: 3     Day of Surgery Review of History & Physical:  There are no significant changes.  H&P Update referred to the surgeon/provider.          Ready For Surgery From Anesthesia Perspective.           Physical Exam  General: Morbid Obesity, Well nourished, Cooperative, Alert and Oriented    Airway:  Mallampati: II   Mouth Opening:  Normal  TM Distance: Normal, at least 6 cm  Tongue: Normal    Dental:  In tact          Anesthesia Plan  Type of Anesthesia, risks & benefits discussed:    Anesthesia Type: MAC  Intra-op Monitoring Plan: standard ASA monitors  Post Op Pain Control Plan: multimodal analgesia  Informed Consent: Informed consent signed with the Patient and all parties understand the risks and agree with anesthesia plan.  All questions answered.   ASA Score: 3  Day of Surgery Review of History & Physical: H&P Update referred to the surgeon/provider.    Ready For Surgery From Anesthesia Perspective.       .

## 2022-10-10 NOTE — ANESTHESIA RELEASE NOTE
"Anesthesia Release from PACU Note    Patient: Antonio Urena    Procedure(s) Performed: Procedure(s) (LRB):  COLONOSCOPY 8/31--card clearance received per Dr Ling (N/A)    Anesthesia type: MAC    Post pain: Adequate analgesia    Post assessment: no apparent anesthetic complications and tolerated procedure well    Last Vitals:   Visit Vitals  BP (!) 105/54   Pulse 60   Temp 36.4 °C (97.5 °F)   Resp 17   Ht 5' 3" (1.6 m)   Wt 99.8 kg (220 lb)   SpO2 95%   Breastfeeding No   BMI 38.97 kg/m²       Post vital signs: stable    Level of consciousness: awake, alert  and oriented    Nausea/Vomiting: no nausea/no vomiting    Complications: none    Airway Patency: patent    Respiratory: unassisted, spontaneous ventilation, room air    Cardiovascular: stable and blood pressure at baseline    Hydration: euvolemic  "

## 2022-10-10 NOTE — TRANSFER OF CARE
"Anesthesia Transfer of Care Note    Patient: Antonio Urena    Procedure(s) Performed: Procedure(s) (LRB):  COLONOSCOPY 8/31--card clearance received per Dr Ling (N/A)    Patient location: GI    Anesthesia Type: MAC    Transport from OR: Transported from OR on room air with adequate spontaneous ventilation    Post pain: adequate analgesia    Post assessment: no apparent anesthetic complications and tolerated procedure well    Post vital signs: stable    Level of consciousness: awake, alert and oriented    Nausea/Vomiting: no nausea/vomiting    Complications: none    Transfer of care protocol was followed      Last vitals:   Visit Vitals  BP (!) 142/82 (BP Location: Left arm, Patient Position: Lying)   Pulse 67   Temp 36.6 °C (97.9 °F) (Temporal)   Resp 18   Ht 5' 3" (1.6 m)   Wt 99.8 kg (220 lb)   SpO2 95%   Breastfeeding No   BMI 38.97 kg/m²     "

## 2022-10-10 NOTE — ANESTHESIA POSTPROCEDURE EVALUATION
Anesthesia Post Evaluation    Patient: Antonio Urena    Procedure(s) Performed: Procedure(s) (LRB):  COLONOSCOPY 8/31--card clearance received per Dr Ling (N/A)    Final Anesthesia Type: MAC      Patient location during evaluation: GI PACU  Patient participation: Yes- Able to Participate  Level of consciousness: awake and alert and oriented  Post-procedure vital signs: reviewed and stable  Pain management: adequate  Airway patency: patent  MICHELLE mitigation strategies: Multimodal analgesia  PONV status at discharge: No PONV  Anesthetic complications: no      Cardiovascular status: hemodynamically stable  Respiratory status: unassisted, spontaneous ventilation and room air  Hydration status: euvolemic  Follow-up not needed.          Vitals Value Taken Time   /54 10/10/22 0915   Temp 36.4 °C (97.5 °F) 10/10/22 0915   Pulse 60 10/10/22 0915   Resp 17 10/10/22 0915   SpO2 95 % 10/10/22 0915         No case tracking events are documented in the log.      Pain/Tiff Score: Tiff Score: 10 (10/10/2022  9:15 AM)

## 2022-10-10 NOTE — H&P
PRE PROCEDURE H&P    Patient Name: Antonio Urena  MRN: [id number]  : 1950  Date of Procedure:  10/10/2022  Referring Physician: Nestor Sher MD  Primary Physician: Wayne Owens MD  Procedure Physician: Nestor Sher MD       Planned Procedure: Colonoscopy  Diagnosis: previous adenomatous polyp  Chief Complaint: Same as above    HPI: Patient is an 72 y.o. female is here for the above.     Last colonoscopy: 5-6 years ago   Family history: father with colon cancer  Anticoagulation: held eliquis for 2 days     Past Medical History:   Past Medical History:   Diagnosis Date    A-fib     A-fib     Pace maker put in 20    Allergy     Anxiety     Asthma     cough variant    Cancer 2018    basal cell c    Colon polyp     COPD (chronic obstructive pulmonary disease)     Hypertension         Past Surgical History:  Past Surgical History:   Procedure Laterality Date    ABLATION Bilateral 6/3/2021    Procedure: ABLATION/CTI;  Surgeon: Juan Lazo MD;  Location: [facility] CATH LAB;  Service: Cardiology;  Laterality: Bilateral;  (RFA of the CTI,    COVID-19, MRNA, LN-S, PF (Pfizer) 3/20/2021, 2021   later date RA lead extraction, RA lead addition    CATARACT EXTRACTION Right     MGM    CATARACT EXTRACTION Left 2022    MGM    HYSTERECTOMY      IMPLANTATION OF BIVENTRICULAR HEART PACEMAKER  2020    Loop recorder also in but not working right now    PHLEBOGRAPHY  2021    Procedure: Venogram;  Surgeon: Juan Lazo MD;  Location: [facility] CATH LAB;  Service: Cardiology;;    REVISION OF PROCEDURE INVOLVING PACEMAKER LEAD N/A 2021    Procedure: REVISION, ELECTRODE LEAD, CARDIAC PACEMAKER;  Surgeon: Juan Lazo MD;  Location: [facility] CATH LAB;  Service: Cardiology;  Laterality: N/A;    REVISION OF PROCEDURE INVOLVING PACEMAKER LEAD Bilateral 7/15/2021    Procedure: REVISION, ELECTRODE LEAD, CARDIAC PACEMAKER/A lead;  Surgeon: Juan Lazo MD;  Location: [facility]  CATH LAB;  Service: Cardiology;  Laterality: Bilateral;  biotronik device    REVISION OF SKIN POCKET FOR PACEMAKER  6/8/2021    Procedure: Revision, Skin Pocket, For Cardiac Pacemaker;  Surgeon: Juan Lazo MD;  Location: [facility] CATH LAB;  Service: Cardiology;;        Home Medications:  Prior to Admission medications    Medication Sig Start Date End Date Taking? Authorizing Provider   cetirizine (ZYRTEC) 10 MG tablet Take 10 mg by mouth once daily.    Yes Historical Provider   DILT- mg CDCR TAKE 1 CAPSULE EVERY DAY 11/12/21  Yes Rich Pedraza MD   flecainide (TAMBOCOR) 100 MG Tab TAKE 1 TABLET EVERY 12 HOURS 7/24/22  Yes Juan Lazo MD   fluticasone-salmeterol diskus inhaler 250-50 mcg Inhale 1 puff into the lungs 2 (two) times daily. Controller.Wash out mouth after using. 6/22/22  Yes Sim Corley MD   lisinopriL (PRINIVIL,ZESTRIL) 20 MG tablet Take 1.5 tablets (30 mg total) by mouth once daily. 7/26/22 7/26/23 Yes ENRICO Lopez MD   omega-3 fatty acids/fish oil (FISH OIL-OMEGA-3 FATTY ACIDS) 300-1,000 mg capsule Take 1 capsule by mouth once daily.   Yes Historical Provider   rosuvastatin (CRESTOR) 20 MG tablet Take 1 tablet (20 mg total) by mouth once daily. 5/6/22 5/6/23 Yes Wayne Owens MD   SPIRIVA RESPIMAT 2.5 mcg/actuation inhaler INHALE 2 PUFFS INTO THE LUNGS ONCE DAILY. 8/16/22  Yes Sim Corley MD   COVID-19 vac, dulce,Pfizer,,PF, (PFIZER COVID-19 DULCE VACCN,PF,) 30 mcg/0.3 mL injection Inject into the muscle. 4/22/22   Anne-Marie Ferguson, PharmD   ELIQUIS 5 mg Tab TAKE 1 TABLET TWICE DAILY 9/8/21   Reza Ling MD   flu vac 2022 [id number]-[id number],PF, (FLUAD QUAD 2022-23,65Y UP,,PF,) 60 mcg (15 mcg x 4)/0.5 mL Syrg Inject 0.5 mLs into the muscle. 9/26/22   Arnulfo Luis, PharmD   levalbuterol (XOPENEX HFA) 45 mcg/actuation inhaler Inhale 2 puffs into the lungs every 6 (six) hours as needed for Wheezing or Shortness of Breath. 8/2/21   Sim Corley MD   LORazepam  "(ATIVAN) 0.5 MG tablet Take 0.5 mg by mouth daily as needed for Anxiety.    Historical Provider   meclizine (ANTIVERT) 25 mg tablet Take 1 tablet (25 mg total) by mouth 3 (three) times daily as needed.  Patient not taking: Reported on 2022   Dayton Barahona MD   triamcinolone acetonide 0.1% (KENALOG) 0.1 % cream Apply topically 2 (two) times daily. Use up to 2 weeks at a time 22   Ansley Cobos MD   prednisoLONE acetate (PRED FORTE) 1 % DrpS Place 1 drop into the right eye 4 (four) times daily. 6/14/22 10/10/22  Juan Cueva MD   prednisoLONE acetate (PRED FORTE) 1 % DrpS Place 1 drop into the left eye 4 (four) times daily. 8/5/22 10/10/22  Juan Cueva MD        Allergies:  Review of patient's allergies indicates:   Allergen Reactions    Lasix [furosemide]      Within 1 year of taking pt will get severe knee pain in both knees.        Social History:   Social History     Socioeconomic History    Marital status:    Tobacco Use    Smoking status: Former     Packs/day: 0.25     Years: 1.00     Pack years: 0.25     Types: Cigarettes     Quit date: 1985     Years since quittin.7    Smokeless tobacco: Former    Tobacco comments:     Quit 30 - 40 years ago   Substance and Sexual Activity    Alcohol use: Yes     Comment: Wine Occasionally     Drug use: Never    Sexual activity: Not Currently       Family History:  Family History   Problem Relation Age of Onset    Cancer Mother     Cancer Father     Cancer Brother        ROS: No acute cardiac events, no acute respiratory complaints.     Physical Exam (all patients):    BP (!) 142/82 (BP Location: Left arm, Patient Position: Lying)   Pulse 67   Temp 97.9 °F (36.6 °C) (Temporal)   Resp 18   Ht 5' 3" (1.6 m)   Wt 99.8 kg (220 lb)   SpO2 95%   Breastfeeding No   BMI 38.97 kg/m²   Lungs: Clear to auscultation bilaterally, respirations unlabored  Heart: Regular rate and rhythm, S1 and S2 normal, no obvious " murmurs  Abdomen:         Soft, non-tender, bowel sounds normal, no masses, no organomegaly    Lab Results   Component Value Date    WBC 9.50 03/11/2022    MCV 88 03/11/2022    RDW 13.3 03/11/2022     03/11/2022    INR 1.1 08/29/2021    GLU 80 03/11/2022    BUN 15 03/11/2022     03/11/2022    K 4.5 03/11/2022     03/11/2022        SEDATION PLAN: per anesthesia      History reviewed, vital signs satisfactory, cardiopulmonary status satisfactory, sedation options, risks and plans have been discussed with the patient  All their questions were answered and the patient agrees to the sedation procedures as planned and the patient is deemed an appropriate candidate for the sedation as planned.    Procedure explained to patient, informed consent obtained and placed in chart.    Nestor Sher  10/10/2022  8:42 AM

## 2022-10-11 VITALS
RESPIRATION RATE: 18 BRPM | OXYGEN SATURATION: 95 % | WEIGHT: 220 LBS | TEMPERATURE: 98 F | BODY MASS INDEX: 38.98 KG/M2 | SYSTOLIC BLOOD PRESSURE: 132 MMHG | HEART RATE: 62 BPM | DIASTOLIC BLOOD PRESSURE: 89 MMHG | HEIGHT: 63 IN

## 2022-10-12 ENCOUNTER — PATIENT MESSAGE (OUTPATIENT)
Dept: OTHER | Facility: OTHER | Age: 72
End: 2022-10-12
Payer: MEDICARE

## 2022-10-21 ENCOUNTER — IMMUNIZATION (OUTPATIENT)
Dept: PHARMACY | Facility: CLINIC | Age: 72
End: 2022-10-21
Payer: MEDICARE

## 2022-10-21 DIAGNOSIS — Z23 NEED FOR VACCINATION: Primary | ICD-10-CM

## 2022-10-28 ENCOUNTER — PATIENT MESSAGE (OUTPATIENT)
Dept: CARDIOLOGY | Facility: CLINIC | Age: 72
End: 2022-10-28
Payer: MEDICARE

## 2022-11-04 ENCOUNTER — CLINICAL SUPPORT (OUTPATIENT)
Dept: CARDIOLOGY | Facility: HOSPITAL | Age: 72
End: 2022-11-04
Attending: INTERNAL MEDICINE
Payer: MEDICARE

## 2022-11-04 ENCOUNTER — OFFICE VISIT (OUTPATIENT)
Dept: CARDIOLOGY | Facility: CLINIC | Age: 72
End: 2022-11-04
Payer: MEDICARE

## 2022-11-04 VITALS
DIASTOLIC BLOOD PRESSURE: 72 MMHG | HEART RATE: 84 BPM | WEIGHT: 223.31 LBS | BODY MASS INDEX: 39.57 KG/M2 | SYSTOLIC BLOOD PRESSURE: 150 MMHG | HEIGHT: 63 IN

## 2022-11-04 DIAGNOSIS — Z79.01 CHRONIC ANTICOAGULATION: Chronic | ICD-10-CM

## 2022-11-04 DIAGNOSIS — I48.92 ATRIAL FIBRILLATION AND FLUTTER: Chronic | ICD-10-CM

## 2022-11-04 DIAGNOSIS — I48.3 TYPICAL ATRIAL FLUTTER: ICD-10-CM

## 2022-11-04 DIAGNOSIS — I48.91 ATRIAL FIBRILLATION AND FLUTTER: Chronic | ICD-10-CM

## 2022-11-04 DIAGNOSIS — R06.02 SHORTNESS OF BREATH: ICD-10-CM

## 2022-11-04 DIAGNOSIS — Z95.0 CARDIAC PACEMAKER IN SITU: Primary | Chronic | ICD-10-CM

## 2022-11-04 DIAGNOSIS — R00.2 PALPITATIONS: ICD-10-CM

## 2022-11-04 DIAGNOSIS — E66.01 MORBID OBESITY WITH BMI OF 40.0-44.9, ADULT: ICD-10-CM

## 2022-11-04 DIAGNOSIS — I48.0 PAF (PAROXYSMAL ATRIAL FIBRILLATION): Chronic | ICD-10-CM

## 2022-11-04 DIAGNOSIS — I49.5 SSS (SICK SINUS SYNDROME): Chronic | ICD-10-CM

## 2022-11-04 DIAGNOSIS — Z95.0 CARDIAC PACEMAKER IN SITU: ICD-10-CM

## 2022-11-04 DIAGNOSIS — Z98.890 HISTORY OF CARDIAC RADIOFREQUENCY ABLATION (RFA): ICD-10-CM

## 2022-11-04 DIAGNOSIS — I48.21 PERMANENT ATRIAL FIBRILLATION: ICD-10-CM

## 2022-11-04 DIAGNOSIS — R06.02 SOB (SHORTNESS OF BREATH): ICD-10-CM

## 2022-11-04 PROCEDURE — 3078F DIAST BP <80 MM HG: CPT | Mod: CPTII,S$GLB,, | Performed by: NURSE PRACTITIONER

## 2022-11-04 PROCEDURE — 99999 PR PBB SHADOW E&M-EST. PATIENT-LVL I: CPT | Mod: PBBFAC,,,

## 2022-11-04 PROCEDURE — 3078F PR MOST RECENT DIASTOLIC BLOOD PRESSURE < 80 MM HG: ICD-10-PCS | Mod: CPTII,S$GLB,, | Performed by: NURSE PRACTITIONER

## 2022-11-04 PROCEDURE — 3008F PR BODY MASS INDEX (BMI) DOCUMENTED: ICD-10-PCS | Mod: CPTII,S$GLB,, | Performed by: NURSE PRACTITIONER

## 2022-11-04 PROCEDURE — 93280 PM DEVICE PROGR EVAL DUAL: CPT | Mod: 26,,, | Performed by: INTERNAL MEDICINE

## 2022-11-04 PROCEDURE — 1126F AMNT PAIN NOTED NONE PRSNT: CPT | Mod: CPTII,S$GLB,, | Performed by: NURSE PRACTITIONER

## 2022-11-04 PROCEDURE — 3077F SYST BP >= 140 MM HG: CPT | Mod: CPTII,S$GLB,, | Performed by: NURSE PRACTITIONER

## 2022-11-04 PROCEDURE — 1126F PR PAIN SEVERITY QUANTIFIED, NO PAIN PRESENT: ICD-10-PCS | Mod: CPTII,S$GLB,, | Performed by: NURSE PRACTITIONER

## 2022-11-04 PROCEDURE — 4010F PR ACE/ARB THEARPY RXD/TAKEN: ICD-10-PCS | Mod: CPTII,S$GLB,, | Performed by: NURSE PRACTITIONER

## 2022-11-04 PROCEDURE — 99214 PR OFFICE/OUTPT VISIT, EST, LEVL IV, 30-39 MIN: ICD-10-PCS | Mod: S$GLB,,, | Performed by: NURSE PRACTITIONER

## 2022-11-04 PROCEDURE — 3288F FALL RISK ASSESSMENT DOCD: CPT | Mod: CPTII,S$GLB,, | Performed by: NURSE PRACTITIONER

## 2022-11-04 PROCEDURE — 4010F ACE/ARB THERAPY RXD/TAKEN: CPT | Mod: CPTII,S$GLB,, | Performed by: NURSE PRACTITIONER

## 2022-11-04 PROCEDURE — 1101F PT FALLS ASSESS-DOCD LE1/YR: CPT | Mod: CPTII,S$GLB,, | Performed by: NURSE PRACTITIONER

## 2022-11-04 PROCEDURE — 99999 PR PBB SHADOW E&M-EST. PATIENT-LVL I: ICD-10-PCS | Mod: PBBFAC,,,

## 2022-11-04 PROCEDURE — 99999 PR PBB SHADOW E&M-EST. PATIENT-LVL III: ICD-10-PCS | Mod: PBBFAC,,, | Performed by: NURSE PRACTITIONER

## 2022-11-04 PROCEDURE — 3077F PR MOST RECENT SYSTOLIC BLOOD PRESSURE >= 140 MM HG: ICD-10-PCS | Mod: CPTII,S$GLB,, | Performed by: NURSE PRACTITIONER

## 2022-11-04 PROCEDURE — 1101F PR PT FALLS ASSESS DOC 0-1 FALLS W/OUT INJ PAST YR: ICD-10-PCS | Mod: CPTII,S$GLB,, | Performed by: NURSE PRACTITIONER

## 2022-11-04 PROCEDURE — 1159F PR MEDICATION LIST DOCUMENTED IN MEDICAL RECORD: ICD-10-PCS | Mod: CPTII,S$GLB,, | Performed by: NURSE PRACTITIONER

## 2022-11-04 PROCEDURE — 93280 CARDIAC DEVICE CHECK - IN CLINIC & HOSPITAL: ICD-10-PCS | Mod: 26,,, | Performed by: INTERNAL MEDICINE

## 2022-11-04 PROCEDURE — 93280 PM DEVICE PROGR EVAL DUAL: CPT

## 2022-11-04 PROCEDURE — 3288F PR FALLS RISK ASSESSMENT DOCUMENTED: ICD-10-PCS | Mod: CPTII,S$GLB,, | Performed by: NURSE PRACTITIONER

## 2022-11-04 PROCEDURE — 1159F MED LIST DOCD IN RCRD: CPT | Mod: CPTII,S$GLB,, | Performed by: NURSE PRACTITIONER

## 2022-11-04 PROCEDURE — 99999 PR PBB SHADOW E&M-EST. PATIENT-LVL III: CPT | Mod: PBBFAC,,, | Performed by: NURSE PRACTITIONER

## 2022-11-04 PROCEDURE — 3008F BODY MASS INDEX DOCD: CPT | Mod: CPTII,S$GLB,, | Performed by: NURSE PRACTITIONER

## 2022-11-04 PROCEDURE — 99214 OFFICE O/P EST MOD 30 MIN: CPT | Mod: S$GLB,,, | Performed by: NURSE PRACTITIONER

## 2022-11-04 NOTE — PROGRESS NOTES
Subjective:   Patient ID:  Antonio Urena is a 72 y.o. female who presents for evaluation of Atrial Fibrillation      HPI    Antonio Urena is a 72 year old female who presents to Arrhythmia clinic for 6 month follow up with device check.     Her current medical conditions include HTN, COPD, AFIB, s/p PPM (Biotronik), s/p atrial lead revision per Dr Lazo (7/2021), h/o RFA of AFL,  obesity. She returns today and is doing well cardiac wise.     She still has some shortness of breath which is limiting her ability to do much functional wise.     Device interrogation today in office--well functioning device, no events or issues noted today.       Denies chest pain or anginal equivalents. No shortness of breath, CASTELLON or palpitations. Denies orthopnea, PND or abdominal bloating. Reports regular walking without any issues lately. NO leg swelling or claudications. No recent falls, syncope or near syncopal events. Reports compliance with medications and dietary restrictions. NO CNS complaints to suggest TIA or CVA today. No signs of abnormal bleeding on eliquis.       Recent Readings 11/2/2022 11/2/2022 10/23/2022 10/17/2022 10/7/2022   SBP (mmHg) 129 142 124 118 121   DBP (mmHg) 73 82 69 66 65   Pulse 60 59 59 60 60     Recent Readings 9/29/2022 9/21/2022 9/15/2022 9/12/2022 9/2/2022   SBP (mmHg) 123 123 125 103 114   DBP (mmHg) 66 62 68 61 60   Pulse 59 60 60 60 59     Recent Readings 9/2/2022   SBP (mmHg) 131   DBP (mmHg) 61   Pulse 60     Past Medical History:   Diagnosis Date    A-fib     A-fib     Pace maker put in 5/12/20    Allergy     Anxiety     Asthma     cough variant    Cancer 2018    basal cell c    Colon polyp     COPD (chronic obstructive pulmonary disease)     Hypertension        Past Surgical History:   Procedure Laterality Date    ABLATION Bilateral 6/3/2021    Procedure: ABLATION/CTI;  Surgeon: Juan Lazo MD;  Location: [facility] CATH LAB;  Service: Cardiology;  Laterality: Bilateral;  (RFA of  the CTI,    COVID-19, MRNA, LN-S, PF (Pfizer) 3/20/2021, 2021   later date RA lead extraction, RA lead addition    CATARACT EXTRACTION Right     MGM    CATARACT EXTRACTION Left 2022    MGM    COLONOSCOPY N/A 10/10/2022    Procedure: COLONOSCOPY --card clearance received per Dr Ling;  Surgeon: Nestor Sher MD;  Location: [facility] ENDO;  Service: Gastroenterology;  Laterality: N/A;    HYSTERECTOMY      IMPLANTATION OF BIVENTRICULAR HEART PACEMAKER  2020    Loop recorder also in but not working right now    PHLEBOGRAPHY  2021    Procedure: Venogram;  Surgeon: Juan Lazo MD;  Location: [facility] CATH LAB;  Service: Cardiology;;    REVISION OF PROCEDURE INVOLVING PACEMAKER LEAD N/A 2021    Procedure: REVISION, ELECTRODE LEAD, CARDIAC PACEMAKER;  Surgeon: Juan Lazo MD;  Location: [facility] CATH LAB;  Service: Cardiology;  Laterality: N/A;    REVISION OF PROCEDURE INVOLVING PACEMAKER LEAD Bilateral 7/15/2021    Procedure: REVISION, ELECTRODE LEAD, CARDIAC PACEMAKER/A lead;  Surgeon: Juan Lazo MD;  Location: [facility] CATH LAB;  Service: Cardiology;  Laterality: Bilateral;  biotronik device    REVISION OF SKIN POCKET FOR PACEMAKER  2021    Procedure: Revision, Skin Pocket, For Cardiac Pacemaker;  Surgeon: Juan Lazo MD;  Location: [facility] CATH LAB;  Service: Cardiology;;       Social History     Tobacco Use    Smoking status: Former     Packs/day: 0.25     Years: 1.00     Pack years: 0.25     Types: Cigarettes     Quit date: 1985     Years since quittin.8    Smokeless tobacco: Former    Tobacco comments:     Quit 30 - 40 years ago   Substance Use Topics    Alcohol use: Yes     Comment: Wine Occasionally     Drug use: Never       Family History   Problem Relation Age of Onset    Cancer Mother     Cancer Father     Cancer Brother      Wt Readings from Last 3 Encounters:   22 101.3 kg (223 lb 5.2 oz)   10/10/22 99.8 kg (220 lb)   22 101.2 kg  "(223 lb)     Temp Readings from Last 3 Encounters:   10/10/22 97.5 °F (36.4 °C)   03/11/22 98 °F (36.7 °C)   12/08/21 98.4 °F (36.9 °C) (Oral)     BP Readings from Last 3 Encounters:   11/04/22 (!) 150/72   10/10/22 132/89   04/22/22 120/80     Pulse Readings from Last 3 Encounters:   11/04/22 84   10/10/22 62   04/22/22 (!) 55         Review of Systems   Constitutional: Positive for malaise/fatigue.   HENT:  Negative for hearing loss and hoarse voice.    Eyes:  Negative for blurred vision and visual disturbance.   Cardiovascular:  Positive for dyspnea on exertion and palpitations. Negative for chest pain, claudication, irregular heartbeat, leg swelling, near-syncope, orthopnea, paroxysmal nocturnal dyspnea and syncope.   Respiratory:  Negative for cough, hemoptysis, shortness of breath, sleep disturbances due to breathing, snoring and wheezing.    Endocrine: Negative for cold intolerance and heat intolerance.   Hematologic/Lymphatic: Bruises/bleeds easily (on eliquis).   Skin:  Negative for color change, dry skin and nail changes.   Musculoskeletal:  Positive for arthritis and back pain. Negative for joint pain and myalgias.   Gastrointestinal:  Negative for bloating, abdominal pain, constipation, nausea and vomiting.   Genitourinary:  Negative for dysuria, flank pain, hematuria and hesitancy.   Neurological:  Negative for headaches, light-headedness, loss of balance, numbness, paresthesias and weakness.   Psychiatric/Behavioral:  Negative for altered mental status.    Allergic/Immunologic: Negative for environmental allergies.     Objective:BP (!) 150/72 (BP Location: Left arm, Patient Position: Sitting)   Pulse 84   Ht 5' 3" (1.6 m)   Wt 101.3 kg (223 lb 5.2 oz)   BMI 39.56 kg/m²      Physical Exam  Vitals and nursing note reviewed.   Constitutional:       General: She is not in acute distress.     Appearance: Normal appearance. She is well-developed. She is obese. She is not ill-appearing.   HENT:      Head: " Normocephalic and atraumatic.      Nose: Nose normal.      Mouth/Throat:      Mouth: Mucous membranes are moist.   Eyes:      Pupils: Pupils are equal, round, and reactive to light.   Neck:      Thyroid: No thyromegaly.      Vascular: No JVD.      Trachea: No tracheal deviation.   Cardiovascular:      Rate and Rhythm: Normal rate and regular rhythm.      Chest Wall: PMI is not displaced.      Pulses: Intact distal pulses.           Radial pulses are 2+ on the right side and 2+ on the left side.        Dorsalis pedis pulses are 2+ on the right side and 2+ on the left side.      Heart sounds: S1 normal and S2 normal. Heart sounds not distant. No murmur heard.     Comments: Pacer in excellent repair  Pulmonary:      Effort: Pulmonary effort is normal. No respiratory distress.      Breath sounds: Normal breath sounds. No wheezing.   Abdominal:      General: Bowel sounds are normal. There is no distension.      Palpations: Abdomen is soft.      Tenderness: There is no abdominal tenderness.   Musculoskeletal:         General: No swelling. Normal range of motion.      Cervical back: Full passive range of motion without pain, normal range of motion and neck supple.      Right lower leg: No edema.      Left lower leg: No edema.      Right ankle: No swelling.      Left ankle: No swelling.   Skin:     General: Skin is warm and dry.      Capillary Refill: Capillary refill takes less than 2 seconds.      Nails: There is no clubbing.   Neurological:      General: No focal deficit present.      Mental Status: She is alert and oriented to person, place, and time.      Motor: No weakness.   Psychiatric:         Speech: Speech normal.         Behavior: Behavior normal.         Thought Content: Thought content normal.         Judgment: Judgment normal.       Lab Results   Component Value Date    CHOL 134 02/04/2022    CHOL 181 08/29/2021    CHOL 183 09/28/2020     Lab Results   Component Value Date    HDL 69 02/04/2022    HDL 61  08/29/2021    HDL 46 09/28/2020     Lab Results   Component Value Date    LDLCALC 50.4 (L) 02/04/2022    LDLCALC 99.4 08/29/2021    LDLCALC 110 (H) 09/28/2020     Lab Results   Component Value Date    TRIG 73 02/04/2022    TRIG 103 08/29/2021    TRIG 154 (H) 09/28/2020     Lab Results   Component Value Date    CHOLHDL 51.5 (H) 02/04/2022    CHOLHDL 33.7 08/29/2021       Chemistry        Component Value Date/Time     03/11/2022 1743    K 4.5 03/11/2022 1743     03/11/2022 1743    CO2 29 03/11/2022 1743    BUN 15 03/11/2022 1743    CREATININE 0.9 03/11/2022 1743    GLU 80 03/11/2022 1743        Component Value Date/Time    CALCIUM 9.9 03/11/2022 1743    ALKPHOS 59 03/11/2022 1743    AST 25 03/11/2022 1743    ALT 26 03/11/2022 1743    BILITOT 0.3 03/11/2022 1743    ESTGFRAFRICA >60 03/11/2022 1743    EGFRNONAA >60 03/11/2022 1743          Lab Results   Component Value Date    TSH 1.791 08/29/2021     Lab Results   Component Value Date    INR 1.1 08/29/2021    INR 1.0 07/12/2021    INR 1.0 06/06/2021     Lab Results   Component Value Date    WBC 9.50 03/11/2022    HGB 13.3 03/11/2022    HCT 41.6 03/11/2022    MCV 88 03/11/2022     03/11/2022          Assessment:      1. Cardiac pacemaker in situ    2. Atrial fibrillation and flutter    3. PAF (paroxysmal atrial fibrillation)    4. History of cardiac radiofrequency ablation (RFA)    5. SSS (sick sinus syndrome)    6. Typical atrial flutter    7. Chronic anticoagulation    8. Morbid obesity with BMI of 40.0-44.9, adult        Plan:     Continue eliquis for cardio-embolic protection  Continue same medical therapy  Obtain ECHO to evaluate LVEF given worsening shortness of breath symptoms  Dash diet 2 gm sodium restriction  Encourage daily walking  Home device monitoring with annual in office interrogation  RTC in 1 year with device check    Nicole May, FNP-C [facility]

## 2022-11-08 ENCOUNTER — PATIENT MESSAGE (OUTPATIENT)
Dept: CARDIOLOGY | Facility: CLINIC | Age: 72
End: 2022-11-08
Payer: MEDICARE

## 2022-11-09 ENCOUNTER — CLINICAL SUPPORT (OUTPATIENT)
Dept: CARDIOLOGY | Facility: HOSPITAL | Age: 72
End: 2022-11-09
Payer: MEDICARE

## 2022-11-09 DIAGNOSIS — Z95.0 PRESENCE OF CARDIAC PACEMAKER: ICD-10-CM

## 2022-11-09 PROCEDURE — 93294 REM INTERROG EVL PM/LDLS PM: CPT | Mod: S$GLB,,, | Performed by: INTERNAL MEDICINE

## 2022-11-09 PROCEDURE — 93294 CARDIAC DEVICE CHECK - REMOTE: ICD-10-PCS | Mod: S$GLB,,, | Performed by: INTERNAL MEDICINE

## 2022-11-09 PROCEDURE — 93296 REM INTERROG EVL PM/IDS: CPT | Performed by: INTERNAL MEDICINE

## 2022-11-14 ENCOUNTER — PATIENT MESSAGE (OUTPATIENT)
Dept: CARDIOLOGY | Facility: HOSPITAL | Age: 72
End: 2022-11-14
Payer: MEDICARE

## 2022-11-15 ENCOUNTER — PATIENT MESSAGE (OUTPATIENT)
Dept: CARDIOLOGY | Facility: CLINIC | Age: 72
End: 2022-11-15
Payer: MEDICARE

## 2022-11-15 ENCOUNTER — OFFICE VISIT (OUTPATIENT)
Dept: DERMATOLOGY | Facility: CLINIC | Age: 72
End: 2022-11-15
Payer: MEDICARE

## 2022-11-15 DIAGNOSIS — L81.4 SOLAR LENTIGO: ICD-10-CM

## 2022-11-15 DIAGNOSIS — L82.1 SEBORRHEIC KERATOSES: ICD-10-CM

## 2022-11-15 DIAGNOSIS — D18.00 HEMANGIOMA, UNSPECIFIED SITE: ICD-10-CM

## 2022-11-15 DIAGNOSIS — L57.0 ACTINIC KERATOSES: Primary | ICD-10-CM

## 2022-11-15 PROCEDURE — 17003 DESTRUCT PREMALG LES 2-14: CPT | Mod: S$GLB,,, | Performed by: STUDENT IN AN ORGANIZED HEALTH CARE EDUCATION/TRAINING PROGRAM

## 2022-11-15 PROCEDURE — 3288F PR FALLS RISK ASSESSMENT DOCUMENTED: ICD-10-PCS | Mod: CPTII,S$GLB,, | Performed by: STUDENT IN AN ORGANIZED HEALTH CARE EDUCATION/TRAINING PROGRAM

## 2022-11-15 PROCEDURE — 1101F PR PT FALLS ASSESS DOC 0-1 FALLS W/OUT INJ PAST YR: ICD-10-PCS | Mod: CPTII,S$GLB,, | Performed by: STUDENT IN AN ORGANIZED HEALTH CARE EDUCATION/TRAINING PROGRAM

## 2022-11-15 PROCEDURE — 99999 PR PBB SHADOW E&M-EST. PATIENT-LVL III: CPT | Mod: PBBFAC,,, | Performed by: STUDENT IN AN ORGANIZED HEALTH CARE EDUCATION/TRAINING PROGRAM

## 2022-11-15 PROCEDURE — 1126F AMNT PAIN NOTED NONE PRSNT: CPT | Mod: CPTII,S$GLB,, | Performed by: STUDENT IN AN ORGANIZED HEALTH CARE EDUCATION/TRAINING PROGRAM

## 2022-11-15 PROCEDURE — 99999 PR PBB SHADOW E&M-EST. PATIENT-LVL III: ICD-10-PCS | Mod: PBBFAC,,, | Performed by: STUDENT IN AN ORGANIZED HEALTH CARE EDUCATION/TRAINING PROGRAM

## 2022-11-15 PROCEDURE — 1159F MED LIST DOCD IN RCRD: CPT | Mod: CPTII,S$GLB,, | Performed by: STUDENT IN AN ORGANIZED HEALTH CARE EDUCATION/TRAINING PROGRAM

## 2022-11-15 PROCEDURE — 4010F PR ACE/ARB THEARPY RXD/TAKEN: ICD-10-PCS | Mod: CPTII,S$GLB,, | Performed by: STUDENT IN AN ORGANIZED HEALTH CARE EDUCATION/TRAINING PROGRAM

## 2022-11-15 PROCEDURE — 3288F FALL RISK ASSESSMENT DOCD: CPT | Mod: CPTII,S$GLB,, | Performed by: STUDENT IN AN ORGANIZED HEALTH CARE EDUCATION/TRAINING PROGRAM

## 2022-11-15 PROCEDURE — 99213 PR OFFICE/OUTPT VISIT, EST, LEVL III, 20-29 MIN: ICD-10-PCS | Mod: 25,S$GLB,, | Performed by: STUDENT IN AN ORGANIZED HEALTH CARE EDUCATION/TRAINING PROGRAM

## 2022-11-15 PROCEDURE — 17000 DESTRUCT PREMALG LESION: CPT | Mod: S$GLB,,, | Performed by: STUDENT IN AN ORGANIZED HEALTH CARE EDUCATION/TRAINING PROGRAM

## 2022-11-15 PROCEDURE — 17003 DESTRUCTION, PREMALIGNANT LESIONS; SECOND THROUGH 14 LESIONS: ICD-10-PCS | Mod: S$GLB,,, | Performed by: STUDENT IN AN ORGANIZED HEALTH CARE EDUCATION/TRAINING PROGRAM

## 2022-11-15 PROCEDURE — 1159F PR MEDICATION LIST DOCUMENTED IN MEDICAL RECORD: ICD-10-PCS | Mod: CPTII,S$GLB,, | Performed by: STUDENT IN AN ORGANIZED HEALTH CARE EDUCATION/TRAINING PROGRAM

## 2022-11-15 PROCEDURE — 1101F PT FALLS ASSESS-DOCD LE1/YR: CPT | Mod: CPTII,S$GLB,, | Performed by: STUDENT IN AN ORGANIZED HEALTH CARE EDUCATION/TRAINING PROGRAM

## 2022-11-15 PROCEDURE — 1160F RVW MEDS BY RX/DR IN RCRD: CPT | Mod: CPTII,S$GLB,, | Performed by: STUDENT IN AN ORGANIZED HEALTH CARE EDUCATION/TRAINING PROGRAM

## 2022-11-15 PROCEDURE — 17000 PR DESTRUCTION(LASER SURGERY,CRYOSURGERY,CHEMOSURGERY),PREMALIGNANT LESIONS,FIRST LESION: ICD-10-PCS | Mod: S$GLB,,, | Performed by: STUDENT IN AN ORGANIZED HEALTH CARE EDUCATION/TRAINING PROGRAM

## 2022-11-15 PROCEDURE — 99213 OFFICE O/P EST LOW 20 MIN: CPT | Mod: 25,S$GLB,, | Performed by: STUDENT IN AN ORGANIZED HEALTH CARE EDUCATION/TRAINING PROGRAM

## 2022-11-15 PROCEDURE — 1126F PR PAIN SEVERITY QUANTIFIED, NO PAIN PRESENT: ICD-10-PCS | Mod: CPTII,S$GLB,, | Performed by: STUDENT IN AN ORGANIZED HEALTH CARE EDUCATION/TRAINING PROGRAM

## 2022-11-15 PROCEDURE — 4010F ACE/ARB THERAPY RXD/TAKEN: CPT | Mod: CPTII,S$GLB,, | Performed by: STUDENT IN AN ORGANIZED HEALTH CARE EDUCATION/TRAINING PROGRAM

## 2022-11-15 PROCEDURE — 1160F PR REVIEW ALL MEDS BY PRESCRIBER/CLIN PHARMACIST DOCUMENTED: ICD-10-PCS | Mod: CPTII,S$GLB,, | Performed by: STUDENT IN AN ORGANIZED HEALTH CARE EDUCATION/TRAINING PROGRAM

## 2022-11-15 NOTE — PATIENT INSTRUCTIONS

## 2022-11-15 NOTE — PROGRESS NOTES
Subjective:       Patient ID:  Antonio Urena is a 72 y.o. female who presents for   Chief Complaint   Patient presents with    Skin Check     UBSE     Patient with hx of BCCs. Patient has had long hx of heavy sun exposure. This is a high risk patient here to check for the development of new lesions.        Review of Systems   Skin:  Positive for daily sunscreen use (SPF 30), activity-related sunscreen use and wears hat. Negative for itching, rash and recent sunburn.   Hematologic/Lymphatic: Bruises/bleeds easily.      Objective:    Physical Exam   Constitutional: She appears well-developed and well-nourished. No distress.   Neurological: She is alert and oriented to person, place, and time. She is not disoriented.   Psychiatric: She has a normal mood and affect.   Skin:   Areas Examined (abnormalities noted in diagram):   Scalp / Hair Palpated and Inspected  Head / Face Inspection Performed  Neck Inspection Performed  Chest / Axilla Inspection Performed  Abdomen Inspection Performed  Genitals / Buttocks / Groin Inspection Performed  Back Inspection Performed  RUE Inspected  LUE Inspection Performed  RLE Inspected  LLE Inspection Performed  Nails and Digits Inspection Performed                 Diagram Legend     Erythematous scaling macule/papule c/w actinic keratosis       Vascular papule c/w angioma      Pigmented verrucoid papule/plaque c/w seborrheic keratosis      Yellow umbilicated papule c/w sebaceous hyperplasia      Irregularly shaped tan macule c/w lentigo     1-2 mm smooth white papules consistent with Milia      Movable subcutaneous cyst with punctum c/w epidermal inclusion cyst      Subcutaneous movable cyst c/w pilar cyst      Firm pink to brown papule c/w dermatofibroma      Pedunculated fleshy papule(s) c/w skin tag(s)      Evenly pigmented macule c/w junctional nevus     Mildly variegated pigmented, slightly irregular-bordered macule c/w mildly atypical nevus      Flesh colored to evenly  pigmented papule c/w intradermal nevus       Pink pearly papule/plaque c/w basal cell carcinoma      Erythematous hyperkeratotic cursted plaque c/w SCC      Surgical scar with no sign of skin cancer recurrence      Open and closed comedones      Inflammatory papules and pustules      Verrucoid papule consistent consistent with wart     Erythematous eczematous patches and plaques     Dystrophic onycholytic nail with subungual debris c/w onychomycosis     Umbilicated papule    Erythematous-base heme-crusted tan verrucoid plaque consistent with inflamed seborrheic keratosis     Erythematous Silvery Scaling Plaque c/w Psoriasis     See annotation      Assessment / Plan:        Actinic keratoses  Cryosurgery Procedure Note    Verbal consent from the patient is obtained including, but not limited to, risk of hypopigmentation/hyperpigmentation, scar, recurrence of lesion. The patient is aware of the precancerous quality and need for treatment of these lesions. Liquid nitrogen cryosurgery is applied to the 7 actinic keratoses, as detailed in the physical exam, to produce a freeze injury. The patient is aware that blisters may form and is instructed on wound care with gentle cleansing and use of vaseline ointment to keep moist until healed. The patient is supplied a handout on cryosurgery and is instructed to call if lesions do not completely resolve.    Solar lentigo  This is a benign hyperpigmented sun induced lesion. Recommend daily sun protection/avoidance and use of at least SPF 30, broad spectrum sunscreen (OTC drug) will reduce the number of new lesions. Treatment of these benign lesions are considered cosmetic.    Seborrheic keratoses  These are benign inherited growths without a malignant potential. Reassurance given to patient. No treatment is necessary.     Hemangioma, unspecified site  This is a benign vascular lesion. Reassurance given. No treatment required.         LOS NUMBER AND COMPLEXITY OF PROBLEMS     COMPLEXITY OF DATA RISK TOTAL TIME (m)   99019  83399 [] 1 self-limited or minor problem [x] Minimal to none [] No treatment recommended or patient to monitor 15-29  10-19   82047  74767 Low  [] 2 or > self limited or minor problems  [] 1 stable chronic illness  [] 1 acute, uncomplicated illness or injury Limited (2)  [] Prior external notes from each unique source  [] Review result of each unique test  [] Order each unique test [x]  Low  OTC medications, minor skin biopsy 30-44  20-29   84706  77699 Moderate  []  1 or > chronic illness with progression, exacerbation or SE of treatment  [x]  2 or more stable chronic illnesses  []  1 acute illness with systemic symptoms  []  1 acute complicated injury  []  1 undiagnosed new problem with uncertain prognosis Moderate (1/3 below)  []  3 or more data items        *Now includes assessment requiring independent historian  []  Independent interpretation of a test  []  Discuss management/test with another provider Moderate  []  Prescription drug mgmt  []  Minor surgery with risk discussed  []  Mgmt limited by social determinates 45-59  30-39   27952  30051 High  []  1 or more chronic illness with severe exacerbation, progression or SE of treatment  []  1 acute or chronic illness/injury that poses a threat to life or bodily function Extensive (2/3 below)  []  3 or more data items        *Now includes assessment requiring independent historian.  []  Independent interpretation of a test  []  Discuss management/test with another provider High  []  Major surgery with risk discussed  []  Drug therapy requiring intensive monitoring for toxicity  []  Hospitalization  []  Decision for DNR 60-74  40-54              No follow-ups on file.

## 2022-11-16 ENCOUNTER — HOSPITAL ENCOUNTER (OUTPATIENT)
Dept: CARDIOLOGY | Facility: HOSPITAL | Age: 72
Discharge: HOME | End: 2022-11-16
Attending: NURSE PRACTITIONER
Payer: MEDICARE

## 2022-11-16 VITALS
SYSTOLIC BLOOD PRESSURE: 150 MMHG | BODY MASS INDEX: 41.04 KG/M2 | HEIGHT: 62 IN | WEIGHT: 223 LBS | DIASTOLIC BLOOD PRESSURE: 72 MMHG

## 2022-11-16 DIAGNOSIS — Z95.0 CARDIAC PACEMAKER IN SITU: ICD-10-CM

## 2022-11-16 DIAGNOSIS — R06.02 SHORTNESS OF BREATH: ICD-10-CM

## 2022-11-16 LAB
AORTIC ROOT ANNULUS: 3.16 CM
AV INDEX (PROSTH): 0.66
AV MEAN GRADIENT: 5 MMHG
AV PEAK GRADIENT: 9 MMHG
AV REGURGITATION PRESSURE HALF TIME: 619.71 MS
AV VALVE AREA: 2.17 CM2
AV VELOCITY RATIO: 0.67
BSA FOR ECHO PROCEDURE: 2.1 M2
CV ECHO LV RWT: 0.45 CM
DOP CALC AO PEAK VEL: 1.5 M/S
DOP CALC AO VTI: 34.7 CM
DOP CALC LVOT AREA: 3.3 CM2
DOP CALC LVOT DIAMETER: 2.04 CM
DOP CALC LVOT PEAK VEL: 1 M/S
DOP CALC LVOT STROKE VOLUME: 75.14 CM3
DOP CALC RVOT PEAK VEL: 0.91 M/S
DOP CALC RVOT VTI: 24.7 CM
DOP CALCLVOT PEAK VEL VTI: 23 CM
E WAVE DECELERATION TIME: 224.3 MSEC
E/A RATIO: 1.03
E/E' RATIO: 6 M/S
ECHO LV POSTERIOR WALL: 0.96 CM (ref 0.6–1.1)
EJECTION FRACTION: 60 %
FRACTIONAL SHORTENING: 29 % (ref 28–44)
HCV RNA # SERPL NAA+PROBE: 690.4 MMHG/S
INTERVENTRICULAR SEPTUM: 1.07 CM (ref 0.6–1.1)
IVRT: 107.27 MSEC
LA MAJOR: 4.13 CM
LA MINOR: 3.67 CM
LA WIDTH: 3.7 CM
LEFT ATRIUM SIZE: 4.25 CM
LEFT ATRIUM VOLUME INDEX MOD: 36.5 ML/M2
LEFT ATRIUM VOLUME INDEX: 26 ML/M2
LEFT ATRIUM VOLUME MOD: 73 CM3
LEFT ATRIUM VOLUME: 51.95 CM3
LEFT INTERNAL DIMENSION IN SYSTOLE: 3.01 CM (ref 2.1–4)
LEFT VENTRICLE DIASTOLIC VOLUME INDEX: 40.03 ML/M2
LEFT VENTRICLE DIASTOLIC VOLUME: 80.06 ML
LEFT VENTRICLE MASS INDEX: 71 G/M2
LEFT VENTRICLE SYSTOLIC VOLUME INDEX: 17.6 ML/M2
LEFT VENTRICLE SYSTOLIC VOLUME: 35.26 ML
LEFT VENTRICULAR INTERNAL DIMENSION IN DIASTOLE: 4.23 CM (ref 3.5–6)
LEFT VENTRICULAR MASS: 141.73 G
LV LATERAL E/E' RATIO: 5.36 M/S
LV SEPTAL E/E' RATIO: 6.82 M/S
LVOT MG: 2.38 MMHG
LVOT MV: 0.74 CM/S
MV PEAK A VEL: 0.73 M/S
MV PEAK E VEL: 0.75 M/S
MV STENOSIS PRESSURE HALF TIME: 65.05 MS
MV VALVE AREA P 1/2 METHOD: 3.38 CM2
PISA AR MAX VEL: 4.1 M/S
PISA TR MAX VEL: 2.53 M/S
PV MEAN GRADIENT: 2.3 MMHG
PV PEAK VELOCITY: 0.95 CM/S
RA MAJOR: 4.43 CM
RA PRESSURE: 3 MMHG
RA WIDTH: 3.88 CM
RIGHT VENTRICULAR END-DIASTOLIC DIMENSION: 4.04 CM
SINUS: 3.3 CM
STJ: 3.22 CM
TDI LATERAL: 0.14 M/S
TDI SEPTAL: 0.11 M/S
TDI: 0.13 M/S
TR MAX PG: 26 MMHG
TRICUSPID ANNULAR PLANE SYSTOLIC EXCURSION: 1.8 CM
TV REST PULMONARY ARTERY PRESSURE: 29 MMHG

## 2022-11-16 PROCEDURE — 93306 TTE W/DOPPLER COMPLETE: CPT | Mod: 26,,, | Performed by: INTERNAL MEDICINE

## 2022-11-16 PROCEDURE — 93306 ECHO (CUPID ONLY): ICD-10-PCS | Mod: 26,,, | Performed by: INTERNAL MEDICINE

## 2022-11-16 PROCEDURE — 93306 TTE W/DOPPLER COMPLETE: CPT | Mod: PO

## 2022-11-17 ENCOUNTER — PATIENT MESSAGE (OUTPATIENT)
Dept: CARDIOLOGY | Facility: CLINIC | Age: 72
End: 2022-11-17
Payer: MEDICARE

## 2023-01-24 DIAGNOSIS — R00.2 PALPITATIONS: Primary | ICD-10-CM

## 2023-01-25 ENCOUNTER — CLINICAL SUPPORT (OUTPATIENT)
Dept: PULMONOLOGY | Facility: CLINIC | Age: 73
End: 2023-01-25
Payer: MEDICARE

## 2023-01-25 ENCOUNTER — OFFICE VISIT (OUTPATIENT)
Dept: SLEEP MEDICINE | Facility: CLINIC | Age: 73
End: 2023-01-25
Payer: MEDICARE

## 2023-01-25 ENCOUNTER — OFFICE VISIT (OUTPATIENT)
Dept: CARDIOLOGY | Facility: CLINIC | Age: 73
End: 2023-01-25
Payer: MEDICARE

## 2023-01-25 ENCOUNTER — HOSPITAL ENCOUNTER (OUTPATIENT)
Dept: RADIOLOGY | Facility: HOSPITAL | Age: 73
Discharge: HOME | End: 2023-01-25
Attending: NURSE PRACTITIONER
Payer: MEDICARE

## 2023-01-25 ENCOUNTER — HOSPITAL ENCOUNTER (OUTPATIENT)
Dept: CARDIOLOGY | Facility: HOSPITAL | Age: 73
Discharge: HOME | End: 2023-01-25
Attending: INTERNAL MEDICINE
Payer: MEDICARE

## 2023-01-25 VITALS
DIASTOLIC BLOOD PRESSURE: 62 MMHG | RESPIRATION RATE: 20 BRPM | OXYGEN SATURATION: 94 % | HEIGHT: 62 IN | WEIGHT: 222 LBS | SYSTOLIC BLOOD PRESSURE: 122 MMHG | BODY MASS INDEX: 40.85 KG/M2 | HEART RATE: 60 BPM

## 2023-01-25 VITALS
HEART RATE: 61 BPM | OXYGEN SATURATION: 95 % | WEIGHT: 222 LBS | DIASTOLIC BLOOD PRESSURE: 56 MMHG | SYSTOLIC BLOOD PRESSURE: 120 MMHG | HEIGHT: 62 IN | BODY MASS INDEX: 40.85 KG/M2

## 2023-01-25 DIAGNOSIS — T82.110A PACEMAKER LEAD MALFUNCTION, INITIAL ENCOUNTER: ICD-10-CM

## 2023-01-25 DIAGNOSIS — J44.89 ASTHMA WITH COPD: Chronic | ICD-10-CM

## 2023-01-25 DIAGNOSIS — Z95.0 PACEMAKER: ICD-10-CM

## 2023-01-25 DIAGNOSIS — J30.89 NON-SEASONAL ALLERGIC RHINITIS, UNSPECIFIED TRIGGER: Primary | Chronic | ICD-10-CM

## 2023-01-25 DIAGNOSIS — R00.2 PALPITATIONS: ICD-10-CM

## 2023-01-25 DIAGNOSIS — T82.9XXS DISORDER OF CARDIAC PACEMAKER SYSTEM, SEQUELA: ICD-10-CM

## 2023-01-25 DIAGNOSIS — E78.5 HYPERLIPIDEMIA, UNSPECIFIED HYPERLIPIDEMIA TYPE: ICD-10-CM

## 2023-01-25 DIAGNOSIS — T82.120D: ICD-10-CM

## 2023-01-25 DIAGNOSIS — I10 ESSENTIAL HYPERTENSION: ICD-10-CM

## 2023-01-25 DIAGNOSIS — I48.0 PAF (PAROXYSMAL ATRIAL FIBRILLATION): ICD-10-CM

## 2023-01-25 DIAGNOSIS — Z98.890 HISTORY OF CARDIAC RADIOFREQUENCY ABLATION (RFA): ICD-10-CM

## 2023-01-25 DIAGNOSIS — R00.2 PALPITATIONS: Primary | ICD-10-CM

## 2023-01-25 DIAGNOSIS — I49.9 CARDIAC ARRHYTHMIA, UNSPECIFIED CARDIAC ARRHYTHMIA TYPE: ICD-10-CM

## 2023-01-25 DIAGNOSIS — I48.3 TYPICAL ATRIAL FLUTTER: ICD-10-CM

## 2023-01-25 DIAGNOSIS — Z95.0 S/P CARDIAC PACEMAKER PROCEDURE: ICD-10-CM

## 2023-01-25 DIAGNOSIS — I49.5 SSS (SICK SINUS SYNDROME): ICD-10-CM

## 2023-01-25 PROCEDURE — 1101F PT FALLS ASSESS-DOCD LE1/YR: CPT | Mod: CPTII,S$GLB,, | Performed by: INTERNAL MEDICINE

## 2023-01-25 PROCEDURE — 1160F RVW MEDS BY RX/DR IN RCRD: CPT | Mod: CPTII,S$GLB,, | Performed by: NURSE PRACTITIONER

## 2023-01-25 PROCEDURE — 93010 EKG 12-LEAD: ICD-10-PCS | Mod: ,,, | Performed by: INTERNAL MEDICINE

## 2023-01-25 PROCEDURE — 1101F PR PT FALLS ASSESS DOC 0-1 FALLS W/OUT INJ PAST YR: ICD-10-PCS | Mod: CPTII,S$GLB,, | Performed by: NURSE PRACTITIONER

## 2023-01-25 PROCEDURE — 99999 PR PBB SHADOW E&M-EST. PATIENT-LVL III: CPT | Mod: PBBFAC,,, | Performed by: INTERNAL MEDICINE

## 2023-01-25 PROCEDURE — 3288F FALL RISK ASSESSMENT DOCD: CPT | Mod: CPTII,S$GLB,, | Performed by: INTERNAL MEDICINE

## 2023-01-25 PROCEDURE — 3078F DIAST BP <80 MM HG: CPT | Mod: CPTII,S$GLB,, | Performed by: INTERNAL MEDICINE

## 2023-01-25 PROCEDURE — 3288F PR FALLS RISK ASSESSMENT DOCUMENTED: ICD-10-PCS | Mod: CPTII,S$GLB,, | Performed by: INTERNAL MEDICINE

## 2023-01-25 PROCEDURE — 3288F PR FALLS RISK ASSESSMENT DOCUMENTED: ICD-10-PCS | Mod: CPTII,S$GLB,, | Performed by: NURSE PRACTITIONER

## 2023-01-25 PROCEDURE — 3074F SYST BP LT 130 MM HG: CPT | Mod: CPTII,S$GLB,, | Performed by: NURSE PRACTITIONER

## 2023-01-25 PROCEDURE — 94010 BREATHING CAPACITY TEST: CPT | Mod: S$GLB,,, | Performed by: INTERNAL MEDICINE

## 2023-01-25 PROCEDURE — 3078F PR MOST RECENT DIASTOLIC BLOOD PRESSURE < 80 MM HG: ICD-10-PCS | Mod: CPTII,S$GLB,, | Performed by: NURSE PRACTITIONER

## 2023-01-25 PROCEDURE — 1160F RVW MEDS BY RX/DR IN RCRD: CPT | Mod: CPTII,S$GLB,, | Performed by: INTERNAL MEDICINE

## 2023-01-25 PROCEDURE — 99999 PR PBB SHADOW E&M-EST. PATIENT-LVL III: ICD-10-PCS | Mod: PBBFAC,,, | Performed by: INTERNAL MEDICINE

## 2023-01-25 PROCEDURE — 93010 ELECTROCARDIOGRAM REPORT: CPT | Mod: ,,, | Performed by: INTERNAL MEDICINE

## 2023-01-25 PROCEDURE — 3078F DIAST BP <80 MM HG: CPT | Mod: CPTII,S$GLB,, | Performed by: NURSE PRACTITIONER

## 2023-01-25 PROCEDURE — 3008F PR BODY MASS INDEX (BMI) DOCUMENTED: ICD-10-PCS | Mod: CPTII,S$GLB,, | Performed by: NURSE PRACTITIONER

## 2023-01-25 PROCEDURE — 99999 PR PBB SHADOW E&M-EST. PATIENT-LVL IV: CPT | Mod: PBBFAC,,, | Performed by: NURSE PRACTITIONER

## 2023-01-25 PROCEDURE — 71046 X-RAY EXAM CHEST 2 VIEWS: CPT | Mod: 26,,, | Performed by: RADIOLOGY

## 2023-01-25 PROCEDURE — 99214 PR OFFICE/OUTPT VISIT, EST, LEVL IV, 30-39 MIN: ICD-10-PCS | Mod: 25,S$GLB,, | Performed by: INTERNAL MEDICINE

## 2023-01-25 PROCEDURE — 1160F PR REVIEW ALL MEDS BY PRESCRIBER/CLIN PHARMACIST DOCUMENTED: ICD-10-PCS | Mod: CPTII,S$GLB,, | Performed by: NURSE PRACTITIONER

## 2023-01-25 PROCEDURE — 1159F PR MEDICATION LIST DOCUMENTED IN MEDICAL RECORD: ICD-10-PCS | Mod: CPTII,S$GLB,, | Performed by: INTERNAL MEDICINE

## 2023-01-25 PROCEDURE — 99214 OFFICE O/P EST MOD 30 MIN: CPT | Mod: 25,S$GLB,, | Performed by: NURSE PRACTITIONER

## 2023-01-25 PROCEDURE — 3078F PR MOST RECENT DIASTOLIC BLOOD PRESSURE < 80 MM HG: ICD-10-PCS | Mod: CPTII,S$GLB,, | Performed by: INTERNAL MEDICINE

## 2023-01-25 PROCEDURE — 94010 BREATHING CAPACITY TEST: ICD-10-PCS | Mod: S$GLB,,, | Performed by: INTERNAL MEDICINE

## 2023-01-25 PROCEDURE — 1159F PR MEDICATION LIST DOCUMENTED IN MEDICAL RECORD: ICD-10-PCS | Mod: CPTII,S$GLB,, | Performed by: NURSE PRACTITIONER

## 2023-01-25 PROCEDURE — 1159F MED LIST DOCD IN RCRD: CPT | Mod: CPTII,S$GLB,, | Performed by: INTERNAL MEDICINE

## 2023-01-25 PROCEDURE — 93005 ELECTROCARDIOGRAM TRACING: CPT

## 2023-01-25 PROCEDURE — 3008F PR BODY MASS INDEX (BMI) DOCUMENTED: ICD-10-PCS | Mod: CPTII,S$GLB,, | Performed by: INTERNAL MEDICINE

## 2023-01-25 PROCEDURE — 3288F FALL RISK ASSESSMENT DOCD: CPT | Mod: CPTII,S$GLB,, | Performed by: NURSE PRACTITIONER

## 2023-01-25 PROCEDURE — 1101F PR PT FALLS ASSESS DOC 0-1 FALLS W/OUT INJ PAST YR: ICD-10-PCS | Mod: CPTII,S$GLB,, | Performed by: INTERNAL MEDICINE

## 2023-01-25 PROCEDURE — 71046 XR CHEST PA AND LATERAL: ICD-10-PCS | Mod: 26,,, | Performed by: RADIOLOGY

## 2023-01-25 PROCEDURE — 1126F PR PAIN SEVERITY QUANTIFIED, NO PAIN PRESENT: ICD-10-PCS | Mod: CPTII,S$GLB,, | Performed by: NURSE PRACTITIONER

## 2023-01-25 PROCEDURE — 99999 PR PBB SHADOW E&M-EST. PATIENT-LVL IV: ICD-10-PCS | Mod: PBBFAC,,, | Performed by: NURSE PRACTITIONER

## 2023-01-25 PROCEDURE — 99214 PR OFFICE/OUTPT VISIT, EST, LEVL IV, 30-39 MIN: ICD-10-PCS | Mod: 25,S$GLB,, | Performed by: NURSE PRACTITIONER

## 2023-01-25 PROCEDURE — 1126F PR PAIN SEVERITY QUANTIFIED, NO PAIN PRESENT: ICD-10-PCS | Mod: CPTII,S$GLB,, | Performed by: INTERNAL MEDICINE

## 2023-01-25 PROCEDURE — 3008F BODY MASS INDEX DOCD: CPT | Mod: CPTII,S$GLB,, | Performed by: INTERNAL MEDICINE

## 2023-01-25 PROCEDURE — 71046 X-RAY EXAM CHEST 2 VIEWS: CPT | Mod: TC

## 2023-01-25 PROCEDURE — 3074F SYST BP LT 130 MM HG: CPT | Mod: CPTII,S$GLB,, | Performed by: INTERNAL MEDICINE

## 2023-01-25 PROCEDURE — 1126F AMNT PAIN NOTED NONE PRSNT: CPT | Mod: CPTII,S$GLB,, | Performed by: NURSE PRACTITIONER

## 2023-01-25 PROCEDURE — 1126F AMNT PAIN NOTED NONE PRSNT: CPT | Mod: CPTII,S$GLB,, | Performed by: INTERNAL MEDICINE

## 2023-01-25 PROCEDURE — 1160F PR REVIEW ALL MEDS BY PRESCRIBER/CLIN PHARMACIST DOCUMENTED: ICD-10-PCS | Mod: CPTII,S$GLB,, | Performed by: INTERNAL MEDICINE

## 2023-01-25 PROCEDURE — 3074F PR MOST RECENT SYSTOLIC BLOOD PRESSURE < 130 MM HG: ICD-10-PCS | Mod: CPTII,S$GLB,, | Performed by: NURSE PRACTITIONER

## 2023-01-25 PROCEDURE — 99214 OFFICE O/P EST MOD 30 MIN: CPT | Mod: 25,S$GLB,, | Performed by: INTERNAL MEDICINE

## 2023-01-25 PROCEDURE — 3074F PR MOST RECENT SYSTOLIC BLOOD PRESSURE < 130 MM HG: ICD-10-PCS | Mod: CPTII,S$GLB,, | Performed by: INTERNAL MEDICINE

## 2023-01-25 PROCEDURE — 1101F PT FALLS ASSESS-DOCD LE1/YR: CPT | Mod: CPTII,S$GLB,, | Performed by: NURSE PRACTITIONER

## 2023-01-25 PROCEDURE — 1159F MED LIST DOCD IN RCRD: CPT | Mod: CPTII,S$GLB,, | Performed by: NURSE PRACTITIONER

## 2023-01-25 PROCEDURE — 3008F BODY MASS INDEX DOCD: CPT | Mod: CPTII,S$GLB,, | Performed by: NURSE PRACTITIONER

## 2023-01-25 RX ORDER — MILK THISTLE 150 MG
CAPSULE ORAL
COMMUNITY

## 2023-01-25 RX ORDER — FLUTICASONE PROPIONATE 50 MCG
2 SPRAY, SUSPENSION (ML) NASAL DAILY
Qty: 48 G | Refills: 4 | Status: SHIPPED | OUTPATIENT
Start: 2023-01-25

## 2023-01-25 NOTE — PROGRESS NOTES
Subjective:   Patient ID:  Antonio Urena is a 72 y.o. female who presents for follow-up of No chief complaint on file.  Patient denies chest pain, angina or symptoms associated with anginal equivalent.  Overall today the patient is feeling generally well heart rate blood pressure stable is no evidence of postural changes blood pressure 120/70 standing sitting and lying down.  Overall doing well pulse is 50 beats per minute heart rate is stable there was no other abnormalities.  Patient feeling well physical exam is normal today.     Overall patient feels well this time.  No acute symptoms.  Intermittent palpitations are very rare this time heart rate blood pressure stable and she is doing well new medication diltiazem otherwise stable this moment.  NO FOCAL CNS SYMPTOMS OR SIGNS TO SUGGEST TIA OR STROKE  NO ANGINA OR EQUIVALENT  NO UNUSUAL CASTELLON. NO ORTHOPNEA OR PND  NO PALPITATIONS  NO NEAR SYNCOPE OR SYNCOPE  NO EDEMA. NO CALVE TENDERNESS  This finds patient feeling well stable blood pressure medications otherwise doing well no acute changes.  No heart rate or blood pressure changes at this time.  Last device check on 12/22/2021 was stable.  No acute changes patient is having no arrhythmias noted.           This virtual visit finds patient feeling well blood pressure on her own reveals blood pressure 122/70 pulse 70 oxygenation 98.  Patient has had no exertional symptoms chest pain shortness of breath sporadic intermittent arrhythmias however doing well and continues on all medications including apixaban for anticoagulation prevention for cardiac risk of stroke.  Patient is otherwise stable all medications all medicines reviewed renewed doing well this time device check in June was stable with no arrhythmias.  Good battery life.  Otherwise doing well.       Clinically stable no recurrence symptoms doing well with anticoagulation in blood pressure control heart rate control doing well this time.  Recent check of  the pacemaker shows appropriate pacing sensing and no other arrhythmias were noted.      Review of Systems   Constitutional: Negative for chills, diaphoresis, night sweats, weight gain and weight loss.   HENT:  Negative for congestion, hoarse voice, sore throat and stridor.    Eyes:  Negative for double vision and pain.   Cardiovascular:  Negative for chest pain, claudication, cyanosis, dyspnea on exertion, irregular heartbeat, leg swelling, near-syncope, orthopnea, palpitations, paroxysmal nocturnal dyspnea and syncope.   Respiratory:  Negative for cough, hemoptysis, shortness of breath, sleep disturbances due to breathing, snoring, sputum production and wheezing.    Endocrine: Negative for cold intolerance, heat intolerance and polydipsia.   Hematologic/Lymphatic: Negative for bleeding problem. Does not bruise/bleed easily.   Skin:  Negative for color change, dry skin and rash.   Musculoskeletal:  Negative for joint swelling and muscle cramps.   Gastrointestinal:  Negative for bloating, abdominal pain, constipation, diarrhea, dysphagia, melena, nausea and vomiting.   Genitourinary:  Negative for flank pain and urgency.   Neurological:  Negative for dizziness, focal weakness, headaches, light-headedness, loss of balance, seizures and weakness.   Psychiatric/Behavioral:  Negative for altered mental status and memory loss. The patient is not nervous/anxious.    Family History   Problem Relation Age of Onset    Cancer Mother     Cancer Father     Cancer Brother      Past Medical History:   Diagnosis Date    A-fib     A-fib     Pace maker put in 5/12/20    Allergy     Anxiety     Asthma     cough variant    Cancer 2018    basal cell c    Colon polyp     COPD (chronic obstructive pulmonary disease)     Hypertension      Social History     Socioeconomic History    Marital status:    Tobacco Use    Smoking status: Former     Packs/day: 0.25     Years: 1.00     Pack years: 0.25     Types: Cigarettes     Quit date:  1985     Years since quittin.0    Smokeless tobacco: Former    Tobacco comments:     Quit 30 - 40 years ago   Substance and Sexual Activity    Alcohol use: Yes     Comment: Wine Occasionally     Drug use: Never    Sexual activity: Not Currently     Current Outpatient Medications on File Prior to Visit   Medication Sig Dispense Refill    cetirizine (ZYRTEC) 10 MG tablet Take 10 mg by mouth once daily.       COVID-19 vac, dulce,Pfizer,,PF, (PFIZER COVID-19 DULCE VACCN,PF,) 30 mcg/0.3 mL injection Inject into the muscle. 0.3 mL 0    diltiaZEM (DILT-XR) 240 MG CDCR Take 1 capsule (240 mg total) by mouth once daily. 90 capsule 1    ELIQUIS 5 mg Tab TAKE 1 TABLET TWICE DAILY 180 tablet 3    flecainide (TAMBOCOR) 100 MG Tab TAKE 1 TABLET EVERY 12 HOURS 180 tablet 3    flu vac  [id number],PF, (FLUAD QUAD ,65Y UP,,PF,) 60 mcg (15 mcg x 4)/0.5 mL Syrg Inject 0.5 mLs into the muscle. 0.5 mL 0    fluticasone-salmeterol diskus inhaler 250-50 mcg Inhale 1 puff into the lungs 2 (two) times daily. Controller.Wash out mouth after using. 180 each 3    levalbuterol (XOPENEX HFA) 45 mcg/actuation inhaler Inhale 2 puffs into the lungs every 6 (six) hours as needed for Wheezing or Shortness of Breath. 45 g 3    lisinopriL (PRINIVIL,ZESTRIL) 20 MG tablet Take 1.5 tablets (30 mg total) by mouth once daily. 135 tablet 0    LORazepam (ATIVAN) 0.5 MG tablet Take 0.5 mg by mouth daily as needed for Anxiety.      meclizine (ANTIVERT) 25 mg tablet Take 1 tablet (25 mg total) by mouth 3 (three) times daily as needed. 20 tablet 0    omega-3 fatty acids/fish oil (FISH OIL-OMEGA-3 FATTY ACIDS) 300-1,000 mg capsule Take 1 capsule by mouth once daily.      rosuvastatin (CRESTOR) 20 MG tablet Take 1 tablet (20 mg total) by mouth once daily. 90 tablet 3    SPIRIVA RESPIMAT 2.5 mcg/actuation inhaler INHALE 2 PUFFS INTO THE LUNGS ONCE DAILY. 12 g 3    triamcinolone acetonide 0.1% (KENALOG) 0.1 % cream Apply topically 2 (two) times  daily. Use up to 2 weeks at a time 454 g 1     No current facility-administered medications on file prior to visit.     Review of patient's allergies indicates:   Allergen Reactions    Lasix [furosemide]      Within 1 year of taking pt will get severe knee pain in both knees.       Objective:     Physical Exam  Eyes:      Pupils: Pupils are equal, round, and reactive to light.   Neck:      Trachea: No tracheal deviation.   Cardiovascular:      Rate and Rhythm: Normal rate and regular rhythm.      Pulses: Intact distal pulses.           Carotid pulses are 2+ on the right side and 2+ on the left side.       Radial pulses are 2+ on the right side and 2+ on the left side.        Femoral pulses are 2+ on the right side and 2+ on the left side.       Popliteal pulses are 2+ on the right side and 2+ on the left side.        Dorsalis pedis pulses are 2+ on the right side and 2+ on the left side.        Posterior tibial pulses are 2+ on the right side and 2+ on the left side.      Heart sounds: Normal heart sounds. No murmur heard.    No friction rub. No gallop.   Pulmonary:      Effort: Pulmonary effort is normal. No respiratory distress.      Breath sounds: Normal breath sounds. No stridor. No wheezing or rales.   Chest:      Chest wall: No tenderness.   Abdominal:      General: There is no distension.      Tenderness: There is no abdominal tenderness. There is no rebound.   Musculoskeletal:         General: No tenderness.      Cervical back: Normal range of motion.   Skin:     General: Skin is warm and dry.   Neurological:      Mental Status: She is alert and oriented to person, place, and time.   EKG today shows sinus rhythm with atrial paced ventricular sense rhythm.  Otherwise stable this time    Device check 12/22:   Battery and Leads (BL)   Auto-threshold measurement unavailable or programmed off on lead(s)   Normal parameters noted on battery and lead(s)     Presenting Rhythm (CT)   Atrial Sensing-Ventricular  Sensing (AS-VS)     Arrhythmic events (AE)   No new arrhythmic events in monitoring period     Anticoagulation  (AC)   Patient on anticoagulant therapy   Patient prescribed Apixaban (Eliquis)     Cardiovascular Physiologic Parameters (CPP)   No abnormalities in physiologic parameters identified     Transmission Information (TI)   Device Summary Report     Follow Up (FU)   Continue remote monitoring with quarterly reporting     Additional Comments   Pacemaker Report   Monitoring period: 11/5/22-12/19/22   Assessment:     1. Palpitations    2. PAF (paroxysmal atrial fibrillation)    3. Displacement of electrode lead of cardiac pacemaker, subsequent encounter    4. Pacemaker lead malfunction, initial encounter    5. Typical atrial flutter    6. Disorder of cardiac pacemaker system, sequela    7. Essential hypertension    8. History of cardiac radiofrequency ablation (RFA)    9. Cardiac arrhythmia, unspecified cardiac arrhythmia type    10. S/P cardiac pacemaker procedure    11. Pacemaker    12. SSS (sick sinus syndrome)    13. Hyperlipidemia, unspecified hyperlipidemia type        Plan:     Palpitations    PAF (paroxysmal atrial fibrillation)    Displacement of electrode lead of cardiac pacemaker, subsequent encounter    Pacemaker lead malfunction, initial encounter    Typical atrial flutter    Disorder of cardiac pacemaker system, sequela    Essential hypertension    History of cardiac radiofrequency ablation (RFA)    Cardiac arrhythmia, unspecified cardiac arrhythmia type    S/P cardiac pacemaker procedure    Pacemaker    SSS (sick sinus syndrome)    Hyperlipidemia, unspecified hyperlipidemia type      Impression 1. Sick sinus syndrome and pacemaker stable doing well this time   2 history of cardiac ablation in doing well and continues on flecainide as well as diltiazem.    3. Hypertension under good control follow-up evaluation 3 4 months.  No acute changes no lightheadedness dizziness and stable this time.

## 2023-01-25 NOTE — PROGRESS NOTES
"Subjective:      Patient ID: Antonio Urena is a 72 y.o. female.    Chief Complaint: Asthma (copd)    HPI  Follow up asthma with COPD. Doing well on Wixela and Spiriva. Breathing stable.  No fever, chills, or hemoptysis. No pleuritic type chest pain.   Seen by cardiology today. Afib s/p ablation, pacemaker.   Cough secondary to allergies.      Patient Active Problem List   Diagnosis    Essential hypertension    Asthma with COPD    Anxiety    Non-seasonal allergic rhinitis    Pure hypercholesterolemia    Paroxysmal atrial fibrillation    SSS (sick sinus syndrome)    Cardiac pacemaker in situ    Atrial fibrillation and flutter    PAF (paroxysmal atrial fibrillation)    Symptomatic bradycardia    TIA involving carotid artery    Typical atrial flutter    History of cardiac radiofrequency ablation (RFA)    Migraine with aura    Chronic anticoagulation    Morbid obesity with BMI of 40.0-44.9, adult       /62   Pulse 60   Resp 20   Ht 5' 2" (1.575 m)   Wt 100.7 kg (222 lb 0.1 oz)   SpO2 (!) 94%   BMI 40.60 kg/m²   Body mass index is 40.6 kg/m².    Review of Systems   HENT:  Positive for postnasal drip.    Respiratory:  Positive for cough.    All other systems reviewed and are negative.  Objective:      Physical Exam  Constitutional:       Appearance: She is well-developed. She is obese.   HENT:      Head: Normocephalic and atraumatic.      Nose: Nose normal.      Mouth/Throat:      Pharynx: Oropharynx is clear.   Cardiovascular:      Rate and Rhythm: Normal rate and regular rhythm.      Heart sounds: No murmur heard.    No gallop.   Pulmonary:      Effort: Pulmonary effort is normal.      Breath sounds: Normal breath sounds.   Abdominal:      Palpations: Abdomen is soft.      Tenderness: There is no abdominal tenderness.   Musculoskeletal:         General: Normal range of motion.      Cervical back: Normal range of motion and neck supple.   Skin:     General: Skin is warm and dry.   Neurological:      Mental " Status: She is alert and oriented to person, place, and time.   Psychiatric:         Mood and Affect: Mood normal.         Behavior: Behavior normal.     Personal Diagnostic Review  Spirometry: stable    X-Ray Chest PA And Lateral  Narrative: EXAM: XR CHEST PA AND LATERAL    CLINICAL HISTORY:   [J44.9]-Chronic obstructive pulmonary disease, unspecified.    COMPARISON:  12/08/2021.    FINDINGS:  2 view chest.  Left chest cardiac conduction device.  Subcutaneous electronic device is redemonstrated.  Mediastinal silhouette is within normal limits.  The lungs are clear.  No pneumothorax or pleural effusion.  No acute osseous finding.  Impression: No acute finding in the chest.    Finalized on: 1/25/2023 1:55 PM By:  Odilon Ruelas MD  [facility]# [id number]      2023-01-25 13:57:[id number]    [facility]        Assessment:       1. Non-seasonal allergic rhinitis, unspecified trigger    2. Asthma with COPD          Outpatient Encounter Medications as of 1/25/2023   Medication Sig Dispense Refill    cetirizine (ZYRTEC) 10 MG tablet Take 10 mg by mouth once daily.       COVID-19 vac, dulce,Pfizer,,PF, (PFIZER COVID-19 DULCE VACCN,PF,) 30 mcg/0.3 mL injection Inject into the muscle. 0.3 mL 0    diltiaZEM (DILT-XR) 240 MG CDCR Take 1 capsule (240 mg total) by mouth once daily. 90 capsule 1    elderberry fruit 350 mg Cap Take by mouth.      ELIQUIS 5 mg Tab TAKE 1 TABLET TWICE DAILY 180 tablet 3    flecainide (TAMBOCOR) 100 MG Tab TAKE 1 TABLET EVERY 12 HOURS 180 tablet 3    flu vac 2022 [id number],PF, (FLUAD QUAD 2022-23,65Y UP,,PF,) 60 mcg (15 mcg x 4)/0.5 mL Syrg Inject 0.5 mLs into the muscle. 0.5 mL 0    fluticasone-salmeterol diskus inhaler 250-50 mcg Inhale 1 puff into the lungs 2 (two) times daily. Controller.Wash out mouth after using. 180 each 3    levalbuterol (XOPENEX HFA) 45 mcg/actuation inhaler Inhale 2 puffs into the lungs every 6 (six) hours as needed for Wheezing or Shortness of Breath. 45 g 3    lisinopriL (PRINIVIL,ZESTRIL)  20 MG tablet Take 1.5 tablets (30 mg total) by mouth once daily. 135 tablet 0    LORazepam (ATIVAN) 0.5 MG tablet Take 0.5 mg by mouth daily as needed for Anxiety.      meclizine (ANTIVERT) 25 mg tablet Take 1 tablet (25 mg total) by mouth 3 (three) times daily as needed. 20 tablet 0    omega-3 fatty acids/fish oil (FISH OIL-OMEGA-3 FATTY ACIDS) 300-1,000 mg capsule Take 1 capsule by mouth once daily.      rosuvastatin (CRESTOR) 20 MG tablet Take 1 tablet (20 mg total) by mouth once daily. 90 tablet 3    SPIRIVA RESPIMAT 2.5 mcg/actuation inhaler INHALE 2 PUFFS INTO THE LUNGS ONCE DAILY. 12 g 3    triamcinolone acetonide 0.1% (KENALOG) 0.1 % cream Apply topically 2 (two) times daily. Use up to 2 weeks at a time 454 g 1    fluticasone propionate (FLONASE) 50 mcg/actuation nasal spray 2 sprays (100 mcg total) by Each Nostril route once daily. 48 g 4    multivitamin capsule Take 1 capsule by mouth once daily.       No facility-administered encounter medications on file as of 1/25/2023.     Orders Placed This Encounter   Procedures    X-Ray Chest PA And Lateral     Standing Status:   Future     Standing Expiration Date:   1/25/2024     Order Specific Question:   May the Radiologist modify the order per protocol to meet the clinical needs of the patient?     Answer:   Yes    Spirometry without Bronchodilator     Standing Status:   Future     Standing Expiration Date:   1/25/2024     Order Specific Question:   Release to patient     Answer:   Immediate     Plan:        Problem List Items Addressed This Visit          ENT    Non-seasonal allergic rhinitis - Primary (Chronic)    Relevant Medications    fluticasone propionate (FLONASE) 50 mcg/actuation nasal spray       Pulmonary    Asthma with COPD (Chronic)    Overview     Wixela, Spiriva.          Relevant Orders    X-Ray Chest PA And Lateral    Spirometry without Bronchodilator

## 2023-01-26 LAB
BRPFT: NORMAL
FEF 25 75 LLN: 0.74
FEF 25 75 PRE REF: 26.7 %
FEF 25 75 REF: 1.65
FEV1 FVC LLN: 64
FEV1 FVC PRE REF: 70.6 %
FEV1 FVC REF: 78
FEV1 LLN: 1.4
FEV1 PRE REF: 49.5 %
FEV1 REF: 1.95
FVC LLN: 1.81
FVC PRE REF: 69.4 %
FVC REF: 2.52
PEF LLN: 3.49
PEF PRE REF: 84 %
PEF REF: 5.08
PRE FEF 25 75: 0.44 L/S
PRE FET 100: 4.89 SEC
PRE FEV1 FVC: 55.06 %
PRE FEV1: 0.96 L
PRE FVC: 1.75 L
PRE PEF: 4.27 L/S

## 2023-02-07 ENCOUNTER — CLINICAL SUPPORT (OUTPATIENT)
Dept: CARDIOLOGY | Facility: HOSPITAL | Age: 73
End: 2023-02-07
Payer: MEDICARE

## 2023-02-07 DIAGNOSIS — Z00.00 ENCOUNTER FOR MEDICARE ANNUAL WELLNESS EXAM: ICD-10-CM

## 2023-02-07 DIAGNOSIS — Z95.0 PRESENCE OF CARDIAC PACEMAKER: ICD-10-CM

## 2023-02-07 PROCEDURE — 93296 REM INTERROG EVL PM/IDS: CPT | Performed by: INTERNAL MEDICINE

## 2023-03-28 ENCOUNTER — OFFICE VISIT (OUTPATIENT)
Dept: OPHTHALMOLOGY | Facility: CLINIC | Age: 73
End: 2023-03-28
Payer: MEDICARE

## 2023-03-28 DIAGNOSIS — H52.4 ASTIGMATISM WITH PRESBYOPIA, BILATERAL: ICD-10-CM

## 2023-03-28 DIAGNOSIS — H26.493 PCO (POSTERIOR CAPSULAR OPACIFICATION), BILATERAL: ICD-10-CM

## 2023-03-28 DIAGNOSIS — Z96.1 PSEUDOPHAKIA OF BOTH EYES: Primary | ICD-10-CM

## 2023-03-28 DIAGNOSIS — H52.203 ASTIGMATISM WITH PRESBYOPIA, BILATERAL: ICD-10-CM

## 2023-03-28 PROCEDURE — 99999 PR PBB SHADOW E&M-EST. PATIENT-LVL III: CPT | Mod: PBBFAC,HCNC,, | Performed by: OPTOMETRIST

## 2023-03-28 PROCEDURE — 1160F RVW MEDS BY RX/DR IN RCRD: CPT | Mod: HCNC,CPTII,S$GLB, | Performed by: OPTOMETRIST

## 2023-03-28 PROCEDURE — 4010F PR ACE/ARB THEARPY RXD/TAKEN: ICD-10-PCS | Mod: HCNC,CPTII,S$GLB, | Performed by: OPTOMETRIST

## 2023-03-28 PROCEDURE — 1160F PR REVIEW ALL MEDS BY PRESCRIBER/CLIN PHARMACIST DOCUMENTED: ICD-10-PCS | Mod: HCNC,CPTII,S$GLB, | Performed by: OPTOMETRIST

## 2023-03-28 PROCEDURE — 92014 PR EYE EXAM, EST PATIENT,COMPREHESV: ICD-10-PCS | Mod: HCNC,S$GLB,, | Performed by: OPTOMETRIST

## 2023-03-28 PROCEDURE — 99999 PR PBB SHADOW E&M-EST. PATIENT-LVL III: ICD-10-PCS | Mod: PBBFAC,HCNC,, | Performed by: OPTOMETRIST

## 2023-03-28 PROCEDURE — 92014 COMPRE OPH EXAM EST PT 1/>: CPT | Mod: HCNC,S$GLB,, | Performed by: OPTOMETRIST

## 2023-03-28 PROCEDURE — 1159F PR MEDICATION LIST DOCUMENTED IN MEDICAL RECORD: ICD-10-PCS | Mod: HCNC,CPTII,S$GLB, | Performed by: OPTOMETRIST

## 2023-03-28 PROCEDURE — 4010F ACE/ARB THERAPY RXD/TAKEN: CPT | Mod: HCNC,CPTII,S$GLB, | Performed by: OPTOMETRIST

## 2023-03-28 PROCEDURE — 1159F MED LIST DOCD IN RCRD: CPT | Mod: HCNC,CPTII,S$GLB, | Performed by: OPTOMETRIST

## 2023-03-28 NOTE — PROGRESS NOTES
HPI    RTC 6 months for dilated eye exam  Pt states VA has improved, OS is better than OD  Pt uses +2.50 OTC readers  Last edited by Rochelle Pittman on 3/28/2023  1:07 PM.            Assessment /Plan     For exam results, see Encounter Report.    Pseudophakia of both eyes  PCO (posterior capsular opacification), bilateral  Stable OU  Mild PCO OU, YAG not yet indicated  Monitor 12 months    Astigmatism with presbyopia, bilateral  Eyeglass Final Rx       Eyeglass Final Rx         Sphere Cylinder Axis Add    Right -0.75 +0.75 175 +2.50    Left -0.25 +0.50 180 +2.50      Expiration Date: 3/28/2024                  Spec Rx is optional, ok to continue with OTC readers      RTC 1 yr for dilated eye exam or PRN if any problems.   Discussed above and answered questions.

## 2023-04-03 ENCOUNTER — PATIENT MESSAGE (OUTPATIENT)
Dept: ADMINISTRATIVE | Facility: OTHER | Age: 73
End: 2023-04-03
Payer: MEDICARE

## 2023-04-17 ENCOUNTER — TELEPHONE (OUTPATIENT)
Dept: ADMINISTRATIVE | Facility: CLINIC | Age: 73
End: 2023-04-17
Payer: MEDICARE

## 2023-04-17 NOTE — TELEPHONE ENCOUNTER
Called pt, no answer; left message informing pt I was calling to remind pt of her in office EAWV on 4/19/23 and to return my call if she had any questions; left my name and number

## 2023-04-18 ENCOUNTER — PATIENT OUTREACH (OUTPATIENT)
Dept: ADMINISTRATIVE | Facility: HOSPITAL | Age: 73
End: 2023-04-18
Payer: MEDICARE

## 2023-04-18 ENCOUNTER — PATIENT MESSAGE (OUTPATIENT)
Dept: ADMINISTRATIVE | Facility: HOSPITAL | Age: 73
End: 2023-04-18
Payer: MEDICARE

## 2023-04-18 NOTE — PROGRESS NOTES
Working Report not seen in > 12 months:     Called pt to discuss scheduling annual exam.  No answer, left message for a return call.

## 2023-04-19 ENCOUNTER — OFFICE VISIT (OUTPATIENT)
Dept: INTERNAL MEDICINE | Facility: CLINIC | Age: 73
End: 2023-04-19
Payer: MEDICARE

## 2023-04-19 ENCOUNTER — OFFICE VISIT (OUTPATIENT)
Dept: CARDIOLOGY | Facility: CLINIC | Age: 73
End: 2023-04-19
Payer: MEDICARE

## 2023-04-19 VITALS
DIASTOLIC BLOOD PRESSURE: 60 MMHG | HEART RATE: 62 BPM | WEIGHT: 224.63 LBS | TEMPERATURE: 98 F | HEIGHT: 62 IN | BODY MASS INDEX: 41.34 KG/M2 | SYSTOLIC BLOOD PRESSURE: 118 MMHG | OXYGEN SATURATION: 96 %

## 2023-04-19 VITALS
SYSTOLIC BLOOD PRESSURE: 118 MMHG | RESPIRATION RATE: 18 BRPM | BODY MASS INDEX: 41.38 KG/M2 | DIASTOLIC BLOOD PRESSURE: 60 MMHG | OXYGEN SATURATION: 96 % | HEART RATE: 62 BPM | WEIGHT: 224.88 LBS | HEIGHT: 62 IN

## 2023-04-19 DIAGNOSIS — T82.110S FAILURE OF PACEMAKER LEAD, SEQUELA: ICD-10-CM

## 2023-04-19 DIAGNOSIS — Z98.890 HISTORY OF CARDIAC RADIOFREQUENCY ABLATION (RFA): ICD-10-CM

## 2023-04-19 DIAGNOSIS — G43.109 MIGRAINE WITH AURA AND WITHOUT STATUS MIGRAINOSUS, NOT INTRACTABLE: ICD-10-CM

## 2023-04-19 DIAGNOSIS — E78.5 HYPERLIPIDEMIA, UNSPECIFIED HYPERLIPIDEMIA TYPE: ICD-10-CM

## 2023-04-19 DIAGNOSIS — R06.02 SHORTNESS OF BREATH: ICD-10-CM

## 2023-04-19 DIAGNOSIS — I48.92 ATRIAL FIBRILLATION AND FLUTTER: ICD-10-CM

## 2023-04-19 DIAGNOSIS — I48.21 PERMANENT ATRIAL FIBRILLATION: ICD-10-CM

## 2023-04-19 DIAGNOSIS — I48.91 ATRIAL FIBRILLATION AND FLUTTER: ICD-10-CM

## 2023-04-19 DIAGNOSIS — I48.0 PAROXYSMAL ATRIAL FIBRILLATION: Chronic | ICD-10-CM

## 2023-04-19 DIAGNOSIS — I48.91 ATRIAL FIBRILLATION AND FLUTTER: Chronic | ICD-10-CM

## 2023-04-19 DIAGNOSIS — R00.1 SYMPTOMATIC BRADYCARDIA: ICD-10-CM

## 2023-04-19 DIAGNOSIS — G45.1 TIA INVOLVING CAROTID ARTERY: ICD-10-CM

## 2023-04-19 DIAGNOSIS — Z95.0 CARDIAC PACEMAKER IN SITU: ICD-10-CM

## 2023-04-19 DIAGNOSIS — Z79.01 CHRONIC ANTICOAGULATION: ICD-10-CM

## 2023-04-19 DIAGNOSIS — E66.01 MORBID OBESITY WITH BMI OF 40.0-44.9, ADULT: ICD-10-CM

## 2023-04-19 DIAGNOSIS — D69.2 SENILE PURPURA: ICD-10-CM

## 2023-04-19 DIAGNOSIS — R07.9 CHEST PAIN, UNSPECIFIED TYPE: ICD-10-CM

## 2023-04-19 DIAGNOSIS — Z95.0 CARDIAC PACEMAKER IN SITU: Chronic | ICD-10-CM

## 2023-04-19 DIAGNOSIS — Z00.00 ENCOUNTER FOR MEDICARE ANNUAL WELLNESS EXAM: Primary | ICD-10-CM

## 2023-04-19 DIAGNOSIS — R00.2 PALPITATIONS: ICD-10-CM

## 2023-04-19 DIAGNOSIS — R06.02 SOB (SHORTNESS OF BREATH): ICD-10-CM

## 2023-04-19 DIAGNOSIS — J44.89 ASTHMA WITH COPD: Chronic | ICD-10-CM

## 2023-04-19 DIAGNOSIS — I10 ESSENTIAL HYPERTENSION: ICD-10-CM

## 2023-04-19 DIAGNOSIS — M85.80 OSTEOPENIA WITH HIGH RISK OF FRACTURE: ICD-10-CM

## 2023-04-19 DIAGNOSIS — I48.92 ATRIAL FIBRILLATION AND FLUTTER: Chronic | ICD-10-CM

## 2023-04-19 DIAGNOSIS — T82.110D PACEMAKER LEAD MALFUNCTION, SUBSEQUENT ENCOUNTER: ICD-10-CM

## 2023-04-19 DIAGNOSIS — I48.3 TYPICAL ATRIAL FLUTTER: ICD-10-CM

## 2023-04-19 DIAGNOSIS — Z95.0 PRESENCE OF CARDIAC PACEMAKER: Primary | ICD-10-CM

## 2023-04-19 DIAGNOSIS — I70.0 ATHEROSCLEROSIS OF AORTA: ICD-10-CM

## 2023-04-19 DIAGNOSIS — Z79.01 CHRONIC ANTICOAGULATION: Chronic | ICD-10-CM

## 2023-04-19 DIAGNOSIS — I49.5 SSS (SICK SINUS SYNDROME): Chronic | ICD-10-CM

## 2023-04-19 DIAGNOSIS — I10 ESSENTIAL HYPERTENSION: Chronic | ICD-10-CM

## 2023-04-19 DIAGNOSIS — F41.1 GENERALIZED ANXIETY DISORDER: ICD-10-CM

## 2023-04-19 DIAGNOSIS — E78.00 PURE HYPERCHOLESTEROLEMIA: Chronic | ICD-10-CM

## 2023-04-19 DIAGNOSIS — T82.9XXS DISORDER OF CARDIAC PACEMAKER SYSTEM, SEQUELA: ICD-10-CM

## 2023-04-19 DIAGNOSIS — I49.5 SSS (SICK SINUS SYNDROME): ICD-10-CM

## 2023-04-19 DIAGNOSIS — I49.9 CARDIAC ARRHYTHMIA, UNSPECIFIED CARDIAC ARRHYTHMIA TYPE: ICD-10-CM

## 2023-04-19 PROCEDURE — 3078F DIAST BP <80 MM HG: CPT | Mod: CPTII,S$GLB,, | Performed by: INTERNAL MEDICINE

## 2023-04-19 PROCEDURE — 3078F DIAST BP <80 MM HG: CPT | Mod: CPTII,S$GLB,, | Performed by: PHYSICIAN ASSISTANT

## 2023-04-19 PROCEDURE — 3008F PR BODY MASS INDEX (BMI) DOCUMENTED: ICD-10-PCS | Mod: CPTII,S$GLB,, | Performed by: PHYSICIAN ASSISTANT

## 2023-04-19 PROCEDURE — 3074F PR MOST RECENT SYSTOLIC BLOOD PRESSURE < 130 MM HG: ICD-10-PCS | Mod: CPTII,S$GLB,, | Performed by: PHYSICIAN ASSISTANT

## 2023-04-19 PROCEDURE — 3288F FALL RISK ASSESSMENT DOCD: CPT | Mod: CPTII,S$GLB,, | Performed by: INTERNAL MEDICINE

## 2023-04-19 PROCEDURE — 1160F PR REVIEW ALL MEDS BY PRESCRIBER/CLIN PHARMACIST DOCUMENTED: ICD-10-PCS | Mod: CPTII,S$GLB,, | Performed by: INTERNAL MEDICINE

## 2023-04-19 PROCEDURE — 3078F PR MOST RECENT DIASTOLIC BLOOD PRESSURE < 80 MM HG: ICD-10-PCS | Mod: CPTII,S$GLB,, | Performed by: PHYSICIAN ASSISTANT

## 2023-04-19 PROCEDURE — 1159F MED LIST DOCD IN RCRD: CPT | Mod: CPTII,S$GLB,, | Performed by: INTERNAL MEDICINE

## 2023-04-19 PROCEDURE — 3074F PR MOST RECENT SYSTOLIC BLOOD PRESSURE < 130 MM HG: ICD-10-PCS | Mod: CPTII,S$GLB,, | Performed by: INTERNAL MEDICINE

## 2023-04-19 PROCEDURE — 99999 PR PBB SHADOW E&M-EST. PATIENT-LVL V: ICD-10-PCS | Mod: PBBFAC,,, | Performed by: PHYSICIAN ASSISTANT

## 2023-04-19 PROCEDURE — G0439 PPPS, SUBSEQ VISIT: HCPCS | Mod: S$GLB,,, | Performed by: PHYSICIAN ASSISTANT

## 2023-04-19 PROCEDURE — 4010F ACE/ARB THERAPY RXD/TAKEN: CPT | Mod: CPTII,S$GLB,, | Performed by: INTERNAL MEDICINE

## 2023-04-19 PROCEDURE — 3008F PR BODY MASS INDEX (BMI) DOCUMENTED: ICD-10-PCS | Mod: CPTII,S$GLB,, | Performed by: INTERNAL MEDICINE

## 2023-04-19 PROCEDURE — 3008F BODY MASS INDEX DOCD: CPT | Mod: CPTII,S$GLB,, | Performed by: PHYSICIAN ASSISTANT

## 2023-04-19 PROCEDURE — 1170F FXNL STATUS ASSESSED: CPT | Mod: CPTII,S$GLB,, | Performed by: PHYSICIAN ASSISTANT

## 2023-04-19 PROCEDURE — 4010F PR ACE/ARB THEARPY RXD/TAKEN: ICD-10-PCS | Mod: CPTII,S$GLB,, | Performed by: PHYSICIAN ASSISTANT

## 2023-04-19 PROCEDURE — 99999 PR PBB SHADOW E&M-EST. PATIENT-LVL V: CPT | Mod: PBBFAC,,, | Performed by: PHYSICIAN ASSISTANT

## 2023-04-19 PROCEDURE — G0439 PR MEDICARE ANNUAL WELLNESS SUBSEQUENT VISIT: ICD-10-PCS | Mod: S$GLB,,, | Performed by: PHYSICIAN ASSISTANT

## 2023-04-19 PROCEDURE — 99999 PR PBB SHADOW E&M-EST. PATIENT-LVL III: ICD-10-PCS | Mod: PBBFAC,,, | Performed by: INTERNAL MEDICINE

## 2023-04-19 PROCEDURE — 3288F PR FALLS RISK ASSESSMENT DOCUMENTED: ICD-10-PCS | Mod: CPTII,S$GLB,, | Performed by: INTERNAL MEDICINE

## 2023-04-19 PROCEDURE — 1126F AMNT PAIN NOTED NONE PRSNT: CPT | Mod: CPTII,S$GLB,, | Performed by: INTERNAL MEDICINE

## 2023-04-19 PROCEDURE — 99999 PR PBB SHADOW E&M-EST. PATIENT-LVL III: CPT | Mod: PBBFAC,,, | Performed by: INTERNAL MEDICINE

## 2023-04-19 PROCEDURE — 3074F SYST BP LT 130 MM HG: CPT | Mod: CPTII,S$GLB,, | Performed by: PHYSICIAN ASSISTANT

## 2023-04-19 PROCEDURE — 1160F PR REVIEW ALL MEDS BY PRESCRIBER/CLIN PHARMACIST DOCUMENTED: ICD-10-PCS | Mod: CPTII,S$GLB,, | Performed by: PHYSICIAN ASSISTANT

## 2023-04-19 PROCEDURE — 1170F PR FUNCTIONAL STATUS ASSESSED: ICD-10-PCS | Mod: CPTII,S$GLB,, | Performed by: PHYSICIAN ASSISTANT

## 2023-04-19 PROCEDURE — 99214 PR OFFICE/OUTPT VISIT, EST, LEVL IV, 30-39 MIN: ICD-10-PCS | Mod: S$GLB,,, | Performed by: INTERNAL MEDICINE

## 2023-04-19 PROCEDURE — 1101F PT FALLS ASSESS-DOCD LE1/YR: CPT | Mod: CPTII,S$GLB,, | Performed by: PHYSICIAN ASSISTANT

## 2023-04-19 PROCEDURE — 1159F MED LIST DOCD IN RCRD: CPT | Mod: CPTII,S$GLB,, | Performed by: PHYSICIAN ASSISTANT

## 2023-04-19 PROCEDURE — 3288F PR FALLS RISK ASSESSMENT DOCUMENTED: ICD-10-PCS | Mod: CPTII,S$GLB,, | Performed by: PHYSICIAN ASSISTANT

## 2023-04-19 PROCEDURE — 99214 OFFICE O/P EST MOD 30 MIN: CPT | Mod: S$GLB,,, | Performed by: INTERNAL MEDICINE

## 2023-04-19 PROCEDURE — 1159F PR MEDICATION LIST DOCUMENTED IN MEDICAL RECORD: ICD-10-PCS | Mod: CPTII,S$GLB,, | Performed by: INTERNAL MEDICINE

## 2023-04-19 PROCEDURE — 1160F RVW MEDS BY RX/DR IN RCRD: CPT | Mod: CPTII,S$GLB,, | Performed by: INTERNAL MEDICINE

## 2023-04-19 PROCEDURE — 3008F BODY MASS INDEX DOCD: CPT | Mod: CPTII,S$GLB,, | Performed by: INTERNAL MEDICINE

## 2023-04-19 PROCEDURE — 1101F PR PT FALLS ASSESS DOC 0-1 FALLS W/OUT INJ PAST YR: ICD-10-PCS | Mod: CPTII,S$GLB,, | Performed by: INTERNAL MEDICINE

## 2023-04-19 PROCEDURE — 3074F SYST BP LT 130 MM HG: CPT | Mod: CPTII,S$GLB,, | Performed by: INTERNAL MEDICINE

## 2023-04-19 PROCEDURE — 3288F FALL RISK ASSESSMENT DOCD: CPT | Mod: CPTII,S$GLB,, | Performed by: PHYSICIAN ASSISTANT

## 2023-04-19 PROCEDURE — 3078F PR MOST RECENT DIASTOLIC BLOOD PRESSURE < 80 MM HG: ICD-10-PCS | Mod: CPTII,S$GLB,, | Performed by: INTERNAL MEDICINE

## 2023-04-19 PROCEDURE — 1159F PR MEDICATION LIST DOCUMENTED IN MEDICAL RECORD: ICD-10-PCS | Mod: CPTII,S$GLB,, | Performed by: PHYSICIAN ASSISTANT

## 2023-04-19 PROCEDURE — 1126F PR PAIN SEVERITY QUANTIFIED, NO PAIN PRESENT: ICD-10-PCS | Mod: CPTII,S$GLB,, | Performed by: INTERNAL MEDICINE

## 2023-04-19 PROCEDURE — 1126F AMNT PAIN NOTED NONE PRSNT: CPT | Mod: CPTII,S$GLB,, | Performed by: PHYSICIAN ASSISTANT

## 2023-04-19 PROCEDURE — 1126F PR PAIN SEVERITY QUANTIFIED, NO PAIN PRESENT: ICD-10-PCS | Mod: CPTII,S$GLB,, | Performed by: PHYSICIAN ASSISTANT

## 2023-04-19 PROCEDURE — 4010F ACE/ARB THERAPY RXD/TAKEN: CPT | Mod: CPTII,S$GLB,, | Performed by: PHYSICIAN ASSISTANT

## 2023-04-19 PROCEDURE — 1101F PT FALLS ASSESS-DOCD LE1/YR: CPT | Mod: CPTII,S$GLB,, | Performed by: INTERNAL MEDICINE

## 2023-04-19 PROCEDURE — 1101F PR PT FALLS ASSESS DOC 0-1 FALLS W/OUT INJ PAST YR: ICD-10-PCS | Mod: CPTII,S$GLB,, | Performed by: PHYSICIAN ASSISTANT

## 2023-04-19 PROCEDURE — 4010F PR ACE/ARB THEARPY RXD/TAKEN: ICD-10-PCS | Mod: CPTII,S$GLB,, | Performed by: INTERNAL MEDICINE

## 2023-04-19 PROCEDURE — 1160F RVW MEDS BY RX/DR IN RCRD: CPT | Mod: CPTII,S$GLB,, | Performed by: PHYSICIAN ASSISTANT

## 2023-04-19 NOTE — PATIENT INSTRUCTIONS
Counseling and Referral of Other Preventative  (Italic type indicates deductible and co-insurance are waived)    Patient Name: Antonio Urena  Today's Date: 4/19/2023    Health Maintenance       Date Due Completion Date    Shingles Vaccine (1 of 2) Never done ---    Mammogram 06/01/2023 6/1/2022    High Dose Statin 04/04/2024 4/4/2023    DEXA Scan 05/10/2024 5/10/2021    Colorectal Cancer Screening 10/10/2027 10/10/2022    Lipid Panel 01/25/2028 1/25/2023    TETANUS VACCINE 07/25/2029 7/25/2019    Override on 7/25/2019: [place] ([place] of [place])        No orders of the defined types were placed in this encounter.    The following information is provided to all patients.  This information is to help you find resources for any of the problems found today that may be affecting your health:                Living healthy guide: [email or web address].[email or web address]      Understanding Diabetes: www.diabetes.org      Eating healthy: www.cdc.gov/healthyweight      CDC home safety checklist: www.cdc.gov/steadi/patient.html      Agency on Aging: www.goea.[place].[facility]      Alcoholics anonymous (AA): www.aa.org      Physical Activity: www.mike.nih.gov/yh6dttn      Tobacco use: www.quitwithusla.org

## 2023-04-19 NOTE — PROGRESS NOTES
Subjective:   Patient ID:  Antonio Urena is a 73 y.o. female who presents for follow-up of No chief complaint on file.    Patient denies chest pain, angina or symptoms associated with anginal equivalent.  Overall today the patient is feeling generally well heart rate blood pressure stable is no evidence of postural changes blood pressure 120/70 standing sitting and lying down.  Overall doing well pulse is 50 beats per minute heart rate is stable there was no other abnormalities.  Patient feeling well physical exam is normal today.     Overall patient feels well this time.  No acute symptoms.  Intermittent palpitations are very rare this time heart rate blood pressure stable and she is doing well new medication diltiazem otherwise stable this moment.  NO FOCAL CNS SYMPTOMS OR SIGNS TO SUGGEST TIA OR STROKE  NO ANGINA OR EQUIVALENT  NO UNUSUAL CASTELLON. NO ORTHOPNEA OR PND  NO PALPITATIONS  NO NEAR SYNCOPE OR SYNCOPE  NO EDEMA. NO CALVE TENDERNESS  This finds patient feeling well stable blood pressure medications otherwise doing well no acute changes.  No heart rate or blood pressure changes at this time.  Last device check on 12/22/2021 was stable.  No acute changes patient is having no arrhythmias noted.           This virtual visit finds patient feeling well blood pressure on her own reveals blood pressure 122/70 pulse 70 oxygenation 98.  Patient has had no exertional symptoms chest pain shortness of breath sporadic intermittent arrhythmias however doing well and continues on all medications including apixaban for anticoagulation prevention for cardiac risk of stroke.  Patient is otherwise stable all medications all medicines reviewed renewed doing well this time device check in June was stable with no arrhythmias.  Good battery life.  Otherwise doing well.        Clinically stable no recurrence symptoms doing well with anticoagulation in blood pressure control heart rate control doing well this time.  Recent check  of the pacemaker shows appropriate pacing sensing and no other arrhythmias were noted.     04/19/2023:   Overall patient doing well no exertional symptoms chest pain shortness breath no significant arrhythmias pacemaker is working well.  No acute shortness breath patient continues on chronic anticoagulation due to PAF stable.  No acute changes no chest pain stable.  All medications reviewed and renewed as necessary.      Review of Systems   Constitutional: Negative for chills, diaphoresis, night sweats, weight gain and weight loss.   HENT:  Negative for congestion, hoarse voice, sore throat and stridor.    Eyes:  Negative for double vision and pain.   Cardiovascular:  Negative for chest pain, claudication, cyanosis, dyspnea on exertion, irregular heartbeat, leg swelling, near-syncope, orthopnea, palpitations, paroxysmal nocturnal dyspnea and syncope.   Respiratory:  Negative for cough, hemoptysis, shortness of breath, sleep disturbances due to breathing, snoring, sputum production and wheezing.    Endocrine: Negative for cold intolerance, heat intolerance and polydipsia.   Hematologic/Lymphatic: Negative for bleeding problem. Does not bruise/bleed easily.   Skin:  Negative for color change, dry skin and rash.   Musculoskeletal:  Negative for joint swelling and muscle cramps.   Gastrointestinal:  Negative for bloating, abdominal pain, constipation, diarrhea, dysphagia, melena, nausea and vomiting.   Genitourinary:  Negative for flank pain and urgency.   Neurological:  Negative for dizziness, focal weakness, headaches, light-headedness, loss of balance, seizures and weakness.   Psychiatric/Behavioral:  Negative for altered mental status and memory loss. The patient is not nervous/anxious.    Family History   Problem Relation Age of Onset    Cancer Mother     Cancer Father     Cancer Brother      Past Medical History:   Diagnosis Date    A-fib     A-fib     Pace maker put in 5/12/20    Allergy     Anxiety     Asthma      cough variant    Cancer 2018    basal cell c    Colon polyp     COPD (chronic obstructive pulmonary disease)     Hypertension      Social History     Socioeconomic History    Marital status:    Tobacco Use    Smoking status: Former     Packs/day: 0.25     Years: 1.00     Pack years: 0.25     Types: Cigarettes     Quit date: 1985     Years since quittin.3    Smokeless tobacco: Former    Tobacco comments:     Quit 30 - 40 years ago   Substance and Sexual Activity    Alcohol use: Yes     Comment: Wine Occasionally     Drug use: Never    Sexual activity: Not Currently     Current Outpatient Medications on File Prior to Visit   Medication Sig Dispense Refill    cetirizine (ZYRTEC) 10 MG tablet Take 10 mg by mouth once daily.       COVID-19 vac, dluce,Pfizer,,PF, (PFIZER COVID-19 DULCE VACCN,PF,) 30 mcg/0.3 mL injection Inject into the muscle. 0.3 mL 0    diltiaZEM (DILT-XR) 240 MG CDCR Take 1 capsule (240 mg total) by mouth once daily. 90 capsule 1    elderberry fruit 350 mg Cap Take by mouth.      ELIQUIS 5 mg Tab TAKE 1 TABLET TWICE DAILY 180 tablet 3    flecainide (TAMBOCOR) 100 MG Tab TAKE 1 TABLET EVERY 12 HOURS 180 tablet 3    flu vac  [id number],PF, (FLUAD QUAD -,65Y UP,,PF,) 60 mcg (15 mcg x 4)/0.5 mL Syrg Inject 0.5 mLs into the muscle. 0.5 mL 0    fluticasone propionate (FLONASE) 50 mcg/actuation nasal spray 2 sprays (100 mcg total) by Each Nostril route once daily. 48 g 4    fluticasone-salmeterol diskus inhaler 250-50 mcg Inhale 1 puff into the lungs 2 (two) times daily. Controller.Wash out mouth after using. 180 each 3    levalbuterol (XOPENEX HFA) 45 mcg/actuation inhaler Inhale 2 puffs into the lungs every 6 (six) hours as needed for Wheezing or Shortness of Breath. 45 g 3    lisinopriL (PRINIVIL,ZESTRIL) 20 MG tablet TAKE 1 AND 1/2 TABLETS EVERY  tablet 0    LORazepam (ATIVAN) 0.5 MG tablet Take 0.5 mg by mouth daily as needed for Anxiety.      meclizine (ANTIVERT) 25  mg tablet Take 1 tablet (25 mg total) by mouth 3 (three) times daily as needed. 20 tablet 0    multivitamin capsule Take 1 capsule by mouth once daily.      omega-3 fatty acids/fish oil (FISH OIL-OMEGA-3 FATTY ACIDS) 300-1,000 mg capsule Take 1 capsule by mouth once daily.      rosuvastatin (CRESTOR) 20 MG tablet Take 1 tablet (20 mg total) by mouth once daily. 90 tablet 3    SPIRIVA RESPIMAT 2.5 mcg/actuation inhaler INHALE 2 PUFFS INTO THE LUNGS ONCE DAILY. 12 g 3    triamcinolone acetonide 0.1% (KENALOG) 0.1 % cream Apply topically 2 (two) times daily. Use up to 2 weeks at a time 454 g 1     No current facility-administered medications on file prior to visit.     Review of patient's allergies indicates:   Allergen Reactions    Lasix [furosemide]      Within 1 year of taking pt will get severe knee pain in both knees.       Objective:     Physical Exam  Eyes:      Pupils: Pupils are equal, round, and reactive to light.   Neck:      Trachea: No tracheal deviation.   Cardiovascular:      Rate and Rhythm: Normal rate and regular rhythm.      Pulses: Intact distal pulses.           Carotid pulses are 2+ on the right side and 2+ on the left side.       Radial pulses are 2+ on the right side and 2+ on the left side.        Femoral pulses are 2+ on the right side and 2+ on the left side.       Popliteal pulses are 2+ on the right side and 2+ on the left side.        Dorsalis pedis pulses are 2+ on the right side and 2+ on the left side.        Posterior tibial pulses are 2+ on the right side and 2+ on the left side.      Heart sounds: Normal heart sounds. No murmur heard.    No friction rub. No gallop.   Pulmonary:      Effort: Pulmonary effort is normal. No respiratory distress.      Breath sounds: Normal breath sounds. No stridor. No wheezing or rales.   Chest:      Chest wall: No tenderness.   Abdominal:      General: There is no distension.      Tenderness: There is no abdominal tenderness. There is no rebound.    Musculoskeletal:         General: No tenderness.      Cervical back: Normal range of motion.   Skin:     General: Skin is warm and dry.   Neurological:      Mental Status: She is alert and oriented to person, place, and time.     Units 2 mo ago  (1/25/23) 1 yr ago  (2/4/22) 1 yr ago  (8/29/21) 2 yr ago  (9/28/20)    Cholesterol 120 - 199 mg/dL 139  134 CM  181 CM  183 R    Comment: The National Cholesterol Education Program (NCEP) has set the   following guidelines (reference ranges) for Cholesterol:   Optimal.....................<200 mg/dL   Borderline High.............200-239 mg/dL   High........................> or = 240 mg/dL    Triglycerides 30 - 150 mg/dL 79  73 CM  103 CM  154 High  R    Comment: The National Cholesterol Education Program (NCEP) has set the   following guidelines (reference values) for triglycerides:   Normal......................<150 mg/dL   Borderline High.............150-199 mg/dL   High........................200-499 mg/dL    HDL 40 - 75 mg/dL 61  69 CM  61 CM  46 R    Comment: The National Cholesterol Education Program (NCEP) has set the   following guidelines (reference values) for HDL Cholesterol:   Low...............<40 mg/dL   Optimal...........>60 mg/dL    LDL Cholesterol 63.0 - 159.0 mg/dL 62.2 Low   50.4 Low  CM  99.4 CM     Comment: The National Cholesterol Education Program (NCEP) has set the   following guidelines (reference values) for LDL Cholesterol:   Optimal.......................<130 mg/dL   Borderline High...............130-159 mg/dL      Assessment:     1. Presence of cardiac pacemaker    2. History of cardiac radiofrequency ablation (RFA)    3. SSS (sick sinus syndrome)    4. Permanent atrial fibrillation    5. Morbid obesity with BMI of 40.0-44.9, adult    6. Cardiac arrhythmia, unspecified cardiac arrhythmia type    7. Chest pain, unspecified type    8. Hyperlipidemia, unspecified hyperlipidemia type    9. SOB (shortness of breath)    10. Palpitations    11.  Typical atrial flutter    12. Symptomatic bradycardia    13. Pacemaker lead malfunction, subsequent encounter    14. Disorder of cardiac pacemaker system, sequela    15. Cardiac pacemaker in situ    16. Migraine with aura and without status migrainosus, not intractable    17. Atrial fibrillation and flutter    18. Failure of pacemaker lead, sequela    19. Essential hypertension    20. Shortness of breath    21. Chronic anticoagulation        Plan:     Presence of cardiac pacemaker    History of cardiac radiofrequency ablation (RFA)    SSS (sick sinus syndrome)    Permanent atrial fibrillation    Morbid obesity with BMI of 40.0-44.9, adult    Cardiac arrhythmia, unspecified cardiac arrhythmia type    Chest pain, unspecified type    Hyperlipidemia, unspecified hyperlipidemia type    SOB (shortness of breath)    Palpitations    Typical atrial flutter    Symptomatic bradycardia    Pacemaker lead malfunction, subsequent encounter    Disorder of cardiac pacemaker system, sequela    Cardiac pacemaker in situ    Migraine with aura and without status migrainosus, not intractable    Atrial fibrillation and flutter    Failure of pacemaker lead, sequela    Essential hypertension    Shortness of breath    Chronic anticoagulation      Impression PAF stable on Eliquis current patient continues on Eliquis and flecainide.  2. Pacemaker in Situ stable on reviewed  3  hypertension stable chronic medications including diltiazem and lisinopril  Patient complains of intermittent cough is doing well this time follow-up evaluation in 5 months.  Stable otherwise no acute changes.

## 2023-04-19 NOTE — PROGRESS NOTES
"  Antonio Urena presented for a  Medicare AWV and comprehensive Health Risk Assessment today. The following components were reviewed and updated:    Medical history  Family History  Social history  Allergies and Current Medications  Health Risk Assessment  Health Maintenance  Care Team         ** See Completed Assessments for Annual Wellness Visit within the encounter summary.**         The following assessments were completed:  Living Situation  CAGE  Depression Screening  Timed Get Up and Go  Whisper Test  Cognitive Function Screening  Nutrition Screening  ADL Screening  PAQ Screening        Vitals:    04/19/23 1057   BP: 118/60   BP Location: Right arm   Patient Position: Sitting   BP Method: Large (Manual)   Pulse: 62   Temp: 98.3 °F (36.8 °C)   TempSrc: Tympanic   SpO2: 96%   Weight: 101.9 kg (224 lb 10.4 oz)   Height: 5' 2" (1.575 m)     Body mass index is 41.09 kg/m².  Physical Exam  Vitals and nursing note reviewed.   Constitutional:       General: She is not in acute distress.     Appearance: Normal appearance. She is well-developed. She is not ill-appearing, toxic-appearing or diaphoretic.   HENT:      Head: Normocephalic and atraumatic.   Cardiovascular:      Rate and Rhythm: Normal rate and regular rhythm.      Heart sounds: Normal heart sounds. No murmur heard.    No friction rub. No gallop.   Pulmonary:      Effort: Pulmonary effort is normal. No respiratory distress.      Breath sounds: Normal breath sounds. No wheezing or rales.   Musculoskeletal:         General: Normal range of motion.   Skin:     General: Skin is warm.      Capillary Refill: Capillary refill takes less than 2 seconds.      Findings: No rash.   Neurological:      Mental Status: She is alert and oriented to person, place, and time.      Motor: No weakness.      Gait: Gait normal.   Psychiatric:         Mood and Affect: Mood normal.         Behavior: Behavior normal.         Thought Content: Thought content normal.         " Judgment: Judgment normal.                   Diagnoses and health risks identified today and associated recommendations/orders:    1. Encounter for Medicare annual wellness exam  Review for opioid use disorders: Patient does not use opioids     Review for Substance Use Disorders: Patient does not use substance (    - Ambulatory Referral/Consult to Enhanced Annual Wellness Visit (eAWV)    2. Morbid obesity with BMI of 40.0-44.9, adult  Advise to maintain lifestyle changes with low carbohydrate, low sugar diet and exercise 30 minutes daily      3. Senile purpura  -due to eliquis  -Stable and controlled. Uses arnica. Continue current treatment plan as previously prescribed with your PCP.       4. Asthma with COPD  -PFT 1/26/2023  -Stable and controlled on current inhalers. Continue current treatment plan as previously prescribed with your pulmonologist.    5. Atherosclerosis of aorta  -CXR 8/29/2021  -CTA 12/8/2021  -Stable and controlled on anticoag and statin. Continue current treatment plan as previously prescribed with your cardiologist.      6. Paroxysmal atrial fibrillation  -Stable and controlled on eliquis, crestor, and diltiazem. Continue current treatment plan as previously prescribed with your cardiologist.       7. Atrial fibrillation and flutter  -Stable and controlled with pacemaker. Continue current treatment plan as previously prescribed with your cardiologist.       8. SSS (sick sinus syndrome)  -Stable and controlled with pacemaker. Continue current treatment plan as previously prescribed with your cardiologist.     9. History of cardiac radiofrequency ablation (RFA)  -Stable and controlled with pacemaker. Continue current treatment plan as previously prescribed with your cardiologist.     10. Cardiac pacemaker in situ  -Stable and controlled. Continue current treatment plan as previously prescribed with your cardiologist.     11. Chronic anticoagulation  -Stable and controlled on eliquis. Continue  current treatment plan as previously prescribed with your cardiologist.    12. Pure hypercholesterolemia  -Stable and controlled on cresor. Continue current treatment plan as previously prescribed with your PCP and cardiologist.     13. Essential hypertension  -Stable and controlled on lisinopril and diltiazem. Continue current treatment plan as previously prescribed with your PCP.       14. Generalized anxiety disorder  -Stable and controlled on ativan 0.5 as needed. Continue current treatment plan as previously prescribed with your PCP.       15. TIA involving carotid artery  -Stable and controlled. Continue current treatment plan as previously prescribed with your cardiologist.  -CTA 12/8/2021    16. Migraine with aura and without status migrainosus, not intractable  -Stable and controlled. No recent migraines. Continue current treatment plan as previously prescribed with your PCP.     17. Osteopenia with high fracture risk  -Stable and controlled. Continue current treatment plan as previously prescribed with your PCP.   -DEXA 5/10/2021  -needs to discuss treatment options with pcp at next appointment.     Provided Antonio with a 5-10 year written screening schedule and personal prevention plan. Recommendations were developed using the USPSTF age appropriate recommendations. Education, counseling, and referrals were provided as needed. After Visit Summary printed and given to patient which includes a list of additional screenings\tests needed.    Follow up in about 1 year (around 4/19/2024), or if symptoms worsen or fail to improve.    Zeina Fernandes PA-C  I offered to discuss advanced care planning, including how to pick a person who would make decisions for you if you were unable to make them for yourself, called a health care power of , and what kind of decisions you might make such as use of life sustaining treatments such as ventilators and tube feeding when faced with a life limiting illness  recorded on a living will that they will need to know. (How you want to be cared for as you near the end of your natural life)     X Patient is interested in learning more about how to make advanced directives.  I provided them paperwork and offered to discuss this with them.

## 2023-05-08 ENCOUNTER — CLINICAL SUPPORT (OUTPATIENT)
Dept: CARDIOLOGY | Facility: HOSPITAL | Age: 73
End: 2023-05-08
Payer: MEDICARE

## 2023-05-08 DIAGNOSIS — Z95.0 PRESENCE OF CARDIAC PACEMAKER: ICD-10-CM

## 2023-05-08 PROCEDURE — 93296 REM INTERROG EVL PM/IDS: CPT | Performed by: INTERNAL MEDICINE

## 2023-05-08 PROCEDURE — 93294 CARDIAC DEVICE CHECK - REMOTE: ICD-10-PCS | Mod: S$GLB,,, | Performed by: INTERNAL MEDICINE

## 2023-05-08 PROCEDURE — 93294 REM INTERROG EVL PM/LDLS PM: CPT | Mod: S$GLB,,, | Performed by: INTERNAL MEDICINE

## 2023-05-22 DIAGNOSIS — I10 ESSENTIAL HYPERTENSION: ICD-10-CM

## 2023-05-23 RX ORDER — LISINOPRIL 20 MG/1
TABLET ORAL
Qty: 135 TABLET | Refills: 0 | Status: SHIPPED | OUTPATIENT
Start: 2023-05-23 | End: 2023-12-11

## 2023-05-25 ENCOUNTER — TELEPHONE (OUTPATIENT)
Dept: INTERNAL MEDICINE | Facility: CLINIC | Age: 73
End: 2023-05-25
Payer: MEDICARE

## 2023-05-25 ENCOUNTER — LAB VISIT (OUTPATIENT)
Dept: LAB | Facility: HOSPITAL | Age: 73
End: 2023-05-25
Attending: NURSE PRACTITIONER
Payer: MEDICARE

## 2023-05-25 ENCOUNTER — OFFICE VISIT (OUTPATIENT)
Dept: INTERNAL MEDICINE | Facility: CLINIC | Age: 73
End: 2023-05-25
Payer: MEDICARE

## 2023-05-25 ENCOUNTER — OFFICE VISIT (OUTPATIENT)
Dept: DERMATOLOGY | Facility: CLINIC | Age: 73
End: 2023-05-25
Payer: MEDICARE

## 2023-05-25 VITALS
TEMPERATURE: 97 F | OXYGEN SATURATION: 94 % | BODY MASS INDEX: 41.09 KG/M2 | WEIGHT: 223.31 LBS | SYSTOLIC BLOOD PRESSURE: 122 MMHG | DIASTOLIC BLOOD PRESSURE: 66 MMHG | HEIGHT: 62 IN | HEART RATE: 72 BPM

## 2023-05-25 DIAGNOSIS — L57.0 ACTINIC KERATOSES: ICD-10-CM

## 2023-05-25 DIAGNOSIS — Z95.0 CARDIAC PACEMAKER IN SITU: Chronic | ICD-10-CM

## 2023-05-25 DIAGNOSIS — G45.1 TIA INVOLVING CAROTID ARTERY: ICD-10-CM

## 2023-05-25 DIAGNOSIS — Z12.31 ENCOUNTER FOR SCREENING MAMMOGRAM FOR MALIGNANT NEOPLASM OF BREAST: ICD-10-CM

## 2023-05-25 DIAGNOSIS — I48.0 PAROXYSMAL ATRIAL FIBRILLATION: Chronic | ICD-10-CM

## 2023-05-25 DIAGNOSIS — I49.5 SSS (SICK SINUS SYNDROME): Chronic | ICD-10-CM

## 2023-05-25 DIAGNOSIS — G43.109 MIGRAINE WITH AURA AND WITHOUT STATUS MIGRAINOSUS, NOT INTRACTABLE: ICD-10-CM

## 2023-05-25 DIAGNOSIS — R00.1 SYMPTOMATIC BRADYCARDIA: ICD-10-CM

## 2023-05-25 DIAGNOSIS — I48.92 ATRIAL FIBRILLATION AND FLUTTER: Chronic | ICD-10-CM

## 2023-05-25 DIAGNOSIS — D18.01 CHERRY ANGIOMA: ICD-10-CM

## 2023-05-25 DIAGNOSIS — L30.9 DERMATITIS: Primary | ICD-10-CM

## 2023-05-25 DIAGNOSIS — I10 ESSENTIAL HYPERTENSION: Chronic | ICD-10-CM

## 2023-05-25 DIAGNOSIS — I70.0 ATHEROSCLEROSIS OF AORTA: ICD-10-CM

## 2023-05-25 DIAGNOSIS — E66.01 MORBID OBESITY WITH BMI OF 40.0-44.9, ADULT: ICD-10-CM

## 2023-05-25 DIAGNOSIS — M85.80 OSTEOPENIA WITH HIGH RISK OF FRACTURE: ICD-10-CM

## 2023-05-25 DIAGNOSIS — Z79.01 CHRONIC ANTICOAGULATION: Chronic | ICD-10-CM

## 2023-05-25 DIAGNOSIS — J30.89 NON-SEASONAL ALLERGIC RHINITIS, UNSPECIFIED TRIGGER: Chronic | ICD-10-CM

## 2023-05-25 DIAGNOSIS — Z85.828 HISTORY OF NONMELANOMA SKIN CANCER: ICD-10-CM

## 2023-05-25 DIAGNOSIS — L21.0 DANDRUFF IN ADULT: ICD-10-CM

## 2023-05-25 DIAGNOSIS — L82.1 SEBORRHEIC KERATOSES: ICD-10-CM

## 2023-05-25 DIAGNOSIS — I48.91 ATRIAL FIBRILLATION AND FLUTTER: Chronic | ICD-10-CM

## 2023-05-25 DIAGNOSIS — F41.9 ANXIETY: ICD-10-CM

## 2023-05-25 DIAGNOSIS — D69.2 SENILE PURPURA: ICD-10-CM

## 2023-05-25 DIAGNOSIS — I48.3 TYPICAL ATRIAL FLUTTER: ICD-10-CM

## 2023-05-25 DIAGNOSIS — J44.89 ASTHMA WITH COPD: Primary | Chronic | ICD-10-CM

## 2023-05-25 DIAGNOSIS — Z98.890 HISTORY OF CARDIAC RADIOFREQUENCY ABLATION (RFA): ICD-10-CM

## 2023-05-25 DIAGNOSIS — E78.00 PURE HYPERCHOLESTEROLEMIA: Chronic | ICD-10-CM

## 2023-05-25 LAB
ALBUMIN SERPL BCP-MCNC: 4 G/DL (ref 3.5–5.2)
ALP SERPL-CCNC: 61 U/L (ref 55–135)
ALT SERPL W/O P-5'-P-CCNC: 15 U/L (ref 10–44)
ANION GAP SERPL CALC-SCNC: 9 MMOL/L (ref 8–16)
AST SERPL-CCNC: 15 U/L (ref 10–40)
BILIRUB SERPL-MCNC: 0.3 MG/DL (ref 0.1–1)
BUN SERPL-MCNC: 18 MG/DL (ref 8–23)
CALCIUM SERPL-MCNC: 9.8 MG/DL (ref 8.7–10.5)
CHLORIDE SERPL-SCNC: 106 MMOL/L (ref 95–110)
CO2 SERPL-SCNC: 29 MMOL/L (ref 23–29)
CREAT SERPL-MCNC: 1 MG/DL (ref 0.5–1.4)
EST. GFR  (NO RACE VARIABLE): 59.1 ML/MIN/1.73 M^2
GLUCOSE SERPL-MCNC: 68 MG/DL (ref 70–110)
POTASSIUM SERPL-SCNC: 4.7 MMOL/L (ref 3.5–5.1)
PROT SERPL-MCNC: 6.5 G/DL (ref 6–8.4)
SODIUM SERPL-SCNC: 144 MMOL/L (ref 136–145)
TSH SERPL DL<=0.005 MIU/L-ACNC: 1.57 UIU/ML (ref 0.4–4)

## 2023-05-25 PROCEDURE — 1126F PR PAIN SEVERITY QUANTIFIED, NO PAIN PRESENT: ICD-10-PCS | Mod: HCNC,CPTII,S$GLB, | Performed by: STUDENT IN AN ORGANIZED HEALTH CARE EDUCATION/TRAINING PROGRAM

## 2023-05-25 PROCEDURE — 99214 PR OFFICE/OUTPT VISIT, EST, LEVL IV, 30-39 MIN: ICD-10-PCS | Mod: HCNC,S$GLB,, | Performed by: NURSE PRACTITIONER

## 2023-05-25 PROCEDURE — 3074F PR MOST RECENT SYSTOLIC BLOOD PRESSURE < 130 MM HG: ICD-10-PCS | Mod: HCNC,CPTII,S$GLB, | Performed by: NURSE PRACTITIONER

## 2023-05-25 PROCEDURE — 3288F PR FALLS RISK ASSESSMENT DOCUMENTED: ICD-10-PCS | Mod: HCNC,CPTII,S$GLB, | Performed by: STUDENT IN AN ORGANIZED HEALTH CARE EDUCATION/TRAINING PROGRAM

## 2023-05-25 PROCEDURE — 1101F PR PT FALLS ASSESS DOC 0-1 FALLS W/OUT INJ PAST YR: ICD-10-PCS | Mod: HCNC,CPTII,S$GLB, | Performed by: NURSE PRACTITIONER

## 2023-05-25 PROCEDURE — 1101F PT FALLS ASSESS-DOCD LE1/YR: CPT | Mod: HCNC,CPTII,S$GLB, | Performed by: STUDENT IN AN ORGANIZED HEALTH CARE EDUCATION/TRAINING PROGRAM

## 2023-05-25 PROCEDURE — 1101F PT FALLS ASSESS-DOCD LE1/YR: CPT | Mod: HCNC,CPTII,S$GLB, | Performed by: NURSE PRACTITIONER

## 2023-05-25 PROCEDURE — 3008F PR BODY MASS INDEX (BMI) DOCUMENTED: ICD-10-PCS | Mod: HCNC,CPTII,S$GLB, | Performed by: NURSE PRACTITIONER

## 2023-05-25 PROCEDURE — 3288F FALL RISK ASSESSMENT DOCD: CPT | Mod: HCNC,CPTII,S$GLB, | Performed by: STUDENT IN AN ORGANIZED HEALTH CARE EDUCATION/TRAINING PROGRAM

## 2023-05-25 PROCEDURE — 80053 COMPREHEN METABOLIC PANEL: CPT | Mod: HCNC | Performed by: NURSE PRACTITIONER

## 2023-05-25 PROCEDURE — 99214 OFFICE O/P EST MOD 30 MIN: CPT | Mod: HCNC,S$GLB,, | Performed by: NURSE PRACTITIONER

## 2023-05-25 PROCEDURE — 3288F FALL RISK ASSESSMENT DOCD: CPT | Mod: HCNC,CPTII,S$GLB, | Performed by: NURSE PRACTITIONER

## 2023-05-25 PROCEDURE — 1160F RVW MEDS BY RX/DR IN RCRD: CPT | Mod: HCNC,CPTII,S$GLB, | Performed by: STUDENT IN AN ORGANIZED HEALTH CARE EDUCATION/TRAINING PROGRAM

## 2023-05-25 PROCEDURE — 99999 PR PBB SHADOW E&M-EST. PATIENT-LVL V: CPT | Mod: PBBFAC,HCNC,, | Performed by: NURSE PRACTITIONER

## 2023-05-25 PROCEDURE — 3078F DIAST BP <80 MM HG: CPT | Mod: HCNC,CPTII,S$GLB, | Performed by: NURSE PRACTITIONER

## 2023-05-25 PROCEDURE — 99999 PR PBB SHADOW E&M-EST. PATIENT-LVL III: ICD-10-PCS | Mod: PBBFAC,HCNC,, | Performed by: STUDENT IN AN ORGANIZED HEALTH CARE EDUCATION/TRAINING PROGRAM

## 2023-05-25 PROCEDURE — 1101F PR PT FALLS ASSESS DOC 0-1 FALLS W/OUT INJ PAST YR: ICD-10-PCS | Mod: HCNC,CPTII,S$GLB, | Performed by: STUDENT IN AN ORGANIZED HEALTH CARE EDUCATION/TRAINING PROGRAM

## 2023-05-25 PROCEDURE — 4010F PR ACE/ARB THEARPY RXD/TAKEN: ICD-10-PCS | Mod: HCNC,CPTII,S$GLB, | Performed by: STUDENT IN AN ORGANIZED HEALTH CARE EDUCATION/TRAINING PROGRAM

## 2023-05-25 PROCEDURE — 1126F AMNT PAIN NOTED NONE PRSNT: CPT | Mod: HCNC,CPTII,S$GLB, | Performed by: STUDENT IN AN ORGANIZED HEALTH CARE EDUCATION/TRAINING PROGRAM

## 2023-05-25 PROCEDURE — 1160F PR REVIEW ALL MEDS BY PRESCRIBER/CLIN PHARMACIST DOCUMENTED: ICD-10-PCS | Mod: HCNC,CPTII,S$GLB, | Performed by: STUDENT IN AN ORGANIZED HEALTH CARE EDUCATION/TRAINING PROGRAM

## 2023-05-25 PROCEDURE — 99213 OFFICE O/P EST LOW 20 MIN: CPT | Mod: 25,HCNC,S$GLB, | Performed by: STUDENT IN AN ORGANIZED HEALTH CARE EDUCATION/TRAINING PROGRAM

## 2023-05-25 PROCEDURE — 1159F MED LIST DOCD IN RCRD: CPT | Mod: HCNC,CPTII,S$GLB, | Performed by: STUDENT IN AN ORGANIZED HEALTH CARE EDUCATION/TRAINING PROGRAM

## 2023-05-25 PROCEDURE — 99213 PR OFFICE/OUTPT VISIT, EST, LEVL III, 20-29 MIN: ICD-10-PCS | Mod: 25,HCNC,S$GLB, | Performed by: STUDENT IN AN ORGANIZED HEALTH CARE EDUCATION/TRAINING PROGRAM

## 2023-05-25 PROCEDURE — 84443 ASSAY THYROID STIM HORMONE: CPT | Mod: HCNC | Performed by: NURSE PRACTITIONER

## 2023-05-25 PROCEDURE — 1159F PR MEDICATION LIST DOCUMENTED IN MEDICAL RECORD: ICD-10-PCS | Mod: HCNC,CPTII,S$GLB, | Performed by: STUDENT IN AN ORGANIZED HEALTH CARE EDUCATION/TRAINING PROGRAM

## 2023-05-25 PROCEDURE — 1126F AMNT PAIN NOTED NONE PRSNT: CPT | Mod: HCNC,CPTII,S$GLB, | Performed by: NURSE PRACTITIONER

## 2023-05-25 PROCEDURE — 1126F PR PAIN SEVERITY QUANTIFIED, NO PAIN PRESENT: ICD-10-PCS | Mod: HCNC,CPTII,S$GLB, | Performed by: NURSE PRACTITIONER

## 2023-05-25 PROCEDURE — 17000 PR DESTRUCTION(LASER SURGERY,CRYOSURGERY,CHEMOSURGERY),PREMALIGNANT LESIONS,FIRST LESION: ICD-10-PCS | Mod: HCNC,S$GLB,, | Performed by: STUDENT IN AN ORGANIZED HEALTH CARE EDUCATION/TRAINING PROGRAM

## 2023-05-25 PROCEDURE — 99999 PR PBB SHADOW E&M-EST. PATIENT-LVL III: CPT | Mod: PBBFAC,HCNC,, | Performed by: STUDENT IN AN ORGANIZED HEALTH CARE EDUCATION/TRAINING PROGRAM

## 2023-05-25 PROCEDURE — 4010F PR ACE/ARB THEARPY RXD/TAKEN: ICD-10-PCS | Mod: HCNC,CPTII,S$GLB, | Performed by: NURSE PRACTITIONER

## 2023-05-25 PROCEDURE — 36415 COLL VENOUS BLD VENIPUNCTURE: CPT | Mod: HCNC | Performed by: NURSE PRACTITIONER

## 2023-05-25 PROCEDURE — 85025 COMPLETE CBC W/AUTO DIFF WBC: CPT | Mod: HCNC | Performed by: NURSE PRACTITIONER

## 2023-05-25 PROCEDURE — 17000 DESTRUCT PREMALG LESION: CPT | Mod: HCNC,S$GLB,, | Performed by: STUDENT IN AN ORGANIZED HEALTH CARE EDUCATION/TRAINING PROGRAM

## 2023-05-25 PROCEDURE — 17003 DESTRUCTION, PREMALIGNANT LESIONS; SECOND THROUGH 14 LESIONS: ICD-10-PCS | Mod: HCNC,S$GLB,, | Performed by: STUDENT IN AN ORGANIZED HEALTH CARE EDUCATION/TRAINING PROGRAM

## 2023-05-25 PROCEDURE — 4010F ACE/ARB THERAPY RXD/TAKEN: CPT | Mod: HCNC,CPTII,S$GLB, | Performed by: STUDENT IN AN ORGANIZED HEALTH CARE EDUCATION/TRAINING PROGRAM

## 2023-05-25 PROCEDURE — 3288F PR FALLS RISK ASSESSMENT DOCUMENTED: ICD-10-PCS | Mod: HCNC,CPTII,S$GLB, | Performed by: NURSE PRACTITIONER

## 2023-05-25 PROCEDURE — 99999 PR PBB SHADOW E&M-EST. PATIENT-LVL V: ICD-10-PCS | Mod: PBBFAC,HCNC,, | Performed by: NURSE PRACTITIONER

## 2023-05-25 PROCEDURE — 3078F PR MOST RECENT DIASTOLIC BLOOD PRESSURE < 80 MM HG: ICD-10-PCS | Mod: HCNC,CPTII,S$GLB, | Performed by: NURSE PRACTITIONER

## 2023-05-25 PROCEDURE — 3074F SYST BP LT 130 MM HG: CPT | Mod: HCNC,CPTII,S$GLB, | Performed by: NURSE PRACTITIONER

## 2023-05-25 PROCEDURE — 4010F ACE/ARB THERAPY RXD/TAKEN: CPT | Mod: HCNC,CPTII,S$GLB, | Performed by: NURSE PRACTITIONER

## 2023-05-25 PROCEDURE — 17003 DESTRUCT PREMALG LES 2-14: CPT | Mod: HCNC,S$GLB,, | Performed by: STUDENT IN AN ORGANIZED HEALTH CARE EDUCATION/TRAINING PROGRAM

## 2023-05-25 PROCEDURE — 3008F BODY MASS INDEX DOCD: CPT | Mod: HCNC,CPTII,S$GLB, | Performed by: NURSE PRACTITIONER

## 2023-05-25 RX ORDER — KETOCONAZOLE 20 MG/ML
SHAMPOO, SUSPENSION TOPICAL
Qty: 120 ML | Refills: 3 | Status: SHIPPED | OUTPATIENT
Start: 2023-05-25

## 2023-05-25 NOTE — TELEPHONE ENCOUNTER
Spoke with pt; MA informed pt PCP was out of clinic and needed to reschedule visit; pt verbalized understanding; MA scheduled with next available provider /LD

## 2023-05-25 NOTE — PROGRESS NOTES
Subjective:       Patient ID: Antonio Urena is a 73 y.o. female.    Chief Complaint: Annual Exam    HPI    -Essential hypertension-Controlled.  Stable.  Asymptomatic.   Reports compliance.  Denies side effects.     - Pure hypercholesterolemia On Crestor 40 mg daily and fish oil 1 mg supplement daily; Reports compliance.    - Asthma with COPD-Followed by pulmonology    - Non-seasonal allergic rhinitis, unspecified trigger  Flonase and Zyrtec daily.  Symptoms stable/controlled.    - Anxiety-flaring. Reports she is in denial regarding aging and restrictions based on age/health        Review of Systems   Constitutional:  Negative for activity change, appetite change, chills, diaphoresis, fatigue, fever and unexpected weight change.   HENT:  Negative for congestion, ear pain, postnasal drip, rhinorrhea, sinus pressure, sinus pain, sneezing, sore throat, tinnitus, trouble swallowing and voice change.    Eyes:  Negative for photophobia, pain and visual disturbance.   Respiratory:  Negative for cough, chest tightness, shortness of breath and wheezing.    Cardiovascular:  Negative for chest pain, palpitations and leg swelling.   Gastrointestinal:  Negative for abdominal distention, abdominal pain, constipation, diarrhea, nausea and vomiting.   Genitourinary:  Negative for decreased urine volume, difficulty urinating, dysuria, flank pain, frequency, hematuria and urgency.   Musculoskeletal:  Negative for arthralgias, back pain, joint swelling, neck pain and neck stiffness.   Allergic/Immunologic: Negative for immunocompromised state.   Neurological:  Negative for dizziness, tremors, seizures, syncope, facial asymmetry, speech difficulty, weakness, light-headedness, numbness and headaches.   Hematological:  Negative for adenopathy. Does not bruise/bleed easily.   Psychiatric/Behavioral:  Negative for confusion and sleep disturbance.      Objective:      Physical Exam  Constitutional:       Appearance: She is obese.    HENT:      Head: Normocephalic and atraumatic.      Right Ear: Tympanic membrane normal.      Left Ear: Tympanic membrane normal.   Eyes:      Conjunctiva/sclera: Conjunctivae normal.   Cardiovascular:      Rate and Rhythm: Normal rate and regular rhythm.      Heart sounds: Normal heart sounds.   Pulmonary:      Effort: Pulmonary effort is normal.      Breath sounds: Normal breath sounds.   Abdominal:      General: Bowel sounds are normal.      Palpations: Abdomen is soft.   Musculoskeletal:         General: Normal range of motion.      Cervical back: Normal range of motion and neck supple.   Skin:     General: Skin is warm and dry.   Neurological:      Mental Status: She is alert and oriented to person, place, and time.       Assessment:     Vitals:    05/25/23 1319   BP: 122/66   Pulse: 72   Temp: 96.8 °F (36 °C)         1. Asthma with COPD    2. Atherosclerosis of aorta    3. Atrial fibrillation and flutter    4. Cardiac pacemaker in situ    5. Essential hypertension    6. History of cardiac radiofrequency ablation (RFA)    7. Paroxysmal atrial fibrillation    8. Pure hypercholesterolemia    9. SSS (sick sinus syndrome)    10. Symptomatic bradycardia    11. Typical atrial flutter    12. Non-seasonal allergic rhinitis, unspecified trigger    13. Anxiety    14. Chronic anticoagulation    15. Senile purpura    16. Morbid obesity with BMI of 40.0-44.9, adult    17. Osteopenia with high risk of fracture    18. TIA involving carotid artery    19. Migraine with aura and without status migrainosus, not intractable    20. Encounter for screening mammogram for malignant neoplasm of breast    21. Dandruff in adult        Plan:   Asthma with COPD    Atherosclerosis of aorta    Atrial fibrillation and flutter    Cardiac pacemaker in situ    Essential hypertension  -     Comprehensive Metabolic Panel; Future; Expected date: 05/25/2023  -     TSH; Future; Expected date: 05/25/2023  -     CBC Auto Differential; Future;  Expected date: 05/25/2023    History of cardiac radiofrequency ablation (RFA)    Paroxysmal atrial fibrillation    Pure hypercholesterolemia    SSS (sick sinus syndrome)    Symptomatic bradycardia    Typical atrial flutter    Non-seasonal allergic rhinitis, unspecified trigger    Anxiety  -     Ambulatory referral/consult to Psychology; Future; Expected date: 06/01/2023    Chronic anticoagulation    Senile purpura    Morbid obesity with BMI of 40.0-44.9, adult    Osteopenia with high risk of fracture    TIA involving carotid artery    Migraine with aura and without status migrainosus, not intractable    Encounter for screening mammogram for malignant neoplasm of breast  -     Mammo Digital Screening Bilat w/ Beny; Future; Expected date: 06/08/2023    Dandruff in adult  -     ketoconazole (NIZORAL) 2 % shampoo; Apply topically twice a week.  Dispense: 120 mL; Refill: 3         Mammo  Psych referral for therapy concerning anxiety  F/u w PCP

## 2023-05-25 NOTE — PROGRESS NOTES
Subjective:       Patient ID:  Antonio Urena is a 73 y.o. female who presents for   Chief Complaint   Patient presents with    Skin Check     Patient here for 6 month skin check. Concerned with face, back. UBSE.    Rash     Concerned with a rash on left shoulder that comes and goes per patient. Itchy.     History of Present Illness: The patient presents for follow up of skin check. This is a high risk patient here to check for the development of new lesions. Patient with a history of multiple NMSC s/p Mohs. Last seen by Dr. Cobos on 11/15/22. Patient reports having a few crusted growths on the skin, but denies any rapidly growing, bleeding or non healing skin lesions. Does have some itching on the left shoulder/back.     Rash      Review of Systems   Constitutional:  Negative for fever and chills.   Skin:  Positive for itching, rash and dry skin.      Objective:    Physical Exam   Constitutional: She appears well-developed and well-nourished. No distress.   Neurological: She is alert and oriented to person, place, and time. She is not disoriented.   Psychiatric: She has a normal mood and affect.   Skin:   Areas Examined (abnormalities noted in diagram):   Scalp / Hair Palpated and Inspected  Head / Face Inspection Performed  Neck Inspection Performed  Chest / Axilla Inspection Performed  Abdomen Inspection Performed  Genitals / Buttocks / Groin Inspection Performed  Back Inspection Performed  RUE Inspected  LUE Inspection Performed  RLE Inspected  LLE Inspection Performed  Nails and Digits Inspection Performed                 Diagram Legend     Erythematous scaling macule/papule c/w actinic keratosis       Vascular papule c/w angioma      Pigmented verrucoid papule/plaque c/w seborrheic keratosis      Yellow umbilicated papule c/w sebaceous hyperplasia      Irregularly shaped tan macule c/w lentigo     1-2 mm smooth white papules consistent with Milia      Movable subcutaneous cyst with punctum c/w epidermal  inclusion cyst      Subcutaneous movable cyst c/w pilar cyst      Firm pink to brown papule c/w dermatofibroma      Pedunculated fleshy papule(s) c/w skin tag(s)      Evenly pigmented macule c/w junctional nevus     Mildly variegated pigmented, slightly irregular-bordered macule c/w mildly atypical nevus      Flesh colored to evenly pigmented papule c/w intradermal nevus       Pink pearly papule/plaque c/w basal cell carcinoma      Erythematous hyperkeratotic cursted plaque c/w SCC      Surgical scar with no sign of skin cancer recurrence      Open and closed comedones      Inflammatory papules and pustules      Verrucoid papule consistent consistent with wart     Erythematous eczematous patches and plaques     Dystrophic onycholytic nail with subungual debris c/w onychomycosis     Umbilicated papule    Erythematous-base heme-crusted tan verrucoid plaque consistent with inflamed seborrheic keratosis     Erythematous Silvery Scaling Plaque c/w Psoriasis     See annotation      Assessment / Plan:        Dermatitis - left shoulder. Okay to use TAC cream in this area. Counseled patient on gentle skin care regimen, including need for sensitive soaps/detergents, as well as need for frequent use of sensitize moisturizers.       Seborrheic keratoses  These are benign inherited growths without a malignant potential. Reassurance given to patient. No treatment is necessary.     Cherry angioma  This is a benign vascular lesion. Reassurance given. No treatment required.     Actinic keratoses  Cryosurgery Procedure Note    Verbal consent from the patient is obtained including, but not limited to, risk of hypopigmentation/hyperpigmentation, scar, recurrence of lesion. The patient is aware of the precancerous quality and need for treatment of these lesions. Liquid nitrogen cryosurgery is applied to the 3 actinic keratoses, as detailed in the physical exam, to produce a freeze injury. The patient is aware that blisters may form and  is instructed on wound care with gentle cleansing and use of vaseline ointment to keep moist until healed. The patient is supplied a handout on cryosurgery and is instructed to call if lesions do not completely resolve.    History of nonmelanoma skin cancer  Upper and lower body skin examination performed today including at least 10 points as noted in physical examination. No lesions suspicious for malignancy noted.  Reassurance provided.             Follow up in about 1 year (around 5/25/2024).

## 2023-05-25 NOTE — TELEPHONE ENCOUNTER
----- Message from Ariana Macias sent at 5/25/2023  8:41 AM CDT -----  Contact: [phone]  Type:  Patient Returning Call    Who Called:Antonio   Who Left Message for Patient: LORI   Does the patient know what this is regarding?:n/a   Would the patient rather a call back or a response via [facility]? Call back   Best Call Back Number:[phone]  Additional Information: n/a      Thanks KB

## 2023-05-25 NOTE — TELEPHONE ENCOUNTER
Attempt to contact pt to reschedule appt due to PCP out; MA canceled appt; lvm to callback to reschedule /LD

## 2023-05-26 LAB
BASOPHILS # BLD AUTO: 0.04 K/UL (ref 0–0.2)
BASOPHILS NFR BLD: 0.5 % (ref 0–1.9)
DIFFERENTIAL METHOD: ABNORMAL
EOSINOPHIL # BLD AUTO: 0.2 K/UL (ref 0–0.5)
EOSINOPHIL NFR BLD: 2.8 % (ref 0–8)
ERYTHROCYTE [DISTWIDTH] IN BLOOD BY AUTOMATED COUNT: 13.9 % (ref 11.5–14.5)
HCT VFR BLD AUTO: 42.6 % (ref 37–48.5)
HGB BLD-MCNC: 13.3 G/DL (ref 12–16)
IMM GRANULOCYTES # BLD AUTO: 0.02 K/UL (ref 0–0.04)
IMM GRANULOCYTES NFR BLD AUTO: 0.2 % (ref 0–0.5)
LYMPHOCYTES # BLD AUTO: 1.5 K/UL (ref 1–4.8)
LYMPHOCYTES NFR BLD: 17.8 % (ref 18–48)
MCH RBC QN AUTO: 28.1 PG (ref 27–31)
MCHC RBC AUTO-ENTMCNC: 31.2 G/DL (ref 32–36)
MCV RBC AUTO: 90 FL (ref 82–98)
MONOCYTES # BLD AUTO: 0.6 K/UL (ref 0.3–1)
MONOCYTES NFR BLD: 6.5 % (ref 4–15)
NEUTROPHILS # BLD AUTO: 6.2 K/UL (ref 1.8–7.7)
NEUTROPHILS NFR BLD: 72.2 % (ref 38–73)
NRBC BLD-RTO: 0 /100 WBC
PLATELET # BLD AUTO: 225 K/UL (ref 150–450)
PMV BLD AUTO: 11.7 FL (ref 9.2–12.9)
RBC # BLD AUTO: 4.73 M/UL (ref 4–5.4)
WBC # BLD AUTO: 8.55 K/UL (ref 3.9–12.7)

## 2023-05-31 DIAGNOSIS — T82.9XXS DISORDER OF CARDIAC PACEMAKER SYSTEM, SEQUELA: ICD-10-CM

## 2023-05-31 DIAGNOSIS — R07.9 CHEST PAIN, UNSPECIFIED TYPE: ICD-10-CM

## 2023-05-31 DIAGNOSIS — R00.2 PALPITATIONS: ICD-10-CM

## 2023-05-31 DIAGNOSIS — E78.5 HYPERLIPIDEMIA, UNSPECIFIED HYPERLIPIDEMIA TYPE: ICD-10-CM

## 2023-05-31 DIAGNOSIS — Z95.0 CARDIAC PACEMAKER IN SITU: ICD-10-CM

## 2023-05-31 DIAGNOSIS — R06.02 SOB (SHORTNESS OF BREATH): ICD-10-CM

## 2023-05-31 DIAGNOSIS — I10 ESSENTIAL HYPERTENSION: ICD-10-CM

## 2023-05-31 DIAGNOSIS — I48.21 PERMANENT ATRIAL FIBRILLATION: ICD-10-CM

## 2023-05-31 RX ORDER — DILTIAZEM HYDROCHLORIDE 240 MG/1
240 CAPSULE, EXTENDED RELEASE ORAL DAILY
Qty: 90 CAPSULE | Refills: 1 | Status: CANCELLED | OUTPATIENT
Start: 2023-05-31

## 2023-05-31 NOTE — TELEPHONE ENCOUNTER
Care Due:                  Date            Visit Type   Department     Provider  --------------------------------------------------------------------------------                                EP -                              PRIMARY      [facility]  Last Visit: 03-      CARE ([facility])   MEDICINE       Wayne Owens  Next Visit: None Scheduled  None         None Found                                                            Last  Test          Frequency    Reason                     Performed    Due Date  --------------------------------------------------------------------------------    Office Visit  12 months..  diltiaZEM, rosuvastatin..  03- 03-    [facility] Embedded Care Due Messages. Reference number: [id number].   5/31/2023 1:27:48 PM CDT

## 2023-05-31 NOTE — TELEPHONE ENCOUNTER
----- Message from Jeannette Hi sent at 5/31/2023 10:12 AM CDT -----  Regarding: pt refill  Can the clinic reply in [facility]: no         Please refill the medication(s) listed below.         Please call the patient when the prescription(s) is ready for  at this phone number   ALYSHAGERMAN [[phone]]  [facility] needs a new script please advise         Medication #1 diltiaZEM (DILT-XR) 240 MG CDCR      Medication #2       Preferred Pharmacy:   [facility] - [place], [place] - [address]  [address] [address]  Phone: [phone] Fax: [phone]

## 2023-06-01 NOTE — TELEPHONE ENCOUNTER
----- Message from Kellen Ponce sent at 6/1/2023  8:10 AM CDT -----  Regarding: RX Refill  Contact: Ashley  .Type:  RX Refill Request    Who Called: Ashley  Refill or New Rx: refill  RX Name and Strength:diltiaZEM (DILT-XR) 240 MG CDCR  How is the patient currently taking it? (ex. 1XDay): Take 1 capsule (240 mg total) by mouth once daily. - Oral  Is this a 30 day or 90 day RX:  90  Preferred Pharmacy with phone number:   [facility] - [address] [id number]  Phone: [phone] Fax: [phone]     Local or Mail Order: mail   Ordering Provider: JANEL Owens  Would the patient rather a call back or a response via [facility]? call  Best Call Back Number: [phone] (home)    Additional Information:  Ashley from [facility] requested the refill for the patient.

## 2023-06-02 DIAGNOSIS — T82.9XXS DISORDER OF CARDIAC PACEMAKER SYSTEM, SEQUELA: ICD-10-CM

## 2023-06-02 DIAGNOSIS — Z95.0 CARDIAC PACEMAKER IN SITU: ICD-10-CM

## 2023-06-02 DIAGNOSIS — E78.5 HYPERLIPIDEMIA, UNSPECIFIED HYPERLIPIDEMIA TYPE: ICD-10-CM

## 2023-06-02 DIAGNOSIS — R07.9 CHEST PAIN, UNSPECIFIED TYPE: ICD-10-CM

## 2023-06-02 DIAGNOSIS — R06.02 SOB (SHORTNESS OF BREATH): ICD-10-CM

## 2023-06-02 DIAGNOSIS — I48.21 PERMANENT ATRIAL FIBRILLATION: ICD-10-CM

## 2023-06-02 DIAGNOSIS — R00.2 PALPITATIONS: ICD-10-CM

## 2023-06-02 DIAGNOSIS — I10 ESSENTIAL HYPERTENSION: ICD-10-CM

## 2023-06-02 NOTE — TELEPHONE ENCOUNTER
No care due was identified.  [facility] Embedded Care Due Messages. Reference number: [id number].   6/02/2023 3:17:59 PM CDT

## 2023-06-02 NOTE — TELEPHONE ENCOUNTER
----- Message from Mar Lowery sent at 6/2/2023  1:23 PM CDT -----  Contact: [phone]  Evans with [facility] pharmacy would like to consult with a nurse in regards to the patient prescription. Please call to advise at [phone]. Thanks

## 2023-06-05 RX ORDER — DILTIAZEM HYDROCHLORIDE 240 MG/1
240 CAPSULE, EXTENDED RELEASE ORAL DAILY
Qty: 90 CAPSULE | Refills: 3 | Status: SHIPPED | OUTPATIENT
Start: 2023-06-05 | End: 2024-03-25 | Stop reason: SDUPTHER

## 2023-06-09 ENCOUNTER — OFFICE VISIT (OUTPATIENT)
Dept: PSYCHIATRY | Facility: CLINIC | Age: 73
End: 2023-06-09
Payer: MEDICARE

## 2023-06-09 DIAGNOSIS — F32.0 CURRENT MILD EPISODE OF MAJOR DEPRESSIVE DISORDER, UNSPECIFIED WHETHER RECURRENT: Primary | ICD-10-CM

## 2023-06-09 DIAGNOSIS — F41.9 ANXIETY: ICD-10-CM

## 2023-06-09 PROCEDURE — 4010F ACE/ARB THERAPY RXD/TAKEN: CPT | Mod: HCNC,CPTII,95, | Performed by: SOCIAL WORKER

## 2023-06-09 PROCEDURE — 4010F PR ACE/ARB THEARPY RXD/TAKEN: ICD-10-PCS | Mod: HCNC,CPTII,95, | Performed by: SOCIAL WORKER

## 2023-06-09 PROCEDURE — 90791 PSYCH DIAGNOSTIC EVALUATION: CPT | Mod: HCNC,95,, | Performed by: SOCIAL WORKER

## 2023-06-09 PROCEDURE — 90791 PR PSYCHIATRIC DIAGNOSTIC EVALUATION: ICD-10-PCS | Mod: HCNC,95,, | Performed by: SOCIAL WORKER

## 2023-06-14 ENCOUNTER — HOSPITAL ENCOUNTER (OUTPATIENT)
Dept: RADIOLOGY | Facility: HOSPITAL | Age: 73
Discharge: HOME | End: 2023-06-14
Attending: NURSE PRACTITIONER
Payer: MEDICARE

## 2023-06-14 VITALS — WEIGHT: 223.31 LBS | BODY MASS INDEX: 41.09 KG/M2 | HEIGHT: 62 IN

## 2023-06-14 DIAGNOSIS — Z12.31 ENCOUNTER FOR SCREENING MAMMOGRAM FOR MALIGNANT NEOPLASM OF BREAST: ICD-10-CM

## 2023-06-14 PROCEDURE — 77067 SCR MAMMO BI INCL CAD: CPT | Mod: TC,HCNC,PO

## 2023-06-14 PROCEDURE — 77067 MAMMO DIGITAL SCREENING BILAT WITH TOMO: ICD-10-PCS | Mod: 26,HCNC,, | Performed by: RADIOLOGY

## 2023-06-14 PROCEDURE — 77067 SCR MAMMO BI INCL CAD: CPT | Mod: 26,HCNC,, | Performed by: RADIOLOGY

## 2023-06-14 PROCEDURE — 77063 MAMMO DIGITAL SCREENING BILAT WITH TOMO: ICD-10-PCS | Mod: 26,HCNC,, | Performed by: RADIOLOGY

## 2023-06-14 PROCEDURE — 77063 BREAST TOMOSYNTHESIS BI: CPT | Mod: 26,HCNC,, | Performed by: RADIOLOGY

## 2023-06-15 ENCOUNTER — PATIENT MESSAGE (OUTPATIENT)
Dept: ADMINISTRATIVE | Facility: OTHER | Age: 73
End: 2023-06-15
Payer: MEDICARE

## 2023-06-21 ENCOUNTER — PATIENT MESSAGE (OUTPATIENT)
Dept: CARDIOLOGY | Facility: CLINIC | Age: 73
End: 2023-06-21
Payer: MEDICARE

## 2023-06-21 NOTE — PROGRESS NOTES
Psychiatry Initial Visit (PhD/LCSW)  Diagnostic Interview - CPT     Due to the nature of this visit type, a virtual visit with synchronous audio and video, each patient to whom this provider administers behavioral health services by telemedicine is: (1) informed of the relationship between the provider and patient and the respective role of any other health care provider with respect to management of the patient; and (2) notified that he or she may decline to receive services by telemedicine and may withdraw from such care at any time.    The patient was informed of the following:     Provider's contact info:  [facility]  [address] [place] [address], [address] Floor, [address]  [place] [address]  (Phone) [phone]    If technology issues occur, call office phone: : [phone]  If crisis: Dial 911 or go to nearest Emergency Room (ER)  If questions related to privacy practices: contact [facility] Information Department: [phone]    For security purposes, the pt identified that they were at [address] [place] [address] during today's session and contact number is [phone].    The pt's emergency contact(s) is Extended Emergency Contact Information  Primary Emergency Contact: Charmaine Rashid  Mobile Phone: [phone]  Relation: Daughter  Preferred language: English   needed? No.    Crisis Disclaimer: Patient was informed that due to the virtual nature of the visit, that if a crisis develops, protocols will be implemented to ensure patient safety, including but not limited to: 1) Initiating a welfare check with local law enforcement and/or 2) Calling 911     Date: 6/9/2023    Site: [place]    Referral source: Nataly Yan NP    Clinical status of patient: Outpatient    Antonio Urena, a 73 y.o. female, for initial evaluation visit.  Met with patient.    Chief complaint/reason for encounter: depression and anxiety    PHQ-9 Depression Patient  Health Questionnaire 6/26/2023 6/9/2023   Patient agreed to terms: Yes Yes   Little interest or pleasure in doing things 1 1   Feeling down, depressed, or hopeless 0 0   Trouble falling or staying asleep, or sleeping too much 0 0   Feeling tired or having little energy 1 1   Poor appetite or overeating 0 0   Feeling bad about yourself - or that you are a failure or have let yourself or your family down 1 0   Trouble concentrating on things, such as reading the newspaper or watching television 0 0   Moving or speaking so slowly that other people could have noticed. Or the opposite - being so fidgety or restless that you have been moving around a lot more than usual 1 0   Thoughts that you would be better off dead, or of hurting yourself in some way 0 0   PHQ-9 Total Score 4 2   If you checked off any problems, how difficult have these problems made it for you to do your work, take care of things at home, or get along with other people? Not difficult at all Not difficult at all   Interpretation Minimal or None Minimal or None       No flowsheet data found.    History of present illness:  Met with this patient for her initial visit appointment online.  The patient was in her home throughout the appointment, alert and oriented.  The patient stated that she had recently moved from [place] to [place] and bought a house in order to live near to her daughter and son-in-law.  She is  having been  for 5 years to her last  Reza.  He did not move here with her.  She has 2 children, a daughter Charmaine, age 53 and a son Geoff, age 50.  She presented to counseling today due to ongoing anxiety.  She stated that she had her 1st anxiety attack at work many years ago.  She was born in [place] and moved to [place] as an adult.  Jenny indicated that much of her current anxiety is  from the move and from the adjustment to her new life in [place].  The patient does not drive and she has been very upset  about her lack of transportation.  She has a lot of connection and interaction with her daughter and son-in-law.  She currently is negative for depression according to her low score on the PHQ-9 today.  The patient states that she has depression on and off depending on what is going on in her life.  She currently feels stable although she is not settled and feels that she will have a hard time fitting in to [facility] and wonders if she will make friends easily.  She stated she would make a follow-up appointment.    Pain: noncontributory    Symptoms:   Mood: depressed mood, fatigue, worthlessness/guilt, and poor concentration  Anxiety: excessive anxiety/worry, restlessness/keyed up, and irritability  Substance abuse: denied  Cognitive functioning: denied  Health behaviors: noncontributory    Psychiatric history: psychotropic management by PCP    Medical history:   Past Medical History:   Diagnosis Date    A-fib     A-fib     Pace maker put in 20    Allergy     Angina pectoris     Anxiety     Arthritis     Asthma     cough variant    Back pain     Cancer 2018    basal cell c    Colon polyp     COPD (chronic obstructive pulmonary disease)     Depression     Hyperlipidemia     Hypertension     Obesity     Pneumonia        Family history of psychiatric illness:   Family History   Problem Relation Age of Onset    Cancer Mother     Cancer Father     Cancer Brother         Social history (marriage, employment, etc.):   Social History     Social History Narrative    Not on file        Substance use:     Social History     Tobacco Use    Smoking status: Former     Packs/day: 0.25     Years: 1.00     Pack years: 0.25     Types: Cigarettes     Quit date: 1985     Years since quittin.5    Smokeless tobacco: Former    Tobacco comments:     Quit 30 - 40 years ago   Substance Use Topics    Alcohol use: Not Currently     Comment: Wine Occasionally         Current medications and drug reactions (include OTC,  herbal):    Current Outpatient Medications:     cetirizine (ZYRTEC) 10 MG tablet, Take 10 mg by mouth once daily. , Disp: , Rfl:     COVID-19 vac, dulce,Pfizer,,PF, (PFIZER COVID-19 DULCE VACCN,PF,) 30 mcg/0.3 mL injection, Inject into the muscle., Disp: 0.3 mL, Rfl: 0    diltiaZEM (DILT-XR) 240 MG CDCR, Take 1 capsule (240 mg total) by mouth once daily., Disp: 90 capsule, Rfl: 3    elderberry fruit 350 mg Cap, Take by mouth., Disp: , Rfl:     ELIQUIS 5 mg Tab, TAKE 1 TABLET TWICE DAILY, Disp: 180 tablet, Rfl: 3    flecainide (TAMBOCOR) 100 MG Tab, TAKE 1 TABLET EVERY 12 HOURS, Disp: 180 tablet, Rfl: 3    flu vac 2022 65up-ecfOL66X,PF, (FLUAD QUAD 2022-23,65Y UP,,PF,) 60 mcg (15 mcg x 4)/0.5 mL Syrg, Inject 0.5 mLs into the muscle., Disp: 0.5 mL, Rfl: 0    fluticasone propionate (FLONASE) 50 mcg/actuation nasal spray, 2 sprays (100 mcg total) by Each Nostril route once daily., Disp: 48 g, Rfl: 4    fluticasone-salmeterol diskus inhaler 250-50 mcg, Inhale 1 puff into the lungs 2 (two) times daily. Controller.Wash out mouth after using., Disp: 180 each, Rfl: 3    ketoconazole (NIZORAL) 2 % shampoo, Apply topically twice a week., Disp: 120 mL, Rfl: 3    levalbuterol (XOPENEX HFA) 45 mcg/actuation inhaler, Inhale 2 puffs into the lungs every 6 (six) hours as needed for Wheezing or Shortness of Breath., Disp: 45 g, Rfl: 3    lisinopriL (PRINIVIL,ZESTRIL) 20 MG tablet, TAKE 1 AND 1/2 TABLETS EVERY DAY, Disp: 135 tablet, Rfl: 0    LORazepam (ATIVAN) 0.5 MG tablet, Take 0.5 mg by mouth daily as needed for Anxiety., Disp: , Rfl:     meclizine (ANTIVERT) 25 mg tablet, Take 1 tablet (25 mg total) by mouth 3 (three) times daily as needed., Disp: 20 tablet, Rfl: 0    multivitamin capsule, Take 1 capsule by mouth once daily., Disp: , Rfl:     omega-3 fatty acids/fish oil (FISH OIL-OMEGA-3 FATTY ACIDS) 300-1,000 mg capsule, Take 1 capsule by mouth once daily., Disp: , Rfl:     rosuvastatin (CRESTOR) 20 MG tablet, Take 1 tablet (20  mg total) by mouth once daily., Disp: 90 tablet, Rfl: 3    SPIRIVA RESPIMAT 2.5 mcg/actuation inhaler, INHALE 2 PUFFS INTO THE LUNGS ONCE DAILY., Disp: 12 g, Rfl: 3    triamcinolone acetonide 0.1% (KENALOG) 0.1 % cream, Apply topically 2 (two) times daily. Use up to 2 weeks at a time, Disp: 454 g, Rfl: 1      Strengths and liabilities: Strength: Patient accepts guidance/feedback, Strength: Patient is expressive/articulate., Liability: Patient has no suport network., Liability: Patient lacks coping skills.    Current Evaluation:     Mental Status Exam:  General Appearance:  unremarkable, age appropriate   Speech: normal tone, normal rate, normal pitch, normal volume      Level of Cooperation: cooperative      Thought Processes: normal and logical   Mood: anxious, dysthymic, sad      Thought Content: normal, no suicidality, no homicidality, delusions, or paranoia   Affect: congruent and appropriate   Orientation: Oriented x3   Memory: recent >  intact   Attention Span & Concentration: intact   Fund of General Knowledge: intact and appropriate to age and level of education   Abstract Reasoning: interpretation of similarities was abstract   Judgment & Insight: fair     Language  intact     Diagnostic Impression - Plan:       ICD-10-CM ICD-9-CM   1. Current mild episode of major depressive disorder, unspecified whether recurrent  F32.0 296.21   2. Anxiety  F41.9 300.00       Plan:individual psychotherapy    Return to Clinic: as scheduled    Length of Service (minutes): Razia Smith LCSW  06/26/2023   8:11 AM

## 2023-06-26 ENCOUNTER — OFFICE VISIT (OUTPATIENT)
Dept: PSYCHIATRY | Facility: CLINIC | Age: 73
End: 2023-06-26
Payer: MEDICARE

## 2023-06-26 DIAGNOSIS — F43.20 ADJUSTMENT DISORDER, UNSPECIFIED TYPE: ICD-10-CM

## 2023-06-26 DIAGNOSIS — F32.0 CURRENT MILD EPISODE OF MAJOR DEPRESSIVE DISORDER, UNSPECIFIED WHETHER RECURRENT: ICD-10-CM

## 2023-06-26 DIAGNOSIS — F41.9 ANXIETY: Primary | ICD-10-CM

## 2023-06-26 PROCEDURE — 4010F PR ACE/ARB THEARPY RXD/TAKEN: ICD-10-PCS | Mod: HCNC,CPTII,95, | Performed by: SOCIAL WORKER

## 2023-06-26 PROCEDURE — 90834 PR PSYCHOTHERAPY W/PATIENT, 45 MIN: ICD-10-PCS | Mod: HCNC,95,, | Performed by: SOCIAL WORKER

## 2023-06-26 PROCEDURE — 90834 PSYTX W PT 45 MINUTES: CPT | Mod: HCNC,95,, | Performed by: SOCIAL WORKER

## 2023-06-26 PROCEDURE — 4010F ACE/ARB THERAPY RXD/TAKEN: CPT | Mod: HCNC,CPTII,95, | Performed by: SOCIAL WORKER

## 2023-06-26 NOTE — PROGRESS NOTES
Individual Psychotherapy Follow-up Visit Progress Note (PhD/LCSW)     Due to the nature of this visit type, a virtual visit with synchronous audio and video, each patient to whom this provider administers behavioral health services by telemedicine is: (1) informed of the relationship between the provider and patient and the respective role of any other health care provider with respect to management of the patient; and (2) notified that he or she may decline to receive services by telemedicine and may withdraw from such care at any time.    The patient was informed of the following:     Provider's contact info:  [facility]  [address] [place] [address], [address]rd Floor, [address]  [place] [address]  (Phone) [phone]    If technology issues occur, call office phone: : [phone]  If crisis: Dial 911 or go to nearest Emergency Room (ER)  If questions related to privacy practices: contact [facility] Information Department: [phone]    For security purposes, the pt identified that they were at [address] [place] [address] during today's session and contact number is [phone].    The pt's emergency contact(s) is Extended Emergency Contact Information  Primary Emergency Contact: Charmaine Rashid  Mobile Phone: [phone]  Relation: Daughter  Preferred language: English   needed? No.    Crisis Disclaimer: Patient was informed that due to the virtual nature of the visit, that if a crisis develops, protocols will be implemented to ensure patient safety, including but not limited to: 1) Initiating a welfare check with local law enforcement and/or 2) Calling 911.    Outpatient - Insight oriented psychotherapy 45 min - CPT code 20587    Date: 11:15 AM      6/26/2023  MRN: [id number]  Primary Care Provider: Wayne Owens MD    Antonio Urena is a 73 y.o. female who presents today for follow-up of depression and anxiety. Met with patient.         Subjective:       Patient report:  Met with this patient for her online follow-up appointment.  The patient was in her home throughout the appointment.  This is her 2nd appointment.  The patient stated that she continues to have trouble adjusting to living in [place] and not having many friends at this time.  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  She states that she continues to feel somewhat lonely and isolated.  Her daughter works out of the home and her son-in-law is a .  Her son worked in a band and is .  She discussed several family members and their careers she also discussed her daughter who is a mother of 3 young children.  She stated that she needs to have some cataracts removed soon.  She would like to have more interaction with people other than her family and her son-in-law.  But she continues to have some issues with her health and her heart.  She continues to hope to make some friends in [place] so that she becomes less isolated.  Encouraged her to find some kind of community groups to join such as a book club or a Sabianist.  She also needs to take up a hobby such as gardening, sewing, or knitting. she stated she would do this and make a follow-up appointment.     Current symptoms:   Depression: depressed mood  Anxiety: excessive anxiety/worry  Substance abuse: denied  Cognitive functioning: denied  Carito: none noted  Psychosis: none noted    PHQ-9 Depression Patient Health Questionnaire 6/26/2023 6/9/2023   Patient agreed to terms: Yes Yes   Little interest or pleasure in doing things 1 1   Feeling down, depressed, or hopeless 0 0   Trouble falling or staying asleep, or sleeping too much 0 0   Feeling tired or having little energy 1 1   Poor appetite or overeating 0 0   Feeling bad about yourself - or that you are a failure or have let  yourself or your family down 1 0   Trouble concentrating on things, such as reading the newspaper or watching television 0 0   Moving or speaking so slowly that other people could have noticed. Or the opposite - being so fidgety or restless that you have been moving around a lot more than usual 1 0   Thoughts that you would be better off dead, or of hurting yourself in some way 0 0   PHQ-9 Total Score 4 2   If you checked off any problems, how difficult have these problems made it for you to do your work, take care of things at home, or get along with other people? Not difficult at all Not difficult at all   Interpretation Minimal or None Minimal or None       No flowsheet data found.    Psychosocial stressors and topics discussed: identifying feelings and self care      Objective:       Mental Status Evaluation  Appearance: unremarkable, age appropriate  Behavior: normal, cooperative  Speech: normal tone, normal rate, normal pitch, normal volume  Mood: anxious, depressed  Affect: congruent and appropriate  Thought Process: normal and logical  Thought Content: normal, no suicidality, no homicidality, delusions, or paranoia  Sensorium: grossly intact  Cognition: grossly intact  Insight: fair  Judgment: adequate to circumstances    Risk parameters:  Patient reports no suicidal ideation  Patient reports no homicidal ideation  Patient reports no self-injurious behavior  Patient reports no violent behavior      Assessment & Plan:       Therapeutic interventions used: Educated pt re: how anxious fears are maintained by a cycle of unwarranted fear and avoidance that precludes positive, corrective experiences with the feared object/situation  Discussed how treatment targets worry, anxiety symptoms, and avoidance to help pt manage worry effectively  Assigned pt to practice relaxation on a daily basis  Encouraged pt to share feelings of depression to clarify them and gain insight into their causes  Assessed pt's current and  past mood episodes, including the features, frequency, intensity and duration   Consistent eye contact, active listening, unconditional positive regard and warm acceptance were used to help build trust  Asked pt to describe his/her feelings about himself/herself and how he/she sees himself/herself as compared with others     The patient's response to the interventions is accepting    The patient's progress toward treatment goals is fair     Homework assigned: practice relaxation skills daily and implement sleep hygiene routine     Treatment plan:   A. Target symptoms: Depression and Anxiety   B. Therapeutic modalities: insight oriented psychotherapy  C. Why chosen therapy is appropriate versus another modality: patient responds to this modality   D. Outcome monitoring methods: self-report     Visit Diagnosis:   1. Anxiety    2. Current mild episode of major depressive disorder, unspecified whether recurrent    3. Adjustment disorder, unspecified type        Follow-up: individual psychotherapy    Return to Clinic: as scheduled    Length of Service (minutes): 45       Claritza Smith LCSW  07/25/2023   11:15 AM

## 2023-06-30 ENCOUNTER — TELEPHONE (OUTPATIENT)
Dept: PSYCHIATRY | Facility: CLINIC | Age: 73
End: 2023-06-30
Payer: MEDICARE

## 2023-06-30 NOTE — TELEPHONE ENCOUNTER
Call and r/s f/u with MsDorethaLuis. From 7/10/23 to Thursday 8/10/23 at 9:00 am virtual. Due to a b/o.

## 2023-07-12 ENCOUNTER — PATIENT MESSAGE (OUTPATIENT)
Dept: PULMONOLOGY | Facility: CLINIC | Age: 73
End: 2023-07-12
Payer: MEDICARE

## 2023-07-12 DIAGNOSIS — J44.89 ASTHMA WITH COPD: ICD-10-CM

## 2023-07-12 RX ORDER — FLUTICASONE PROPIONATE AND SALMETEROL 250; 50 UG/1; UG/1
1 POWDER RESPIRATORY (INHALATION) 2 TIMES DAILY
Qty: 180 EACH | Refills: 3 | Status: SHIPPED | OUTPATIENT
Start: 2023-07-12

## 2023-07-12 RX ORDER — FLUTICASONE PROPIONATE AND SALMETEROL 250; 50 UG/1; UG/1
1 POWDER RESPIRATORY (INHALATION) 2 TIMES DAILY
Qty: 180 EACH | Refills: 3 | OUTPATIENT
Start: 2023-07-12

## 2023-08-04 DIAGNOSIS — I49.5 SSS (SICK SINUS SYNDROME): ICD-10-CM

## 2023-08-04 DIAGNOSIS — I48.21 PERMANENT ATRIAL FIBRILLATION: ICD-10-CM

## 2023-08-04 DIAGNOSIS — Z95.0 PRESENCE OF CARDIAC PACEMAKER: Primary | ICD-10-CM

## 2023-08-06 ENCOUNTER — CLINICAL SUPPORT (OUTPATIENT)
Dept: CARDIOLOGY | Facility: HOSPITAL | Age: 73
End: 2023-08-06
Payer: MEDICARE

## 2023-08-06 DIAGNOSIS — Z95.0 PRESENCE OF CARDIAC PACEMAKER: ICD-10-CM

## 2023-08-06 PROCEDURE — 93296 REM INTERROG EVL PM/IDS: CPT | Mod: HCNC | Performed by: INTERNAL MEDICINE

## 2023-08-10 ENCOUNTER — PATIENT MESSAGE (OUTPATIENT)
Dept: ADMINISTRATIVE | Facility: OTHER | Age: 73
End: 2023-08-10
Payer: MEDICARE

## 2023-08-10 ENCOUNTER — OFFICE VISIT (OUTPATIENT)
Dept: PSYCHIATRY | Facility: CLINIC | Age: 73
End: 2023-08-10
Payer: MEDICARE

## 2023-08-10 DIAGNOSIS — F41.9 ANXIETY: Primary | ICD-10-CM

## 2023-08-10 DIAGNOSIS — F43.20 ADJUSTMENT DISORDER, UNSPECIFIED TYPE: ICD-10-CM

## 2023-08-10 DIAGNOSIS — F32.0 CURRENT MILD EPISODE OF MAJOR DEPRESSIVE DISORDER, UNSPECIFIED WHETHER RECURRENT: ICD-10-CM

## 2023-08-10 PROCEDURE — 4010F ACE/ARB THERAPY RXD/TAKEN: CPT | Mod: HCNC,CPTII,95, | Performed by: SOCIAL WORKER

## 2023-08-10 PROCEDURE — 90834 PSYTX W PT 45 MINUTES: CPT | Mod: HCNC,95,, | Performed by: SOCIAL WORKER

## 2023-08-10 PROCEDURE — 4010F PR ACE/ARB THEARPY RXD/TAKEN: ICD-10-PCS | Mod: HCNC,CPTII,95, | Performed by: SOCIAL WORKER

## 2023-08-10 PROCEDURE — 90834 PR PSYCHOTHERAPY W/PATIENT, 45 MIN: ICD-10-PCS | Mod: HCNC,95,, | Performed by: SOCIAL WORKER
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
## 2023-08-27 DIAGNOSIS — J44.9 CHRONIC OBSTRUCTIVE PULMONARY DISEASE, UNSPECIFIED COPD TYPE: ICD-10-CM

## 2023-08-27 DIAGNOSIS — J44.89 ASTHMA WITH COPD: ICD-10-CM

## 2023-08-29 RX ORDER — TIOTROPIUM BROMIDE INHALATION SPRAY 3.12 UG/1
2 SPRAY, METERED RESPIRATORY (INHALATION) DAILY
Qty: 12 G | Refills: 3 | Status: SHIPPED | OUTPATIENT
Start: 2023-08-29

## 2023-09-15 ENCOUNTER — PATIENT MESSAGE (OUTPATIENT)
Dept: CARDIOLOGY | Facility: CLINIC | Age: 73
End: 2023-09-15
Payer: MEDICARE

## 2023-09-20 ENCOUNTER — OFFICE VISIT (OUTPATIENT)
Dept: CARDIOLOGY | Facility: CLINIC | Age: 73
End: 2023-09-20
Payer: MEDICARE

## 2023-09-20 VITALS — WEIGHT: 212.5 LBS | SYSTOLIC BLOOD PRESSURE: 115 MMHG | DIASTOLIC BLOOD PRESSURE: 60 MMHG | BODY MASS INDEX: 38.87 KG/M2

## 2023-09-20 DIAGNOSIS — T82.120D: ICD-10-CM

## 2023-09-20 DIAGNOSIS — I10 ESSENTIAL HYPERTENSION: ICD-10-CM

## 2023-09-20 DIAGNOSIS — I48.91 ATRIAL FIBRILLATION AND FLUTTER: ICD-10-CM

## 2023-09-20 DIAGNOSIS — R00.2 PALPITATIONS: ICD-10-CM

## 2023-09-20 DIAGNOSIS — I48.3 TYPICAL ATRIAL FLUTTER: ICD-10-CM

## 2023-09-20 DIAGNOSIS — I49.9 CARDIAC ARRHYTHMIA, UNSPECIFIED CARDIAC ARRHYTHMIA TYPE: ICD-10-CM

## 2023-09-20 DIAGNOSIS — R00.1 SYMPTOMATIC BRADYCARDIA: ICD-10-CM

## 2023-09-20 DIAGNOSIS — R07.9 CHEST PAIN, UNSPECIFIED TYPE: ICD-10-CM

## 2023-09-20 DIAGNOSIS — I49.5 SSS (SICK SINUS SYNDROME): ICD-10-CM

## 2023-09-20 DIAGNOSIS — Z79.01 CHRONIC ANTICOAGULATION: ICD-10-CM

## 2023-09-20 DIAGNOSIS — Z95.0 PRESENCE OF CARDIAC PACEMAKER: Primary | ICD-10-CM

## 2023-09-20 DIAGNOSIS — E66.01 MORBID OBESITY WITH BMI OF 40.0-44.9, ADULT: ICD-10-CM

## 2023-09-20 DIAGNOSIS — E78.5 HYPERLIPIDEMIA, UNSPECIFIED HYPERLIPIDEMIA TYPE: ICD-10-CM

## 2023-09-20 DIAGNOSIS — T82.110D PACEMAKER LEAD MALFUNCTION, SUBSEQUENT ENCOUNTER: ICD-10-CM

## 2023-09-20 DIAGNOSIS — T82.9XXS DISORDER OF CARDIAC PACEMAKER SYSTEM, SEQUELA: ICD-10-CM

## 2023-09-20 DIAGNOSIS — I48.92 ATRIAL FIBRILLATION AND FLUTTER: ICD-10-CM

## 2023-09-20 DIAGNOSIS — I48.0 PAF (PAROXYSMAL ATRIAL FIBRILLATION): ICD-10-CM

## 2023-09-20 DIAGNOSIS — T82.110A PACEMAKER LEAD MALFUNCTION, INITIAL ENCOUNTER: ICD-10-CM

## 2023-09-20 DIAGNOSIS — R06.02 SHORTNESS OF BREATH: ICD-10-CM

## 2023-09-20 DIAGNOSIS — R06.02 SOB (SHORTNESS OF BREATH): ICD-10-CM

## 2023-09-20 DIAGNOSIS — Z95.0 S/P CARDIAC PACEMAKER PROCEDURE: ICD-10-CM

## 2023-09-20 PROCEDURE — 3008F BODY MASS INDEX DOCD: CPT | Mod: HCNC,CPTII,S$GLB, | Performed by: INTERNAL MEDICINE

## 2023-09-20 PROCEDURE — 4010F ACE/ARB THERAPY RXD/TAKEN: CPT | Mod: HCNC,CPTII,S$GLB, | Performed by: INTERNAL MEDICINE

## 2023-09-20 PROCEDURE — 1159F PR MEDICATION LIST DOCUMENTED IN MEDICAL RECORD: ICD-10-PCS | Mod: HCNC,CPTII,S$GLB, | Performed by: INTERNAL MEDICINE

## 2023-09-20 PROCEDURE — 1101F PR PT FALLS ASSESS DOC 0-1 FALLS W/OUT INJ PAST YR: ICD-10-PCS | Mod: HCNC,CPTII,S$GLB, | Performed by: INTERNAL MEDICINE

## 2023-09-20 PROCEDURE — 3074F SYST BP LT 130 MM HG: CPT | Mod: HCNC,CPTII,S$GLB, | Performed by: INTERNAL MEDICINE

## 2023-09-20 PROCEDURE — 3074F PR MOST RECENT SYSTOLIC BLOOD PRESSURE < 130 MM HG: ICD-10-PCS | Mod: HCNC,CPTII,S$GLB, | Performed by: INTERNAL MEDICINE

## 2023-09-20 PROCEDURE — 3288F PR FALLS RISK ASSESSMENT DOCUMENTED: ICD-10-PCS | Mod: HCNC,CPTII,S$GLB, | Performed by: INTERNAL MEDICINE

## 2023-09-20 PROCEDURE — 99214 OFFICE O/P EST MOD 30 MIN: CPT | Mod: HCNC,S$GLB,, | Performed by: INTERNAL MEDICINE

## 2023-09-20 PROCEDURE — 1101F PT FALLS ASSESS-DOCD LE1/YR: CPT | Mod: HCNC,CPTII,S$GLB, | Performed by: INTERNAL MEDICINE

## 2023-09-20 PROCEDURE — 4010F PR ACE/ARB THEARPY RXD/TAKEN: ICD-10-PCS | Mod: HCNC,CPTII,S$GLB, | Performed by: INTERNAL MEDICINE

## 2023-09-20 PROCEDURE — 99999 PR PBB SHADOW E&M-EST. PATIENT-LVL III: ICD-10-PCS | Mod: PBBFAC,HCNC,, | Performed by: INTERNAL MEDICINE

## 2023-09-20 PROCEDURE — 3078F DIAST BP <80 MM HG: CPT | Mod: HCNC,CPTII,S$GLB, | Performed by: INTERNAL MEDICINE

## 2023-09-20 PROCEDURE — 3288F FALL RISK ASSESSMENT DOCD: CPT | Mod: HCNC,CPTII,S$GLB, | Performed by: INTERNAL MEDICINE

## 2023-09-20 PROCEDURE — 1159F MED LIST DOCD IN RCRD: CPT | Mod: HCNC,CPTII,S$GLB, | Performed by: INTERNAL MEDICINE

## 2023-09-20 PROCEDURE — 3078F PR MOST RECENT DIASTOLIC BLOOD PRESSURE < 80 MM HG: ICD-10-PCS | Mod: HCNC,CPTII,S$GLB, | Performed by: INTERNAL MEDICINE

## 2023-09-20 PROCEDURE — 99214 PR OFFICE/OUTPT VISIT, EST, LEVL IV, 30-39 MIN: ICD-10-PCS | Mod: HCNC,S$GLB,, | Performed by: INTERNAL MEDICINE

## 2023-09-20 PROCEDURE — 99999 PR PBB SHADOW E&M-EST. PATIENT-LVL III: CPT | Mod: PBBFAC,HCNC,, | Performed by: INTERNAL MEDICINE

## 2023-09-20 PROCEDURE — 3008F PR BODY MASS INDEX (BMI) DOCUMENTED: ICD-10-PCS | Mod: HCNC,CPTII,S$GLB, | Performed by: INTERNAL MEDICINE

## 2023-09-28 RX ORDER — FLECAINIDE ACETATE 100 MG/1
TABLET ORAL
Qty: 180 TABLET | Refills: 3 | Status: SHIPPED | OUTPATIENT
Start: 2023-09-28

## 2023-10-06 ENCOUNTER — PATIENT MESSAGE (OUTPATIENT)
Dept: INTERNAL MEDICINE | Facility: CLINIC | Age: 73
End: 2023-10-06
Payer: MEDICARE

## 2023-10-06 ENCOUNTER — TELEPHONE (OUTPATIENT)
Dept: INTERNAL MEDICINE | Facility: CLINIC | Age: 73
End: 2023-10-06
Payer: MEDICARE

## 2023-10-06 ENCOUNTER — NURSE TRIAGE (OUTPATIENT)
Dept: ADMINISTRATIVE | Facility: CLINIC | Age: 73
End: 2023-10-06
Payer: MEDICARE

## 2023-10-06 NOTE — TELEPHONE ENCOUNTER
Spoke with pt; pt stated she went to Urgent Care but nothing was done; pt states she has a bruise on her left leg and pulled her hamstring; they told her to take tylenol & ice the bruise; pt was advised to see Ortho and have a CT scan; pt states she has no problem working /LD

## 2023-10-06 NOTE — TELEPHONE ENCOUNTER
OOC RN  Patient had run on with doggie and the doggie bruised on right chin.  Twisted left thigh hamstring, and it is sore, applying ice and taking tylenol. .  Went to  and they need to go to Ortho to r/o tear.  Patient is on Eliquis.  Said the UC,  said she needs to go to Ortho.  Pain,  on standing.   2/10   patient wants to know if she should make appt with you or go to ortho like  said.  CT,  and xray not done.  please reach out to patient and advise.   For any new or worsening conditions.  Phone callback OOC  Rn number  given vu.   Reason for Disposition   Minor injury or pain from twisting or over-stretching    Additional Information   Negative: Major bleeding (actively dripping or spurting) that can't be stopped   Negative: Bullet, stabbed by knife, or other serious penetrating wound   Negative: Looks like a dislocated joint (crooked or deformed)   Negative: Can't stand (bear weight) or walk   Negative: Serious injury with multiple fractures (broken bones)   Negative: Sounds like a life-threatening emergency to the triager   Negative: SEVERE pain (e.g., excruciating pain, unable to do any normal activities)   Negative: Skin is split open or gaping (or length > 1/2 inch or 12 mm)   Negative: Bleeding won't stop after 10 minutes of direct pressure (using correct technique)   Negative: Dirt in the wound and not removed with 15 minutes of scrubbing   Negative: Sounds like a serious injury to the triager   Negative: Looks infected (e.g., spreading redness, red streak, pus)   Negative: No prior tetanus shots (or is not fully vaccinated) and any wound (e.g., cut or scrape)   Negative: HIV positive or severe immunodeficiency (severely weak immune system) and DIRTY cut or scrape   Negative: Patient wants to be seen   Negative: Injury interferes with work or school   Negative: Large swelling or bruise and size > palm of person's hand   Negative: MODERATE pain (e.g., interferes with normal activities, limping) and  high-risk adult (e.g., age > 60 years, osteoporosis, chronic steroid use)   Negative: Suspicious history for the injury   Negative: Last tetanus shot > 5 years ago and DIRTY cut or scrape   Negative: Last tetanus shot > 10 years ago and CLEAN cut or scrape   Negative: Injury and pain has not improved after 3 days   Negative: Injury is still painful or swollen after 2 weeks    Protocols used: Leg Injury-A-[place]

## 2023-10-09 ENCOUNTER — OFFICE VISIT (OUTPATIENT)
Dept: INTERNAL MEDICINE | Facility: CLINIC | Age: 73
End: 2023-10-09
Payer: MEDICARE

## 2023-10-09 ENCOUNTER — HOSPITAL ENCOUNTER (OUTPATIENT)
Dept: RADIOLOGY | Facility: HOSPITAL | Age: 73
Discharge: HOME | End: 2023-10-09
Attending: FAMILY MEDICINE
Payer: MEDICARE

## 2023-10-09 ENCOUNTER — NURSE TRIAGE (OUTPATIENT)
Dept: ADMINISTRATIVE | Facility: CLINIC | Age: 73
End: 2023-10-09
Payer: MEDICARE

## 2023-10-09 ENCOUNTER — PATIENT MESSAGE (OUTPATIENT)
Dept: INTERNAL MEDICINE | Facility: CLINIC | Age: 73
End: 2023-10-09

## 2023-10-09 VITALS
BODY MASS INDEX: 37.26 KG/M2 | OXYGEN SATURATION: 97 % | RESPIRATION RATE: 18 BRPM | DIASTOLIC BLOOD PRESSURE: 70 MMHG | HEIGHT: 63 IN | WEIGHT: 210.31 LBS | SYSTOLIC BLOOD PRESSURE: 120 MMHG | TEMPERATURE: 98 F | HEART RATE: 69 BPM

## 2023-10-09 DIAGNOSIS — S76.312A STRAIN OF LEFT HAMSTRING, INITIAL ENCOUNTER: ICD-10-CM

## 2023-10-09 DIAGNOSIS — M79.604 RIGHT LEG PAIN: Primary | ICD-10-CM

## 2023-10-09 DIAGNOSIS — M79.604 RIGHT LEG PAIN: ICD-10-CM

## 2023-10-09 PROCEDURE — 3074F PR MOST RECENT SYSTOLIC BLOOD PRESSURE < 130 MM HG: ICD-10-PCS | Mod: HCNC,CPTII,S$GLB, | Performed by: FAMILY MEDICINE

## 2023-10-09 PROCEDURE — 99999 PR PBB SHADOW E&M-EST. PATIENT-LVL V: ICD-10-PCS | Mod: PBBFAC,HCNC,, | Performed by: FAMILY MEDICINE

## 2023-10-09 PROCEDURE — 3288F FALL RISK ASSESSMENT DOCD: CPT | Mod: HCNC,CPTII,S$GLB, | Performed by: FAMILY MEDICINE

## 2023-10-09 PROCEDURE — 73590 X-RAY EXAM OF LOWER LEG: CPT | Mod: 26,HCNC,RT, | Performed by: RADIOLOGY

## 2023-10-09 PROCEDURE — 1159F PR MEDICATION LIST DOCUMENTED IN MEDICAL RECORD: ICD-10-PCS | Mod: HCNC,CPTII,S$GLB, | Performed by: FAMILY MEDICINE

## 2023-10-09 PROCEDURE — 73590 XR TIBIA FIBULA 2 VIEW RIGHT: ICD-10-PCS | Mod: 26,HCNC,RT, | Performed by: RADIOLOGY

## 2023-10-09 PROCEDURE — 99213 OFFICE O/P EST LOW 20 MIN: CPT | Mod: HCNC,S$GLB,, | Performed by: FAMILY MEDICINE

## 2023-10-09 PROCEDURE — 73590 X-RAY EXAM OF LOWER LEG: CPT | Mod: TC,HCNC,RT

## 2023-10-09 PROCEDURE — 99213 PR OFFICE/OUTPT VISIT, EST, LEVL III, 20-29 MIN: ICD-10-PCS | Mod: HCNC,S$GLB,, | Performed by: FAMILY MEDICINE

## 2023-10-09 PROCEDURE — 4010F ACE/ARB THERAPY RXD/TAKEN: CPT | Mod: HCNC,CPTII,S$GLB, | Performed by: FAMILY MEDICINE

## 2023-10-09 PROCEDURE — 1125F PR PAIN SEVERITY QUANTIFIED, PAIN PRESENT: ICD-10-PCS | Mod: HCNC,CPTII,S$GLB, | Performed by: FAMILY MEDICINE

## 2023-10-09 PROCEDURE — 4010F PR ACE/ARB THEARPY RXD/TAKEN: ICD-10-PCS | Mod: HCNC,CPTII,S$GLB, | Performed by: FAMILY MEDICINE

## 2023-10-09 PROCEDURE — 1101F PR PT FALLS ASSESS DOC 0-1 FALLS W/OUT INJ PAST YR: ICD-10-PCS | Mod: HCNC,CPTII,S$GLB, | Performed by: FAMILY MEDICINE

## 2023-10-09 PROCEDURE — 1159F MED LIST DOCD IN RCRD: CPT | Mod: HCNC,CPTII,S$GLB, | Performed by: FAMILY MEDICINE

## 2023-10-09 PROCEDURE — 1125F AMNT PAIN NOTED PAIN PRSNT: CPT | Mod: HCNC,CPTII,S$GLB, | Performed by: FAMILY MEDICINE

## 2023-10-09 PROCEDURE — 3078F DIAST BP <80 MM HG: CPT | Mod: HCNC,CPTII,S$GLB, | Performed by: FAMILY MEDICINE

## 2023-10-09 PROCEDURE — 99999 PR PBB SHADOW E&M-EST. PATIENT-LVL V: CPT | Mod: PBBFAC,HCNC,, | Performed by: FAMILY MEDICINE

## 2023-10-09 PROCEDURE — 1101F PT FALLS ASSESS-DOCD LE1/YR: CPT | Mod: HCNC,CPTII,S$GLB, | Performed by: FAMILY MEDICINE

## 2023-10-09 PROCEDURE — 3078F PR MOST RECENT DIASTOLIC BLOOD PRESSURE < 80 MM HG: ICD-10-PCS | Mod: HCNC,CPTII,S$GLB, | Performed by: FAMILY MEDICINE

## 2023-10-09 PROCEDURE — 3288F PR FALLS RISK ASSESSMENT DOCUMENTED: ICD-10-PCS | Mod: HCNC,CPTII,S$GLB, | Performed by: FAMILY MEDICINE

## 2023-10-09 PROCEDURE — 3074F SYST BP LT 130 MM HG: CPT | Mod: HCNC,CPTII,S$GLB, | Performed by: FAMILY MEDICINE

## 2023-10-09 PROCEDURE — 3008F BODY MASS INDEX DOCD: CPT | Mod: HCNC,CPTII,S$GLB, | Performed by: FAMILY MEDICINE

## 2023-10-09 PROCEDURE — 3008F PR BODY MASS INDEX (BMI) DOCUMENTED: ICD-10-PCS | Mod: HCNC,CPTII,S$GLB, | Performed by: FAMILY MEDICINE

## 2023-10-09 NOTE — PROGRESS NOTES
Can we let pt know the following results...   No bone abnormalities. Does have soft tissue swelling with correlates to the hematoma. Keep scheudled appt with ortho.

## 2023-10-09 NOTE — TELEPHONE ENCOUNTER
Pt calling back as follow up to call Friday. States that the bruising has gotten worse as well as the swelling to her leg and she has pain all the way up to her buttock area as well. Pt is concerned r/t her being on eliquis and would like to be seen. Per protocol advised to SEE IN OFFICE TODAY. verbalized understanding. Was able to schedule with provider in pcp office. verbalized understanding. Denies any further questions or concerns at this time, advised to call back if they have any that come up. Advised pt to call back with any other concerns or worsening symptoms. Verbalized understanding and will route message to provider.     Reason for Disposition   Patient wants to be seen    Additional Information   Negative: Major bleeding (actively dripping or spurting) that can't be stopped   Negative: Bullet, stabbed by knife, or other serious penetrating wound   Negative: Looks like a dislocated joint (crooked or deformed)   Negative: Can't stand (bear weight) or walk   Negative: Serious injury with multiple fractures (broken bones)   Negative: Sounds like a life-threatening emergency to the triager   Negative: SEVERE pain (e.g., excruciating pain, unable to do any normal activities)   Negative: Skin is split open or gaping (or length > 1/2 inch or 12 mm)   Negative: Bleeding won't stop after 10 minutes of direct pressure (using correct technique)   Negative: Dirt in the wound and not removed with 15 minutes of scrubbing   Negative: Sounds like a serious injury to the triager   Negative: Looks infected (e.g., spreading redness, red streak, pus)   Negative: No prior tetanus shots (or is not fully vaccinated) and any wound (e.g., cut or scrape)   Negative: HIV positive or severe immunodeficiency (severely weak immune system) and DIRTY cut or scrape    Protocols used: Leg Injury-A-[place]

## 2023-10-09 NOTE — PATIENT INSTRUCTIONS
Continue taking Elquis.   Continue icing. Make sure to having a piece of fabric between you and the ice. Ice for 15 minutes at a time.   .   We will refer you to ortho

## 2023-10-09 NOTE — PROGRESS NOTES
"Subjective:      Patient ID: Antonio Urena is a 73 y.o. female.    Chief Complaint: Leg Injury (Pt presents to clinic with MARYCARMEN leg Pain)    Pt went to urgent care on Thursday.    Having swelling  since that is worsening.   Had her dog hit her "head on" on her right leg.  Was bracing her left leg.   Having pain in left hamstring. And bruising of the right leg.   No active bleeding.   Held her elquis today.       The patient's Health Maintenance was reviewed and the following appears to be due at this time:   Health Maintenance Due   Topic Date Due    Shingles Vaccine (1 of 2) Never done    Influenza Vaccine (1) 09/01/2023    COVID-19 Vaccine (6 - 2023-24 season) 09/01/2023        Past Medical History:  Past Medical History:   Diagnosis Date    A-fib     A-fib     Pace maker put in 5/12/20    Allergy     Angina pectoris     Anxiety     Arthritis     Asthma     cough variant    Back pain     Cancer 2018    basal cell c    Colon polyp     COPD (chronic obstructive pulmonary disease)     Depression     Hyperlipidemia     Hypertension     Obesity     Pneumonia      Past Surgical History:   Procedure Laterality Date    ABLATION Bilateral 6/3/2021    Procedure: ABLATION/CTI;  Surgeon: Juan Lazo MD;  Location: [facility] CATH LAB;  Service: Cardiology;  Laterality: Bilateral;  (RFA of the CTI,    COVID-19, MRNA, LN-S, PF (Pfizer) 3/20/2021, 2/27/2021   later date RA lead extraction, RA lead addition    CATARACT EXTRACTION Right     MGM    CATARACT EXTRACTION Left 07/28/2022    MGM    COLONOSCOPY N/A 10/10/2022    Procedure: COLONOSCOPY 8/31--card clearance received per Dr Ling;  Surgeon: eNstor Sher MD;  Location: [facility] ENDO;  Service: Gastroenterology;  Laterality: N/A;    HYSTERECTOMY      IMPLANTATION OF BIVENTRICULAR HEART PACEMAKER  05/12/2020    Loop recorder also in but not working right now    PHLEBOGRAPHY  6/8/2021    Procedure: Venogram;  Surgeon: Juan Lazo MD;  Location: [facility] CATH " LAB;  Service: Cardiology;;    REVISION OF PROCEDURE INVOLVING PACEMAKER LEAD N/A 2021    Procedure: REVISION, ELECTRODE LEAD, CARDIAC PACEMAKER;  Surgeon: Juan Lazo MD;  Location: [facility] CATH LAB;  Service: Cardiology;  Laterality: N/A;    REVISION OF PROCEDURE INVOLVING PACEMAKER LEAD Bilateral 7/15/2021    Procedure: REVISION, ELECTRODE LEAD, CARDIAC PACEMAKER/A lead;  Surgeon: Juan Lazo MD;  Location: [facility] CATH LAB;  Service: Cardiology;  Laterality: Bilateral;  biotronik device    REVISION OF SKIN POCKET FOR PACEMAKER  2021    Procedure: Revision, Skin Pocket, For Cardiac Pacemaker;  Surgeon: Juan Lazo MD;  Location: [facility] CATH LAB;  Service: Cardiology;;     Review of patient's allergies indicates:   Allergen Reactions    Lasix [furosemide]      Within 1 year of taking pt will get severe knee pain in both knees.     Social History     Socioeconomic History    Marital status:    Tobacco Use    Smoking status: Former     Current packs/day: 0.00     Average packs/day: 0.3 packs/day for 1 year (0.3 ttl pk-yrs)     Types: Cigarettes     Start date: 1984     Quit date: 1985     Years since quittin.7    Smokeless tobacco: Former    Tobacco comments:     Quit 30 - 40 years ago   Substance and Sexual Activity    Alcohol use: Not Currently     Comment: Wine Occasionally     Drug use: Never    Sexual activity: Not Currently     Social Determinants of Health     Financial Resource Strain: Medium Risk (2023)    Overall Financial Resource Strain (CARDIA)     Difficulty of Paying Living Expenses: Somewhat hard   Food Insecurity: Food Insecurity Present (2023)    Hunger Vital Sign     Worried About Running Out of Food in the Last Year: Sometimes true     Ran Out of Food in the Last Year: Sometimes true   Transportation Needs: Unmet Transportation Needs (2023)    PRAPARE - Transportation     Lack of Transportation (Medical): Yes     Lack of  "Transportation (Non-Medical): Yes   Physical Activity: Unknown (4/19/2023)    Exercise Vital Sign     Days of Exercise per Week: 1 day     Minutes of Exercise per Session: Patient refused   Stress: No Stress Concern Present (4/19/2023)    [employer] - Occupational Stress Questionnaire     Feeling of Stress : Not at all   Social Connections: Moderately Isolated (4/19/2023)    Social Connection and Isolation Panel [NHANES]     Frequency of Communication with Friends and Family: More than three times a week     Frequency of Social Gatherings with Friends and Family: Once a week     Attends Judaism Services: Never     Active Member of Clubs or Organizations: No     Attends Club or Organization Meetings: 1 to 4 times per year     Marital Status:    Housing Stability: Low Risk  (4/19/2023)    Housing Stability Vital Sign     Unable to Pay for Housing in the Last Year: No     Number of Places Lived in the Last Year: 1     Unstable Housing in the Last Year: No     Family History   Problem Relation Age of Onset    Cancer Mother     Cancer Father     Cancer Brother        Review of Systems   Constitutional:  Negative for fever.   Respiratory:  Negative for cough and shortness of breath.    Cardiovascular:  Negative for chest pain.   Gastrointestinal:  Negative for diarrhea, nausea and vomiting.   Psychiatric/Behavioral:  Negative for dysphoric mood and sleep disturbance. The patient is not nervous/anxious.         Objective:   /70 (BP Location: Right arm, Patient Position: Sitting, BP Method: Large (Manual))   Pulse 69   Temp 98 °F (36.7 °C) (Oral)   Resp 18   Ht 5' 3" (1.6 m)   Wt 95.4 kg (210 lb 5.1 oz)   SpO2 97%   BMI 37.26 kg/m²     Physical Exam  Vitals and nursing note reviewed.   Constitutional:       Appearance: Normal appearance.   HENT:      Head: Normocephalic.   Eyes:      Conjunctiva/sclera: Conjunctivae normal.   Cardiovascular:      Rate and Rhythm: Normal " rate and regular rhythm.   Pulmonary:      Effort: Pulmonary effort is normal.      Breath sounds: Normal breath sounds.   Musculoskeletal:         General: Swelling and tenderness present.      Comments: Of left shin   Skin:     General: Skin is warm and dry.      Findings: Bruising present.   Neurological:      Mental Status: She is alert and oriented to person, place, and time. Mental status is at baseline.   Psychiatric:         Mood and Affect: Mood normal.         Behavior: Behavior normal.         Thought Content: Thought content normal.       Assessment:     1. Right leg pain    2. Strain of left hamstring, initial encounter      Plan:       1. Right leg pain  Comments:  most likely from hematoma; recommended scheduled icing; discussed timing for hematoma to resolve; follow up with Xray results; referring to ortho  Orders:  -     X-Ray Tibia Fibula 2 View Right; Future; Expected date: 10/09/2023  -     Ambulatory referral/consult to Orthopedics; Future; Expected date: 10/16/2023    2. Strain of left hamstring, initial encounter  Comments:  referring to ortho for further guidance  Orders:  -     Ambulatory referral/consult to Orthopedics; Future; Expected date: 10/16/2023         Medication List with Changes/Refills   Current Medications    CETIRIZINE (ZYRTEC) 10 MG TABLET    Take 10 mg by mouth once daily.     COVID-19 VAC, DULCE,PFIZER,,PF, (PFIZER COVID-19 DULCE VACCN,PF,) 30 MCG/0.3 ML INJECTION    Inject into the muscle.    DILTIAZEM (DILT-XR) 240 MG CDCR    Take 1 capsule (240 mg total) by mouth once daily.    ELDERBERRY FRUIT 350 MG CAP    Take by mouth.    ELIQUIS 5 MG TAB    TAKE 1 TABLET TWICE DAILY    FLECAINIDE (TAMBOCOR) 100 MG TAB    TAKE 1 TABLET EVERY 12 HOURS    FLU VAC 2022 [id number],PF, (FLUAD QUAD 2022-23,65Y UP,,PF,) 60 MCG (15 MCG X 4)/0.5 ML SYRG    Inject 0.5 mLs into the muscle.    FLUTICASONE PROPIONATE (FLONASE) 50 MCG/ACTUATION NASAL SPRAY    2 sprays (100 mcg total) by Each  Nostril route once daily.    FLUTICASONE-SALMETEROL DISKUS INHALER 250-50 MCG    Inhale 1 puff into the lungs 2 (two) times daily. Controller.Wash out mouth after using.    KETOCONAZOLE (NIZORAL) 2 % SHAMPOO    Apply topically twice a week.    LEVALBUTEROL (XOPENEX HFA) 45 MCG/ACTUATION INHALER    Inhale 2 puffs into the lungs every 6 (six) hours as needed for Wheezing or Shortness of Breath.    LISINOPRIL (PRINIVIL,ZESTRIL) 20 MG TABLET    TAKE 1 AND 1/2 TABLETS EVERY DAY    LORAZEPAM (ATIVAN) 0.5 MG TABLET    Take 0.5 mg by mouth daily as needed for Anxiety.    MECLIZINE (ANTIVERT) 25 MG TABLET    Take 1 tablet (25 mg total) by mouth 3 (three) times daily as needed.    MULTIVITAMIN CAPSULE    Take 1 capsule by mouth once daily.    OMEGA-3 FATTY ACIDS/FISH OIL (FISH OIL-OMEGA-3 FATTY ACIDS) 300-1,000 MG CAPSULE    Take 1 capsule by mouth once daily.    ROSUVASTATIN (CRESTOR) 20 MG TABLET    Take 1 tablet (20 mg total) by mouth once daily.    TIOTROPIUM BROMIDE (SPIRIVA RESPIMAT) 2.5 MCG/ACTUATION INHALER    Inhale 2 puffs into the lungs once daily.    TRIAMCINOLONE ACETONIDE 0.1% (KENALOG) 0.1 % CREAM    Apply topically 2 (two) times daily. Use up to 2 weeks at a time                PATIENT INSTRUCTIONS:   Patient Instructions   Continue taking Elquis.   Continue icing. Make sure to having a piece of fabric between you and the ice. Ice for 15 minutes at a time.   .   We will refer you to ortho      Follow up if symptoms worsen or fail to improve.

## 2023-10-16 ENCOUNTER — OFFICE VISIT (OUTPATIENT)
Dept: SPORTS MEDICINE | Facility: CLINIC | Age: 73
End: 2023-10-16
Payer: MEDICARE

## 2023-10-16 DIAGNOSIS — M79.604 RIGHT LEG PAIN: ICD-10-CM

## 2023-10-16 DIAGNOSIS — S76.312A PARTIAL HAMSTRING TEAR, LEFT, INITIAL ENCOUNTER: ICD-10-CM

## 2023-10-16 DIAGNOSIS — S80.11XA CONTUSION OF RIGHT LOWER LEG, INITIAL ENCOUNTER: Primary | ICD-10-CM

## 2023-10-16 DIAGNOSIS — S76.312A STRAIN OF LEFT HAMSTRING, INITIAL ENCOUNTER: ICD-10-CM

## 2023-10-16 PROCEDURE — 4010F ACE/ARB THERAPY RXD/TAKEN: CPT | Mod: HCNC,CPTII,S$GLB, | Performed by: STUDENT IN AN ORGANIZED HEALTH CARE EDUCATION/TRAINING PROGRAM

## 2023-10-16 PROCEDURE — 97110 THERAPEUTIC EXERCISES: CPT | Mod: HCNC,GP,S$GLB,97 | Performed by: STUDENT IN AN ORGANIZED HEALTH CARE EDUCATION/TRAINING PROGRAM

## 2023-10-16 PROCEDURE — 99999 PR PBB SHADOW E&M-EST. PATIENT-LVL I: CPT | Mod: PBBFAC,HCNC,, | Performed by: STUDENT IN AN ORGANIZED HEALTH CARE EDUCATION/TRAINING PROGRAM

## 2023-10-16 PROCEDURE — 99204 OFFICE O/P NEW MOD 45 MIN: CPT | Mod: 25,HCNC,S$GLB, | Performed by: STUDENT IN AN ORGANIZED HEALTH CARE EDUCATION/TRAINING PROGRAM

## 2023-10-16 PROCEDURE — 97110 PR THERAPEUTIC EXERCISES: ICD-10-PCS | Mod: HCNC,GP,S$GLB,97 | Performed by: STUDENT IN AN ORGANIZED HEALTH CARE EDUCATION/TRAINING PROGRAM

## 2023-10-16 PROCEDURE — 99204 PR OFFICE/OUTPT VISIT, NEW, LEVL IV, 45-59 MIN: ICD-10-PCS | Mod: 25,HCNC,S$GLB, | Performed by: STUDENT IN AN ORGANIZED HEALTH CARE EDUCATION/TRAINING PROGRAM

## 2023-10-16 PROCEDURE — 99999 PR PBB SHADOW E&M-EST. PATIENT-LVL I: ICD-10-PCS | Mod: PBBFAC,HCNC,, | Performed by: STUDENT IN AN ORGANIZED HEALTH CARE EDUCATION/TRAINING PROGRAM

## 2023-10-16 PROCEDURE — 4010F PR ACE/ARB THEARPY RXD/TAKEN: ICD-10-PCS | Mod: HCNC,CPTII,S$GLB, | Performed by: STUDENT IN AN ORGANIZED HEALTH CARE EDUCATION/TRAINING PROGRAM

## 2023-10-16 NOTE — PROGRESS NOTES
Patient ID: Antonio Urena  YOB: 1950  MRN: [id number]    Chief Complaint: No chief complaint on file.      Referred By:  Dr. Pizarro for left posterior thigh pain    History of Present Illness: Antonio Urena is a 73-year-old female presenting today for right shin pain and left posterior thigh pain.  Notes having a contusion type injury to her right shin when getting tangled up with her dog at home.  And while trying to avoid she took a misstep with the right and felt a pop in the midportion of her posterior thigh.  Since then she has slowly been getting better and feels like her strength has improved dramatically.  Normal gait today still has significant bruising over the posterior thigh initially had some pain proximally in the buttocks area but that has improved dramatically as well no pain with sitting down.  Most her pain is over the mid thigh area.  Does note some burning in this area denies any radicular or referred pain down the leg.  No previous injuries to the left lower extremity.  Right x-ray of the shin previously showed some soft tissue swelling otherwise no bony involvement.  Past Medical History:   Past Medical History:   Diagnosis Date    A-fib     A-fib     Pace maker put in 5/12/20    Allergy     Angina pectoris     Anxiety     Arthritis     Asthma     cough variant    Back pain     Cancer 2018    basal cell c    Colon polyp     COPD (chronic obstructive pulmonary disease)     Depression     Hyperlipidemia     Hypertension     Obesity     Pneumonia      Past Surgical History:   Procedure Laterality Date    ABLATION Bilateral 6/3/2021    Procedure: ABLATION/CTI;  Surgeon: Juan Lazo MD;  Location: [facility] CATH LAB;  Service: Cardiology;  Laterality: Bilateral;  (RFA of the CTI,    COVID-19, MRNA, LN-S, PF (Pfizer) 3/20/2021, 2/27/2021   later date RA lead extraction, RA lead addition    CATARACT EXTRACTION Right     MGM    CATARACT EXTRACTION Left 07/28/2022    MGM     COLONOSCOPY N/A 10/10/2022    Procedure: COLONOSCOPY --card clearance received per Dr Ling;  Surgeon: Nestor Sher MD;  Location: [facility] ENDO;  Service: Gastroenterology;  Laterality: N/A;    HYSTERECTOMY      IMPLANTATION OF BIVENTRICULAR HEART PACEMAKER  2020    Loop recorder also in but not working right now    PHLEBOGRAPHY  2021    Procedure: Venogram;  Surgeon: Juan Lazo MD;  Location: [facility] CATH LAB;  Service: Cardiology;;    REVISION OF PROCEDURE INVOLVING PACEMAKER LEAD N/A 2021    Procedure: REVISION, ELECTRODE LEAD, CARDIAC PACEMAKER;  Surgeon: Juan Lazo MD;  Location: [facility] CATH LAB;  Service: Cardiology;  Laterality: N/A;    REVISION OF PROCEDURE INVOLVING PACEMAKER LEAD Bilateral 7/15/2021    Procedure: REVISION, ELECTRODE LEAD, CARDIAC PACEMAKER/A lead;  Surgeon: Juan Lazo MD;  Location: [facility] CATH LAB;  Service: Cardiology;  Laterality: Bilateral;  biotronik device    REVISION OF SKIN POCKET FOR PACEMAKER  2021    Procedure: Revision, Skin Pocket, For Cardiac Pacemaker;  Surgeon: Juan Lazo MD;  Location: [facility] CATH LAB;  Service: Cardiology;;     Family History   Problem Relation Age of Onset    Cancer Mother     Cancer Father     Cancer Brother      Social History     Socioeconomic History    Marital status:    Tobacco Use    Smoking status: Former     Current packs/day: 0.00     Average packs/day: 0.3 packs/day for 1 year (0.3 ttl pk-yrs)     Types: Cigarettes     Start date: 1984     Quit date: 1985     Years since quittin.8    Smokeless tobacco: Former    Tobacco comments:     Quit 30 - 40 years ago   Substance and Sexual Activity    Alcohol use: Not Currently     Comment: Wine Occasionally     Drug use: Never    Sexual activity: Not Currently     Social Determinants of Health     Financial Resource Strain: Medium Risk (2023)    Overall Financial Resource Strain (CARDIA)     Difficulty of Paying  Living Expenses: Somewhat hard   Food Insecurity: Food Insecurity Present (4/19/2023)    Hunger Vital Sign     Worried About Running Out of Food in the Last Year: Sometimes true     Ran Out of Food in the Last Year: Sometimes true   Transportation Needs: Unmet Transportation Needs (4/19/2023)    PRAPARE - Transportation     Lack of Transportation (Medical): Yes     Lack of Transportation (Non-Medical): Yes   Physical Activity: Unknown (4/19/2023)    Exercise Vital Sign     Days of Exercise per Week: 1 day     Minutes of Exercise per Session: Patient refused   Stress: No Stress Concern Present (4/19/2023)    [employer] - Occupational Stress Questionnaire     Feeling of Stress : Not at all   Social Connections: Moderately Isolated (4/19/2023)    Social Connection and Isolation Panel [NHANES]     Frequency of Communication with Friends and Family: More than three times a week     Frequency of Social Gatherings with Friends and Family: Once a week     Attends Buddhism Services: Never     Active Member of Clubs or Organizations: No     Attends Club or Organization Meetings: 1 to 4 times per year     Marital Status:    Housing Stability: Low Risk  (4/19/2023)    Housing Stability Vital Sign     Unable to Pay for Housing in the Last Year: No     Number of Places Lived in the Last Year: 1     Unstable Housing in the Last Year: No     Medication List with Changes/Refills   Current Medications    CETIRIZINE (ZYRTEC) 10 MG TABLET    Take 10 mg by mouth once daily.     COVID-19 VAC, DULCE,PFIZER,,PF, (PFIZER COVID-19 DULCE VACCN,PF,) 30 MCG/0.3 ML INJECTION    Inject into the muscle.    DILTIAZEM (DILT-XR) 240 MG CDCR    Take 1 capsule (240 mg total) by mouth once daily.    ELDERBERRY FRUIT 350 MG CAP    Take by mouth.    ELIQUIS 5 MG TAB    TAKE 1 TABLET TWICE DAILY    FLECAINIDE (TAMBOCOR) 100 MG TAB    TAKE 1 TABLET EVERY 12 HOURS    FLU VAC 2022 [id number],PF, (FLUAD QUAD 2022-23,65Y  UP,,PF,) 60 MCG (15 MCG X 4)/0.5 ML SYRG    Inject 0.5 mLs into the muscle.    FLU VAC 2023 [id number],PF, (FLUAD QUAD 2023-24,65Y UP,,PF,) 60 MCG (15 MCG X 4)/0.5 ML SYRG    Inject into the muscle    FLUTICASONE PROPIONATE (FLONASE) 50 MCG/ACTUATION NASAL SPRAY    2 sprays (100 mcg total) by Each Nostril route once daily.    FLUTICASONE-SALMETEROL DISKUS INHALER 250-50 MCG    Inhale 1 puff into the lungs 2 (two) times daily. Controller.Wash out mouth after using.    KETOCONAZOLE (NIZORAL) 2 % SHAMPOO    Apply topically twice a week.    LEVALBUTEROL (XOPENEX HFA) 45 MCG/ACTUATION INHALER    Inhale 2 puffs into the lungs every 6 (six) hours as needed for Wheezing or Shortness of Breath.    LISINOPRIL (PRINIVIL,ZESTRIL) 20 MG TABLET    TAKE 1 AND 1/2 TABLETS EVERY DAY    LORAZEPAM (ATIVAN) 0.5 MG TABLET    Take 0.5 mg by mouth daily as needed for Anxiety.    MECLIZINE (ANTIVERT) 25 MG TABLET    Take 1 tablet (25 mg total) by mouth 3 (three) times daily as needed.    MULTIVITAMIN CAPSULE    Take 1 capsule by mouth once daily.    OMEGA-3 FATTY ACIDS/FISH OIL (FISH OIL-OMEGA-3 FATTY ACIDS) 300-1,000 MG CAPSULE    Take 1 capsule by mouth once daily.    ROSUVASTATIN (CRESTOR) 20 MG TABLET    Take 1 tablet (20 mg total) by mouth once daily.    SARS-COV-2, COVID-19, (SPIKEVAX, MODERNA,, 12YRS AND UP 2023,) 50 MCG/0.5 ML INJECTION    Inject 0.5 mLs into the muscle once. Inject 0.5 mL into the muscle once. for 1 dose    TIOTROPIUM BROMIDE (SPIRIVA RESPIMAT) 2.5 MCG/ACTUATION INHALER    Inhale 2 puffs into the lungs once daily.    TRIAMCINOLONE ACETONIDE 0.1% (KENALOG) 0.1 % CREAM    Apply topically 2 (two) times daily. Use up to 2 weeks at a time     Review of patient's allergies indicates:   Allergen Reactions    Lasix [furosemide]      Within 1 year of taking pt will get severe knee pain in both knees.       Physical Exam:   There is no height or weight on file to calculate BMI.    GENERAL: Well appearing, in no acute  distress.  HEAD: Normocephalic and atraumatic.  ENT: External ears and nose grossly normal.  EYES: EOMI bilaterally  PULMONARY: Respirations are grossly even and non-labored.  NEURO: Awake, alert, and oriented x 3.  SKIN: No obvious rashes appreciated.  PSYCH: Mood & affect are appropriate.    Detailed MSK exam:     Left hip exam negative logroll full range of motion compared to contralateral hip flexion external internal rotation.  Negative ELPIDIO negative FADIR  No tenderness over the proximal hamstring tendon or ischial tuberosity.  Bruising noted mid thigh extending down into the posterior knee.  4+5 strength at 90° 45° and at 0 with resistance compared to the contralateral.  Mild pain associated most her pain in the mid belly portion of the hamstring on the left side.    Right shin with soft tissue swelling without any warmth or redness appreciated.    Imaging:  X-Ray Tibia Fibula 2 View Right  Narrative: EXAMINATION:  XR TIBIA FIBULA 2 VIEW RIGHT    CLINICAL HISTORY:  Pain in right leg    TECHNIQUE:  AP and lateral views of the right tibia and fibula were performed.    COMPARISON:  None.    FINDINGS:  No acute osseous abnormality.  Mild soft tissue edema suspected anteriorly at the level of the proximal tibia, correlate clinically.  Impression: As above    Electronically signed by: Jesus Vázquez MD  Date:    10/09/2023  Time:    15:29      Relevant imaging results were reviewed and interpreted by me and per my read as above.  This was discussed with the patient and / or family today.     Assessment:  Antonio Urena is a 73 y.o. female presents today for bilateral leg pain.  Right shin most likely contusion x-rays reviewed normal   Left posterior hamstring injury likely hamstring partial tear with this significant amount of bruising.  However has good strength today and is almost symmetric contralateral knee 2 weeks after injury.  Discussed bruising may continue to get worse.  No tenderness proximally.   Likely partial hamstring tear but clinically has improved dramatically.  Discussed some home exercises and stretching that she can do over the next 3-4 weeks.  If not improving consider formal therapy.  Follow-up with me as needed in the future.  Avoid anti-inflammatories secondary to blood thinner Tylenol ice at the end of the day as needed.    Contusion of right lower leg, initial encounter    Right leg pain  Comments:  most likely from hematoma; recommended scheduled icing; discussed timing for hematoma to resolve; follow up with Xray results; referring to ortho  Orders:  -     Ambulatory referral/consult to Orthopedics    Strain of left hamstring, initial encounter  Comments:  referring to ortho for further guidance  Orders:  -     Ambulatory referral/consult to Orthopedics    Partial hamstring tear, left, initial encounter         A copy of today's visit note has been sent to the referring provider.       Choco Hewitt MD    Disclaimer: This note was prepared using a voice recognition system and is likely to have sound alike errors within the text.

## 2023-10-16 NOTE — PATIENT INSTRUCTIONS
Assessment:  Antonio Urena is a 73 y.o. female No chief complaint on file.      Encounter Diagnoses   Name Primary?    Right leg pain     Strain of left hamstring, initial encounter     Contusion of right lower leg, initial encounter Yes    Partial hamstring tear, left, initial encounter         Plan:       At least 10 minutes were spent teaching the patient a therapeutic home exercise program and they were provided this plan in writing.  CPT 77374      Follow-up: as needed or sooner if there are any problems between now and then.    Thank you for choosing [facility] and Dr. Choco Hewitt for your orthopedic & sports medicine care. It is our goal to provide you with exceptional care that will help keep you healthy, active, and get you back in the game.    Please do not hesitate to reach out to us via email, phone, or MyChart with any questions, concerns, or feedback.    If you felt that you received exemplary care today, please consider leaving us feedback on Healthgrades at:  [email or web address]    If you are experiencing pain/discomfort ,or have questions after 5pm and would like to be connected to the [facility] on-call team, please call this number and specify which Sports Medicine provider is treating you: [phone]

## 2023-11-04 ENCOUNTER — CLINICAL SUPPORT (OUTPATIENT)
Dept: CARDIOLOGY | Facility: HOSPITAL | Age: 73
End: 2023-11-04
Payer: MEDICARE

## 2023-11-04 DIAGNOSIS — Z95.0 PRESENCE OF CARDIAC PACEMAKER: ICD-10-CM

## 2023-11-04 PROCEDURE — 93296 REM INTERROG EVL PM/IDS: CPT | Mod: HCNC | Performed by: INTERNAL MEDICINE

## 2023-11-04 PROCEDURE — 93294 REM INTERROG EVL PM/LDLS PM: CPT | Mod: HCNC,S$GLB,, | Performed by: INTERNAL MEDICINE

## 2023-11-04 PROCEDURE — 93294 CARDIAC DEVICE CHECK - REMOTE: ICD-10-PCS | Mod: HCNC,S$GLB,, | Performed by: INTERNAL MEDICINE

## 2023-11-07 ENCOUNTER — PATIENT MESSAGE (OUTPATIENT)
Dept: CARDIOLOGY | Facility: CLINIC | Age: 73
End: 2023-11-07
Payer: MEDICARE

## 2023-11-07 DIAGNOSIS — I48.92 ATRIAL FIBRILLATION AND FLUTTER: Primary | ICD-10-CM

## 2023-11-07 DIAGNOSIS — I48.91 ATRIAL FIBRILLATION AND FLUTTER: Primary | ICD-10-CM

## 2023-11-10 ENCOUNTER — NURSE TRIAGE (OUTPATIENT)
Dept: ADMINISTRATIVE | Facility: CLINIC | Age: 73
End: 2023-11-10
Payer: MEDICARE

## 2023-11-10 ENCOUNTER — TELEPHONE (OUTPATIENT)
Dept: CARDIOLOGY | Facility: HOSPITAL | Age: 73
End: 2023-11-10
Payer: MEDICARE

## 2023-11-10 ENCOUNTER — CLINICAL SUPPORT (OUTPATIENT)
Dept: CARDIOLOGY | Facility: HOSPITAL | Age: 73
End: 2023-11-10
Attending: INTERNAL MEDICINE
Payer: MEDICARE

## 2023-11-10 ENCOUNTER — OFFICE VISIT (OUTPATIENT)
Dept: DERMATOLOGY | Facility: CLINIC | Age: 73
End: 2023-11-10
Payer: MEDICARE

## 2023-11-10 ENCOUNTER — OFFICE VISIT (OUTPATIENT)
Dept: CARDIOLOGY | Facility: CLINIC | Age: 73
End: 2023-11-10
Payer: MEDICARE

## 2023-11-10 VITALS
OXYGEN SATURATION: 97 % | WEIGHT: 209.88 LBS | HEART RATE: 78 BPM | DIASTOLIC BLOOD PRESSURE: 62 MMHG | BODY MASS INDEX: 37.18 KG/M2 | SYSTOLIC BLOOD PRESSURE: 130 MMHG

## 2023-11-10 DIAGNOSIS — E78.00 PURE HYPERCHOLESTEROLEMIA: Chronic | ICD-10-CM

## 2023-11-10 DIAGNOSIS — I48.0 PAROXYSMAL ATRIAL FIBRILLATION: Chronic | ICD-10-CM

## 2023-11-10 DIAGNOSIS — I49.5 SSS (SICK SINUS SYNDROME): Primary | Chronic | ICD-10-CM

## 2023-11-10 DIAGNOSIS — L57.0 ACTINIC KERATOSIS: Primary | ICD-10-CM

## 2023-11-10 DIAGNOSIS — I49.5 SSS (SICK SINUS SYNDROME): ICD-10-CM

## 2023-11-10 DIAGNOSIS — R00.1 SYMPTOMATIC BRADYCARDIA: ICD-10-CM

## 2023-11-10 DIAGNOSIS — Z79.01 CHRONIC ANTICOAGULATION: Chronic | ICD-10-CM

## 2023-11-10 DIAGNOSIS — I48.21 PERMANENT ATRIAL FIBRILLATION: ICD-10-CM

## 2023-11-10 DIAGNOSIS — Z95.0 PRESENCE OF CARDIAC PACEMAKER: ICD-10-CM

## 2023-11-10 DIAGNOSIS — Z95.0 CARDIAC PACEMAKER IN SITU: Chronic | ICD-10-CM

## 2023-11-10 DIAGNOSIS — I48.3 TYPICAL ATRIAL FLUTTER: ICD-10-CM

## 2023-11-10 DIAGNOSIS — I10 ESSENTIAL HYPERTENSION: Chronic | ICD-10-CM

## 2023-11-10 DIAGNOSIS — E66.01 MORBID OBESITY WITH BMI OF 40.0-44.9, ADULT: ICD-10-CM

## 2023-11-10 DIAGNOSIS — Z98.890 HISTORY OF CARDIAC RADIOFREQUENCY ABLATION (RFA): ICD-10-CM

## 2023-11-10 DIAGNOSIS — L40.9 PSORIASIS: ICD-10-CM

## 2023-11-10 PROCEDURE — 17003 DESTRUCT PREMALG LES 2-14: CPT | Mod: HCNC,S$GLB,, | Performed by: STUDENT IN AN ORGANIZED HEALTH CARE EDUCATION/TRAINING PROGRAM

## 2023-11-10 PROCEDURE — 3078F DIAST BP <80 MM HG: CPT | Mod: HCNC,CPTII,S$GLB, | Performed by: INTERNAL MEDICINE

## 2023-11-10 PROCEDURE — 1160F PR REVIEW ALL MEDS BY PRESCRIBER/CLIN PHARMACIST DOCUMENTED: ICD-10-PCS | Mod: HCNC,CPTII,S$GLB, | Performed by: INTERNAL MEDICINE

## 2023-11-10 PROCEDURE — 1159F PR MEDICATION LIST DOCUMENTED IN MEDICAL RECORD: ICD-10-PCS | Mod: HCNC,CPTII,S$GLB, | Performed by: STUDENT IN AN ORGANIZED HEALTH CARE EDUCATION/TRAINING PROGRAM

## 2023-11-10 PROCEDURE — 3008F BODY MASS INDEX DOCD: CPT | Mod: HCNC,CPTII,S$GLB, | Performed by: INTERNAL MEDICINE

## 2023-11-10 PROCEDURE — 4010F ACE/ARB THERAPY RXD/TAKEN: CPT | Mod: HCNC,CPTII,S$GLB, | Performed by: INTERNAL MEDICINE

## 2023-11-10 PROCEDURE — 99215 OFFICE O/P EST HI 40 MIN: CPT | Mod: HCNC,S$GLB,, | Performed by: INTERNAL MEDICINE

## 2023-11-10 PROCEDURE — 99213 PR OFFICE/OUTPT VISIT, EST, LEVL III, 20-29 MIN: ICD-10-PCS | Mod: 25,HCNC,S$GLB, | Performed by: STUDENT IN AN ORGANIZED HEALTH CARE EDUCATION/TRAINING PROGRAM

## 2023-11-10 PROCEDURE — 1159F MED LIST DOCD IN RCRD: CPT | Mod: HCNC,CPTII,S$GLB, | Performed by: STUDENT IN AN ORGANIZED HEALTH CARE EDUCATION/TRAINING PROGRAM

## 2023-11-10 PROCEDURE — 1101F PT FALLS ASSESS-DOCD LE1/YR: CPT | Mod: HCNC,CPTII,S$GLB, | Performed by: INTERNAL MEDICINE

## 2023-11-10 PROCEDURE — 4010F PR ACE/ARB THEARPY RXD/TAKEN: ICD-10-PCS | Mod: HCNC,CPTII,S$GLB, | Performed by: INTERNAL MEDICINE

## 2023-11-10 PROCEDURE — 3008F PR BODY MASS INDEX (BMI) DOCUMENTED: ICD-10-PCS | Mod: HCNC,CPTII,S$GLB, | Performed by: INTERNAL MEDICINE

## 2023-11-10 PROCEDURE — 17000 PR DESTRUCTION(LASER SURGERY,CRYOSURGERY,CHEMOSURGERY),PREMALIGNANT LESIONS,FIRST LESION: ICD-10-PCS | Mod: HCNC,S$GLB,, | Performed by: STUDENT IN AN ORGANIZED HEALTH CARE EDUCATION/TRAINING PROGRAM

## 2023-11-10 PROCEDURE — 17000 DESTRUCT PREMALG LESION: CPT | Mod: HCNC,S$GLB,, | Performed by: STUDENT IN AN ORGANIZED HEALTH CARE EDUCATION/TRAINING PROGRAM

## 2023-11-10 PROCEDURE — 3288F FALL RISK ASSESSMENT DOCD: CPT | Mod: HCNC,CPTII,S$GLB, | Performed by: STUDENT IN AN ORGANIZED HEALTH CARE EDUCATION/TRAINING PROGRAM

## 2023-11-10 PROCEDURE — 99999 PR PBB SHADOW E&M-EST. PATIENT-LVL II: CPT | Mod: PBBFAC,HCNC,, | Performed by: STUDENT IN AN ORGANIZED HEALTH CARE EDUCATION/TRAINING PROGRAM

## 2023-11-10 PROCEDURE — 3075F PR MOST RECENT SYSTOLIC BLOOD PRESS GE 130-139MM HG: ICD-10-PCS | Mod: HCNC,CPTII,S$GLB, | Performed by: INTERNAL MEDICINE

## 2023-11-10 PROCEDURE — 99215 PR OFFICE/OUTPT VISIT, EST, LEVL V, 40-54 MIN: ICD-10-PCS | Mod: HCNC,S$GLB,, | Performed by: INTERNAL MEDICINE

## 2023-11-10 PROCEDURE — 4010F ACE/ARB THERAPY RXD/TAKEN: CPT | Mod: HCNC,CPTII,S$GLB, | Performed by: STUDENT IN AN ORGANIZED HEALTH CARE EDUCATION/TRAINING PROGRAM

## 2023-11-10 PROCEDURE — 1101F PT FALLS ASSESS-DOCD LE1/YR: CPT | Mod: HCNC,CPTII,S$GLB, | Performed by: STUDENT IN AN ORGANIZED HEALTH CARE EDUCATION/TRAINING PROGRAM

## 2023-11-10 PROCEDURE — 1101F PR PT FALLS ASSESS DOC 0-1 FALLS W/OUT INJ PAST YR: ICD-10-PCS | Mod: HCNC,CPTII,S$GLB, | Performed by: STUDENT IN AN ORGANIZED HEALTH CARE EDUCATION/TRAINING PROGRAM

## 2023-11-10 PROCEDURE — 3288F PR FALLS RISK ASSESSMENT DOCUMENTED: ICD-10-PCS | Mod: HCNC,CPTII,S$GLB, | Performed by: INTERNAL MEDICINE

## 2023-11-10 PROCEDURE — 93280 PM DEVICE PROGR EVAL DUAL: CPT | Mod: HCNC

## 2023-11-10 PROCEDURE — 1160F PR REVIEW ALL MEDS BY PRESCRIBER/CLIN PHARMACIST DOCUMENTED: ICD-10-PCS | Mod: HCNC,CPTII,S$GLB, | Performed by: STUDENT IN AN ORGANIZED HEALTH CARE EDUCATION/TRAINING PROGRAM

## 2023-11-10 PROCEDURE — 99999 PR PBB SHADOW E&M-EST. PATIENT-LVL III: ICD-10-PCS | Mod: PBBFAC,HCNC,, | Performed by: INTERNAL MEDICINE

## 2023-11-10 PROCEDURE — 1160F RVW MEDS BY RX/DR IN RCRD: CPT | Mod: HCNC,CPTII,S$GLB, | Performed by: STUDENT IN AN ORGANIZED HEALTH CARE EDUCATION/TRAINING PROGRAM

## 2023-11-10 PROCEDURE — 3288F PR FALLS RISK ASSESSMENT DOCUMENTED: ICD-10-PCS | Mod: HCNC,CPTII,S$GLB, | Performed by: STUDENT IN AN ORGANIZED HEALTH CARE EDUCATION/TRAINING PROGRAM

## 2023-11-10 PROCEDURE — 1160F RVW MEDS BY RX/DR IN RCRD: CPT | Mod: HCNC,CPTII,S$GLB, | Performed by: INTERNAL MEDICINE

## 2023-11-10 PROCEDURE — 3078F PR MOST RECENT DIASTOLIC BLOOD PRESSURE < 80 MM HG: ICD-10-PCS | Mod: HCNC,CPTII,S$GLB, | Performed by: INTERNAL MEDICINE

## 2023-11-10 PROCEDURE — 1159F PR MEDICATION LIST DOCUMENTED IN MEDICAL RECORD: ICD-10-PCS | Mod: HCNC,CPTII,S$GLB, | Performed by: INTERNAL MEDICINE

## 2023-11-10 PROCEDURE — 1101F PR PT FALLS ASSESS DOC 0-1 FALLS W/OUT INJ PAST YR: ICD-10-PCS | Mod: HCNC,CPTII,S$GLB, | Performed by: INTERNAL MEDICINE

## 2023-11-10 PROCEDURE — 99999 PR PBB SHADOW E&M-EST. PATIENT-LVL I: ICD-10-PCS | Mod: PBBFAC,HCNC,,

## 2023-11-10 PROCEDURE — 1159F MED LIST DOCD IN RCRD: CPT | Mod: HCNC,CPTII,S$GLB, | Performed by: INTERNAL MEDICINE

## 2023-11-10 PROCEDURE — 3288F FALL RISK ASSESSMENT DOCD: CPT | Mod: HCNC,CPTII,S$GLB, | Performed by: INTERNAL MEDICINE

## 2023-11-10 PROCEDURE — 99999 PR PBB SHADOW E&M-EST. PATIENT-LVL I: CPT | Mod: PBBFAC,HCNC,,

## 2023-11-10 PROCEDURE — 99213 OFFICE O/P EST LOW 20 MIN: CPT | Mod: 25,HCNC,S$GLB, | Performed by: STUDENT IN AN ORGANIZED HEALTH CARE EDUCATION/TRAINING PROGRAM

## 2023-11-10 PROCEDURE — 17003 DESTRUCTION, PREMALIGNANT LESIONS; SECOND THROUGH 14 LESIONS: ICD-10-PCS | Mod: HCNC,S$GLB,, | Performed by: STUDENT IN AN ORGANIZED HEALTH CARE EDUCATION/TRAINING PROGRAM

## 2023-11-10 PROCEDURE — 3075F SYST BP GE 130 - 139MM HG: CPT | Mod: HCNC,CPTII,S$GLB, | Performed by: INTERNAL MEDICINE

## 2023-11-10 PROCEDURE — 99999 PR PBB SHADOW E&M-EST. PATIENT-LVL II: ICD-10-PCS | Mod: PBBFAC,HCNC,, | Performed by: STUDENT IN AN ORGANIZED HEALTH CARE EDUCATION/TRAINING PROGRAM

## 2023-11-10 PROCEDURE — 4010F PR ACE/ARB THEARPY RXD/TAKEN: ICD-10-PCS | Mod: HCNC,CPTII,S$GLB, | Performed by: STUDENT IN AN ORGANIZED HEALTH CARE EDUCATION/TRAINING PROGRAM

## 2023-11-10 PROCEDURE — 99999 PR PBB SHADOW E&M-EST. PATIENT-LVL III: CPT | Mod: PBBFAC,HCNC,, | Performed by: INTERNAL MEDICINE

## 2023-11-10 RX ORDER — TRIAMCINOLONE ACETONIDE 1 MG/G
CREAM TOPICAL 2 TIMES DAILY
Qty: 454 G | Refills: 1 | Status: SHIPPED | OUTPATIENT
Start: 2023-11-10

## 2023-11-10 NOTE — PROGRESS NOTES
Subjective:       Patient ID:  Antonio Urena is a 73 y.o. female who presents for   Chief Complaint   Patient presents with    Skin Check     face     History of Present Illness: The patient presents for follow up of an actinic keratosis and evaluation of a new growth, last seen on 5/25/23.  Patient with a history of multiple NMSC s/p Mohs. At last visit, lesions on the arms were treated with cryotherapy, but did not resolve on the right arm. Also since then, has noticed a new red, scaly growth within the area of a previous Mohs surgical area on the forehead. Lesion developed several weeks ago and persistent; no treatment. Denies any other rapidly growing, bleeding or non healing skin lesions.           Review of Systems   Constitutional:  Negative for fever and chills.   Skin:  Negative for itching, rash (Patient with a history of psoriasis, uses TAC with great control over symptoms.) and dry skin.        Objective:    Physical Exam   Constitutional: She appears well-developed and well-nourished. No distress.   Neurological: She is alert and oriented to person, place, and time. She is not disoriented.   Psychiatric: She has a normal mood and affect.   Skin:   Areas Examined (abnormalities noted in diagram):   Scalp / Hair Palpated and Inspected  Head / Face Inspection Performed  Neck Inspection Performed  Chest / Axilla Inspection Performed  RUE Inspected  LUE Inspection Performed                   Diagram Legend     Erythematous scaling macule/papule c/w actinic keratosis       Vascular papule c/w angioma      Pigmented verrucoid papule/plaque c/w seborrheic keratosis      Yellow umbilicated papule c/w sebaceous hyperplasia      Irregularly shaped tan macule c/w lentigo     1-2 mm smooth white papules consistent with Milia      Movable subcutaneous cyst with punctum c/w epidermal inclusion cyst      Subcutaneous movable cyst c/w pilar cyst      Firm pink to brown papule c/w dermatofibroma      Pedunculated  fleshy papule(s) c/w skin tag(s)      Evenly pigmented macule c/w junctional nevus     Mildly variegated pigmented, slightly irregular-bordered macule c/w mildly atypical nevus      Flesh colored to evenly pigmented papule c/w intradermal nevus       Pink pearly papule/plaque c/w basal cell carcinoma      Erythematous hyperkeratotic cursted plaque c/w SCC      Surgical scar with no sign of skin cancer recurrence      Open and closed comedones      Inflammatory papules and pustules      Verrucoid papule consistent consistent with wart     Erythematous eczematous patches and plaques     Dystrophic onycholytic nail with subungual debris c/w onychomycosis     Umbilicated papule    Erythematous-base heme-crusted tan verrucoid plaque consistent with inflamed seborrheic keratosis     Erythematous Silvery Scaling Plaque c/w Psoriasis     See annotation      Assessment / Plan:        Actinic keratosis  Cryosurgery Procedure Note    Verbal consent from the patient is obtained including, but not limited to, risk of hypopigmentation/hyperpigmentation, scar, recurrence of lesion. The patient is aware of the precancerous quality and need for treatment of these lesions. Liquid nitrogen cryosurgery is applied to the 3 actinic keratoses, as detailed in the physical exam, to produce a freeze injury. The patient is aware that blisters may form and is instructed on wound care with gentle cleansing and use of vaseline ointment to keep moist until healed. The patient is supplied a handout on cryosurgery and is instructed to call if lesions do not completely resolve.    Psoriasis - stable. Refills sent.   -     triamcinolone acetonide 0.1% (KENALOG) 0.1 % cream; Apply topically 2 (two) times daily. Use up to 2 weeks at a time  Dispense: 454 g; Refill: 1             Follow up in about 6 months (around 5/10/2024) for FBSE.

## 2023-11-10 NOTE — TELEPHONE ENCOUNTER
Returned call to patient, she complains of 'heart pounding in chest' while riding home.  States that her watch reported rates between 70-90s.  This lasted for 15-20 minutes but never returned to her normal baseline.  She is requesting we change back to previous settings (mode was changed from DDDR --> to DDD-CLS).  Reviewed with Dr. Cheek, he said that would be fine.  Offered to make changes today, pt is in [place] now and doesn't have ride back to [facility].  Scheduled for 10am on Monday, Nov 13th.  [employer] [facility] has agreed to meet pt at clinic and return programming to DDDR.  Pt verbalized understanding, will check [facility] for appointment details.

## 2023-11-10 NOTE — TELEPHONE ENCOUNTER
----- Message from Dorothy Mcdonald LPN sent at 11/10/2023  3:23 PM CST -----  Contact: Antonio  Since leaving today after change in pacemaker check she is having high heart rates while in the card. She states that when she walked outside heart  rate went up to 88-90, please contact this pt pb  ----- Message -----  From: Mariana Pratt  Sent: 11/10/2023   3:20 PM CST  To: Violet LÓPEZ Staff    Type:  Needs Medical Advice    Who Called: Antonio  Symptoms (please be specific): Chest pain due to palpitations/Fast heart rate as high as 77-90   How long has patient had these symptoms:  Today  Pharmacy name and phone #:    [facility] - [place], [place] - [address]  [address] [address]  Phone: [phone] Fax: [phone]  Would the patient rather a call back or a response via [facility]? call  Best Call Back Number: [phone]  Additional Information: Patient reports heart beat has increased since pacemaker has been adjusted. Please give patient an immediate call back to assist.    Thank you,  GH

## 2023-11-10 NOTE — PROGRESS NOTES
"  Subjective:   Patient ID:  Antonio Urena is a 73 y.o. female     Chief complaint:TBS/PPM    HPI    Initially referred by Dr Ling for AF/ PPM issues--   Chart reviewed in detail   All ECGs in Epic personally reviewed  PPM eval data personally reviewed.   71 woman  PAF on chronic anticoagulation (Eliquis)    Dxed after c/o palpitations and some weakness @ 1 year ago. Had DC PPM placed on 5/12/2020 in [place].  A lead displaced apparently shortly thereafter.   Was on Flec but then was told that it "did the opposite of what it was supposed to do"   Now on Multaq  Reviewed all ECGs and she was in AF on ECGs from Oct 2020 till now and she was in AF on ECG from 10/19/20, and 5/14/21 as well as all the time on Holter from 11/2/20 but in NSR on ECGs from 12/2/2020 and 5/10/21 as well as holter from 5/12/21  The PPM was in VVI 60 bpm and was pacing frequently.     Labs are all OK (specifically normal CBC, CMP, TSH and BNP)     Echo:  The left ventricle is normal in size with normal systolic function. The estimated ejection fraction is 55%.  There is left ventricular concentric remodeling.  Mild left atrial enlargement.  Indeterminate diastolic function.  Normal right ventricular systolic function.  Normal central venous pressure (3 mmHg).  The estimated PA systolic pressure is 29 mmHg.  Small circumferential pericardial effusion. Effusion is fluid.     Would proceed with obtaining old records and considering RFA of the CTI as a first step then removing A lead and re-implanting another (on a different date than the RFA). Would then switch from Multaq to flecainide.     The extraction should be simple and if a simple pullback is insufficient, would remove the lead via a trans-femoral approach.     I have discussed the procedures (RFA of the CTI, RA lead extraction, RA lead addition)  in detail with the patient. I described its benefits and risks. I reviewed alternative therapies and discussed their potential value. The " patient was given ample opportunity to express concerns and ask questions and I provided appropriate responses and  answers to such.The patient understands and agrees to proceed.  Consent form was signed today by patient and myself and appropriately witnessed.      In the meantime, we have also decreased PPM rate to 30 bpm         Update 9/9/2021:  She had RFA of the CTI and PPM revision and was switched from Multaq to Flec.  She felt a lot better  Since then but has had recent c/o palps but these did not correlate with any ATs on her PPM   Last PPM check - ASVS 14%, APVS 85%, APVP 1% - no arr, TI stable and little activity - sedentary pat      Clinically:  Additional c/os include palps and H/As after bending over  She has started with the digital HTN clinic  She also has had an ER visit due to dysarthria - this was similar to her past events when she was in 6th, 9th and 12th grade and w/up was neg for CVA/TIA - this was clinically a migraine manifestation.   >>    She is doing well from an EP point of view - no AF etc   Palps may well be related to cardiac awareness as part of reflex adrenalia due to hypotension caused by bending  - they may also be related to isolated PACs    Now in NSR  >>  48 hr Holter for correlation attempt to palps  This showed NSR, Some PMTs - we can perhaps program around that     Update 4/22/2022:  I am feeling 98% but occ feels rapid heartbeat after she stands up and walks around - the max is 89 bpm, then in  Few secs or a min or so, it slows down gradually to 60 bpm. Happens during the day but not late in the evening      >>  rapid heartbeat likely a result of mild OH and CLS response     Update 12/03/2023 :  One year follow-up.  In the meantime, very little palpitations.  Occasional irregular heartbeats.  >>  Patient's device (DDD)was fully evaluated today under my direct supervision and real-time feedback.  Summary of findings are as listed below:  Device is in good repair.   The  battery is not near DALILA.  The sensing and pacing thresholds are favorable with well maintained safety margins.   There were no significant tachy-dysrhythmias noted.  There were no device data indicating possible ongoing fluid retention.    Recommendation:  Device follow up as per clinic routine with remote and in house checks  Change back from DDDR to DDD CLS.  >>  Current Outpatient Medications   Medication Sig    cetirizine (ZYRTEC) 10 MG tablet Take 10 mg by mouth once daily.     diltiaZEM (DILT-XR) 240 MG CDCR Take 1 capsule (240 mg total) by mouth once daily.    elderberry fruit 350 mg Cap Take by mouth.    ELIQUIS 5 mg Tab TAKE 1 TABLET TWICE DAILY    flecainide (TAMBOCOR) 100 MG Tab TAKE 1 TABLET EVERY 12 HOURS    fluticasone propionate (FLONASE) 50 mcg/actuation nasal spray 2 sprays (100 mcg total) by Each Nostril route once daily.    fluticasone-salmeterol diskus inhaler 250-50 mcg Inhale 1 puff into the lungs 2 (two) times daily. Controller.Wash out mouth after using.    levalbuterol (XOPENEX HFA) 45 mcg/actuation inhaler Inhale 2 puffs into the lungs every 6 (six) hours as needed for Wheezing or Shortness of Breath.    lisinopriL (PRINIVIL,ZESTRIL) 20 MG tablet TAKE 1 AND 1/2 TABLETS EVERY DAY    multivitamin capsule Take 1 capsule by mouth once daily.    omega-3 fatty acids/fish oil (FISH OIL-OMEGA-3 FATTY ACIDS) 300-1,000 mg capsule Take 1 capsule by mouth once daily.    rosuvastatin (CRESTOR) 20 MG tablet Take 1 tablet (20 mg total) by mouth once daily.    tiotropium bromide (SPIRIVA RESPIMAT) 2.5 mcg/actuation inhaler Inhale 2 puffs into the lungs once daily.    triamcinolone acetonide 0.1% (KENALOG) 0.1 % cream Apply topically 2 (two) times daily. Use up to 2 weeks at a time    COVID-19 vac, dulce,Pfizer,,PF, (PFIZER COVID-19 DULCE VACCN,PF,) 30 mcg/0.3 mL injection Inject into the muscle. (Patient not taking: Reported on 11/10/2023)    flu vac 2022 65up-[id number],PF, (FLUAD QUAD 2022-23,65Y UP,,PF,) 60  mcg (15 mcg x 4)/0.5 mL Syrg Inject 0.5 mLs into the muscle. (Patient not taking: Reported on 11/10/2023)    flu vac 2023 [id number],PF, (FLUAD QUAD 2023-24,65Y UP,,PF,) 60 mcg (15 mcg x 4)/0.5 mL Syrg Inject into the muscle (Patient not taking: Reported on 11/10/2023)    ketoconazole (NIZORAL) 2 % shampoo Apply topically twice a week. (Patient not taking: Reported on 11/10/2023)    LORazepam (ATIVAN) 0.5 MG tablet Take 0.5 mg by mouth daily as needed for Anxiety.    meclizine (ANTIVERT) 25 mg tablet Take 1 tablet (25 mg total) by mouth 3 (three) times daily as needed. (Patient not taking: Reported on 11/10/2023)     No current facility-administered medications for this visit.     Review of Systems     Constitutional: Reviewed  for decreased appetite, weight gain and weight loss.   HENT: Reviewed for nosebleeds.    Eyes:  Reviewed for blurred vision and visual disturbance.   Cardiovascular: Reviewed for chest pain, claudication, cyanosis,dyspnea on exertion, leg swelling, orthopnea,paroxysmal nocturnal dyspnearregular heartbeats, palpitations, near-syncope, and syncope.   Respiratory: Reviewed for cough, shortness of breath, wheezing, sleep disturbances due to breathing and snoring, .    Endocrine: Reviewed for heat intolerance.   Hematologic/Lymphatic: Reviewed for easy bruisability/bleeding.   Skin: Reviewed for rash.   Musculoskeletal: Reviewed for muscle weakness and myalgias.   Gastrointestinal: Reviewed for abdominal pain, anorexia, melena, nausea and vomiting.   Genitourinary: Reviewed for menorrhagia, frequency, nocturia and incontinence.   Neurological: Reviewed for excessive daytime sleepiness, dizziness, vertigo, weakness, headaches, loss of balance and seizures,   Psychiatric/Behavioral:  Reviewed for insomnia, altered mental status, depression, anxiety and nervousness.       All symptoms reviewed above were negative except for occasional irregular heartbeats and occasional independent swelling.  His  heartburn and daytime sleepiness as well as some mild arthritic complaints.       Social History     Tobacco Use   Smoking Status Former    Current packs/day: 0.00    Average packs/day: 0.3 packs/day for 1 year (0.3 ttl pk-yrs)    Types: Cigarettes    Start date: 1984    Quit date: 1985    Years since quittin.9   Smokeless Tobacco Former   Tobacco Comments    Quit 30 - 40 years ago        Objective:     Physical Exam  Vitals and nursing note reviewed.   Constitutional:       Appearance: She is well-developed. She is obese.      Comments: Overweight   HENT:      Head: Normocephalic and atraumatic.      Right Ear: External ear normal.      Left Ear: External ear normal.   Eyes:      General: No scleral icterus.        Left eye: No discharge.      Conjunctiva/sclera: Conjunctivae normal.      Left eye: Left conjunctiva is not injected. No hemorrhage.     Pupils: Pupils are equal, round, and reactive to light.   Neck:      Thyroid: No thyromegaly.   Cardiovascular:      Rate and Rhythm: Normal rate and regular rhythm.      Pulses: Intact distal pulses.           Carotid pulses are 2+ on the right side and 2+ on the left side.       Radial pulses are 2+ on the right side and 2+ on the left side.        Dorsalis pedis pulses are 2+ on the right side and 2+ on the left side.        Posterior tibial pulses are 2+ on the right side and 2+ on the left side.      Heart sounds: Normal heart sounds. No midsystolic click and no opening snap. No murmur heard.     No friction rub. No gallop. No S3 or S4 sounds.   Pulmonary:      Effort: Pulmonary effort is normal.      Breath sounds: Normal breath sounds.   Chest:      Comments: Device pocket is in great repair.  Abdominal:      General: There is no distension.      Palpations: Abdomen is soft. Abdomen is not rigid. There is no hepatomegaly.      Tenderness: There is no abdominal tenderness. There is no guarding.      Comments: Obese abdomen   Musculoskeletal:       Cervical back: Normal range of motion and neck supple.      Right lower leg: No swelling.      Left lower leg: No swelling.      Right ankle: No swelling.      Left ankle: No swelling.   Skin:     General: Skin is warm and dry.      Findings: No rash.      Nails: There is no clubbing.   Neurological:      Mental Status: She is alert and oriented to person, place, and time.      Cranial Nerves: No cranial nerve deficit.      Gait: Gait normal.   Psychiatric:         Speech: Speech normal.         Behavior: Behavior normal.         Thought Content: Thought content normal.       /62 (BP Location: Right arm, Patient Position: Sitting)   Pulse 78   Wt 95.2 kg (209 lb 14.1 oz)   SpO2 97%   BMI 37.18 kg/m²       Results for orders placed during the hospital encounter of 11/16/22    Echo    Interpretation Summary  · The left ventricle is normal in size with concentric remodeling and normal systolic function.  · The estimated ejection fraction is 60%.  · Normal left ventricular diastolic function.  · Normal right ventricular size with normal right ventricular systolic function.  · Mild aortic regurgitation.  · Mild tricuspid regurgitation.  · Normal central venous pressure (3 mmHg).  · The estimated PA systolic pressure is 29 mmHg.  · There is abnormal septal wall motion consistent with right ventricular pacemaker.    WBC   Date Value Ref Range Status   05/25/2023 8.55 3.90 - 12.70 K/uL Final     Hematocrit   Date Value Ref Range Status   05/25/2023 42.6 37.0 - 48.5 % Final     Hemoglobin   Date Value Ref Range Status   05/25/2023 13.3 12.0 - 16.0 g/dL Final     Lab Results   Component Value Date     05/25/2023     Lab Results   Component Value Date    CREATININE 1.0 05/25/2023    EGFRNORACEVR 59.1 (A) 05/25/2023    K 4.7 05/25/2023     Lab Results   Component Value Date     (H) 03/11/2022            reports that she does not currently use alcohol.  Past Medical History:   Diagnosis Date    A-fib      A-fib     Pace maker put in 5/12/20    Allergy     Angina pectoris     Anxiety     Arthritis     Asthma     cough variant    Back pain     Cancer 2018    basal cell c    Colon polyp     COPD (chronic obstructive pulmonary disease)     Depression     Hyperlipidemia     Hypertension     Obesity     Pneumonia      Past Surgical History:   Procedure Laterality Date    ABLATION Bilateral 6/3/2021    Procedure: ABLATION/CTI;  Surgeon: Juan Lazo MD;  Location: [facility] CATH LAB;  Service: Cardiology;  Laterality: Bilateral;  (RFA of the CTI,    COVID-19, MRNA, LN-S, PF (Pfizer) 3/20/2021, 2/27/2021   later date RA lead extraction, RA lead addition    CATARACT EXTRACTION Right     MGM    CATARACT EXTRACTION Left 07/28/2022    MGM    COLONOSCOPY N/A 10/10/2022    Procedure: COLONOSCOPY 8/31--card clearance received per Dr Ling;  Surgeon: Nestor Sher MD;  Location: [facility] ENDO;  Service: Gastroenterology;  Laterality: N/A;    HYSTERECTOMY      IMPLANTATION OF BIVENTRICULAR HEART PACEMAKER  05/12/2020    Loop recorder also in but not working right now    PHLEBOGRAPHY  6/8/2021    Procedure: Venogram;  Surgeon: Juan Lazo MD;  Location: [facility] CATH LAB;  Service: Cardiology;;    REVISION OF PROCEDURE INVOLVING PACEMAKER LEAD N/A 6/8/2021    Procedure: REVISION, ELECTRODE LEAD, CARDIAC PACEMAKER;  Surgeon: Juan Lazo MD;  Location: [facility] CATH LAB;  Service: Cardiology;  Laterality: N/A;    REVISION OF PROCEDURE INVOLVING PACEMAKER LEAD Bilateral 7/15/2021    Procedure: REVISION, ELECTRODE LEAD, CARDIAC PACEMAKER/A lead;  Surgeon: Juan Lazo MD;  Location: [facility] CATH LAB;  Service: Cardiology;  Laterality: Bilateral;  biotronik device    REVISION OF SKIN POCKET FOR PACEMAKER  6/8/2021    Procedure: Revision, Skin Pocket, For Cardiac Pacemaker;  Surgeon: Juan Lazo MD;  Location: [facility] CATH LAB;  Service: Cardiology;;     Family History   Problem Relation Age of Onset    Cancer  Mother     Cancer Father     Cancer Brother     Stroke Brother        Assessment:   Pacemaker is in good repair.  No PAF.  Tolerating flecainide quite well.  Atrial flutter apparently cured by RFA.    1. SSS (sick sinus syndrome)    2. Symptomatic bradycardia    3. Typical atrial flutter    4. Pure hypercholesterolemia    5. History of cardiac radiofrequency ablation (RFA)    6. Paroxysmal atrial fibrillation    7. Essential hypertension    8. Cardiac pacemaker in situ    9. Chronic anticoagulation    10. Morbid obesity with BMI of 40.0-44.9, adult        Plan:      Keep same treatment.  I discussed routine device follow up including quarterly to bi-annual device checks for device function as well as yearly follow up in the EP clinic. The patient  was advised to call with any concerns regarding their device. Device clinic follow up as scheduled. RTC 6 m          No orders of the defined types were placed in this encounter.      Follow up in about 6 months (around 5/10/2024).    There are no discontinued medications.         Medication List with Changes/Refills   Current Medications    CETIRIZINE (ZYRTEC) 10 MG TABLET    Take 10 mg by mouth once daily.     COVID-19 VAC, DULCE,PFIZER,,PF, (PFIZER COVID-19 DULCE VACCN,PF,) 30 MCG/0.3 ML INJECTION    Inject into the muscle.    DILTIAZEM (DILT-XR) 240 MG CDCR    Take 1 capsule (240 mg total) by mouth once daily.    ELDERBERRY FRUIT 350 MG CAP    Take by mouth.    ELIQUIS 5 MG TAB    TAKE 1 TABLET TWICE DAILY    FLECAINIDE (TAMBOCOR) 100 MG TAB    TAKE 1 TABLET EVERY 12 HOURS    FLU VAC 2022 [id number],PF, (FLUAD QUAD 2022-23,65Y UP,,PF,) 60 MCG (15 MCG X 4)/0.5 ML SYRG    Inject 0.5 mLs into the muscle.    FLU VAC 2023 [id number],PF, (FLUAD QUAD 2023-24,65Y UP,,PF,) 60 MCG (15 MCG X 4)/0.5 ML SYRG    Inject into the muscle    FLUTICASONE PROPIONATE (FLONASE) 50 MCG/ACTUATION NASAL SPRAY    2 sprays (100 mcg total) by Each Nostril route once daily.     FLUTICASONE-SALMETEROL DISKUS INHALER 250-50 MCG    Inhale 1 puff into the lungs 2 (two) times daily. Controller.Wash out mouth after using.    KETOCONAZOLE (NIZORAL) 2 % SHAMPOO    Apply topically twice a week.    LEVALBUTEROL (XOPENEX HFA) 45 MCG/ACTUATION INHALER    Inhale 2 puffs into the lungs every 6 (six) hours as needed for Wheezing or Shortness of Breath.    LISINOPRIL (PRINIVIL,ZESTRIL) 20 MG TABLET    TAKE 1 AND 1/2 TABLETS EVERY DAY    LORAZEPAM (ATIVAN) 0.5 MG TABLET    Take 0.5 mg by mouth daily as needed for Anxiety.    MECLIZINE (ANTIVERT) 25 MG TABLET    Take 1 tablet (25 mg total) by mouth 3 (three) times daily as needed.    MULTIVITAMIN CAPSULE    Take 1 capsule by mouth once daily.    OMEGA-3 FATTY ACIDS/FISH OIL (FISH OIL-OMEGA-3 FATTY ACIDS) 300-1,000 MG CAPSULE    Take 1 capsule by mouth once daily.    ROSUVASTATIN (CRESTOR) 20 MG TABLET    Take 1 tablet (20 mg total) by mouth once daily.    TIOTROPIUM BROMIDE (SPIRIVA RESPIMAT) 2.5 MCG/ACTUATION INHALER    Inhale 2 puffs into the lungs once daily.    TRIAMCINOLONE ACETONIDE 0.1% (KENALOG) 0.1 % CREAM    Apply topically 2 (two) times daily. Use up to 2 weeks at a time        This note is at least partially dictated using the M*Modal Fluency Direct word recognition program. There are word recognition mistakes that are occasionally missed on review.

## 2023-11-13 ENCOUNTER — CLINICAL SUPPORT (OUTPATIENT)
Dept: CARDIOLOGY | Facility: HOSPITAL | Age: 73
End: 2023-11-13
Attending: INTERNAL MEDICINE
Payer: MEDICARE

## 2023-11-13 DIAGNOSIS — I49.5 SSS (SICK SINUS SYNDROME): ICD-10-CM

## 2023-11-13 DIAGNOSIS — I48.21 PERMANENT ATRIAL FIBRILLATION: ICD-10-CM

## 2023-11-13 DIAGNOSIS — Z95.0 PRESENCE OF CARDIAC PACEMAKER: ICD-10-CM

## 2023-11-13 PROCEDURE — 99999 PR PBB SHADOW E&M-EST. PATIENT-LVL I: CPT | Mod: PBBFAC,HCNC,,

## 2023-11-13 PROCEDURE — 99999 PR PBB SHADOW E&M-EST. PATIENT-LVL I: ICD-10-PCS | Mod: PBBFAC,HCNC,,

## 2023-11-16 NOTE — PROGRESS NOTES
Pt was symptomatic when Biotronik pacemaker was placed in DDD-CLS mode on 11/10/23.  She was seen 11/13/23 by Ivone De Leon to return to original parameter of DDDR.

## 2023-11-20 ENCOUNTER — PATIENT MESSAGE (OUTPATIENT)
Dept: CARDIOLOGY | Facility: CLINIC | Age: 73
End: 2023-11-20
Payer: MEDICARE

## 2023-12-11 DIAGNOSIS — I10 ESSENTIAL HYPERTENSION: ICD-10-CM

## 2023-12-11 DIAGNOSIS — I48.21 PERMANENT ATRIAL FIBRILLATION: ICD-10-CM

## 2023-12-11 DIAGNOSIS — Z95.0 CARDIAC PACEMAKER IN SITU: ICD-10-CM

## 2023-12-11 DIAGNOSIS — R07.9 CHEST PAIN, UNSPECIFIED TYPE: ICD-10-CM

## 2023-12-11 DIAGNOSIS — R00.2 PALPITATIONS: ICD-10-CM

## 2023-12-11 DIAGNOSIS — E78.5 HYPERLIPIDEMIA, UNSPECIFIED HYPERLIPIDEMIA TYPE: ICD-10-CM

## 2023-12-11 DIAGNOSIS — R06.02 SOB (SHORTNESS OF BREATH): ICD-10-CM

## 2023-12-11 DIAGNOSIS — T82.9XXS DISORDER OF CARDIAC PACEMAKER SYSTEM, SEQUELA: ICD-10-CM

## 2023-12-11 RX ORDER — APIXABAN 5 MG/1
TABLET, FILM COATED ORAL
Qty: 180 TABLET | Refills: 3 | Status: SHIPPED | OUTPATIENT
Start: 2023-12-11

## 2023-12-11 RX ORDER — LISINOPRIL 20 MG/1
TABLET ORAL
Qty: 135 TABLET | Refills: 3 | Status: SHIPPED | OUTPATIENT
Start: 2023-12-11 | End: 2024-02-20 | Stop reason: SDUPTHER

## 2024-01-12 NOTE — TELEPHONE ENCOUNTER
Additional Information   Negative: Passed out (i.e., lost consciousness, collapsed and was not responding)   Negative: Shock suspected (e.g., cold/pale/clammy skin, too weak to stand, low BP, rapid pulse)    Protocols used: Heart Rate and Heartbeat [id number]    Pt states she went to have her pacemaker adjusted. Stated she had a high heart rate the entire ride home and needs to speak with her doctor. Pt abruptly ended the triage stating a doctor was calling her. Message sent to the Provider's office to call her.    She is aware of diagnosis of prediabetes.  Limit concentrated sugars and control portion sizes carbohydrates.  Recheck HbA1c.

## 2024-01-30 ENCOUNTER — HOSPITAL ENCOUNTER (OUTPATIENT)
Dept: RADIOLOGY | Facility: HOSPITAL | Age: 74
Discharge: HOME | End: 2024-01-30
Attending: NURSE PRACTITIONER
Payer: MEDICARE

## 2024-01-30 ENCOUNTER — OFFICE VISIT (OUTPATIENT)
Dept: SLEEP MEDICINE | Facility: CLINIC | Age: 74
End: 2024-01-30
Payer: MEDICARE

## 2024-01-30 VITALS
OXYGEN SATURATION: 94 % | HEIGHT: 63 IN | RESPIRATION RATE: 19 BRPM | WEIGHT: 208.56 LBS | DIASTOLIC BLOOD PRESSURE: 64 MMHG | BODY MASS INDEX: 36.95 KG/M2 | SYSTOLIC BLOOD PRESSURE: 128 MMHG | HEART RATE: 76 BPM

## 2024-01-30 DIAGNOSIS — J44.89 ASTHMA WITH COPD: Chronic | ICD-10-CM

## 2024-01-30 DIAGNOSIS — E66.01 MORBID OBESITY WITH BMI OF 40.0-44.9, ADULT: ICD-10-CM

## 2024-01-30 DIAGNOSIS — J44.9 CHRONIC OBSTRUCTIVE PULMONARY DISEASE, UNSPECIFIED COPD TYPE: ICD-10-CM

## 2024-01-30 DIAGNOSIS — J44.89 ASTHMA WITH COPD: Primary | Chronic | ICD-10-CM

## 2024-01-30 PROCEDURE — 99214 OFFICE O/P EST MOD 30 MIN: CPT | Mod: S$GLB,,, | Performed by: NURSE PRACTITIONER

## 2024-01-30 PROCEDURE — 3078F DIAST BP <80 MM HG: CPT | Mod: CPTII,S$GLB,, | Performed by: NURSE PRACTITIONER

## 2024-01-30 PROCEDURE — 3008F BODY MASS INDEX DOCD: CPT | Mod: CPTII,S$GLB,, | Performed by: NURSE PRACTITIONER

## 2024-01-30 PROCEDURE — 71046 X-RAY EXAM CHEST 2 VIEWS: CPT | Mod: TC

## 2024-01-30 PROCEDURE — 71046 X-RAY EXAM CHEST 2 VIEWS: CPT | Mod: 26,,, | Performed by: RADIOLOGY

## 2024-01-30 PROCEDURE — 1160F RVW MEDS BY RX/DR IN RCRD: CPT | Mod: CPTII,S$GLB,, | Performed by: NURSE PRACTITIONER

## 2024-01-30 PROCEDURE — 1159F MED LIST DOCD IN RCRD: CPT | Mod: CPTII,S$GLB,, | Performed by: NURSE PRACTITIONER

## 2024-01-30 PROCEDURE — 3288F FALL RISK ASSESSMENT DOCD: CPT | Mod: CPTII,S$GLB,, | Performed by: NURSE PRACTITIONER

## 2024-01-30 PROCEDURE — 99999 PR PBB SHADOW E&M-EST. PATIENT-LVL V: CPT | Mod: PBBFAC,,, | Performed by: NURSE PRACTITIONER

## 2024-01-30 PROCEDURE — 3074F SYST BP LT 130 MM HG: CPT | Mod: CPTII,S$GLB,, | Performed by: NURSE PRACTITIONER

## 2024-01-30 PROCEDURE — 1101F PT FALLS ASSESS-DOCD LE1/YR: CPT | Mod: CPTII,S$GLB,, | Performed by: NURSE PRACTITIONER

## 2024-01-30 PROCEDURE — 1126F AMNT PAIN NOTED NONE PRSNT: CPT | Mod: CPTII,S$GLB,, | Performed by: NURSE PRACTITIONER

## 2024-01-30 RX ORDER — LEVALBUTEROL TARTRATE 45 UG/1
2 AEROSOL, METERED ORAL EVERY 6 HOURS PRN
Qty: 15 G | Refills: 0 | Status: SHIPPED | OUTPATIENT
Start: 2024-01-30

## 2024-01-30 NOTE — PROGRESS NOTES
"Subjective:      Patient ID: Antonio Urena is a 73 y.o. female.    Chief Complaint: Asthma and COPD (Med refill)    HPI  Follow up asthma with COPD. Doing well on Wixela and Spiriva. Breathing stable.  No fever, chills, or hemoptysis. No pleuritic type chest pain.    Afib s/p ablation, pacemaker.   Cough secondary to allergies and reflux.      Patient Active Problem List   Diagnosis    Essential hypertension    Asthma with COPD    Anxiety    Non-seasonal allergic rhinitis    Pure hypercholesterolemia    Paroxysmal atrial fibrillation    SSS (sick sinus syndrome)    Cardiac pacemaker in situ    Atrial fibrillation and flutter    Symptomatic bradycardia    TIA involving carotid artery    Typical atrial flutter    History of cardiac radiofrequency ablation (RFA)    Migraine with aura    Chronic anticoagulation    Morbid obesity with BMI of 40.0-44.9, adult    Atherosclerosis of aorta    Senile purpura    Osteopenia with high risk of fracture       /64   Pulse 76   Resp 19   Ht 5' 3" (1.6 m)   Wt 94.6 kg (208 lb 8.9 oz)   SpO2 (!) 94%   BMI 36.94 kg/m²   Body mass index is 36.94 kg/m².    Review of Systems   HENT:  Positive for postnasal drip.    Respiratory:  Positive for cough.    All other systems reviewed and are negative.    Objective:      Physical Exam  Constitutional:       Appearance: She is well-developed. She is obese.   HENT:      Head: Normocephalic and atraumatic.      Nose: Nose normal.      Mouth/Throat:      Pharynx: Oropharynx is clear.   Cardiovascular:      Rate and Rhythm: Normal rate and regular rhythm.      Heart sounds: No murmur heard.     No gallop.   Pulmonary:      Effort: Pulmonary effort is normal.      Breath sounds: Normal breath sounds.   Abdominal:      Palpations: Abdomen is soft.      Tenderness: There is no abdominal tenderness.   Musculoskeletal:         General: Normal range of motion.      Cervical back: Normal range of motion and neck supple.   Skin:     General: " Skin is warm and dry.   Neurological:      Mental Status: She is alert and oriented to person, place, and time.   Psychiatric:         Mood and Affect: Mood normal.         Behavior: Behavior normal.       Personal Diagnostic Review      Independently review cxr from today  Normal- stable        Assessment:       1. Asthma with COPD    2. Morbid obesity with BMI of 40.0-44.9, adult    3. Chronic obstructive pulmonary disease, unspecified COPD type          Outpatient Encounter Medications as of 1/30/2024   Medication Sig Dispense Refill    cetirizine (ZYRTEC) 10 MG tablet Take 10 mg by mouth once daily.       diltiaZEM (DILT-XR) 240 MG CDCR Take 1 capsule (240 mg total) by mouth once daily. 90 capsule 3    elderberry fruit 350 mg Cap Take by mouth.      ELIQUIS 5 mg Tab TAKE 1 TABLET TWICE DAILY 180 tablet 3    flecainide (TAMBOCOR) 100 MG Tab TAKE 1 TABLET EVERY 12 HOURS 180 tablet 3    fluticasone propionate (FLONASE) 50 mcg/actuation nasal spray 2 sprays (100 mcg total) by Each Nostril route once daily. 48 g 4    fluticasone-salmeterol diskus inhaler 250-50 mcg Inhale 1 puff into the lungs 2 (two) times daily. Controller.Wash out mouth after using. 180 each 3    lisinopriL (PRINIVIL,ZESTRIL) 20 MG tablet TAKE 1 AND 1/2 TABLETS EVERY  tablet 3    LORazepam (ATIVAN) 0.5 MG tablet Take 0.5 mg by mouth daily as needed for Anxiety.      multivitamin capsule Take 1 capsule by mouth once daily.      omega-3 fatty acids/fish oil (FISH OIL-OMEGA-3 FATTY ACIDS) 300-1,000 mg capsule Take 1 capsule by mouth once daily.      rosuvastatin (CRESTOR) 20 MG tablet Take 1 tablet (20 mg total) by mouth once daily. 90 tablet 3    tiotropium bromide (SPIRIVA RESPIMAT) 2.5 mcg/actuation inhaler Inhale 2 puffs into the lungs once daily. 12 g 3    triamcinolone acetonide 0.1% (KENALOG) 0.1 % cream Apply topically 2 (two) times daily. Use up to 2 weeks at a time 454 g 1    [DISCONTINUED] levalbuterol (XOPENEX HFA) 45 mcg/actuation  inhaler Inhale 2 puffs into the lungs every 6 (six) hours as needed for Wheezing or Shortness of Breath. 45 g 3    COVID-19 vac, dulce,Pfizer,,PF, (PFIZER COVID-19 DULCE VACCN,PF,) 30 mcg/0.3 mL injection Inject into the muscle. (Patient not taking: Reported on 11/10/2023) 0.3 mL 0    flu vac 2022 [id number],PF, (FLUAD QUAD 2022-23,65Y UP,,PF,) 60 mcg (15 mcg x 4)/0.5 mL Syrg Inject 0.5 mLs into the muscle. (Patient not taking: Reported on 11/10/2023) 0.5 mL 0    flu vac 2023 [id number],PF, (FLUAD QUAD 2023-24,65Y UP,,PF,) 60 mcg (15 mcg x 4)/0.5 mL Syrg Inject into the muscle (Patient not taking: Reported on 11/10/2023) 0.5 mL 0    ketoconazole (NIZORAL) 2 % shampoo Apply topically twice a week. (Patient not taking: Reported on 11/10/2023) 120 mL 3    levalbuterol (XOPENEX HFA) 45 mcg/actuation inhaler Inhale 2 puffs into the lungs every 6 (six) hours as needed for Wheezing or Shortness of Breath. 15 g 0    meclizine (ANTIVERT) 25 mg tablet Take 1 tablet (25 mg total) by mouth 3 (three) times daily as needed. (Patient not taking: Reported on 11/10/2023) 20 tablet 0     No facility-administered encounter medications on file as of 1/30/2024.     Orders Placed This Encounter   Procedures    X-Ray Chest PA And Lateral     Standing Status:   Future     Standing Expiration Date:   1/29/2025    Spirometry without Bronchodilator     Standing Status:   Future     Standing Expiration Date:   1/30/2025     Order Specific Question:   Release to patient     Answer:   Immediate     Plan:        Problem List Items Addressed This Visit          Pulmonary    Asthma with COPD - Primary (Chronic)    Overview     Wixela, Spiriva.          Relevant Medications    levalbuterol (XOPENEX HFA) 45 mcg/actuation inhaler    Other Relevant Orders    X-Ray Chest PA And Lateral    Spirometry without Bronchodilator       Endocrine    Morbid obesity with BMI of 40.0-44.9, adult    Current Assessment & Plan     Weight loss and exercise to  improve overall health.            Other Visit Diagnoses       Chronic obstructive pulmonary disease, unspecified COPD type        Relevant Medications    levalbuterol (XOPENEX HFA) 45 mcg/actuation inhaler          Continue current pulmonary drug regimen.

## 2024-02-02 ENCOUNTER — CLINICAL SUPPORT (OUTPATIENT)
Dept: CARDIOLOGY | Facility: HOSPITAL | Age: 74
End: 2024-02-02
Payer: MEDICARE

## 2024-02-02 DIAGNOSIS — Z95.0 PRESENCE OF CARDIAC PACEMAKER: ICD-10-CM

## 2024-02-02 DIAGNOSIS — I48.0 PAROXYSMAL ATRIAL FIBRILLATION: ICD-10-CM

## 2024-02-02 DIAGNOSIS — I49.5 SICK SINUS SYNDROME: ICD-10-CM

## 2024-02-03 ENCOUNTER — CLINICAL SUPPORT (OUTPATIENT)
Dept: CARDIOLOGY | Facility: HOSPITAL | Age: 74
End: 2024-02-03
Attending: INTERNAL MEDICINE
Payer: MEDICARE

## 2024-02-03 DIAGNOSIS — I49.5 SICK SINUS SYNDROME: ICD-10-CM

## 2024-02-03 DIAGNOSIS — I48.0 PAROXYSMAL ATRIAL FIBRILLATION: ICD-10-CM

## 2024-02-03 DIAGNOSIS — Z95.0 PRESENCE OF CARDIAC PACEMAKER: ICD-10-CM

## 2024-02-03 PROCEDURE — 93296 REM INTERROG EVL PM/IDS: CPT | Performed by: INTERNAL MEDICINE

## 2024-02-03 PROCEDURE — 93294 REM INTERROG EVL PM/LDLS PM: CPT | Mod: S$GLB,,, | Performed by: INTERNAL MEDICINE

## 2024-02-06 ENCOUNTER — PATIENT MESSAGE (OUTPATIENT)
Dept: ADMINISTRATIVE | Facility: OTHER | Age: 74
End: 2024-02-06
Payer: MEDICARE

## 2024-02-09 LAB
OHS CV AF BURDEN PERCENT: < 1
OHS CV DC REMOTE DEVICE TYPE: NORMAL
OHS CV RV PACING PERCENT: 4 %

## 2024-02-15 ENCOUNTER — HOSPITAL ENCOUNTER (EMERGENCY)
Facility: HOSPITAL | Age: 74
Discharge: HOME | End: 2024-02-15
Attending: EMERGENCY MEDICINE
Payer: MEDICARE

## 2024-02-15 ENCOUNTER — NURSE TRIAGE (OUTPATIENT)
Dept: ADMINISTRATIVE | Facility: CLINIC | Age: 74
End: 2024-02-15
Payer: MEDICARE

## 2024-02-15 VITALS
BODY MASS INDEX: 36.95 KG/M2 | RESPIRATION RATE: 23 BRPM | OXYGEN SATURATION: 95 % | SYSTOLIC BLOOD PRESSURE: 129 MMHG | WEIGHT: 208.56 LBS | HEART RATE: 60 BPM | TEMPERATURE: 98 F | HEIGHT: 63 IN | DIASTOLIC BLOOD PRESSURE: 60 MMHG

## 2024-02-15 DIAGNOSIS — R07.9 CHEST PAIN: ICD-10-CM

## 2024-02-15 LAB
ALBUMIN SERPL BCP-MCNC: 3.8 G/DL (ref 3.5–5.2)
ALP SERPL-CCNC: 58 U/L (ref 55–135)
ALT SERPL W/O P-5'-P-CCNC: 16 U/L (ref 10–44)
ANION GAP SERPL CALC-SCNC: 11 MMOL/L (ref 8–16)
AST SERPL-CCNC: 16 U/L (ref 10–40)
BASOPHILS # BLD AUTO: 0.03 K/UL (ref 0–0.2)
BASOPHILS NFR BLD: 0.3 % (ref 0–1.9)
BILIRUB SERPL-MCNC: 0.2 MG/DL (ref 0.1–1)
BNP SERPL-MCNC: 61 PG/ML (ref 0–99)
BUN SERPL-MCNC: 19 MG/DL (ref 8–23)
CALCIUM SERPL-MCNC: 9.2 MG/DL (ref 8.7–10.5)
CHLORIDE SERPL-SCNC: 104 MMOL/L (ref 95–110)
CO2 SERPL-SCNC: 23 MMOL/L (ref 23–29)
CREAT SERPL-MCNC: 1 MG/DL (ref 0.5–1.4)
DIFFERENTIAL METHOD BLD: NORMAL
EOSINOPHIL # BLD AUTO: 0.2 K/UL (ref 0–0.5)
EOSINOPHIL NFR BLD: 2.8 % (ref 0–8)
ERYTHROCYTE [DISTWIDTH] IN BLOOD BY AUTOMATED COUNT: 13.2 % (ref 11.5–14.5)
EST. GFR  (NO RACE VARIABLE): 59.1 ML/MIN/1.73 M^2
GLUCOSE SERPL-MCNC: 102 MG/DL (ref 70–110)
HCT VFR BLD AUTO: 39.6 % (ref 37–48.5)
HEP C VIRUS HOLD SPECIMEN: NORMAL
HGB BLD-MCNC: 12.9 G/DL (ref 12–16)
IMM GRANULOCYTES # BLD AUTO: 0.02 K/UL (ref 0–0.04)
IMM GRANULOCYTES NFR BLD AUTO: 0.2 % (ref 0–0.5)
LYMPHOCYTES # BLD AUTO: 1.7 K/UL (ref 1–4.8)
LYMPHOCYTES NFR BLD: 19.9 % (ref 18–48)
MCH RBC QN AUTO: 28.9 PG (ref 27–31)
MCHC RBC AUTO-ENTMCNC: 32.6 G/DL (ref 32–36)
MCV RBC AUTO: 89 FL (ref 82–98)
MONOCYTES # BLD AUTO: 0.6 K/UL (ref 0.3–1)
MONOCYTES NFR BLD: 7.3 % (ref 4–15)
NEUTROPHILS # BLD AUTO: 6 K/UL (ref 1.8–7.7)
NEUTROPHILS NFR BLD: 69.5 % (ref 38–73)
NRBC BLD-RTO: 0 /100 WBC
PLATELET # BLD AUTO: 215 K/UL (ref 150–450)
PMV BLD AUTO: 11 FL (ref 9.2–12.9)
POTASSIUM SERPL-SCNC: 4.5 MMOL/L (ref 3.5–5.1)
PROT SERPL-MCNC: 6.6 G/DL (ref 6–8.4)
RBC # BLD AUTO: 4.46 M/UL (ref 4–5.4)
SODIUM SERPL-SCNC: 138 MMOL/L (ref 136–145)
TROPONIN I SERPL DL<=0.01 NG/ML-MCNC: <0.006 NG/ML (ref 0–0.03)
TROPONIN I SERPL DL<=0.01 NG/ML-MCNC: <0.006 NG/ML (ref 0–0.03)
WBC # BLD AUTO: 8.68 K/UL (ref 3.9–12.7)

## 2024-02-15 PROCEDURE — 93010 ELECTROCARDIOGRAM REPORT: CPT | Mod: ,,, | Performed by: INTERNAL MEDICINE

## 2024-02-15 PROCEDURE — 99285 EMERGENCY DEPT VISIT HI MDM: CPT | Mod: 25,ER

## 2024-02-15 PROCEDURE — 87389 HIV-1 AG W/HIV-1&-2 AB AG IA: CPT | Performed by: EMERGENCY MEDICINE

## 2024-02-15 PROCEDURE — 83880 ASSAY OF NATRIURETIC PEPTIDE: CPT | Mod: ER | Performed by: EMERGENCY MEDICINE

## 2024-02-15 PROCEDURE — 80053 COMPREHEN METABOLIC PANEL: CPT | Mod: ER | Performed by: EMERGENCY MEDICINE

## 2024-02-15 PROCEDURE — 93005 ELECTROCARDIOGRAM TRACING: CPT | Mod: ER

## 2024-02-15 PROCEDURE — 25000003 PHARM REV CODE 250: Mod: ER | Performed by: EMERGENCY MEDICINE

## 2024-02-15 PROCEDURE — 86803 HEPATITIS C AB TEST: CPT | Performed by: EMERGENCY MEDICINE

## 2024-02-15 PROCEDURE — 85025 COMPLETE CBC W/AUTO DIFF WBC: CPT | Mod: ER | Performed by: EMERGENCY MEDICINE

## 2024-02-15 PROCEDURE — 84484 ASSAY OF TROPONIN QUANT: CPT | Mod: ER | Performed by: EMERGENCY MEDICINE

## 2024-02-15 RX ORDER — ASPIRIN 325 MG
325 TABLET ORAL
Status: COMPLETED | OUTPATIENT
Start: 2024-02-15 | End: 2024-02-15

## 2024-02-15 RX ADMIN — ASPIRIN 325 MG: 325 TABLET ORAL at 05:02

## 2024-02-15 NOTE — TELEPHONE ENCOUNTER
Pt reports she is on the digital blood pressure monitoring system and has been having low blood pressure recently (yesterday 95/54 pulse is 60). This morning patient woke up with dizziness and blurred vision and low blood pressure. (99/49). Pt has lost weight since July while doing intermittent fasting.  Pt reports that she had palpitations while walking. Denies palpitations at this time but is having chest pressure right in the middle of the chest at a 4/10. Also reports mild SOB and dizziness. Current BP is 134/52. Reports continued chest pain that has lasted longer than 5 minutes.     Dispo is to call  now. Pt verbalized understanding but will have daughter bring her to ED rather than call 911.   Reason for Disposition   Chest pain lasting longer than 5 minutes and over 44 years old    Additional Information   Negative: SEVERE difficulty breathing (e.g., struggling for each breath, speaks in single words)   Negative: Difficult to awaken or acting confused (e.g., disoriented, slurred speech)   Negative: Shock suspected (e.g., cold/pale/clammy skin, too weak to stand, low BP, rapid pulse)   Negative: Passed out (i.e., lost consciousness, collapsed and was not responding)    Protocols used: Chest Pain-A-OH

## 2024-02-15 NOTE — ED PROVIDER NOTES
Encounter Date: 2/15/2024       History     Chief Complaint   Patient presents with    Chest Pain     With sob that began 2 hours pta     The history is provided by the patient.   Chest Pain  The current episode started 1 to 2 hours ago. Chest pain occurs constantly. The chest pain is improving. At its most intense, the chest pain is at 6/10. The chest pain is currently at 3/10. The quality of the pain is described as pressure-like. The pain does not radiate. Primary symptoms include shortness of breath. Pertinent negatives for primary symptoms include no fever, no cough, no abdominal pain, no nausea and no vomiting.   Pertinent negatives for associated symptoms include no weakness.     Review of patient's allergies indicates:   Allergen Reactions    Lasix [furosemide]      Within 1 year of taking pt will get severe knee pain in both knees.     Past Medical History:   Diagnosis Date    A-fib     A-fib     Pace maker put in 5/12/20    Allergy     Angina pectoris     Anxiety     Arthritis     Asthma     cough variant    Back pain     Cancer 2018    basal cell c    Colon polyp     COPD (chronic obstructive pulmonary disease)     Depression     Hyperlipidemia     Hypertension     Obesity     Pneumonia      Past Surgical History:   Procedure Laterality Date    ABLATION Bilateral 06/03/2021    Procedure: ABLATION/CTI;  Surgeon: Juan Lazo MD;  Location: [facility] CATH LAB;  Service: Cardiology;  Laterality: Bilateral;  (RFA of the CTI,    COVID-19, MRNA, LN-S, PF (Pfizer) 3/20/2021, 2/27/2021   later date RA lead extraction, RA lead addition    CATARACT EXTRACTION Right     MGM    CATARACT EXTRACTION Left 07/28/2022    MGM    COLONOSCOPY N/A 10/10/2022    Procedure: COLONOSCOPY 8/31--card clearance received per Dr Ling;  Surgeon: Nestor Sher MD;  Location: [facility] ENDO;  Service: Gastroenterology;  Laterality: N/A;    HYSTERECTOMY      IMPLANTATION OF BIVENTRICULAR HEART PACEMAKER  05/12/2020    Loop  recorder also in but not working right now    PHLEBOGRAPHY  2021    Procedure: Venogram;  Surgeon: Juan Lazo MD;  Location: [facility] CATH LAB;  Service: Cardiology;;    REVISION OF PROCEDURE INVOLVING PACEMAKER LEAD N/A 2021    Procedure: REVISION, ELECTRODE LEAD, CARDIAC PACEMAKER;  Surgeon: Juan Lazo MD;  Location: [facility] CATH LAB;  Service: Cardiology;  Laterality: N/A;    REVISION OF PROCEDURE INVOLVING PACEMAKER LEAD Bilateral 07/15/2021    Procedure: REVISION, ELECTRODE LEAD, CARDIAC PACEMAKER/A lead;  Surgeon: Juan Lazo MD;  Location: [facility] CATH LAB;  Service: Cardiology;  Laterality: Bilateral;  biotronik device    REVISION OF SKIN POCKET FOR PACEMAKER  2021    Procedure: Revision, Skin Pocket, For Cardiac Pacemaker;  Surgeon: Juan Lazo MD;  Location: [facility] CATH LAB;  Service: Cardiology;;     Family History   Problem Relation Age of Onset    Cancer Mother     Cancer Father     Cancer Brother     Stroke Brother      Social History     Tobacco Use    Smoking status: Former     Current packs/day: 0.00     Average packs/day: 0.3 packs/day for 1 year (0.3 ttl pk-yrs)     Types: Cigarettes     Start date: 1984     Quit date: 1985     Years since quittin.1    Smokeless tobacco: Former    Tobacco comments:     Quit 30 - 40 years ago   Substance Use Topics    Alcohol use: Not Currently     Comment: Wine Occasionally     Drug use: Never     Review of Systems   Constitutional:  Negative for fever.   HENT:  Negative for sore throat.    Respiratory:  Positive for shortness of breath. Negative for cough.    Cardiovascular:  Positive for chest pain.   Gastrointestinal:  Negative for abdominal pain, nausea and vomiting.   Genitourinary:  Negative for dysuria.   Musculoskeletal:  Negative for back pain.   Skin:  Negative for rash.   Neurological:  Negative for weakness.   Hematological:  Does not bruise/bleed easily.       Physical Exam     Initial Vitals  [02/15/24 1631]   BP Pulse Resp Temp SpO2   (!) 129/58 60 18 98 °F (36.7 °C) 97 %      MAP       --         Physical Exam    Nursing note and vitals reviewed.  Constitutional: She appears well-developed and well-nourished. No distress.   HENT:   Head: Normocephalic and atraumatic.   Mouth/Throat: Oropharynx is clear and moist.   Eyes: Conjunctivae and EOM are normal. Pupils are equal, round, and reactive to light.   Neck: Neck supple.   Normal range of motion.  Cardiovascular:  Normal rate, regular rhythm and normal heart sounds.           Pulmonary/Chest: Breath sounds normal. No respiratory distress.   Abdominal: Abdomen is soft. Bowel sounds are normal. She exhibits no distension. There is no abdominal tenderness.   Musculoskeletal:         General: Normal range of motion.      Cervical back: Normal range of motion and neck supple.     Neurological: She is alert and oriented to person, place, and time. She has normal strength.   Skin: Skin is warm and dry.   Psychiatric: She has a normal mood and affect. Thought content normal.         ED Course   Procedures  Labs Reviewed   COMPREHENSIVE METABOLIC PANEL - Abnormal; Notable for the following components:       Result Value    eGFR 59.1 (*)     All other components within normal limits    Narrative:     Release to patient->Immediate   CBC W/ AUTO DIFFERENTIAL    Narrative:     Release to patient->Immediate   TROPONIN I    Narrative:     Release to patient->Immediate   TROPONIN I   B-TYPE NATRIURETIC PEPTIDE    Narrative:     Release to patient->Immediate   HIV 1 / 2 ANTIBODY   HEPATITIS C ANTIBODY   HEP C VIRUS HOLD SPECIMEN          Imaging Results              X-Ray Chest AP Portable (Final result)  Result time 02/15/24 17:00:10      Final result by Jaret Rees MD (02/15/24 17:00:10)                   Impression:      No acute abnormality.  Stable findings.      Electronically signed by: Jaret Rees  Date:    02/15/2024  Time:    17:00                Narrative:    EXAMINATION:  XR CHEST AP PORTABLE    CLINICAL HISTORY:  Chest Pain;    TECHNIQUE:  Single frontal view of the chest was performed.    COMPARISON:  Prior    FINDINGS:  The lungs are clear, with normal appearance of pulmonary vasculature and no pleural effusion or pneumothorax.    The cardiac silhouette is prominent the hilar and mediastinal contours are unremarkable.    Bones are intact.  Left chest pacemaker.  Cardiac monitor device.                                       Medications   aspirin tablet 325 mg (325 mg Oral Given 2/15/24 1705)       6:10 PM I assumed care, case discussed in detail with Dr. Barahona, data reviewed, patient interviewed and examined.  Underlying history of atrial fibrillation and hypertension, others as well documented.  Chest pain onset about an hour or 2 prior to arrival while at a store, workup so far is reassuring.  Await repeat troponin which is due in about an hour and a half.  Continuing to monitor closely.  Resting comfortably, no further symptoms.  She has good follow-up with cardiology and electrophysiology, current, most recent encounter about 6 months ago.  Stable sinus rhythm, vital signs, asymptomatic.    Ye Cabral MD        8:05 PM 2nd troponin likewise undetectable.  She remains perfectly clinically stable, asymptomatic, reassuring vital signs and normal cardiac rhythm.  She has appointment scheduled for early next week with her primary care provider.  Stable for discharge, continue usual medications, keep outpatient routine follow-up as scheduled.  Counseled, reassured.  Ready for discharge    Ye Cabral MD    Medical Decision Making  Amount and/or Complexity of Data Reviewed  Labs: ordered. Decision-making details documented in ED Course.  Radiology: ordered. Decision-making details documented in ED Course.  ECG/medicine tests: ordered and independent interpretation performed. Decision-making details documented in ED Course.    Risk  OTC  drugs.  Decision regarding hospitalization.      Additional MDM:   Differential Diagnosis:   Angina, pleurisy, nonspecific chest pain, pneumonia, other causes of chest pain                                    Clinical Impression:  Final diagnoses:  [R07.9] Chest pain          ED Disposition Condition    Discharge Stable          ED Prescriptions    None       Follow-up Information       Follow up With Specialties Details Why Contact Info    Wayne Owens MD Family Medicine  As needed [address]  [place] [address]  [phone]      [facility] Emergency Dept Emergency Medicine  As needed [address] [address] [id number] [phone]             Ye Cabral MD  02/15/24 2005

## 2024-02-15 NOTE — TELEPHONE ENCOUNTER
BP been running low    - pt went to ER now- [facility]    Made appt 2/20 with Dr Pinto- former Dr Ling pt    Will follow ER recommendations and appt for tues- pt aware and confirmed

## 2024-02-16 LAB
HCV AB SERPL QL IA: NEGATIVE
HIV 1+2 AB+HIV1 P24 AG SERPL QL IA: NEGATIVE
OHS QRS DURATION: 108 MS
OHS QTC CALCULATION: 442 MS

## 2024-02-16 NOTE — DISCHARGE INSTRUCTIONS
As discussed, no evidence of heart problem or other specific new or unstable condition.      Testing and exam are fine.      Keep your follow-up appointment with your regular doctor Tuesday as scheduled and return here as needed.

## 2024-02-20 ENCOUNTER — OFFICE VISIT (OUTPATIENT)
Dept: CARDIOLOGY | Facility: CLINIC | Age: 74
End: 2024-02-20
Payer: MEDICARE

## 2024-02-20 VITALS
OXYGEN SATURATION: 98 % | BODY MASS INDEX: 36.91 KG/M2 | DIASTOLIC BLOOD PRESSURE: 72 MMHG | SYSTOLIC BLOOD PRESSURE: 128 MMHG | WEIGHT: 208.31 LBS | HEIGHT: 63 IN | HEART RATE: 70 BPM

## 2024-02-20 DIAGNOSIS — I48.0 PAROXYSMAL ATRIAL FIBRILLATION: ICD-10-CM

## 2024-02-20 DIAGNOSIS — G45.1 TIA INVOLVING CAROTID ARTERY: ICD-10-CM

## 2024-02-20 DIAGNOSIS — E78.00 PURE HYPERCHOLESTEROLEMIA: Chronic | ICD-10-CM

## 2024-02-20 DIAGNOSIS — I48.3 TYPICAL ATRIAL FLUTTER: ICD-10-CM

## 2024-02-20 DIAGNOSIS — I48.92 ATRIAL FIBRILLATION AND FLUTTER: Chronic | ICD-10-CM

## 2024-02-20 DIAGNOSIS — Z98.890 HISTORY OF CARDIAC RADIOFREQUENCY ABLATION (RFA): ICD-10-CM

## 2024-02-20 DIAGNOSIS — I70.0 ATHEROSCLEROSIS OF AORTA: ICD-10-CM

## 2024-02-20 DIAGNOSIS — I48.91 ATRIAL FIBRILLATION AND FLUTTER: Chronic | ICD-10-CM

## 2024-02-20 DIAGNOSIS — Z95.0 CARDIAC PACEMAKER IN SITU: Chronic | ICD-10-CM

## 2024-02-20 DIAGNOSIS — R00.1 SYMPTOMATIC BRADYCARDIA: ICD-10-CM

## 2024-02-20 DIAGNOSIS — I10 ESSENTIAL HYPERTENSION: Primary | Chronic | ICD-10-CM

## 2024-02-20 DIAGNOSIS — R07.89 OTHER CHEST PAIN: ICD-10-CM

## 2024-02-20 DIAGNOSIS — E66.01 MORBID OBESITY WITH BMI OF 40.0-44.9, ADULT: ICD-10-CM

## 2024-02-20 DIAGNOSIS — I49.5 SSS (SICK SINUS SYNDROME): Chronic | ICD-10-CM

## 2024-02-20 PROCEDURE — 1126F AMNT PAIN NOTED NONE PRSNT: CPT | Mod: CPTII,S$GLB,, | Performed by: INTERNAL MEDICINE

## 2024-02-20 PROCEDURE — 3288F FALL RISK ASSESSMENT DOCD: CPT | Mod: CPTII,S$GLB,, | Performed by: INTERNAL MEDICINE

## 2024-02-20 PROCEDURE — 3008F BODY MASS INDEX DOCD: CPT | Mod: CPTII,S$GLB,, | Performed by: INTERNAL MEDICINE

## 2024-02-20 PROCEDURE — 1101F PT FALLS ASSESS-DOCD LE1/YR: CPT | Mod: CPTII,S$GLB,, | Performed by: INTERNAL MEDICINE

## 2024-02-20 PROCEDURE — 3078F DIAST BP <80 MM HG: CPT | Mod: CPTII,S$GLB,, | Performed by: INTERNAL MEDICINE

## 2024-02-20 PROCEDURE — 1160F RVW MEDS BY RX/DR IN RCRD: CPT | Mod: CPTII,S$GLB,, | Performed by: INTERNAL MEDICINE

## 2024-02-20 PROCEDURE — 4010F ACE/ARB THERAPY RXD/TAKEN: CPT | Mod: CPTII,S$GLB,, | Performed by: INTERNAL MEDICINE

## 2024-02-20 PROCEDURE — 3074F SYST BP LT 130 MM HG: CPT | Mod: CPTII,S$GLB,, | Performed by: INTERNAL MEDICINE

## 2024-02-20 PROCEDURE — 1159F MED LIST DOCD IN RCRD: CPT | Mod: CPTII,S$GLB,, | Performed by: INTERNAL MEDICINE

## 2024-02-20 PROCEDURE — 99999 PR PBB SHADOW E&M-EST. PATIENT-LVL IV: CPT | Mod: PBBFAC,,, | Performed by: INTERNAL MEDICINE

## 2024-02-20 PROCEDURE — 99215 OFFICE O/P EST HI 40 MIN: CPT | Mod: S$GLB,,, | Performed by: INTERNAL MEDICINE

## 2024-02-20 RX ORDER — LISINOPRIL 20 MG/1
10 TABLET ORAL DAILY
Qty: 45 TABLET | Refills: 3 | Status: SHIPPED | OUTPATIENT
Start: 2024-02-20

## 2024-02-20 NOTE — PROGRESS NOTES
Subjective:   Patient ID:  Antonio Urena is a 73 y.o. female who presents for follow up of Palpitations and Dizziness      74 yo female, came in for post ER visit.  PMH AFIB s/p RFA and PPM, HTn HLD, h/o TIA h/o skin Ca. no h/o MI  Prior cardiologist Dr. Ling. F/u Dr. Martinez for afib s/p PPM.  2022 echo EF nl. Mild AI and mR  In the past 2 months, 4 spells of chest pain dyspnea dizziness fatigue. BP 90/50 mmHG at home, and 120/80 mmHG in ER. Troponin x2 and BNP negative, EKG A pacing and V sensing QTc 442 ms  Weight loss 25 lbs on purpose since 07/23             Past Medical History:   Diagnosis Date    A-fib     A-fib     Pace maker put in 5/12/20    Allergy     Angina pectoris     Anxiety     Arthritis     Asthma     cough variant    Back pain     Cancer 2018    basal cell c    Colon polyp     COPD (chronic obstructive pulmonary disease)     Depression     Hyperlipidemia     Hypertension     Obesity     Pneumonia        Past Surgical History:   Procedure Laterality Date    ABLATION Bilateral 06/03/2021    Procedure: ABLATION/CTI;  Surgeon: Juan Lazo MD;  Location: [facility] CATH LAB;  Service: Cardiology;  Laterality: Bilateral;  (RFA of the CTI,    COVID-19, MRNA, LN-S, PF (Pfizer) 3/20/2021, 2/27/2021   later date RA lead extraction, RA lead addition    CATARACT EXTRACTION Right     MGM    CATARACT EXTRACTION Left 07/28/2022    MGM    COLONOSCOPY N/A 10/10/2022    Procedure: COLONOSCOPY 8/31--card clearance received per Dr Ling;  Surgeon: Nestor Sher MD;  Location: [facility] ENDO;  Service: Gastroenterology;  Laterality: N/A;    HYSTERECTOMY      IMPLANTATION OF BIVENTRICULAR HEART PACEMAKER  05/12/2020    Loop recorder also in but not working right now    PHLEBOGRAPHY  06/08/2021    Procedure: Venogram;  Surgeon: Juan Lazo MD;  Location: [facility] CATH LAB;  Service: Cardiology;;    REVISION OF PROCEDURE INVOLVING PACEMAKER LEAD N/A 06/08/2021    Procedure: REVISION, ELECTRODE LEAD,  CARDIAC PACEMAKER;  Surgeon: Juan Lazo MD;  Location: [facility] CATH LAB;  Service: Cardiology;  Laterality: N/A;    REVISION OF PROCEDURE INVOLVING PACEMAKER LEAD Bilateral 07/15/2021    Procedure: REVISION, ELECTRODE LEAD, CARDIAC PACEMAKER/A lead;  Surgeon: Juan Lazo MD;  Location: [facility] CATH LAB;  Service: Cardiology;  Laterality: Bilateral;  biotronik device    REVISION OF SKIN POCKET FOR PACEMAKER  2021    Procedure: Revision, Skin Pocket, For Cardiac Pacemaker;  Surgeon: Juan Lazo MD;  Location: [facility] CATH LAB;  Service: Cardiology;;       Social History     Tobacco Use    Smoking status: Former     Current packs/day: 0.00     Average packs/day: 0.3 packs/day for 1 year (0.3 ttl pk-yrs)     Types: Cigarettes     Start date: 1984     Quit date: 1985     Years since quittin.1    Smokeless tobacco: Former    Tobacco comments:     Quit 30 - 40 years ago   Substance Use Topics    Alcohol use: Not Currently     Comment: Wine Occasionally     Drug use: Never       Family History   Problem Relation Age of Onset    Cancer Mother     Cancer Father     Cancer Brother     Stroke Brother          ROS    Objective:   Physical Exam  HENT:      Head: Normocephalic.   Eyes:      Pupils: Pupils are equal, round, and reactive to light.   Neck:      Thyroid: No thyromegaly.      Vascular: Normal carotid pulses. No carotid bruit or JVD.   Cardiovascular:      Rate and Rhythm: Normal rate and regular rhythm. No extrasystoles are present.     Chest Wall: PMI is not displaced.      Pulses: Normal pulses.      Heart sounds: Normal heart sounds. No murmur heard.     No gallop. No S3 sounds.   Pulmonary:      Effort: No respiratory distress.      Breath sounds: Normal breath sounds. No stridor.   Abdominal:      General: Bowel sounds are normal.      Palpations: Abdomen is soft.      Tenderness: There is no abdominal tenderness. There is no rebound.   Musculoskeletal:         General: Normal  "range of motion.   Skin:     Findings: No rash.   Neurological:      Mental Status: She is alert and oriented to person, place, and time.   Psychiatric:         Behavior: Behavior normal.         Lab Results   Component Value Date    CHOL 140 01/30/2024    CHOL 139 01/25/2023    CHOL 134 02/04/2022     Lab Results   Component Value Date    HDL 62 01/30/2024    HDL 61 01/25/2023    HDL 69 02/04/2022     Lab Results   Component Value Date    LDLCALC 60.2 (L) 01/30/2024    LDLCALC 62.2 (L) 01/25/2023    LDLCALC 50.4 (L) 02/04/2022     Lab Results   Component Value Date    TRIG 89 01/30/2024    TRIG 79 01/25/2023    TRIG 73 02/04/2022     Lab Results   Component Value Date    CHOLHDL 44.3 01/30/2024    CHOLHDL 43.9 01/25/2023    CHOLHDL 51.5 (H) 02/04/2022       Chemistry        Component Value Date/Time     02/15/2024 1638    K 4.5 02/15/2024 1638     02/15/2024 1638    CO2 23 02/15/2024 1638    BUN 19 02/15/2024 1638    CREATININE 1.0 02/15/2024 1638     02/15/2024 1638        Component Value Date/Time    CALCIUM 9.2 02/15/2024 1638    ALKPHOS 58 02/15/2024 1638    AST 16 02/15/2024 1638    ALT 16 02/15/2024 1638    BILITOT 0.2 02/15/2024 1638    ESTGFRAFRICA >60 03/11/2022 1743    EGFRNONAA >60 03/11/2022 1743          No results found for: "LABA1C", "HGBA1C"  Lab Results   Component Value Date    TSH 1.575 05/25/2023     Lab Results   Component Value Date    INR 1.1 08/29/2021    INR 1.0 07/12/2021    INR 1.0 06/06/2021     Lab Results   Component Value Date    WBC 8.68 02/15/2024    HGB 12.9 02/15/2024    HCT 39.6 02/15/2024    MCV 89 02/15/2024     02/15/2024     BMP  Sodium   Date Value Ref Range Status   02/15/2024 138 136 - 145 mmol/L Final     Potassium   Date Value Ref Range Status   02/15/2024 4.5 3.5 - 5.1 mmol/L Final     Chloride   Date Value Ref Range Status   02/15/2024 104 95 - 110 mmol/L Final     CO2   Date Value Ref Range Status   02/15/2024 23 23 - 29 mmol/L Final     BUN "   Date Value Ref Range Status   02/15/2024 19 8 - 23 mg/dL Final     Creatinine   Date Value Ref Range Status   02/15/2024 1.0 0.5 - 1.4 mg/dL Final     Calcium   Date Value Ref Range Status   02/15/2024 9.2 8.7 - 10.5 mg/dL Final     Anion Gap   Date Value Ref Range Status   02/15/2024 11 8 - 16 mmol/L Final     eGFR if    Date Value Ref Range Status   03/11/2022 >60 >60 mL/min/1.73 m^2 Final     eGFR if non    Date Value Ref Range Status   03/11/2022 >60 >60 mL/min/1.73 m^2 Final     Comment:     Calculation used to obtain the estimated glomerular filtration  rate (eGFR) is the CKD-EPI equation.        BNP  @LABRCNTIP(BNP,BNPTRIAGEBLO)@  @LABRCNTIP(troponini)@  Estimated Creatinine Clearance: 53.9 mL/min (based on SCr of 1 mg/dL).  No results found in the last 24 hours.  No results found in the last 24 hours.  No results found in the last 24 hours.    Assessment:      1. Essential hypertension    2. Paroxysmal atrial fibrillation    3. Atherosclerosis of aorta    4. Pure hypercholesterolemia    5. Cardiac pacemaker in situ    6. SSS (sick sinus syndrome)    7. Atrial fibrillation and flutter    8. Symptomatic bradycardia    9. History of cardiac radiofrequency ablation (RFA)    10. Typical atrial flutter    11. Morbid obesity with BMI of 40.0-44.9, adult    12. TIA involving carotid artery    13. Other chest pain      Episodes of low BP and ER visits  Plan:   Decrease lisinopril from 30 mg daily 10 mg daily to avoid low BP  Exercise MPI for chest pain   Continue Eliquis cardizem flecainide 100 mg bid and statin  RTC in 2 m

## 2024-02-26 ENCOUNTER — TELEPHONE (OUTPATIENT)
Dept: CARDIOLOGY | Facility: CLINIC | Age: 74
End: 2024-02-26
Payer: MEDICARE

## 2024-02-26 NOTE — TELEPHONE ENCOUNTER
Spoke with pt in regards to informing her CPT codes were given to insurance.                 ----- Message from Claire Gibson sent at 2/26/2024  2:01 PM CST -----  Contact: Antonio  Pt called to confirm the cpt code and auth for her tests on 3/7 were submitted. Please call her back at [phone].     Thanks  TS

## 2024-02-26 NOTE — TELEPHONE ENCOUNTER
"Spoke with [facility] in regards to providing cpt codes for upcoming NMST.                   ----- Message from Chasidy Morales sent at 2/26/2024  1:48 PM CST -----  Regarding: Appointments  "Type:  Patient Call Back    Who Called:Edna([facility])    What is the reqeust in detail:Requesting call back in regards to an CPT code for pt appointments on March 7th. Please advise    Can the clinic reply by [facility]?no     Best Call Back Number:[phone]      Additional Information:Ref#[id number]            "

## 2024-03-01 ENCOUNTER — CLINICAL SUPPORT (OUTPATIENT)
Dept: PULMONOLOGY | Facility: CLINIC | Age: 74
End: 2024-03-01
Attending: NURSE PRACTITIONER
Payer: MEDICARE

## 2024-03-01 DIAGNOSIS — J44.89 ASTHMA WITH COPD: Chronic | ICD-10-CM

## 2024-03-01 LAB
BRPFT: NORMAL
FEF 25 75 LLN: 0.73
FEF 25 75 PRE REF: 17.7 %
FEF 25 75 REF: 1.66
FEV1 FVC LLN: 64
FEV1 FVC PRE REF: 59.5 %
FEV1 FVC REF: 78
FEV1 LLN: 1.43
FEV1 PRE REF: 52.1 %
FEV1 REF: 2
FVC LLN: 1.86
FVC PRE REF: 86.7 %
FVC REF: 2.6
PEF LLN: 3.43
PEF PRE REF: 82.9 %
PEF REF: 5.09
PRE FEF 25 75: 0.29 L/S
PRE FET 100: 14.69 SEC
PRE FEV1 FVC: 46.2 %
PRE FEV1: 1.04 L
PRE FVC: 2.25 L
PRE PEF: 4.22 L/S

## 2024-03-01 PROCEDURE — 94010 BREATHING CAPACITY TEST: CPT | Mod: S$GLB,,, | Performed by: INTERNAL MEDICINE

## 2024-03-07 ENCOUNTER — HOSPITAL ENCOUNTER (OUTPATIENT)
Dept: RADIOLOGY | Facility: HOSPITAL | Age: 74
Discharge: HOME | End: 2024-03-07
Attending: INTERNAL MEDICINE
Payer: MEDICARE

## 2024-03-07 ENCOUNTER — HOSPITAL ENCOUNTER (OUTPATIENT)
Dept: CARDIOLOGY | Facility: HOSPITAL | Age: 74
Discharge: HOME | End: 2024-03-07
Attending: INTERNAL MEDICINE
Payer: MEDICARE

## 2024-03-07 DIAGNOSIS — R07.89 OTHER CHEST PAIN: ICD-10-CM

## 2024-03-07 LAB
CV STRESS BASE HR: 60 BPM
DIASTOLIC BLOOD PRESSURE: 73 MMHG
NUC REST EJECTION FRACTION: 78
NUC STRESS EJECTION FRACTION: 84 %
OHS CV CPX 85 PERCENT MAX PREDICTED HEART RATE MALE: 123
OHS CV CPX MAX PREDICTED HEART RATE: 145
OHS CV CPX PATIENT IS FEMALE: 1
OHS CV CPX PATIENT IS MALE: 0
OHS CV CPX PEAK DIASTOLIC BLOOD PRESSURE: 61 MMHG
OHS CV CPX PEAK HEAR RATE: 61 BPM
OHS CV CPX PEAK RATE PRESSURE PRODUCT: 6771
OHS CV CPX PEAK SYSTOLIC BLOOD PRESSURE: 111 MMHG
OHS CV CPX PERCENT MAX PREDICTED HEART RATE ACHIEVED: 44
OHS CV CPX RATE PRESSURE PRODUCT PRESENTING: 6120
SYSTOLIC BLOOD PRESSURE: 102 MMHG

## 2024-03-07 PROCEDURE — 63600175 PHARM REV CODE 636 W HCPCS: Performed by: INTERNAL MEDICINE

## 2024-03-07 PROCEDURE — A9502 TC99M TETROFOSMIN: HCPCS | Performed by: INTERNAL MEDICINE

## 2024-03-07 PROCEDURE — 93018 CV STRESS TEST I&R ONLY: CPT | Mod: ,,, | Performed by: STUDENT IN AN ORGANIZED HEALTH CARE EDUCATION/TRAINING PROGRAM

## 2024-03-07 PROCEDURE — 93017 CV STRESS TEST TRACING ONLY: CPT

## 2024-03-07 PROCEDURE — 78452 HT MUSCLE IMAGE SPECT MULT: CPT

## 2024-03-07 PROCEDURE — 93016 CV STRESS TEST SUPVJ ONLY: CPT | Mod: ,,, | Performed by: STUDENT IN AN ORGANIZED HEALTH CARE EDUCATION/TRAINING PROGRAM

## 2024-03-07 PROCEDURE — 78452 HT MUSCLE IMAGE SPECT MULT: CPT | Mod: 26,,, | Performed by: STUDENT IN AN ORGANIZED HEALTH CARE EDUCATION/TRAINING PROGRAM

## 2024-03-07 RX ORDER — REGADENOSON 0.08 MG/ML
0.4 INJECTION, SOLUTION INTRAVENOUS ONCE
Status: COMPLETED | OUTPATIENT
Start: 2024-03-07 | End: 2024-03-07

## 2024-03-07 RX ADMIN — TETROFOSMIN 9.3 MILLICURIE: 1.38 INJECTION, POWDER, LYOPHILIZED, FOR SOLUTION INTRAVENOUS at 08:03

## 2024-03-07 RX ADMIN — REGADENOSON 0.4 MG: 0.08 INJECTION, SOLUTION INTRAVENOUS at 10:03

## 2024-03-07 RX ADMIN — TETROFOSMIN 30.7 MILLICURIE: 1.38 INJECTION, POWDER, LYOPHILIZED, FOR SOLUTION INTRAVENOUS at 10:03

## 2024-03-11 ENCOUNTER — TELEPHONE (OUTPATIENT)
Dept: CARDIOLOGY | Facility: CLINIC | Age: 74
End: 2024-03-11
Payer: MEDICARE

## 2024-03-11 NOTE — TELEPHONE ENCOUNTER
LVM for pt to call back in regards to test results.                 ----- Message from Bo Pinto MD sent at 3/11/2024  3:59 PM CDT -----  Nuclear stress test normal   Continue current Rx.   F/U as scheduled

## 2024-03-12 ENCOUNTER — TELEPHONE (OUTPATIENT)
Dept: CARDIOLOGY | Facility: CLINIC | Age: 74
End: 2024-03-12
Payer: MEDICARE

## 2024-03-12 NOTE — TELEPHONE ENCOUNTER
Patient contacted and was advised that her     Nuclear stress test normal   Continue current Rx.   F/U as schedule   The patient stated understanding with no questions or concerns.

## 2024-03-25 DIAGNOSIS — I48.21 PERMANENT ATRIAL FIBRILLATION: ICD-10-CM

## 2024-03-25 DIAGNOSIS — R07.9 CHEST PAIN, UNSPECIFIED TYPE: ICD-10-CM

## 2024-03-25 DIAGNOSIS — Z95.0 CARDIAC PACEMAKER IN SITU: ICD-10-CM

## 2024-03-25 DIAGNOSIS — T82.9XXS DISORDER OF CARDIAC PACEMAKER SYSTEM, SEQUELA: ICD-10-CM

## 2024-03-25 DIAGNOSIS — E78.5 HYPERLIPIDEMIA, UNSPECIFIED HYPERLIPIDEMIA TYPE: ICD-10-CM

## 2024-03-25 DIAGNOSIS — R06.02 SOB (SHORTNESS OF BREATH): ICD-10-CM

## 2024-03-25 DIAGNOSIS — R00.2 PALPITATIONS: ICD-10-CM

## 2024-03-25 DIAGNOSIS — I10 ESSENTIAL HYPERTENSION: ICD-10-CM

## 2024-03-25 NOTE — TELEPHONE ENCOUNTER
Care Due:                  Date            Visit Type   Department     Provider  --------------------------------------------------------------------------------    Last Visit: None Found      None         None Found  Next Visit: None Scheduled  None         None Found                                                            Last  Test          Frequency    Reason                     Performed    Due Date  --------------------------------------------------------------------------------    Office Visit  15 months..  diltiaZEM, rosuvastatin..  Not Found    Overdue    [employer] Embedded Care Due Messages. Reference number: [id number].   3/25/2024 12:17:39 PM CDT

## 2024-03-27 ENCOUNTER — PATIENT MESSAGE (OUTPATIENT)
Dept: CARDIOLOGY | Facility: CLINIC | Age: 74
End: 2024-03-27
Payer: MEDICARE

## 2024-03-28 ENCOUNTER — PATIENT MESSAGE (OUTPATIENT)
Dept: INTERNAL MEDICINE | Facility: CLINIC | Age: 74
End: 2024-03-28
Payer: MEDICARE

## 2024-03-28 DIAGNOSIS — I48.21 PERMANENT ATRIAL FIBRILLATION: ICD-10-CM

## 2024-03-28 DIAGNOSIS — E78.5 HYPERLIPIDEMIA, UNSPECIFIED HYPERLIPIDEMIA TYPE: ICD-10-CM

## 2024-03-28 DIAGNOSIS — I10 ESSENTIAL HYPERTENSION: ICD-10-CM

## 2024-03-28 DIAGNOSIS — R06.02 SOB (SHORTNESS OF BREATH): ICD-10-CM

## 2024-03-28 DIAGNOSIS — R00.2 PALPITATIONS: ICD-10-CM

## 2024-03-28 DIAGNOSIS — T82.9XXS DISORDER OF CARDIAC PACEMAKER SYSTEM, SEQUELA: ICD-10-CM

## 2024-03-28 DIAGNOSIS — R07.9 CHEST PAIN, UNSPECIFIED TYPE: ICD-10-CM

## 2024-03-28 DIAGNOSIS — Z95.0 CARDIAC PACEMAKER IN SITU: ICD-10-CM

## 2024-03-28 RX ORDER — DILTIAZEM HYDROCHLORIDE 240 MG/1
240 CAPSULE, EXTENDED RELEASE ORAL DAILY
Qty: 90 CAPSULE | Refills: 3 | Status: SHIPPED | OUTPATIENT
Start: 2024-03-28 | End: 2024-04-01 | Stop reason: SDUPTHER

## 2024-03-28 RX ORDER — DILTIAZEM HYDROCHLORIDE 240 MG/1
240 CAPSULE, EXTENDED RELEASE ORAL DAILY
Qty: 90 CAPSULE | Refills: 3 | Status: CANCELLED | OUTPATIENT
Start: 2024-03-28 | End: 2025-03-28

## 2024-03-28 NOTE — TELEPHONE ENCOUNTER
No care due was identified.  [facility] Embedded Care Due Messages. Reference number: [id number].   3/28/2024 1:53:00 PM CDT

## 2024-04-01 ENCOUNTER — OFFICE VISIT (OUTPATIENT)
Dept: CARDIOLOGY | Facility: CLINIC | Age: 74
End: 2024-04-01
Payer: MEDICARE

## 2024-04-01 VITALS
WEIGHT: 207.44 LBS | SYSTOLIC BLOOD PRESSURE: 130 MMHG | OXYGEN SATURATION: 94 % | DIASTOLIC BLOOD PRESSURE: 64 MMHG | HEIGHT: 63 IN | HEART RATE: 60 BPM | BODY MASS INDEX: 36.75 KG/M2

## 2024-04-01 VITALS
HEART RATE: 60 BPM | WEIGHT: 207.44 LBS | BODY MASS INDEX: 36.75 KG/M2 | DIASTOLIC BLOOD PRESSURE: 64 MMHG | SYSTOLIC BLOOD PRESSURE: 130 MMHG

## 2024-04-01 DIAGNOSIS — T82.9XXS DISORDER OF CARDIAC PACEMAKER SYSTEM, SEQUELA: ICD-10-CM

## 2024-04-01 DIAGNOSIS — I48.92 ATRIAL FIBRILLATION AND FLUTTER: Chronic | ICD-10-CM

## 2024-04-01 DIAGNOSIS — R00.2 PALPITATIONS: ICD-10-CM

## 2024-04-01 DIAGNOSIS — I48.91 ATRIAL FIBRILLATION AND FLUTTER: Chronic | ICD-10-CM

## 2024-04-01 DIAGNOSIS — I10 ESSENTIAL HYPERTENSION: Chronic | ICD-10-CM

## 2024-04-01 DIAGNOSIS — I49.5 SSS (SICK SINUS SYNDROME): Chronic | ICD-10-CM

## 2024-04-01 DIAGNOSIS — I10 ESSENTIAL HYPERTENSION: ICD-10-CM

## 2024-04-01 DIAGNOSIS — R07.9 CHEST PAIN, UNSPECIFIED TYPE: ICD-10-CM

## 2024-04-01 DIAGNOSIS — I48.21 PERMANENT ATRIAL FIBRILLATION: Primary | ICD-10-CM

## 2024-04-01 DIAGNOSIS — R06.02 SOB (SHORTNESS OF BREATH): ICD-10-CM

## 2024-04-01 DIAGNOSIS — I48.92 ATRIAL FIBRILLATION AND FLUTTER: Primary | Chronic | ICD-10-CM

## 2024-04-01 DIAGNOSIS — G45.1 TIA INVOLVING CAROTID ARTERY: ICD-10-CM

## 2024-04-01 DIAGNOSIS — J44.89 ASTHMA WITH COPD: Chronic | ICD-10-CM

## 2024-04-01 DIAGNOSIS — I48.91 ATRIAL FIBRILLATION AND FLUTTER: Primary | Chronic | ICD-10-CM

## 2024-04-01 DIAGNOSIS — I70.0 ATHEROSCLEROSIS OF AORTA: ICD-10-CM

## 2024-04-01 DIAGNOSIS — Z98.890 HISTORY OF CARDIAC RADIOFREQUENCY ABLATION (RFA): ICD-10-CM

## 2024-04-01 DIAGNOSIS — E66.09 CLASS 2 OBESITY DUE TO EXCESS CALORIES WITHOUT SERIOUS COMORBIDITY WITH BODY MASS INDEX (BMI) OF 36.0 TO 36.9 IN ADULT: ICD-10-CM

## 2024-04-01 DIAGNOSIS — E78.00 PURE HYPERCHOLESTEROLEMIA: Chronic | ICD-10-CM

## 2024-04-01 DIAGNOSIS — I48.3 TYPICAL ATRIAL FLUTTER: ICD-10-CM

## 2024-04-01 DIAGNOSIS — I48.0 PAROXYSMAL ATRIAL FIBRILLATION: Chronic | ICD-10-CM

## 2024-04-01 DIAGNOSIS — E78.5 HYPERLIPIDEMIA, UNSPECIFIED HYPERLIPIDEMIA TYPE: ICD-10-CM

## 2024-04-01 DIAGNOSIS — Z95.0 CARDIAC PACEMAKER IN SITU: ICD-10-CM

## 2024-04-01 DIAGNOSIS — Z95.0 CARDIAC PACEMAKER IN SITU: Chronic | ICD-10-CM

## 2024-04-01 DIAGNOSIS — Z79.01 CHRONIC ANTICOAGULATION: Chronic | ICD-10-CM

## 2024-04-01 PROBLEM — E66.812 CLASS 2 OBESITY DUE TO EXCESS CALORIES WITHOUT SERIOUS COMORBIDITY WITH BODY MASS INDEX (BMI) OF 36.0 TO 36.9 IN ADULT: Status: ACTIVE | Noted: 2024-04-01

## 2024-04-01 PROBLEM — E66.01 MORBID OBESITY WITH BMI OF 40.0-44.9, ADULT: Status: RESOLVED | Noted: 2022-03-07 | Resolved: 2024-04-01

## 2024-04-01 PROCEDURE — 3075F SYST BP GE 130 - 139MM HG: CPT | Mod: CPTII,S$GLB,, | Performed by: INTERNAL MEDICINE

## 2024-04-01 PROCEDURE — 4010F ACE/ARB THERAPY RXD/TAKEN: CPT | Mod: CPTII,S$GLB,, | Performed by: INTERNAL MEDICINE

## 2024-04-01 PROCEDURE — 1160F RVW MEDS BY RX/DR IN RCRD: CPT | Mod: CPTII,S$GLB,, | Performed by: INTERNAL MEDICINE

## 2024-04-01 PROCEDURE — 1159F MED LIST DOCD IN RCRD: CPT | Mod: CPTII,S$GLB,, | Performed by: INTERNAL MEDICINE

## 2024-04-01 PROCEDURE — 99214 OFFICE O/P EST MOD 30 MIN: CPT | Mod: S$GLB,,, | Performed by: INTERNAL MEDICINE

## 2024-04-01 PROCEDURE — 1126F AMNT PAIN NOTED NONE PRSNT: CPT | Mod: CPTII,S$GLB,, | Performed by: INTERNAL MEDICINE

## 2024-04-01 PROCEDURE — 3078F DIAST BP <80 MM HG: CPT | Mod: CPTII,S$GLB,, | Performed by: INTERNAL MEDICINE

## 2024-04-01 PROCEDURE — 99999 PR PBB SHADOW E&M-EST. PATIENT-LVL IV: CPT | Mod: PBBFAC,,, | Performed by: INTERNAL MEDICINE

## 2024-04-01 PROCEDURE — 99999 PR PBB SHADOW E&M-EST. PATIENT-LVL III: CPT | Mod: PBBFAC,,, | Performed by: INTERNAL MEDICINE

## 2024-04-01 PROCEDURE — 3288F FALL RISK ASSESSMENT DOCD: CPT | Mod: CPTII,S$GLB,, | Performed by: INTERNAL MEDICINE

## 2024-04-01 PROCEDURE — 1101F PT FALLS ASSESS-DOCD LE1/YR: CPT | Mod: CPTII,S$GLB,, | Performed by: INTERNAL MEDICINE

## 2024-04-01 RX ORDER — DILTIAZEM HYDROCHLORIDE 240 MG/1
240 CAPSULE, EXTENDED RELEASE ORAL DAILY
Qty: 7 CAPSULE | Refills: 2 | Status: SHIPPED | OUTPATIENT
Start: 2024-04-01 | End: 2025-04-01

## 2024-04-01 NOTE — PROGRESS NOTES
"  Subjective:   Patient ID:  Antonio Urena is a 74 y.o. female     Chief complaint:  Tachy-suresh syndrome    HPI  Initially referred by Dr Ling for AF/ PPM issues--   Chart reviewed in detail   All ECGs in Epic personally reviewed  PPM eval data personally reviewed.   71 woman  PAF on chronic anticoagulation (Eliquis)    Dxed after c/o palpitations and some weakness @ 1 year ago. Had DC PPM placed on 5/12/2020 in [place].  A lead displaced apparently shortly thereafter.   Was on Flec but then was told that it "did the opposite of what it was supposed to do"   Now on Multaq  Reviewed all ECGs and she was in AF on ECGs from Oct 2020 till now and she was in AF on ECG from 10/19/20, and 5/14/21 as well as all the time on Holter from 11/2/20 but in NSR on ECGs from 12/2/2020 and 5/10/21 as well as holter from 5/12/21  The PPM was in VVI 60 bpm and was pacing frequently.     Labs are all OK (specifically normal CBC, CMP, TSH and BNP)     Echo:  The left ventricle is normal in size with normal systolic function. The estimated ejection fraction is 55%.  There is left ventricular concentric remodeling.  Mild left atrial enlargement.  Indeterminate diastolic function.  Normal right ventricular systolic function.  Normal central venous pressure (3 mmHg).  The estimated PA systolic pressure is 29 mmHg.  Small circumferential pericardial effusion. Effusion is fluid.     Would proceed with obtaining old records and considering RFA of the CTI as a first step then removing A lead and re-implanting another (on a different date than the RFA). Would then switch from Multaq to flecainide.     The extraction should be simple and if a simple pullback is insufficient, would remove the lead via a trans-femoral approach.     In the meantime, we have also decreased PPM rate to 30 bpm   \   Update 9/9/2021:  She had RFA of the CTI and PPM revision and was switched from Multaq to Flec.  She felt a lot better  since then but has had " "recent c/o palps but these did not correlate with any ATs on her PPM   Last PPM check - ASVS 14%, APVS 85%, APVP 1% - no arr, TI stable and little activity - sedentary pat      Clinically:  Additional c/os include palps and H/As after bending over  She has started with the digital HTN clinic  She also has had an ER visit due to dysarthria - this was similar to her past events when she was in 6th, 9th and 12th grade and w/up was neg for CVA/TIA - this was clinically a migraine manifestation.   >>    She is doing well from an EP point of view - no AF etc   Palps may well be related to cardiac awareness as part of reflex adrenergia due to hypotension caused by bending  - they may also be related to isolated PACs    Now in NSR  >>  48 hr Holter for correlation attempt to palps  This showed NSR, Some PMTs - we can perhaps program around that     Update 4/22/2022:  "I am feeling 98% but occ feel rapid heartbeat after I stand up and walk around" - the max is 89 bpm, then in  Few secs or a min or so, it slows down gradually to 60 bpm. Happens during the day but not late in the evening      >>  rapid heartbeat likely a result of mild OH and CLS response      Update 12/03/2023 :  One year follow-up.  In the meantime, very little palpitations.  Occasional irregular heartbeats.  >>  Patient's device (DDD)was fully evaluated today under my direct supervision and real-time feedback.  Summary of findings are as listed below:  Device is in good repair.   The battery is not near DALILA.  The sensing and pacing thresholds are favorable with well maintained safety margins.   There were no significant tachy-dysrhythmias noted.  There were no device data indicating possible ongoing fluid retention.    Recommendation:  Device follow up as per clinic routine with remote and in house checks  Change back from DDDR to DDD CLS.  >>  >>  Pacemaker is in good repair.  No PAF.  Tolerating flecainide quite well.  Atrial flutter apparently cured by " RFA.  >>  Keep same Rx    Update 04/01/2024 :  Since last visit, she has had 3 episodes of low blood pressure.  The 1st 1 she was clearly dehydrated.  3 to 4 weeks later she had similar symptoms of dizziness, near-syncope for about 2 days.  She went to urgent care.  After the 3rd episode, Dr. Get paulino increased her lisinopril from 30 mg a day to 10 mg a day.  As a result she has been feeling so much better and she is even sleeps better.  She feels so well that occasionally when she walks her dog instead of doing her usual half a mi a day she goes for up to 2 miles without any issues.    Current Outpatient Medications   Medication Sig    cetirizine (ZYRTEC) 10 MG tablet Take 10 mg by mouth once daily.     elderberry fruit 350 mg Cap Take by mouth.    ELIQUIS 5 mg Tab TAKE 1 TABLET TWICE DAILY    flecainide (TAMBOCOR) 100 MG Tab TAKE 1 TABLET EVERY 12 HOURS    fluticasone propionate (FLONASE) 50 mcg/actuation nasal spray 2 sprays (100 mcg total) by Each Nostril route once daily.    fluticasone-salmeterol diskus inhaler 250-50 mcg Inhale 1 puff into the lungs 2 (two) times daily. Controller.Wash out mouth after using.    levalbuterol (XOPENEX HFA) 45 mcg/actuation inhaler Inhale 2 puffs into the lungs every 6 (six) hours as needed for Wheezing or Shortness of Breath.    lisinopriL (PRINIVIL,ZESTRIL) 20 MG tablet Take 0.5 tablets (10 mg total) by mouth once daily.    multivitamin capsule Take 1 capsule by mouth once daily.    omega-3 fatty acids/fish oil (FISH OIL-OMEGA-3 FATTY ACIDS) 300-1,000 mg capsule Take 1 capsule by mouth once daily.    rosuvastatin (CRESTOR) 40 MG Tab Take 1 tablet (40 mg total) by mouth once daily.    tiotropium bromide (SPIRIVA RESPIMAT) 2.5 mcg/actuation inhaler Inhale 2 puffs into the lungs once daily.    triamcinolone acetonide 0.1% (KENALOG) 0.1 % cream Apply topically 2 (two) times daily. Use up to 2 weeks at a time    diltiaZEM (DILT-XR) 240 MG CDCR Take 1 capsule (240 mg total) by  mouth once daily.    ketoconazole (NIZORAL) 2 % shampoo Apply topically twice a week.    LORazepam (ATIVAN) 0.5 MG tablet Take 0.5 mg by mouth daily as needed for Anxiety.     No current facility-administered medications for this visit.     Review of Systems     Constitutional: Reviewed  for decreased appetite, weight gain and weight loss.   HENT: Reviewed for nosebleeds.    Eyes:  Reviewed for blurred vision and visual disturbance.   Cardiovascular: Reviewed for chest pain, claudication, cyanosis,dyspnea on exertion, leg swelling, orthopnea,paroxysmal nocturnal dyspnearregular heartbeats, palpitations, near-syncope, and syncope.   Respiratory: Reviewed for cough, shortness of breath, wheezing, sleep disturbances due to breathing and snoring, .    Endocrine: Reviewed for heat intolerance.   Hematologic/Lymphatic: Reviewed for easy bruisability/bleeding.   Skin: Reviewed for rash.   Musculoskeletal: Reviewed for muscle weakness and myalgias.   Gastrointestinal: Reviewed for abdominal pain, anorexia, melena, nausea and vomiting.   Genitourinary: Reviewed for menorrhagia, frequency, nocturia and incontinence.   Neurological: Reviewed for excessive daytime sleepiness, dizziness, vertigo, weakness, headaches, loss of balance and seizures,   Psychiatric/Behavioral:  Reviewed for insomnia, altered mental status, depression, anxiety and nervousness.       All symptoms reviewed above were negative except for occasional cough, heartburn, daytime sleepiness, arthritic complaints.       Social History     Tobacco Use   Smoking Status Former    Current packs/day: 0.00    Average packs/day: 0.3 packs/day for 1 year (0.3 ttl pk-yrs)    Types: Cigarettes    Start date: 1984    Quit date: 1985    Years since quittin.2   Smokeless Tobacco Former   Tobacco Comments    Quit 30 - 40 years ago        Objective:     Physical Exam  Vitals and nursing note reviewed.   Constitutional:       Appearance: She is well-developed. She  is obese.      Comments: Overweight   HENT:      Head: Normocephalic and atraumatic.      Right Ear: External ear normal.      Left Ear: External ear normal.   Eyes:      General: No scleral icterus.        Left eye: No discharge.      Conjunctiva/sclera: Conjunctivae normal.      Left eye: Left conjunctiva is not injected. No hemorrhage.     Pupils: Pupils are equal, round, and reactive to light.   Neck:      Thyroid: No thyromegaly.   Cardiovascular:      Rate and Rhythm: Normal rate and regular rhythm.      Pulses: Intact distal pulses.           Carotid pulses are 2+ on the right side and 2+ on the left side.       Radial pulses are 2+ on the right side and 2+ on the left side.        Dorsalis pedis pulses are 2+ on the right side and 2+ on the left side.        Posterior tibial pulses are 2+ on the right side and 2+ on the left side.      Heart sounds: Normal heart sounds. No midsystolic click and no opening snap. No murmur heard.     No friction rub. No gallop. No S3 or S4 sounds.   Pulmonary:      Effort: Pulmonary effort is normal.      Breath sounds: Normal breath sounds.   Abdominal:      General: There is no distension.      Palpations: Abdomen is soft. Abdomen is not rigid. There is no hepatomegaly.      Tenderness: There is no abdominal tenderness. There is no guarding.      Comments: Obese abdomen   Musculoskeletal:      Cervical back: Normal range of motion and neck supple.      Right lower leg: No swelling.      Left lower leg: No swelling.      Right ankle: No swelling.      Left ankle: No swelling.   Skin:     General: Skin is warm and dry.      Findings: No rash.      Nails: There is no clubbing.   Neurological:      Mental Status: She is alert and oriented to person, place, and time.      Cranial Nerves: No cranial nerve deficit.      Gait: Gait normal.   Psychiatric:         Speech: Speech normal.         Behavior: Behavior normal.         Thought Content: Thought content normal.       /64  "  Pulse 60   Ht 5' 3" (1.6 m)   Wt 94.1 kg (207 lb 7.3 oz)   SpO2 (!) 94%   BMI 36.75 kg/m²       Results for orders placed during the hospital encounter of 11/16/22    Echo    Interpretation Summary  · The left ventricle is normal in size with concentric remodeling and normal systolic function.  · The estimated ejection fraction is 60%.  · Normal left ventricular diastolic function.  · Normal right ventricular size with normal right ventricular systolic function.  · Mild aortic regurgitation.  · Mild tricuspid regurgitation.  · Normal central venous pressure (3 mmHg).  · The estimated PA systolic pressure is 29 mmHg.  · There is abnormal septal wall motion consistent with right ventricular pacemaker.    WBC   Date Value Ref Range Status   02/15/2024 8.68 3.90 - 12.70 K/uL Final     Hematocrit   Date Value Ref Range Status   02/15/2024 39.6 37.0 - 48.5 % Final     Hemoglobin   Date Value Ref Range Status   02/15/2024 12.9 12.0 - 16.0 g/dL Final     Lab Results   Component Value Date     02/15/2024     Lab Results   Component Value Date    CREATININE 1.0 02/15/2024    EGFRNORACEVR 59.1 (A) 02/15/2024    K 4.5 02/15/2024     Lab Results   Component Value Date    BNP 61 02/15/2024            reports that she does not currently use alcohol.  Past Medical History:   Diagnosis Date    A-fib     A-fib     Pace maker put in 5/12/20    Allergy     Angina pectoris     Anxiety     Arthritis     Asthma     cough variant    Back pain     Cancer 2018    basal cell c    Colon polyp     COPD (chronic obstructive pulmonary disease)     Depression     Hyperlipidemia     Hypertension     Obesity     Pneumonia      Past Surgical History:   Procedure Laterality Date    ABLATION Bilateral 06/03/2021    Procedure: ABLATION/CTI;  Surgeon: Juan Lazo MD;  Location: [facility] CATH LAB;  Service: Cardiology;  Laterality: Bilateral;  (RFA of the CTI,    COVID-19, MRNA, LN-S, PF (Pfizer) 3/20/2021, 2/27/2021   later date RA " lead extraction, RA lead addition    CATARACT EXTRACTION Right     MGM    CATARACT EXTRACTION Left 07/28/2022    MGM    COLONOSCOPY N/A 10/10/2022    Procedure: COLONOSCOPY 8/31--card clearance received per Dr Ling;  Surgeon: Nestor Sher MD;  Location: [facility] ENDO;  Service: Gastroenterology;  Laterality: N/A;    HYSTERECTOMY      IMPLANTATION OF BIVENTRICULAR HEART PACEMAKER  05/12/2020    Loop recorder also in but not working right now    PHLEBOGRAPHY  06/08/2021    Procedure: Venogram;  Surgeon: Juan Lazo MD;  Location: [facility] CATH LAB;  Service: Cardiology;;    REVISION OF PROCEDURE INVOLVING PACEMAKER LEAD N/A 06/08/2021    Procedure: REVISION, ELECTRODE LEAD, CARDIAC PACEMAKER;  Surgeon: Juan Lazo MD;  Location: [facility] CATH LAB;  Service: Cardiology;  Laterality: N/A;    REVISION OF PROCEDURE INVOLVING PACEMAKER LEAD Bilateral 07/15/2021    Procedure: REVISION, ELECTRODE LEAD, CARDIAC PACEMAKER/A lead;  Surgeon: Juan Lazo MD;  Location: [facility] CATH LAB;  Service: Cardiology;  Laterality: Bilateral;  biotronik device    REVISION OF SKIN POCKET FOR PACEMAKER  06/08/2021    Procedure: Revision, Skin Pocket, For Cardiac Pacemaker;  Surgeon: Juan Lazo MD;  Location: [facility] CATH LAB;  Service: Cardiology;;     Family History   Problem Relation Age of Onset    Cancer Mother     Cancer Father     Cancer Brother     Stroke Brother        Assessment:   Currently in sinus rhythm doing well on a combination of flecainide/diltiazem.  1. Atrial fibrillation and flutter    2. TIA involving carotid artery    3. Atherosclerosis of aorta    4. Cardiac pacemaker in situ    5. Essential hypertension    6. SSS (sick sinus syndrome)    7. Typical atrial flutter    8. History of cardiac radiofrequency ablation (RFA)    9. Pure hypercholesterolemia    10. Asthma with COPD    11. Chronic anticoagulation        Plan:      Continue current therapy   No orders of the defined types were placed  in this encounter.      Follow up in about 1 year (around 4/1/2025), or if symptoms worsen or fail to improve.    There are no discontinued medications.         Medication List with Changes/Refills   Current Medications    CETIRIZINE (ZYRTEC) 10 MG TABLET    Take 10 mg by mouth once daily.     ELDERBERRY FRUIT 350 MG CAP    Take by mouth.    ELIQUIS 5 MG TAB    TAKE 1 TABLET TWICE DAILY    FLECAINIDE (TAMBOCOR) 100 MG TAB    TAKE 1 TABLET EVERY 12 HOURS    FLUTICASONE PROPIONATE (FLONASE) 50 MCG/ACTUATION NASAL SPRAY    2 sprays (100 mcg total) by Each Nostril route once daily.    FLUTICASONE-SALMETEROL DISKUS INHALER 250-50 MCG    Inhale 1 puff into the lungs 2 (two) times daily. Controller.Wash out mouth after using.    KETOCONAZOLE (NIZORAL) 2 % SHAMPOO    Apply topically twice a week.    LEVALBUTEROL (XOPENEX HFA) 45 MCG/ACTUATION INHALER    Inhale 2 puffs into the lungs every 6 (six) hours as needed for Wheezing or Shortness of Breath.    LISINOPRIL (PRINIVIL,ZESTRIL) 20 MG TABLET    Take 0.5 tablets (10 mg total) by mouth once daily.    LORAZEPAM (ATIVAN) 0.5 MG TABLET    Take 0.5 mg by mouth daily as needed for Anxiety.    MULTIVITAMIN CAPSULE    Take 1 capsule by mouth once daily.    OMEGA-3 FATTY ACIDS/FISH OIL (FISH OIL-OMEGA-3 FATTY ACIDS) 300-1,000 MG CAPSULE    Take 1 capsule by mouth once daily.    ROSUVASTATIN (CRESTOR) 40 MG TAB    Take 1 tablet (40 mg total) by mouth once daily.    TIOTROPIUM BROMIDE (SPIRIVA RESPIMAT) 2.5 MCG/ACTUATION INHALER    Inhale 2 puffs into the lungs once daily.    TRIAMCINOLONE ACETONIDE 0.1% (KENALOG) 0.1 % CREAM    Apply topically 2 (two) times daily. Use up to 2 weeks at a time   Changed and/or Refilled Medications    Modified Medication Previous Medication    DILTIAZEM (DILT-XR) 240 MG CDCR diltiaZEM (DILT-XR) 240 MG CDCR       Take 1 capsule (240 mg total) by mouth once daily.    Take 1 capsule (240 mg total) by mouth once daily.        This note is at least  partially dictated using the [employer] word recognition program. There are word recognition mistakes that are occasionally missed on review.

## 2024-04-01 NOTE — PROGRESS NOTES
Subjective:   Patient ID:  Antonio Urena is a 74 y.o. female who presents for follow up of No chief complaint on file.      74 yo female, came in for routine f/u  PMH AFIB s/p RFA and PPM, HTn HLD, h/o TIA h/o skin Ca. no h/o MI  Prior cardiologist Dr. Ling. F/u Dr. Martinez for afib s/p PPM.  2022 echo EF nl. Mild AI and mR  In the past 2 months, 4 spells of chest pain dyspnea dizziness fatigue. BP 90/50 mmHG at home, and 120/80 mmHG in ER. Troponin x2 and BNP negative, EKG A pacing and V sensing QTc 442 ms  Weight loss 25 lbs on purpose since 07/23     Interval history  02/24 phar nuke showed no ischemia. BNP and troponin wnl  Now BP at 120 to 130 mmHG. No episode of hypotension. Pt feels better            Past Medical History:   Diagnosis Date    A-fib     A-fib     Pace maker put in 5/12/20    Allergy     Angina pectoris     Anxiety     Arthritis     Asthma     cough variant    Back pain     Cancer 2018    basal cell c    Colon polyp     COPD (chronic obstructive pulmonary disease)     Depression     Hyperlipidemia     Hypertension     Obesity     Pneumonia        Past Surgical History:   Procedure Laterality Date    ABLATION Bilateral 06/03/2021    Procedure: ABLATION/CTI;  Surgeon: Juan Lazo MD;  Location: [facility] CATH LAB;  Service: Cardiology;  Laterality: Bilateral;  (RFA of the CTI,    COVID-19, MRNA, LN-S, PF (Pfizer) 3/20/2021, 2/27/2021   later date RA lead extraction, RA lead addition    CATARACT EXTRACTION Right     MGM    CATARACT EXTRACTION Left 07/28/2022    MGM    COLONOSCOPY N/A 10/10/2022    Procedure: COLONOSCOPY 8/31--card clearance received per Dr Ling;  Surgeon: Nestor Sher MD;  Location: [facility] ENDO;  Service: Gastroenterology;  Laterality: N/A;    HYSTERECTOMY      IMPLANTATION OF BIVENTRICULAR HEART PACEMAKER  05/12/2020    Loop recorder also in but not working right now    PHLEBOGRAPHY  06/08/2021    Procedure: Venogram;  Surgeon: Juan Lazo MD;   Location: [facility] CATH LAB;  Service: Cardiology;;    REVISION OF PROCEDURE INVOLVING PACEMAKER LEAD N/A 2021    Procedure: REVISION, ELECTRODE LEAD, CARDIAC PACEMAKER;  Surgeon: Juan Lazo MD;  Location: [facility] CATH LAB;  Service: Cardiology;  Laterality: N/A;    REVISION OF PROCEDURE INVOLVING PACEMAKER LEAD Bilateral 07/15/2021    Procedure: REVISION, ELECTRODE LEAD, CARDIAC PACEMAKER/A lead;  Surgeon: Juan Lazo MD;  Location: [facility] CATH LAB;  Service: Cardiology;  Laterality: Bilateral;  biotronik device    REVISION OF SKIN POCKET FOR PACEMAKER  2021    Procedure: Revision, Skin Pocket, For Cardiac Pacemaker;  Surgeon: Juan Lazo MD;  Location: [facility] CATH LAB;  Service: Cardiology;;       Social History     Tobacco Use    Smoking status: Former     Current packs/day: 0.00     Average packs/day: 0.3 packs/day for 1 year (0.3 ttl pk-yrs)     Types: Cigarettes     Start date: 1984     Quit date: 1985     Years since quittin.2    Smokeless tobacco: Former    Tobacco comments:     Quit 30 - 40 years ago   Substance Use Topics    Alcohol use: Not Currently     Comment: Wine Occasionally     Drug use: Never       Family History   Problem Relation Age of Onset    Cancer Mother     Cancer Father     Cancer Brother     Stroke Brother          ROS    Objective:   Physical Exam  HENT:      Head: Normocephalic.   Eyes:      Pupils: Pupils are equal, round, and reactive to light.   Neck:      Thyroid: No thyromegaly.      Vascular: Normal carotid pulses. No carotid bruit or JVD.   Cardiovascular:      Rate and Rhythm: Normal rate and regular rhythm. No extrasystoles are present.     Chest Wall: PMI is not displaced.      Pulses: Normal pulses.      Heart sounds: Normal heart sounds. No murmur heard.     No gallop. No S3 sounds.   Pulmonary:      Effort: No respiratory distress.      Breath sounds: Normal breath sounds. No stridor.   Abdominal:      General: Bowel sounds are  "normal.      Palpations: Abdomen is soft.      Tenderness: There is no abdominal tenderness. There is no rebound.   Skin:     Findings: No rash.   Neurological:      Mental Status: She is alert and oriented to person, place, and time.   Psychiatric:         Behavior: Behavior normal.         Lab Results   Component Value Date    CHOL 140 01/30/2024    CHOL 139 01/25/2023    CHOL 134 02/04/2022     Lab Results   Component Value Date    HDL 62 01/30/2024    HDL 61 01/25/2023    HDL 69 02/04/2022     Lab Results   Component Value Date    LDLCALC 60.2 (L) 01/30/2024    LDLCALC 62.2 (L) 01/25/2023    LDLCALC 50.4 (L) 02/04/2022     Lab Results   Component Value Date    TRIG 89 01/30/2024    TRIG 79 01/25/2023    TRIG 73 02/04/2022     Lab Results   Component Value Date    CHOLHDL 44.3 01/30/2024    CHOLHDL 43.9 01/25/2023    CHOLHDL 51.5 (H) 02/04/2022       Chemistry        Component Value Date/Time     02/15/2024 1638    K 4.5 02/15/2024 1638     02/15/2024 1638    CO2 23 02/15/2024 1638    BUN 19 02/15/2024 1638    CREATININE 1.0 02/15/2024 1638     02/15/2024 1638        Component Value Date/Time    CALCIUM 9.2 02/15/2024 1638    ALKPHOS 58 02/15/2024 1638    AST 16 02/15/2024 1638    ALT 16 02/15/2024 1638    BILITOT 0.2 02/15/2024 1638    ESTGFRAFRICA >60 03/11/2022 1743    EGFRNONAA >60 03/11/2022 1743          No results found for: "LABA1C", "HGBA1C"  Lab Results   Component Value Date    TSH 1.575 05/25/2023     Lab Results   Component Value Date    INR 1.1 08/29/2021    INR 1.0 07/12/2021    INR 1.0 06/06/2021     Lab Results   Component Value Date    WBC 8.68 02/15/2024    HGB 12.9 02/15/2024    HCT 39.6 02/15/2024    MCV 89 02/15/2024     02/15/2024     BMP  Sodium   Date Value Ref Range Status   02/15/2024 138 136 - 145 mmol/L Final     Potassium   Date Value Ref Range Status   02/15/2024 4.5 3.5 - 5.1 mmol/L Final     Chloride   Date Value Ref Range Status   02/15/2024 104 95 - 110 " mmol/L Final     CO2   Date Value Ref Range Status   02/15/2024 23 23 - 29 mmol/L Final     BUN   Date Value Ref Range Status   02/15/2024 19 8 - 23 mg/dL Final     Creatinine   Date Value Ref Range Status   02/15/2024 1.0 0.5 - 1.4 mg/dL Final     Calcium   Date Value Ref Range Status   02/15/2024 9.2 8.7 - 10.5 mg/dL Final     Anion Gap   Date Value Ref Range Status   02/15/2024 11 8 - 16 mmol/L Final     eGFR if    Date Value Ref Range Status   03/11/2022 >60 >60 mL/min/1.73 m^2 Final     eGFR if non    Date Value Ref Range Status   03/11/2022 >60 >60 mL/min/1.73 m^2 Final     Comment:     Calculation used to obtain the estimated glomerular filtration  rate (eGFR) is the CKD-EPI equation.        BNP  @LABRCNTIP(BNP,BNPTRIAGEBLO)@  @LABRCNTIP(troponini)@  CrCl cannot be calculated (Patient's most recent lab result is older than the maximum 7 days allowed.).  No results found in the last 24 hours.  No results found in the last 24 hours.  No results found in the last 24 hours.    Assessment:      1. Permanent atrial fibrillation    2. Hyperlipidemia, unspecified hyperlipidemia type    3. Palpitations    4. Disorder of cardiac pacemaker system, sequela    5. Chest pain, unspecified type    6. SOB (shortness of breath)    7. Cardiac pacemaker in situ    8. Essential hypertension    9. TIA involving carotid artery    10. Atherosclerosis of aorta    11. Atrial fibrillation and flutter    12. Paroxysmal atrial fibrillation    13. History of cardiac radiofrequency ablation (RFA)    14. SSS (sick sinus syndrome)    15. Class 2 obesity due to excess calories without serious comorbidity with body mass index (BMI) of 36.0 to 36.9 in adult      BP LDL and BS controlled    Plan:     Continue cardizme flecainide eliquis and Lisinopril for HTN SSS and PAF  Continue statin for HLD    Counseled DASH  Check Lipid profile with PCP in 6 months  Recommend heart-healthy diet, weight control and regular  exercise.  Paula. Risk modification.   I have reviewed all pertinent labs and cardiac studies independently. Plans and recommendations have been formulated under my direct supervision. All questions answered and patient voiced understanding.   If symptoms persist go to the ED  RTC in 6 months

## 2024-04-11 ENCOUNTER — NURSE TRIAGE (OUTPATIENT)
Dept: ADMINISTRATIVE | Facility: CLINIC | Age: 74
End: 2024-04-11
Payer: MEDICARE

## 2024-04-11 NOTE — TELEPHONE ENCOUNTER
Pt stated her left TMJ is popping. No swelling. No pain. If she opens her mouth to eat, chew, or yawn it pops. This is 3rd day. Dry mouth but its not new. Care advice recommend pt see MD within 3 days. Appt given.   Reason for Disposition   Patient wants to be seen    Additional Information   Negative: SEVERE difficulty breathing (e.g., struggling for each breath, speaks in single words, stridor)   Negative: Drooling or spitting out saliva (because can't swallow) and new-onset   Negative: Bleeding from mouth and won't stop after 10 minutes of direct pressure   Negative: Electrical burn of mouth   Negative: Chemical burn of mouth   Negative: Difficulty breathing and not severe   Negative: Patient sounds very sick or weak to the triager   Negative: Bloody crusts on lips or sores in mouth AND a rash anywhere else on body (back, chest, face, palms, soles)   Negative: Gum bleeding and taking Coumadin (warfarin) or other strong blood thinner, or known bleeding disorder (e.g., thrombocytopenia)   Negative: Dry mouth and urinating more frequently than usual (i.e., frequency)   Negative: Dry mouth and drinking more liquids than usual (thirsty) and present more than 1 day (24 hours)   Negative: Receiving chemotherapy or radiation therapy   Negative: White patches that stick to tongue or inner cheek, which can be wiped off   Negative: Dry mouth and new-onset and unexplained  (Exceptions: Chronic symptom or dry mouth from mild dehydration.)   Negative: Weak immune system (e.g., HIV positive, cancer chemo, splenectomy, organ transplant, chronic steroids)    Protocols used: Mouth Symptoms-A-OH

## 2024-04-13 ENCOUNTER — OFFICE VISIT (OUTPATIENT)
Dept: INTERNAL MEDICINE | Facility: CLINIC | Age: 74
End: 2024-04-13
Payer: MEDICARE

## 2024-04-13 VITALS
TEMPERATURE: 97 F | WEIGHT: 205 LBS | RESPIRATION RATE: 20 BRPM | OXYGEN SATURATION: 97 % | HEART RATE: 62 BPM | BODY MASS INDEX: 36.32 KG/M2 | DIASTOLIC BLOOD PRESSURE: 68 MMHG | SYSTOLIC BLOOD PRESSURE: 128 MMHG | HEIGHT: 63 IN

## 2024-04-13 DIAGNOSIS — S03.00XA DISLOCATION OF TEMPOROMANDIBULAR JOINT, INITIAL ENCOUNTER: Primary | ICD-10-CM

## 2024-04-13 PROCEDURE — 1159F MED LIST DOCD IN RCRD: CPT | Mod: CPTII,S$GLB,, | Performed by: INTERNAL MEDICINE

## 2024-04-13 PROCEDURE — 1101F PT FALLS ASSESS-DOCD LE1/YR: CPT | Mod: CPTII,S$GLB,, | Performed by: INTERNAL MEDICINE

## 2024-04-13 PROCEDURE — 3074F SYST BP LT 130 MM HG: CPT | Mod: CPTII,S$GLB,, | Performed by: INTERNAL MEDICINE

## 2024-04-13 PROCEDURE — 3078F DIAST BP <80 MM HG: CPT | Mod: CPTII,S$GLB,, | Performed by: INTERNAL MEDICINE

## 2024-04-13 PROCEDURE — 99213 OFFICE O/P EST LOW 20 MIN: CPT | Mod: S$GLB,,, | Performed by: INTERNAL MEDICINE

## 2024-04-13 PROCEDURE — 3288F FALL RISK ASSESSMENT DOCD: CPT | Mod: CPTII,S$GLB,, | Performed by: INTERNAL MEDICINE

## 2024-04-13 PROCEDURE — 1126F AMNT PAIN NOTED NONE PRSNT: CPT | Mod: CPTII,S$GLB,, | Performed by: INTERNAL MEDICINE

## 2024-04-13 PROCEDURE — 4010F ACE/ARB THERAPY RXD/TAKEN: CPT | Mod: CPTII,S$GLB,, | Performed by: INTERNAL MEDICINE

## 2024-04-13 PROCEDURE — 99999 PR PBB SHADOW E&M-EST. PATIENT-LVL V: CPT | Mod: PBBFAC,,, | Performed by: INTERNAL MEDICINE

## 2024-04-13 PROCEDURE — 1160F RVW MEDS BY RX/DR IN RCRD: CPT | Mod: CPTII,S$GLB,, | Performed by: INTERNAL MEDICINE

## 2024-04-13 RX ORDER — MELOXICAM 15 MG/1
15 TABLET ORAL DAILY
Qty: 30 TABLET | Refills: 1 | Status: SHIPPED | OUTPATIENT
Start: 2024-04-13

## 2024-04-13 NOTE — PROGRESS NOTES
"Subjective:       Patient ID: Antonio Urena is a 74 y.o. female.    Chief Complaint: Follow-up      HPI:  Patient is a 74-year-old female presenting to Saturday clinic with a several day history of left temporomandibular joint popping and cracking.  She states that symptoms began a few days ago.  She did some reading online she was sort of familiar with the symptoms and has been doing some exercises to make it better.  Has improved some but has not resolve completely.  She wanted to come in and get it checked out and get recommendations in order to avoid a long-term issue.    Review of Systems    Objective:   /68   Pulse 62   Temp 97.3 °F (36.3 °C) (Tympanic)   Resp 20   Ht 5' 3" (1.6 m)   Wt 93 kg (205 lb 0.4 oz)   SpO2 97%   BMI 36.32 kg/m²      Physical Exam  Constitutional:       General: She is not in acute distress.     Appearance: Normal appearance. She is well-developed. She is not ill-appearing.   HENT:      Head: Normocephalic and atraumatic.      Comments: Mild popping and clicking at the left temporomandibular joint.  Good alignment of the jaw.  Eyes:      Extraocular Movements: Extraocular movements intact.   Pulmonary:      Effort: Pulmonary effort is normal. No respiratory distress.   Musculoskeletal:      Cervical back: Normal range of motion.   Skin:     Coloration: Skin is not jaundiced or pale.      Findings: No rash.   Neurological:      General: No focal deficit present.      Mental Status: She is alert and oriented to person, place, and time.      Cranial Nerves: No cranial nerve deficit.   Psychiatric:         Behavior: Behavior normal.         Thought Content: Thought content normal.             Assessment:       1. Dislocation of temporomandibular joint, initial encounter        Plan:   No problem-specific Assessment & Plan notes found for this encounter.    Antonio was seen today for follow-up.    Diagnoses and all orders for this visit:    Dislocation of temporomandibular " joint, initial encounter  Comments:  Meloxicam 15 mg once daily, follow up with dentist.  Avoid chewy foods.  Orders:  -     meloxicam (MOBIC) 15 MG tablet; Take 1 tablet (15 mg total) by mouth once daily.    Patient is having some temporomandibular joint dysfunction.  I have recommended ice to the area.  I have recommended anti-inflammatory.  I have instructed her to avoid foods that require a lot of chewing over the next couple of days.  If she is continuing to have symptoms come Monday she should pursue further evaluation by her dentist.  She expresses understanding.      Follow up if symptoms worsen or fail to improve.

## 2024-04-15 ENCOUNTER — PATIENT MESSAGE (OUTPATIENT)
Dept: SLEEP MEDICINE | Facility: CLINIC | Age: 74
End: 2024-04-15
Payer: MEDICARE

## 2024-04-15 ENCOUNTER — PATIENT MESSAGE (OUTPATIENT)
Dept: PULMONOLOGY | Facility: CLINIC | Age: 74
End: 2024-04-15
Payer: MEDICARE

## 2024-04-16 DIAGNOSIS — J44.89 ASTHMA WITH COPD: ICD-10-CM

## 2024-04-16 DIAGNOSIS — J44.9 CHRONIC OBSTRUCTIVE PULMONARY DISEASE, UNSPECIFIED COPD TYPE: ICD-10-CM

## 2024-04-16 RX ORDER — FLUTICASONE PROPIONATE AND SALMETEROL 250; 50 UG/1; UG/1
1 POWDER RESPIRATORY (INHALATION) 2 TIMES DAILY
Qty: 180 EACH | Refills: 3 | Status: SHIPPED | OUTPATIENT
Start: 2024-04-16

## 2024-04-16 RX ORDER — TIOTROPIUM BROMIDE INHALATION SPRAY 3.12 UG/1
2 SPRAY, METERED RESPIRATORY (INHALATION) DAILY
Qty: 12 G | Refills: 3 | Status: SHIPPED | OUTPATIENT
Start: 2024-04-16

## 2024-04-17 ENCOUNTER — PATIENT MESSAGE (OUTPATIENT)
Dept: SLEEP MEDICINE | Facility: CLINIC | Age: 74
End: 2024-04-17
Payer: MEDICARE

## 2024-05-01 ENCOUNTER — OFFICE VISIT (OUTPATIENT)
Dept: DERMATOLOGY | Facility: CLINIC | Age: 74
End: 2024-05-01
Payer: MEDICARE

## 2024-05-01 ENCOUNTER — OFFICE VISIT (OUTPATIENT)
Dept: OPHTHALMOLOGY | Facility: CLINIC | Age: 74
End: 2024-05-01
Payer: MEDICARE

## 2024-05-01 ENCOUNTER — PATIENT MESSAGE (OUTPATIENT)
Dept: OPHTHALMOLOGY | Facility: CLINIC | Age: 74
End: 2024-05-01

## 2024-05-01 VITALS — BODY MASS INDEX: 36.32 KG/M2 | HEIGHT: 63 IN | WEIGHT: 205 LBS

## 2024-05-01 DIAGNOSIS — H52.4 ASTIGMATISM WITH PRESBYOPIA, BILATERAL: ICD-10-CM

## 2024-05-01 DIAGNOSIS — Z96.1 PSEUDOPHAKIA OF BOTH EYES: Primary | ICD-10-CM

## 2024-05-01 DIAGNOSIS — H26.493 PCO (POSTERIOR CAPSULAR OPACIFICATION), BILATERAL: ICD-10-CM

## 2024-05-01 DIAGNOSIS — L21.9 SEBORRHEIC DERMATITIS: Primary | ICD-10-CM

## 2024-05-01 DIAGNOSIS — D48.5 NEOPLASM OF UNCERTAIN BEHAVIOR OF SKIN: ICD-10-CM

## 2024-05-01 DIAGNOSIS — L57.0 ACTINIC KERATOSIS: ICD-10-CM

## 2024-05-01 DIAGNOSIS — L82.1 SEBORRHEIC KERATOSES: ICD-10-CM

## 2024-05-01 DIAGNOSIS — H52.203 ASTIGMATISM WITH PRESBYOPIA, BILATERAL: ICD-10-CM

## 2024-05-01 PROCEDURE — 1160F RVW MEDS BY RX/DR IN RCRD: CPT | Mod: CPTII,S$GLB,, | Performed by: OPTOMETRIST

## 2024-05-01 PROCEDURE — 1126F AMNT PAIN NOTED NONE PRSNT: CPT | Mod: CPTII,S$GLB,, | Performed by: OPTOMETRIST

## 2024-05-01 PROCEDURE — 92014 COMPRE OPH EXAM EST PT 1/>: CPT | Mod: S$GLB,,, | Performed by: OPTOMETRIST

## 2024-05-01 PROCEDURE — 1159F MED LIST DOCD IN RCRD: CPT | Mod: CPTII,S$GLB,, | Performed by: STUDENT IN AN ORGANIZED HEALTH CARE EDUCATION/TRAINING PROGRAM

## 2024-05-01 PROCEDURE — 11102 TANGNTL BX SKIN SINGLE LES: CPT | Mod: S$GLB,,, | Performed by: STUDENT IN AN ORGANIZED HEALTH CARE EDUCATION/TRAINING PROGRAM

## 2024-05-01 PROCEDURE — 99999 PR PBB SHADOW E&M-EST. PATIENT-LVL III: CPT | Mod: PBBFAC,,, | Performed by: OPTOMETRIST

## 2024-05-01 PROCEDURE — 1159F MED LIST DOCD IN RCRD: CPT | Mod: CPTII,S$GLB,, | Performed by: OPTOMETRIST

## 2024-05-01 PROCEDURE — 1126F AMNT PAIN NOTED NONE PRSNT: CPT | Mod: CPTII,S$GLB,, | Performed by: STUDENT IN AN ORGANIZED HEALTH CARE EDUCATION/TRAINING PROGRAM

## 2024-05-01 PROCEDURE — 1160F RVW MEDS BY RX/DR IN RCRD: CPT | Mod: CPTII,S$GLB,, | Performed by: STUDENT IN AN ORGANIZED HEALTH CARE EDUCATION/TRAINING PROGRAM

## 2024-05-01 PROCEDURE — 3288F FALL RISK ASSESSMENT DOCD: CPT | Mod: CPTII,S$GLB,, | Performed by: STUDENT IN AN ORGANIZED HEALTH CARE EDUCATION/TRAINING PROGRAM

## 2024-05-01 PROCEDURE — 99214 OFFICE O/P EST MOD 30 MIN: CPT | Mod: 25,S$GLB,, | Performed by: STUDENT IN AN ORGANIZED HEALTH CARE EDUCATION/TRAINING PROGRAM

## 2024-05-01 PROCEDURE — 88305 TISSUE EXAM BY PATHOLOGIST: CPT | Performed by: PATHOLOGY

## 2024-05-01 PROCEDURE — 4010F ACE/ARB THERAPY RXD/TAKEN: CPT | Mod: CPTII,S$GLB,, | Performed by: STUDENT IN AN ORGANIZED HEALTH CARE EDUCATION/TRAINING PROGRAM

## 2024-05-01 PROCEDURE — 99999 PR PBB SHADOW E&M-EST. PATIENT-LVL IV: CPT | Mod: PBBFAC,,, | Performed by: STUDENT IN AN ORGANIZED HEALTH CARE EDUCATION/TRAINING PROGRAM

## 2024-05-01 PROCEDURE — 92015 DETERMINE REFRACTIVE STATE: CPT | Mod: S$GLB,,, | Performed by: OPTOMETRIST

## 2024-05-01 PROCEDURE — 4010F ACE/ARB THERAPY RXD/TAKEN: CPT | Mod: CPTII,S$GLB,, | Performed by: OPTOMETRIST

## 2024-05-01 PROCEDURE — 1101F PT FALLS ASSESS-DOCD LE1/YR: CPT | Mod: CPTII,S$GLB,, | Performed by: STUDENT IN AN ORGANIZED HEALTH CARE EDUCATION/TRAINING PROGRAM

## 2024-05-01 PROCEDURE — 88305 TISSUE EXAM BY PATHOLOGIST: CPT | Mod: 26,,, | Performed by: PATHOLOGY

## 2024-05-01 PROCEDURE — 17000 DESTRUCT PREMALG LESION: CPT | Mod: XS,S$GLB,, | Performed by: STUDENT IN AN ORGANIZED HEALTH CARE EDUCATION/TRAINING PROGRAM

## 2024-05-01 RX ORDER — CLOBETASOL PROPIONATE 0.46 MG/ML
SOLUTION TOPICAL DAILY
Qty: 50 ML | Refills: 3 | Status: SHIPPED | OUTPATIENT
Start: 2024-05-01

## 2024-05-01 NOTE — PROGRESS NOTES
Subjective:       Patient ID:  Antonio Urena is a 74 y.o. female who presents for   Chief Complaint   Patient presents with    Skin Check     Patient have dark spots on chest and forehead      History of Present Illness: The patient presents for follow up of skin check, and evaluation of a skin lesion. Last seen on 11/10/23 where she had a suspected AK on the right cheek treated with cryotherapy. Reports that the lesion is still present, still red and firm to the touch. Has other similar red lesions on the chest.           Review of Systems   Constitutional:  Negative for fever and chills.   Skin:  Positive for rash (Patient continues to have flaking, redness and itching throughout the scalp).        Objective:    Physical Exam   Constitutional: She appears well-developed and well-nourished. No distress.   Neurological: She is alert and oriented to person, place, and time. She is not disoriented.   Psychiatric: She has a normal mood and affect.   Skin:   Areas Examined (abnormalities noted in diagram):   Scalp / Hair Palpated and Inspected  Head / Face Inspection Performed  Neck Inspection Performed  Chest / Axilla Inspection Performed  Back Inspection Performed  RUE Inspected  LUE Inspection Performed                   Diagram Legend     Erythematous scaling macule/papule c/w actinic keratosis       Vascular papule c/w angioma      Pigmented verrucoid papule/plaque c/w seborrheic keratosis      Yellow umbilicated papule c/w sebaceous hyperplasia      Irregularly shaped tan macule c/w lentigo     1-2 mm smooth white papules consistent with Milia      Movable subcutaneous cyst with punctum c/w epidermal inclusion cyst      Subcutaneous movable cyst c/w pilar cyst      Firm pink to brown papule c/w dermatofibroma      Pedunculated fleshy papule(s) c/w skin tag(s)      Evenly pigmented macule c/w junctional nevus     Mildly variegated pigmented, slightly irregular-bordered macule c/w mildly atypical nevus       Flesh colored to evenly pigmented papule c/w intradermal nevus       Pink pearly papule/plaque c/w basal cell carcinoma      Erythematous hyperkeratotic cursted plaque c/w SCC      Surgical scar with no sign of skin cancer recurrence      Open and closed comedones      Inflammatory papules and pustules      Verrucoid papule consistent consistent with wart     Erythematous eczematous patches and plaques     Dystrophic onycholytic nail with subungual debris c/w onychomycosis     Umbilicated papule    Erythematous-base heme-crusted tan verrucoid plaque consistent with inflamed seborrheic keratosis     Erythematous Silvery Scaling Plaque c/w Psoriasis     See annotation      Assessment / Plan:      Pathology Orders:       Normal Orders This Visit    Specimen to Pathology, Dermatology     Comments:    Number of Specimens:->1  ------------------------->-------------------------  Spec 1 Procedure:->Biopsy  Spec 1 Clinical Impression:->peristent erythematous nodule.  r/o NMSC  Spec 1 Source:->right cheek    Questions:    Procedure Type: Dermatology and skin neoplasms    Number of Specimens: 1    ------------------------: -------------------------    Spec 1 Procedure: Biopsy    Spec 1 Clinical Impression: peristent erythematous nodule. r/o NMSC    Spec 1 Source: right cheek    Release to patient:         Neoplasm of uncertain behavior of skin  -     Specimen to Pathology, Dermatology    Shave biopsy procedure note:    Shave biopsy performed after verbal consent including risk of infection, scar, recurrence, need for additional treatment of site. Area prepped with alcohol, anesthetized with approximately 1.0cc of 1% lidocaine with epinephrine. Lesional tissue shaved with razor blade. Hemostasis achieved with application of aluminum chloride followed by hyfrecation. No complications. Dressing applied. Wound care explained.    Actinic keratosis  Cryosurgery Procedure Note    Verbal consent from the patient is obtained  including, but not limited to, risk of hypopigmentation/hyperpigmentation, scar, recurrence of lesion. The patient is aware of the precancerous quality and need for treatment of these lesions. Liquid nitrogen cryosurgery is applied to the 1 actinic keratoses, as detailed in the physical exam, to produce a freeze injury. The patient is aware that blisters may form and is instructed on wound care with gentle cleansing and use of vaseline ointment to keep moist until healed. The patient is supplied a handout on cryosurgery and is instructed to call if lesions do not completely resolve.    Seborrheic dermatitis  -     clobetasoL (TEMOVATE) 0.05 % external solution; Apply topically once daily. Apply to the scalp as needed.  Dispense: 50 mL; Refill: 3    Seborrheic keratoses  These are benign inherited growths without a malignant potential. Reassurance given to patient. No treatment is necessary.              Follow up in about 6 months (around 11/1/2024).

## 2024-05-01 NOTE — PROGRESS NOTES
HPI     Annual Exam            Comments: Vision changes since last eye exam?: No changes that she   noticed    Any eye pain today: No    Other ocular symptoms: Yes, floater but lesser now     Interested in contact lens fitting today? No                     Comments    DNL REFERRAL       1. PCIOL OD 7/28/22/ CDE: 13.66/ SN60WF 21.0  PCIOL OS 8/11/22 Block/ SY60WF 20.5  2. Lazy eye OS DX at age 19      **pace maker, on eliquis**      Pred QID OD             Last edited by Rajeev Staples on 5/1/2024  9:14 AM.            Assessment /Plan     For exam results, see Encounter Report.    Pseudophakia of both eyes  PCO (posterior capsular opacification), bilateral  Stable OU  Mild PCO OS>OD, YAG not yet indicated  Monitor 12 months    Astigmatism with presbyopia, bilateral  Eyeglass Final Rx       Eyeglass Final Rx         Sphere Cylinder Axis Add    Right -0.50 +1.00 175 +2.50    Left -0.25 +1.25 010 +2.50      Expiration Date: 5/1/2025                  RTC 1 yr for dilated eye exam or PRN if any problems.   Discussed above and answered questions.

## 2024-05-03 LAB
FINAL PATHOLOGIC DIAGNOSIS: NORMAL
GROSS: NORMAL
Lab: NORMAL
MICROSCOPIC EXAM: NORMAL

## 2024-05-04 ENCOUNTER — CLINICAL SUPPORT (OUTPATIENT)
Dept: CARDIOLOGY | Facility: HOSPITAL | Age: 74
End: 2024-05-04
Payer: MEDICARE

## 2024-05-04 ENCOUNTER — CLINICAL SUPPORT (OUTPATIENT)
Dept: CARDIOLOGY | Facility: HOSPITAL | Age: 74
End: 2024-05-04
Attending: INTERNAL MEDICINE
Payer: MEDICARE

## 2024-05-04 DIAGNOSIS — I48.0 PAROXYSMAL ATRIAL FIBRILLATION: ICD-10-CM

## 2024-05-04 DIAGNOSIS — Z95.0 PRESENCE OF CARDIAC PACEMAKER: ICD-10-CM

## 2024-05-04 DIAGNOSIS — I49.5 SICK SINUS SYNDROME: ICD-10-CM

## 2024-05-04 PROCEDURE — 93296 REM INTERROG EVL PM/IDS: CPT | Performed by: INTERNAL MEDICINE

## 2024-05-04 PROCEDURE — 93294 REM INTERROG EVL PM/LDLS PM: CPT | Mod: S$GLB,,, | Performed by: INTERNAL MEDICINE

## 2024-05-10 ENCOUNTER — PATIENT MESSAGE (OUTPATIENT)
Dept: CARDIOLOGY | Facility: CLINIC | Age: 74
End: 2024-05-10
Payer: MEDICARE

## 2024-05-10 DIAGNOSIS — R07.9 CHEST PAIN, UNSPECIFIED TYPE: ICD-10-CM

## 2024-05-10 DIAGNOSIS — I48.21 PERMANENT ATRIAL FIBRILLATION: ICD-10-CM

## 2024-05-10 DIAGNOSIS — T82.9XXS DISORDER OF CARDIAC PACEMAKER SYSTEM, SEQUELA: ICD-10-CM

## 2024-05-10 DIAGNOSIS — Z95.0 CARDIAC PACEMAKER IN SITU: ICD-10-CM

## 2024-05-10 DIAGNOSIS — E78.5 HYPERLIPIDEMIA, UNSPECIFIED HYPERLIPIDEMIA TYPE: ICD-10-CM

## 2024-05-10 DIAGNOSIS — R00.2 PALPITATIONS: ICD-10-CM

## 2024-05-10 DIAGNOSIS — I10 ESSENTIAL HYPERTENSION: ICD-10-CM

## 2024-05-10 DIAGNOSIS — R06.02 SOB (SHORTNESS OF BREATH): ICD-10-CM

## 2024-05-18 RX ORDER — FLECAINIDE ACETATE 100 MG/1
100 TABLET ORAL EVERY 12 HOURS
Qty: 180 TABLET | Refills: 3 | Status: SHIPPED | OUTPATIENT
Start: 2024-05-18

## 2024-05-28 LAB
OHS CV AF BURDEN PERCENT: < 1
OHS CV DC REMOTE DEVICE TYPE: NORMAL
OHS CV RV PACING PERCENT: 5 %

## 2024-06-13 ENCOUNTER — PATIENT MESSAGE (OUTPATIENT)
Dept: INTERNAL MEDICINE | Facility: CLINIC | Age: 74
End: 2024-06-13
Payer: MEDICARE

## 2024-06-13 DIAGNOSIS — Z12.31 ENCOUNTER FOR SCREENING MAMMOGRAM FOR BREAST CANCER: Primary | ICD-10-CM

## 2024-06-21 ENCOUNTER — HOSPITAL ENCOUNTER (OUTPATIENT)
Dept: RADIOLOGY | Facility: HOSPITAL | Age: 74
Discharge: HOME | End: 2024-06-21
Attending: FAMILY MEDICINE
Payer: MEDICARE

## 2024-06-21 VITALS — BODY MASS INDEX: 36.32 KG/M2 | HEIGHT: 63 IN | WEIGHT: 205 LBS

## 2024-06-21 DIAGNOSIS — Z12.31 ENCOUNTER FOR SCREENING MAMMOGRAM FOR BREAST CANCER: ICD-10-CM

## 2024-06-21 PROCEDURE — 77067 SCR MAMMO BI INCL CAD: CPT | Mod: TC,PO

## 2024-06-21 PROCEDURE — 77067 SCR MAMMO BI INCL CAD: CPT | Mod: 26,,, | Performed by: RADIOLOGY

## 2024-06-21 PROCEDURE — 77063 BREAST TOMOSYNTHESIS BI: CPT | Mod: 26,,, | Performed by: RADIOLOGY

## 2024-07-28 ENCOUNTER — NURSE TRIAGE (OUTPATIENT)
Dept: ADMINISTRATIVE | Facility: CLINIC | Age: 74
End: 2024-07-28
Payer: MEDICARE
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
## 2024-07-31 DIAGNOSIS — Z78.0 MENOPAUSE: ICD-10-CM

## 2024-08-03 ENCOUNTER — CLINICAL SUPPORT (OUTPATIENT)
Dept: CARDIOLOGY | Facility: HOSPITAL | Age: 74
End: 2024-08-03
Payer: MEDICARE

## 2024-08-03 ENCOUNTER — CLINICAL SUPPORT (OUTPATIENT)
Dept: CARDIOLOGY | Facility: HOSPITAL | Age: 74
End: 2024-08-03
Attending: INTERNAL MEDICINE
Payer: MEDICARE

## 2024-08-03 DIAGNOSIS — Z95.0 PRESENCE OF CARDIAC PACEMAKER: ICD-10-CM

## 2024-08-03 DIAGNOSIS — I48.0 PAROXYSMAL ATRIAL FIBRILLATION: ICD-10-CM

## 2024-08-03 DIAGNOSIS — I49.5 SICK SINUS SYNDROME: ICD-10-CM

## 2024-08-03 PROCEDURE — 93296 REM INTERROG EVL PM/IDS: CPT | Performed by: INTERNAL MEDICINE

## 2024-08-03 PROCEDURE — 93294 REM INTERROG EVL PM/LDLS PM: CPT | Mod: S$GLB,,, | Performed by: INTERNAL MEDICINE

## 2024-08-04 ENCOUNTER — PATIENT MESSAGE (OUTPATIENT)
Dept: ADMINISTRATIVE | Facility: OTHER | Age: 74
End: 2024-08-04
Payer: MEDICARE

## 2024-08-12 ENCOUNTER — PATIENT MESSAGE (OUTPATIENT)
Dept: INTERNAL MEDICINE | Facility: CLINIC | Age: 74
End: 2024-08-12
Payer: MEDICARE

## 2024-08-13 LAB
OHS CV AF BURDEN PERCENT: < 1
OHS CV DC REMOTE DEVICE TYPE: NORMAL
OHS CV RV PACING PERCENT: 6 %

## 2024-08-19 PROBLEM — R00.1 SYMPTOMATIC BRADYCARDIA: Status: RESOLVED | Noted: 2021-06-06 | Resolved: 2024-08-19

## 2024-08-20 PROBLEM — N18.31 STAGE 3A CHRONIC KIDNEY DISEASE: Status: ACTIVE | Noted: 2024-08-20

## 2024-08-20 PROBLEM — I77.9 CAROTID ARTERIAL DISEASE: Status: ACTIVE | Noted: 2024-08-20

## 2024-08-21 ENCOUNTER — LAB VISIT (OUTPATIENT)
Dept: LAB | Facility: HOSPITAL | Age: 74
End: 2024-08-21
Attending: FAMILY MEDICINE
Payer: MEDICARE

## 2024-08-21 ENCOUNTER — OFFICE VISIT (OUTPATIENT)
Dept: INTERNAL MEDICINE | Facility: CLINIC | Age: 74
End: 2024-08-21
Payer: MEDICARE

## 2024-08-21 VITALS
OXYGEN SATURATION: 98 % | WEIGHT: 204.56 LBS | DIASTOLIC BLOOD PRESSURE: 74 MMHG | HEIGHT: 63 IN | HEART RATE: 80 BPM | TEMPERATURE: 97 F | SYSTOLIC BLOOD PRESSURE: 130 MMHG | BODY MASS INDEX: 36.25 KG/M2

## 2024-08-21 VITALS
HEART RATE: 80 BPM | HEIGHT: 63 IN | BODY MASS INDEX: 36.25 KG/M2 | WEIGHT: 204.56 LBS | DIASTOLIC BLOOD PRESSURE: 74 MMHG | RESPIRATION RATE: 20 BRPM | SYSTOLIC BLOOD PRESSURE: 130 MMHG

## 2024-08-21 DIAGNOSIS — Z79.01 CHRONIC ANTICOAGULATION: Chronic | ICD-10-CM

## 2024-08-21 DIAGNOSIS — M85.80 OSTEOPENIA WITH HIGH RISK OF FRACTURE: ICD-10-CM

## 2024-08-21 DIAGNOSIS — I48.0 PAROXYSMAL ATRIAL FIBRILLATION: Chronic | ICD-10-CM

## 2024-08-21 DIAGNOSIS — F41.9 ANXIETY: ICD-10-CM

## 2024-08-21 DIAGNOSIS — I77.9 CAROTID ARTERY DISEASE, UNSPECIFIED LATERALITY, UNSPECIFIED TYPE: ICD-10-CM

## 2024-08-21 DIAGNOSIS — N18.31 STAGE 3A CHRONIC KIDNEY DISEASE: ICD-10-CM

## 2024-08-21 DIAGNOSIS — E78.00 PURE HYPERCHOLESTEROLEMIA: Chronic | ICD-10-CM

## 2024-08-21 DIAGNOSIS — Z86.39 PERSONAL HISTORY OF OTHER ENDOCRINE, NUTRITIONAL AND METABOLIC DISEASE: ICD-10-CM

## 2024-08-21 DIAGNOSIS — Z95.0 CARDIAC PACEMAKER IN SITU: Chronic | ICD-10-CM

## 2024-08-21 DIAGNOSIS — E66.09 CLASS 2 OBESITY DUE TO EXCESS CALORIES WITHOUT SERIOUS COMORBIDITY WITH BODY MASS INDEX (BMI) OF 36.0 TO 36.9 IN ADULT: ICD-10-CM

## 2024-08-21 DIAGNOSIS — E66.01 SEVERE OBESITY (BMI 35.0-39.9) WITH COMORBIDITY: ICD-10-CM

## 2024-08-21 DIAGNOSIS — J30.89 NON-SEASONAL ALLERGIC RHINITIS, UNSPECIFIED TRIGGER: Chronic | ICD-10-CM

## 2024-08-21 DIAGNOSIS — D69.2 SENILE PURPURA: ICD-10-CM

## 2024-08-21 DIAGNOSIS — I48.3 TYPICAL ATRIAL FLUTTER: ICD-10-CM

## 2024-08-21 DIAGNOSIS — Z00.00 ENCOUNTER FOR PREVENTIVE HEALTH EXAMINATION: ICD-10-CM

## 2024-08-21 DIAGNOSIS — G45.1 TIA INVOLVING CAROTID ARTERY: ICD-10-CM

## 2024-08-21 DIAGNOSIS — I49.5 SSS (SICK SINUS SYNDROME): Chronic | ICD-10-CM

## 2024-08-21 DIAGNOSIS — Z00.00 ANNUAL PHYSICAL EXAM: Primary | ICD-10-CM

## 2024-08-21 DIAGNOSIS — I48.92 ATRIAL FIBRILLATION AND FLUTTER: Chronic | ICD-10-CM

## 2024-08-21 DIAGNOSIS — I48.91 ATRIAL FIBRILLATION AND FLUTTER: Chronic | ICD-10-CM

## 2024-08-21 DIAGNOSIS — Z98.890 HISTORY OF CARDIAC RADIOFREQUENCY ABLATION (RFA): ICD-10-CM

## 2024-08-21 DIAGNOSIS — Z00.00 ANNUAL PHYSICAL EXAM: ICD-10-CM

## 2024-08-21 DIAGNOSIS — I10 ESSENTIAL HYPERTENSION: Chronic | ICD-10-CM

## 2024-08-21 DIAGNOSIS — J44.89 ASTHMA WITH COPD: Chronic | ICD-10-CM

## 2024-08-21 DIAGNOSIS — Z00.00 ENCOUNTER FOR MEDICARE ANNUAL WELLNESS EXAM: Primary | ICD-10-CM

## 2024-08-21 DIAGNOSIS — I70.0 ATHEROSCLEROSIS OF AORTA: ICD-10-CM

## 2024-08-21 PROBLEM — R07.9 CHEST PAIN: Status: RESOLVED | Noted: 2020-10-19 | Resolved: 2024-08-21

## 2024-08-21 LAB
ALBUMIN SERPL BCP-MCNC: 4.1 G/DL (ref 3.5–5.2)
ALP SERPL-CCNC: 54 U/L (ref 55–135)
ALT SERPL W/O P-5'-P-CCNC: 17 U/L (ref 10–44)
ANION GAP SERPL CALC-SCNC: 8 MMOL/L (ref 8–16)
AST SERPL-CCNC: 21 U/L (ref 10–40)
BILIRUB SERPL-MCNC: 0.4 MG/DL (ref 0.1–1)
BUN SERPL-MCNC: 17 MG/DL (ref 8–23)
CALCIUM SERPL-MCNC: 9.7 MG/DL (ref 8.7–10.5)
CHLORIDE SERPL-SCNC: 108 MMOL/L (ref 95–110)
CHOLEST SERPL-MCNC: 138 MG/DL (ref 120–199)
CHOLEST/HDLC SERPL: 2 {RATIO} (ref 2–5)
CO2 SERPL-SCNC: 26 MMOL/L (ref 23–29)
CREAT SERPL-MCNC: 0.9 MG/DL (ref 0.5–1.4)
ERYTHROCYTE [DISTWIDTH] IN BLOOD BY AUTOMATED COUNT: 14.5 % (ref 11.5–14.5)
EST. GFR  (NO RACE VARIABLE): >60 ML/MIN/1.73 M^2
ESTIMATED AVG GLUCOSE: 103 MG/DL (ref 68–131)
GLUCOSE SERPL-MCNC: 73 MG/DL (ref 70–110)
HBA1C MFR BLD: 5.2 % (ref 4–5.6)
HCT VFR BLD AUTO: 41.9 % (ref 37–48.5)
HDLC SERPL-MCNC: 69 MG/DL (ref 40–75)
HDLC SERPL: 50 % (ref 20–50)
HGB BLD-MCNC: 13.7 G/DL (ref 12–16)
LDLC SERPL CALC-MCNC: 54.4 MG/DL (ref 63–159)
MCH RBC QN AUTO: 29.8 PG (ref 27–31)
MCHC RBC AUTO-ENTMCNC: 32.7 G/DL (ref 32–36)
MCV RBC AUTO: 91 FL (ref 82–98)
NONHDLC SERPL-MCNC: 69 MG/DL
PLATELET # BLD AUTO: 188 K/UL (ref 150–450)
PMV BLD AUTO: 12.2 FL (ref 9.2–12.9)
POTASSIUM SERPL-SCNC: 4.4 MMOL/L (ref 3.5–5.1)
PROT SERPL-MCNC: 6.6 G/DL (ref 6–8.4)
RBC # BLD AUTO: 4.6 M/UL (ref 4–5.4)
SODIUM SERPL-SCNC: 142 MMOL/L (ref 136–145)
TRIGL SERPL-MCNC: 73 MG/DL (ref 30–150)
TSH SERPL DL<=0.005 MIU/L-ACNC: 1.37 UIU/ML (ref 0.4–4)
WBC # BLD AUTO: 7.16 K/UL (ref 3.9–12.7)

## 2024-08-21 PROCEDURE — 84443 ASSAY THYROID STIM HORMONE: CPT | Performed by: FAMILY MEDICINE

## 2024-08-21 PROCEDURE — 4010F ACE/ARB THERAPY RXD/TAKEN: CPT | Mod: CPTII,S$GLB,, | Performed by: FAMILY MEDICINE

## 2024-08-21 PROCEDURE — 3066F NEPHROPATHY DOC TX: CPT | Mod: CPTII,S$GLB,, | Performed by: NURSE PRACTITIONER

## 2024-08-21 PROCEDURE — 83036 HEMOGLOBIN GLYCOSYLATED A1C: CPT | Performed by: FAMILY MEDICINE

## 2024-08-21 PROCEDURE — 99999 PR PBB SHADOW E&M-EST. PATIENT-LVL IV: CPT | Mod: PBBFAC,,, | Performed by: NURSE PRACTITIONER

## 2024-08-21 PROCEDURE — 3288F FALL RISK ASSESSMENT DOCD: CPT | Mod: CPTII,S$GLB,, | Performed by: NURSE PRACTITIONER

## 2024-08-21 PROCEDURE — 85027 COMPLETE CBC AUTOMATED: CPT | Performed by: FAMILY MEDICINE

## 2024-08-21 PROCEDURE — 1101F PT FALLS ASSESS-DOCD LE1/YR: CPT | Mod: CPTII,S$GLB,, | Performed by: NURSE PRACTITIONER

## 2024-08-21 PROCEDURE — 3288F FALL RISK ASSESSMENT DOCD: CPT | Mod: CPTII,S$GLB,, | Performed by: FAMILY MEDICINE

## 2024-08-21 PROCEDURE — 1126F AMNT PAIN NOTED NONE PRSNT: CPT | Mod: CPTII,S$GLB,, | Performed by: FAMILY MEDICINE

## 2024-08-21 PROCEDURE — 1101F PT FALLS ASSESS-DOCD LE1/YR: CPT | Mod: CPTII,S$GLB,, | Performed by: FAMILY MEDICINE

## 2024-08-21 PROCEDURE — 1158F ADVNC CARE PLAN TLK DOCD: CPT | Mod: CPTII,S$GLB,, | Performed by: FAMILY MEDICINE

## 2024-08-21 PROCEDURE — 99214 OFFICE O/P EST MOD 30 MIN: CPT | Mod: 25,S$GLB,, | Performed by: FAMILY MEDICINE

## 2024-08-21 PROCEDURE — 4010F ACE/ARB THERAPY RXD/TAKEN: CPT | Mod: CPTII,S$GLB,, | Performed by: NURSE PRACTITIONER

## 2024-08-21 PROCEDURE — 1159F MED LIST DOCD IN RCRD: CPT | Mod: CPTII,S$GLB,, | Performed by: FAMILY MEDICINE

## 2024-08-21 PROCEDURE — 3044F HG A1C LEVEL LT 7.0%: CPT | Mod: CPTII,S$GLB,, | Performed by: NURSE PRACTITIONER

## 2024-08-21 PROCEDURE — 3078F DIAST BP <80 MM HG: CPT | Mod: CPTII,S$GLB,, | Performed by: FAMILY MEDICINE

## 2024-08-21 PROCEDURE — G0439 PPPS, SUBSEQ VISIT: HCPCS | Mod: S$GLB,,, | Performed by: NURSE PRACTITIONER

## 2024-08-21 PROCEDURE — 1160F RVW MEDS BY RX/DR IN RCRD: CPT | Mod: CPTII,S$GLB,, | Performed by: FAMILY MEDICINE

## 2024-08-21 PROCEDURE — 3075F SYST BP GE 130 - 139MM HG: CPT | Mod: CPTII,S$GLB,, | Performed by: NURSE PRACTITIONER

## 2024-08-21 PROCEDURE — 3078F DIAST BP <80 MM HG: CPT | Mod: CPTII,S$GLB,, | Performed by: NURSE PRACTITIONER

## 2024-08-21 PROCEDURE — 80061 LIPID PANEL: CPT | Performed by: FAMILY MEDICINE

## 2024-08-21 PROCEDURE — 1158F ADVNC CARE PLAN TLK DOCD: CPT | Mod: CPTII,S$GLB,, | Performed by: NURSE PRACTITIONER

## 2024-08-21 PROCEDURE — 80053 COMPREHEN METABOLIC PANEL: CPT | Performed by: FAMILY MEDICINE

## 2024-08-21 PROCEDURE — 99397 PER PM REEVAL EST PAT 65+ YR: CPT | Mod: 25,S$GLB,, | Performed by: FAMILY MEDICINE

## 2024-08-21 PROCEDURE — 3061F NEG MICROALBUMINURIA REV: CPT | Mod: CPTII,S$GLB,, | Performed by: NURSE PRACTITIONER

## 2024-08-21 PROCEDURE — 36415 COLL VENOUS BLD VENIPUNCTURE: CPT | Performed by: FAMILY MEDICINE

## 2024-08-21 PROCEDURE — 99999 PR PBB SHADOW E&M-EST. PATIENT-LVL III: CPT | Mod: PBBFAC,,, | Performed by: FAMILY MEDICINE

## 2024-08-21 PROCEDURE — 3075F SYST BP GE 130 - 139MM HG: CPT | Mod: CPTII,S$GLB,, | Performed by: FAMILY MEDICINE

## 2024-08-21 RX ORDER — LORAZEPAM 1 MG/1
.5-1 TABLET ORAL EVERY 12 HOURS PRN
Qty: 30 TABLET | Refills: 0 | Status: SHIPPED | OUTPATIENT
Start: 2024-08-21 | End: 2024-09-20

## 2024-08-21 NOTE — PROGRESS NOTES
Subjective:   Patient ID: Antonio Urena is a 74 y.o. female.  Chief Complaint:  Annual Exam    Presents for annual physical exam     Medical history:  -hypertension.  Controlled.  On lisinopril 20 mg half tablet daily and diltiazem 240 mg daily.  Reports compliance.  Denies side effects.  Denies shortness or breath or swelling.  -hyperlipidemia.  On Crestor 40 mg daily and fish oil supplement daily.  Reports compliance.  Denies side effects.  Denies chest pain or claudication.  Needs repeat lipid panel.    -atrial fibrillation/atrial flutter.  Status post radiofrequency ablation.  Rate controlled on antihypertensive medication.  Rhythm controlled on Tambocor 100 mg twice a day.  On chronic anticoagulation with Eliquis 5 mg twice a day.  -sick sinus syndrome with ACE ID placement.  Stable.  No CVA, TIA, syncopal, or near syncopal episodes.  -asthma/COPD.  On Advair and Spiriva maintenance inhalers.  Reports compliance.  Denies side effects.  No frequent Xopenex rescue inhaler use needed.  -Allergic rhinitis.  Symptoms stable and well controlled on Flonase and Zyrtec.  - Family history of colon cancer/ History of colon polyps.  Up-to-date on colon cancer screening.  - Anxiety.  Ativan 1 mg half to whole tablet every 12 hours as needed.  Thirty tablets/greater than 1 year.  Needs refill.    Health maintenance needs include bone density, shingles vaccine series, and COVID booster    Complaints today include recurrent visual Arb as previously attributable to migraine headaches   However, not having any aura or headaches at this time  Increased frequency in May in June however no aura headaches July and August      Review of Systems   Constitutional:  Negative for chills, fatigue and fever.   Eyes:  Positive for visual disturbance.   Respiratory:  Negative for cough, chest tightness, shortness of breath and wheezing.    Cardiovascular:  Negative for chest pain, palpitations and leg swelling.   Gastrointestinal:   "Negative for abdominal distention, abdominal pain, constipation, diarrhea, nausea and vomiting.   Genitourinary:  Negative for difficulty urinating.   Musculoskeletal:  Negative for myalgias.   Skin:  Negative for rash.   Neurological:  Negative for dizziness, syncope, weakness, light-headedness and headaches.   Psychiatric/Behavioral:  Negative for sleep disturbance. The patient is nervous/anxious.        Objective:   /74 (BP Location: Right arm, Patient Position: Sitting, BP Method: Large (Manual))   Pulse 80   Temp 97.4 °F (36.3 °C) (Tympanic)   Ht 5' 3" (1.6 m)   Wt 92.8 kg (204 lb 9.4 oz)   SpO2 98%   BMI 36.24 kg/m²     Physical Exam  Vitals and nursing note reviewed.   Constitutional:       Appearance: Normal appearance. She is well-developed. She is obese.   Eyes:      General: No scleral icterus.     Conjunctiva/sclera: Conjunctivae normal.   Neck:      Vascular: No JVD.   Cardiovascular:      Rate and Rhythm: Normal rate and regular rhythm.      Heart sounds: Normal heart sounds.   Pulmonary:      Effort: Pulmonary effort is normal.      Breath sounds: Normal breath sounds.   Abdominal:      General: There is no distension.      Palpations: Abdomen is soft. There is no hepatomegaly.      Tenderness: There is no abdominal tenderness. There is no guarding or rebound.   Musculoskeletal:      Right lower leg: No edema.      Left lower leg: No edema.   Skin:     Findings: No rash.   Neurological:      Mental Status: She is alert.   Psychiatric:         Attention and Perception: Attention and perception normal.         Mood and Affect: Mood and affect normal.         Speech: Speech normal.         Behavior: Behavior normal. Behavior is cooperative.         Thought Content: Thought content normal.         Cognition and Memory: Cognition and memory normal.         Judgment: Judgment normal.         Assessment:       ICD-10-CM ICD-9-CM   1. Annual physical exam  Z00.00 V70.0   2. Personal history of " other endocrine, nutritional and metabolic disease  Z86.39 V12.29   3. Essential hypertension  I10 401.9   4. Atrial fibrillation and flutter  I48.91 427.31    I48.92 427.32   5. Paroxysmal atrial fibrillation  I48.0 427.31   6. Typical atrial flutter  I48.3 427.32   7. Chronic anticoagulation- Eliquis  Z79.01 V58.61   8. History of cardiac radiofrequency ablation (RFA)  Z98.890 V15.1   9. SSS (sick sinus syndrome)  I49.5 427.81   10. Cardiac pacemaker in situ  Z95.0 V45.01   11. Pure hypercholesterolemia  E78.00 272.0   12. Atherosclerosis of aorta  I70.0 440.0   13. Carotid artery disease, unspecified laterality, unspecified type  I77.9 447.9   14. Stage 3a chronic kidney disease  N18.31 585.3   15. Osteopenia with high risk of fracture  M85.80 733.90   16. Severe obesity (BMI 35.0-39.9) with comorbidity  E66.01 278.01   17. Asthma with COPD  J44.89 493.20   18. Non-seasonal allergic rhinitis, unspecified trigger  J30.89 477.8   19. Anxiety  F41.9 300.00   20. Senile purpura  D69.2 287.2     Plan:   Annual physical exam  Personal history of other endocrine, nutritional and metabolic disease  -     CBC Without Differential; Future; Expected date: 08/21/2024  -     Comprehensive Metabolic Panel; Future; Expected date: 08/21/2024  -     Lipid Panel; Future; Expected date: 08/21/2024  -     TSH; Future; Expected date: 08/21/2024  -     Hemoglobin A1C; Future; Expected date: 08/21/2024  -     Microalbumin/Creatinine Ratio, Urine; Future; Expected date: 08/21/2024  Blood pressure normal.  BMI 36.    Check labs.  Treat as indicated.    Colon cancer screening up-to-date   Breast cancer screening up-to-date   Bone density scheduled   Recommend COVID booster and shingles vaccine series through pharmacy   Other immunizations up-to-date    Essential hypertension  Controlled.  Stable.  Asymptomatic.  BP at goal.    Continue lisinopril 20 mg half tablet daily   Continue diltiazem  mg daily     Atrial fibrillation and  flutter  Paroxysmal atrial fibrillation  Typical atrial flutter  Chronic anticoagulation- Eliquis  History of cardiac radiofrequency ablation (RFA)  Clinically and hemodynamically stable   Remains in normal sinus rhythm   Continue Tambocor 100 mg twice a day   Follow-up cardiology as scheduled     SSS (sick sinus syndrome)  Cardiac pacemaker in situ  Stable   No active symptoms     Pure hypercholesterolemia  Atherosclerosis of aorta  Carotid artery disease, unspecified laterality, unspecified type  Asymptomatic   Check lipid panel   Continue fish oil supplementation daily   Continue Crestor 40 mg daily   Additional agent as needed     Stage 3a chronic kidney disease  Repeat renal function panel today   If any progression to stage IV disease will need to see Nephrology    Osteopenia with high risk of fracture  Recheck bone density   Rediscuss treatment if indicated     Severe obesity (BMI 35.0-39.9) with comorbidity  Discussed increased BMI, Increased health risks, Need for weight loss, Lifestyle modifications.    Asthma with COPD  Symptoms stable and well controlled   Continue Advair and Spiriva controller medications   Continue Xopenex as needed   Follow-up pulmonology as scheduled    Non-seasonal allergic rhinitis, unspecified trigger  Symptoms stable and well controlled   Continue Flonase and Zyrtec    Anxiety  -     LORazepam (ATIVAN) 1 MG tablet; Take 0.5-1 tablets (0.5-1 mg total) by mouth every 12 (twelve) hours as needed for Anxiety.  Dispense: 30 tablet; Refill: 0  Increased frequency of attacks off medication   Okay to restart Ativan   Discussed appropriate frequency of use   If needed to frequently, will start daily SSRI     Headache/visual disturbances   Currently resolved   Discussed if returned averages more than 1 per week, would need to start preventative medication to be referred to Neurology     Return to clinic 1 year for annual physical exam or sooner as needed

## 2024-08-21 NOTE — PATIENT INSTRUCTIONS
Counseling and Referral of Other Preventative  (Italic type indicates deductible and co-insurance are waived)    Patient Name: Antonio Urena  Today's Date: 8/21/2024    Health Maintenance       Date Due Completion Date    Annual UACr Never done ---    Shingles Vaccine (1 of 2) Never done ---    COVID-19 Vaccine (7 - 2023-24 season) 02/16/2024 10/16/2023    DEXA Scan 05/10/2024 5/10/2021    Influenza Vaccine (1) 09/01/2024 10/16/2023    High Dose Statin 08/12/2025 8/12/2024    Colorectal Cancer Screening 10/10/2027 10/10/2022    Lipid Panel 01/30/2029 1/30/2024    TETANUS VACCINE 07/25/2029 7/25/2019    Override on 7/25/2019: [place] of [place]        No orders of the defined types were placed in this encounter.    The following information is provided to all patients.  This information is to help you find resources for any of the problems found today that may be affecting your health:                  Living healthy guide: www.[facility].[place].gov      Understanding Diabetes: www.diabetes.org      Eating healthy: www.cdc.gov/healthyweight      CDC home safety checklist: www.cdc.gov/steadi/patient.html      Agency on Aging: www.goea.[place].gov      Alcoholics anonymous (AA): www.aa.org      Physical Activity: www.mike.nih.gov/an4srxg      Tobacco use: www.quitwithusla.org

## 2024-08-21 NOTE — PROGRESS NOTES
"Antonio Urena presented for a  Medicare AWV and comprehensive Health Risk Assessment today. The following components were reviewed and updated:    Medical history  Family History  Social history  Allergies and Current Medications  Health Risk Assessment  Health Maintenance  Care Team         ** See Completed Assessments for Annual Wellness Visit within the encounter summary.**         The following assessments were completed:  Living Situation  CAGE  Depression Screening  Timed Get Up and Go  Whisper Test  Cognitive Function Screening  Nutrition Screening  ADL Screening  PAQ Screening      Opioid documentation:      Patient does not have a current opioid prescription.        Vitals:    08/21/24 1047   BP: 130/74   Patient Position: Sitting   Pulse: 80   Resp: 20   Weight: 92.8 kg (204 lb 9.4 oz)   Height:    Pain scale 5' 2.5" (1.588 m)    0     Body mass index is 36.82 kg/m².  Physical Exam  Constitutional:       General: She is not in acute distress.     Appearance: She is not ill-appearing or diaphoretic.   HENT:      Mouth/Throat:      Mouth: Mucous membranes are moist.   Eyes:      General:         Right eye: No discharge.         Left eye: No discharge.      Conjunctiva/sclera: Conjunctivae normal.   Pulmonary:      Effort: Pulmonary effort is normal. No respiratory distress.   Skin:     General: Skin is warm and dry.   Neurological:      Mental Status: She is alert and oriented to person, place, and time. Mental status is at baseline.   Psychiatric:         Mood and Affect: Mood normal.         Behavior: Behavior normal.         Thought Content: Thought content normal.         Judgment: Judgment normal.     Diagnoses and health risks identified today and associated recommendations/orders:    1. Encounter for Medicare annual wellness exam, Encounter for preventive health examination  Review for opioid screening: Patient does not have Rx for Opioids  Review for substance use disorder: Patient does not use " substance per chart    Patient states she does not drink alcohol    I offered to discuss advanced care planning, including how to pick a person who would make decisions for you if you were unable to make them for yourself, called a health care power of , and what kind of decisions you might make such as use of life sustaining treatments such as ventilators and tube feeding when faced with a life limiting illness recorded on a living will that they will need to know. (How you want to be cared for as you near the end of your natural life)     X Patient is interested in learning more about how to make advanced directives.  I provided them paperwork and offered to discuss this with them.    2. Senile purpura  Monitor  Follow up as directed    3. Class 2 obesity due to excess calories without serious comorbidity with body mass index (BMI) of 36.0 to 36.9 in adult  Monitor  Follow up with PCP  Increased activity encouraged  Patient states she does Chair yoga     4. Paroxysmal atrial fibrillation, Atrial fibrillation and flutter, Hx TIA involving carotid artery, SSS (sick sinus syndrome), Cardiac pacemaker in situ, Typical atrial flutter, History of cardiac radiofrequency ablation (RFA), Chronic anticoagulation- Eliquis  Monitor  Follow up with Cardiology as directed   Continue home eliquis, diltiazem, tambocor    5. Atherosclerosis of aorta, Carotid artery disease, Pure hypercholesterolemia  Monitor  Follow up with Cardiology   Blood pressure control  Continue home fish oil, crestor    6. Asthma with COPD, Non-seasonal allergic rhinitis, unspecified trigger  Monitor  Stable  Continue home zyrtec, flonzse, advair, xopenex, spiriva    7. Stage 3a chronic kidney disease  Monitor  Follow up with PCP  Dx discussed with patient     8. Osteopenia with high risk of fracture  Monitor  Follow up with PCP  Continue home  MVI    9. Essential hypertension  Monitor  Stable  Continue home lisinopril, diltiazem     10. Carotid  artery disease, unspecified laterality, unspecified type, Hx TIA involving carotid artery  12/08/2021 CTA- The right common carotid artery appears normal.  Minimal atherosclerotic calcifications of the carotid bifurcation without evidence of significant stenosis.  The left common carotid artery appears normal.  The carotid bifurcation and left internal carotid artery appears normal.  Dx discussed with patient  Monitor  Follow up as directed     11. Anxiety  Monitor  Stable  Follow up with PCP- declined counseling referral at this time- states she tried before in the past and did not work well for her  [facility]                                 Provided Antonio with a 5-10 year written screening schedule and personal prevention plan. Recommendations were developed using the USPSTF age appropriate recommendations. Education, counseling, and referrals were provided as needed. After Visit Summary printed and given to patient which includes a list of additional screenings\tests needed.    Follow up in about 1 year (around 8/21/2025) for Annual wellness visit.    KENIA Zuniga

## 2024-09-17 ENCOUNTER — HOSPITAL ENCOUNTER (OUTPATIENT)
Dept: RADIOLOGY | Facility: HOSPITAL | Age: 74
Discharge: HOME | End: 2024-09-17
Attending: FAMILY MEDICINE
Payer: MEDICARE

## 2024-09-17 DIAGNOSIS — Z78.0 MENOPAUSE: ICD-10-CM

## 2024-09-25 ENCOUNTER — APPOINTMENT (OUTPATIENT)
Dept: RADIOLOGY | Facility: HOSPITAL | Age: 74
End: 2024-09-25
Attending: FAMILY MEDICINE
Payer: MEDICARE

## 2024-09-25 PROCEDURE — 77080 DXA BONE DENSITY AXIAL: CPT | Mod: TC

## 2024-09-25 PROCEDURE — 77080 DXA BONE DENSITY AXIAL: CPT | Mod: 26,,, | Performed by: RADIOLOGY

## 2024-10-04 DIAGNOSIS — I10 ESSENTIAL HYPERTENSION: Primary | Chronic | ICD-10-CM

## 2024-10-04 DIAGNOSIS — I48.0 PAROXYSMAL ATRIAL FIBRILLATION: Chronic | ICD-10-CM

## 2024-10-08 ENCOUNTER — OFFICE VISIT (OUTPATIENT)
Dept: CARDIOLOGY | Facility: CLINIC | Age: 74
End: 2024-10-08
Payer: MEDICARE

## 2024-10-08 ENCOUNTER — HOSPITAL ENCOUNTER (OUTPATIENT)
Dept: CARDIOLOGY | Facility: HOSPITAL | Age: 74
Discharge: HOME | End: 2024-10-08
Attending: INTERNAL MEDICINE
Payer: MEDICARE

## 2024-10-08 VITALS
BODY MASS INDEX: 36.25 KG/M2 | DIASTOLIC BLOOD PRESSURE: 68 MMHG | HEIGHT: 63 IN | OXYGEN SATURATION: 97 % | HEART RATE: 59 BPM | WEIGHT: 204.56 LBS | SYSTOLIC BLOOD PRESSURE: 124 MMHG

## 2024-10-08 DIAGNOSIS — I48.92 ATRIAL FIBRILLATION AND FLUTTER: Chronic | ICD-10-CM

## 2024-10-08 DIAGNOSIS — I48.0 PAROXYSMAL ATRIAL FIBRILLATION: Chronic | ICD-10-CM

## 2024-10-08 DIAGNOSIS — I70.0 ATHEROSCLEROSIS OF AORTA: ICD-10-CM

## 2024-10-08 DIAGNOSIS — Z95.0 CARDIAC PACEMAKER IN SITU: Chronic | ICD-10-CM

## 2024-10-08 DIAGNOSIS — N18.31 STAGE 3A CHRONIC KIDNEY DISEASE: ICD-10-CM

## 2024-10-08 DIAGNOSIS — I48.91 ATRIAL FIBRILLATION AND FLUTTER: Chronic | ICD-10-CM

## 2024-10-08 DIAGNOSIS — Z98.890 HISTORY OF CARDIAC RADIOFREQUENCY ABLATION (RFA): ICD-10-CM

## 2024-10-08 DIAGNOSIS — E78.00 PURE HYPERCHOLESTEROLEMIA: Chronic | ICD-10-CM

## 2024-10-08 DIAGNOSIS — I10 ESSENTIAL HYPERTENSION: Chronic | ICD-10-CM

## 2024-10-08 DIAGNOSIS — I65.23 BILATERAL CAROTID ARTERY STENOSIS: Primary | ICD-10-CM

## 2024-10-08 DIAGNOSIS — E66.01 SEVERE OBESITY (BMI 35.0-39.9) WITH COMORBIDITY: ICD-10-CM

## 2024-10-08 DIAGNOSIS — G45.1 TIA INVOLVING CAROTID ARTERY: ICD-10-CM

## 2024-10-08 PROCEDURE — 93005 ELECTROCARDIOGRAM TRACING: CPT

## 2024-10-08 PROCEDURE — 99999 PR PBB SHADOW E&M-EST. PATIENT-LVL IV: CPT | Mod: PBBFAC,,, | Performed by: INTERNAL MEDICINE

## 2024-10-08 PROCEDURE — 93010 ELECTROCARDIOGRAM REPORT: CPT | Mod: ,,, | Performed by: INTERNAL MEDICINE

## 2024-10-08 PROCEDURE — 1159F MED LIST DOCD IN RCRD: CPT | Mod: CPTII,S$GLB,, | Performed by: INTERNAL MEDICINE

## 2024-10-08 PROCEDURE — 4010F ACE/ARB THERAPY RXD/TAKEN: CPT | Mod: CPTII,S$GLB,, | Performed by: INTERNAL MEDICINE

## 2024-10-08 PROCEDURE — 3074F SYST BP LT 130 MM HG: CPT | Mod: CPTII,S$GLB,, | Performed by: INTERNAL MEDICINE

## 2024-10-08 PROCEDURE — 1101F PT FALLS ASSESS-DOCD LE1/YR: CPT | Mod: CPTII,S$GLB,, | Performed by: INTERNAL MEDICINE

## 2024-10-08 PROCEDURE — 3066F NEPHROPATHY DOC TX: CPT | Mod: CPTII,S$GLB,, | Performed by: INTERNAL MEDICINE

## 2024-10-08 PROCEDURE — 3288F FALL RISK ASSESSMENT DOCD: CPT | Mod: CPTII,S$GLB,, | Performed by: INTERNAL MEDICINE

## 2024-10-08 PROCEDURE — 99214 OFFICE O/P EST MOD 30 MIN: CPT | Mod: S$GLB,,, | Performed by: INTERNAL MEDICINE

## 2024-10-08 PROCEDURE — 3044F HG A1C LEVEL LT 7.0%: CPT | Mod: CPTII,S$GLB,, | Performed by: INTERNAL MEDICINE

## 2024-10-08 PROCEDURE — 1160F RVW MEDS BY RX/DR IN RCRD: CPT | Mod: CPTII,S$GLB,, | Performed by: INTERNAL MEDICINE

## 2024-10-08 PROCEDURE — 3061F NEG MICROALBUMINURIA REV: CPT | Mod: CPTII,S$GLB,, | Performed by: INTERNAL MEDICINE

## 2024-10-08 PROCEDURE — 3078F DIAST BP <80 MM HG: CPT | Mod: CPTII,S$GLB,, | Performed by: INTERNAL MEDICINE

## 2024-10-08 PROCEDURE — 1126F AMNT PAIN NOTED NONE PRSNT: CPT | Mod: CPTII,S$GLB,, | Performed by: INTERNAL MEDICINE

## 2024-10-08 NOTE — PROGRESS NOTES
Subjective:   Patient ID:  Antonio Urena is a 74 y.o. female who presents for follow up of No chief complaint on file.      73 yo female, came in for 6 m routine f/u  PMH AFIB s/p RFA and PPM, HTn HLD, h/o TIA h/o skin Ca. no h/o MI  Prior cardiologist Dr. Ling. F/u Dr. Martinez for afib s/p PPM.  2022 echo EF nl. Mild AI and mR  In the past 2 months, 4 spells of chest pain dyspnea dizziness fatigue. BP 90/50 mmHG at home, and 120/80 mmHG in ER. Troponin x2 and BNP negative, EKG A pacing and V sensing QTc 442 ms  Weight loss 25 lbs on purpose since 07/23 04/24 visit  02/24 phar nuke showed no ischemia. BNP and troponin wnl  Now BP at 120 to 130 mmHG. No episode of hypotension. Pt feels better    Interval history  EKG reviewed by myself today reveals A pacing and QTc 452 ms nonspecific STT change  No chest pain dizziness faint orthopnea palpitation leg swelling. Fatigue and declined MICHELLE eval  LDL 54 BP C  BMI 37                   Past Medical History:   Diagnosis Date    A-fib     A-fib     Pace maker put in 5/12/20    Allergy     Angina pectoris     Anxiety     Arthritis     Asthma     cough variant    Back pain     Cancer 2018    basal cell c    Colon polyp     COPD (chronic obstructive pulmonary disease)     Depression     Hyperlipidemia     Hypertension     Obesity     Pneumonia        Past Surgical History:   Procedure Laterality Date    ABLATION Bilateral 06/03/2021    Procedure: ABLATION/CTI;  Surgeon: Juan Lazo MD;  Location: [facility] CATH LAB;  Service: Cardiology;  Laterality: Bilateral;  (RFA of the CTI,    COVID-19, MRNA, LN-S, PF (Pfizer) 3/20/2021, 2/27/2021   later date RA lead extraction, RA lead addition    CATARACT EXTRACTION Right     MGM    CATARACT EXTRACTION Left 07/28/2022    MGM    COLONOSCOPY N/A 10/10/2022    Procedure: COLONOSCOPY 8/31--card clearance received per Dr Ling;  Surgeon: Nestor Sher MD;  Location: [facility] ENDO;  Service: Gastroenterology;  Laterality:  N/A;    HYSTERECTOMY      IMPLANTATION OF BIVENTRICULAR HEART PACEMAKER  2020    Loop recorder also in but not working right now    PHLEBOGRAPHY  2021    Procedure: Venogram;  Surgeon: Juan Lazo MD;  Location: [facility] CATH LAB;  Service: Cardiology;;    REVISION OF PROCEDURE INVOLVING PACEMAKER LEAD N/A 2021    Procedure: REVISION, ELECTRODE LEAD, CARDIAC PACEMAKER;  Surgeon: Juan Lazo MD;  Location: [facility] CATH LAB;  Service: Cardiology;  Laterality: N/A;    REVISION OF PROCEDURE INVOLVING PACEMAKER LEAD Bilateral 07/15/2021    Procedure: REVISION, ELECTRODE LEAD, CARDIAC PACEMAKER/A lead;  Surgeon: Juan Lazo MD;  Location: [facility] CATH LAB;  Service: Cardiology;  Laterality: Bilateral;  biotronik device    REVISION OF SKIN POCKET FOR PACEMAKER  2021    Procedure: Revision, Skin Pocket, For Cardiac Pacemaker;  Surgeon: Juan Lazo MD;  Location: [facility] CATH LAB;  Service: Cardiology;;       Social History     Tobacco Use    Smoking status: Former     Current packs/day: 0.00     Average packs/day: 0.3 packs/day for 1 year (0.3 ttl pk-yrs)     Types: Cigarettes     Start date: 1984     Quit date: 1985     Years since quittin.7    Smokeless tobacco: Former    Tobacco comments:     Quit 30 - 40 years ago   Substance Use Topics    Alcohol use: Not Currently     Comment: Wine Occasionally     Drug use: Never       Family History   Problem Relation Name Age of Onset    Cancer Mother      Cancer Father      Cancer Brother      Stroke Brother           ROS    Objective:   Physical Exam  Constitutional:       Appearance: She is obese.   HENT:      Head: Normocephalic.   Eyes:      Pupils: Pupils are equal, round, and reactive to light.   Neck:      Thyroid: No thyromegaly.      Vascular: Normal carotid pulses. No carotid bruit or JVD.   Cardiovascular:      Rate and Rhythm: Normal rate and regular rhythm. No extrasystoles are present.     Chest Wall: PMI is  not displaced.      Pulses: Normal pulses.      Heart sounds: Normal heart sounds. No murmur heard.     No gallop. No S3 sounds.   Pulmonary:      Effort: No respiratory distress.      Breath sounds: Normal breath sounds. No stridor.   Abdominal:      General: Bowel sounds are normal.      Palpations: Abdomen is soft.      Tenderness: There is no abdominal tenderness. There is no rebound.   Skin:     Findings: No rash.   Neurological:      Mental Status: She is alert and oriented to person, place, and time.   Psychiatric:         Behavior: Behavior normal.         Lab Results   Component Value Date    CHOL 138 08/21/2024    CHOL 140 01/30/2024    CHOL 139 01/25/2023     Lab Results   Component Value Date    HDL 69 08/21/2024    HDL 62 01/30/2024    HDL 61 01/25/2023     Lab Results   Component Value Date    LDLCALC 54.4 (L) 08/21/2024    LDLCALC 60.2 (L) 01/30/2024    LDLCALC 62.2 (L) 01/25/2023     Lab Results   Component Value Date    TRIG 73 08/21/2024    TRIG 89 01/30/2024    TRIG 79 01/25/2023     Lab Results   Component Value Date    CHOLHDL 50.0 08/21/2024    CHOLHDL 44.3 01/30/2024    CHOLHDL 43.9 01/25/2023       Chemistry        Component Value Date/Time     08/21/2024 1246    K 4.4 08/21/2024 1246     08/21/2024 1246    CO2 26 08/21/2024 1246    BUN 17 08/21/2024 1246    CREATININE 0.9 08/21/2024 1246    GLU 73 08/21/2024 1246        Component Value Date/Time    CALCIUM 9.7 08/21/2024 1246    ALKPHOS 54 (L) 08/21/2024 1246    AST 21 08/21/2024 1246    ALT 17 08/21/2024 1246    BILITOT 0.4 08/21/2024 1246    ESTGFRAFRICA >60 03/11/2022 1743    EGFRNONAA >60 03/11/2022 1743          Lab Results   Component Value Date    HGBA1C 5.2 08/21/2024     Lab Results   Component Value Date    TSH 1.374 08/21/2024     Lab Results   Component Value Date    INR 1.1 08/29/2021    INR 1.0 07/12/2021    INR 1.0 06/06/2021     Lab Results   Component Value Date    WBC 7.16 08/21/2024    HGB 13.7 08/21/2024     HCT 41.9 08/21/2024    MCV 91 08/21/2024     08/21/2024     BMP  Sodium   Date Value Ref Range Status   08/21/2024 142 136 - 145 mmol/L Final     Potassium   Date Value Ref Range Status   08/21/2024 4.4 3.5 - 5.1 mmol/L Final     Chloride   Date Value Ref Range Status   08/21/2024 108 95 - 110 mmol/L Final     CO2   Date Value Ref Range Status   08/21/2024 26 23 - 29 mmol/L Final     BUN   Date Value Ref Range Status   08/21/2024 17 8 - 23 mg/dL Final     Creatinine   Date Value Ref Range Status   08/21/2024 0.9 0.5 - 1.4 mg/dL Final     Calcium   Date Value Ref Range Status   08/21/2024 9.7 8.7 - 10.5 mg/dL Final     Anion Gap   Date Value Ref Range Status   08/21/2024 8 8 - 16 mmol/L Final     eGFR if    Date Value Ref Range Status   03/11/2022 >60 >60 mL/min/1.73 m^2 Final     eGFR if non    Date Value Ref Range Status   03/11/2022 >60 >60 mL/min/1.73 m^2 Final     Comment:     Calculation used to obtain the estimated glomerular filtration  rate (eGFR) is the CKD-EPI equation.        BNP  @LABRCNTIP(BNP,BNPTRIAGEBLO)@  @LABRCNTIP(troponini)@  CrCl cannot be calculated (Patient's most recent lab result is older than the maximum 7 days allowed.).  No results found in the last 24 hours.  No results found in the last 24 hours.  No results found in the last 24 hours.    Assessment:      1. Bilateral carotid artery stenosis    2. Hx TIA involving carotid artery    3. Atherosclerosis of aorta    4. Atrial fibrillation and flutter    5. Cardiac pacemaker in situ    6. Essential hypertension    7. History of cardiac radiofrequency ablation (RFA)    8. Pure hypercholesterolemia    9. Paroxysmal atrial fibrillation    10. Stage 3a chronic kidney disease    11. Severe obesity (BMI 35.0-39.9) with comorbidity        Plan:   Bilateral carotid artery stenosis  Comments:  repeat carotid US  Orders:  -     CV Ultrasound Bilateral Doppler Carotid; Future    Hx TIA involving carotid  artery    Atherosclerosis of aorta  -     CV Ultrasound Bilateral Doppler Carotid; Future    Atrial fibrillation and flutter  Comments:  on SR  continue flecaindie cardizem and eliquis    Cardiac pacemaker in situ  Comments:  f/u EP    Essential hypertension  Comments:  BP controlled    History of cardiac radiofrequency ablation (RFA)    Pure hypercholesterolemia  Comments:  LDL controlled  continue statin    Paroxysmal atrial fibrillation    Stage 3a chronic kidney disease  Comments:  improved   now GFR > 60    Severe obesity (BMI 35.0-39.9) with comorbidity  Comments:  BMI 37      Carotid US ordered    Counseled DASH  Check Lipid profile with PCP in 6 months  Recommend heart-healthy diet, weight control and regular exercise.  Paula. Risk modification.   I have reviewed all pertinent labs and cardiac studies independently. Plans and recommendations have been formulated under my direct supervision. All questions answered and patient voiced understanding.   If symptoms persist go to the ED  RTC in 6 months

## 2024-10-09 LAB
OHS QRS DURATION: 96 MS
OHS QTC CALCULATION: 452 MS

## 2024-11-02 ENCOUNTER — CLINICAL SUPPORT (OUTPATIENT)
Dept: CARDIOLOGY | Facility: HOSPITAL | Age: 74
End: 2024-11-02
Attending: INTERNAL MEDICINE
Payer: MEDICARE

## 2024-11-02 ENCOUNTER — CLINICAL SUPPORT (OUTPATIENT)
Dept: CARDIOLOGY | Facility: HOSPITAL | Age: 74
End: 2024-11-02

## 2024-11-02 DIAGNOSIS — Z95.0 PRESENCE OF CARDIAC PACEMAKER: ICD-10-CM

## 2024-11-02 DIAGNOSIS — I49.5 SICK SINUS SYNDROME: ICD-10-CM

## 2024-11-02 DIAGNOSIS — I48.0 PAROXYSMAL ATRIAL FIBRILLATION: ICD-10-CM

## 2024-11-02 PROCEDURE — 93294 REM INTERROG EVL PM/LDLS PM: CPT | Mod: S$GLB,,, | Performed by: INTERNAL MEDICINE

## 2024-11-02 PROCEDURE — 93296 REM INTERROG EVL PM/IDS: CPT | Performed by: INTERNAL MEDICINE

## 2024-11-07 ENCOUNTER — HOSPITAL ENCOUNTER (OUTPATIENT)
Dept: CARDIOLOGY | Facility: HOSPITAL | Age: 74
Discharge: HOME | End: 2024-11-07
Attending: INTERNAL MEDICINE
Payer: MEDICARE

## 2024-11-07 VITALS
BODY MASS INDEX: 36.14 KG/M2 | HEIGHT: 63 IN | WEIGHT: 204 LBS | SYSTOLIC BLOOD PRESSURE: 110 MMHG | DIASTOLIC BLOOD PRESSURE: 57 MMHG

## 2024-11-07 DIAGNOSIS — I65.23 BILATERAL CAROTID ARTERY STENOSIS: ICD-10-CM

## 2024-11-07 DIAGNOSIS — I70.0 ATHEROSCLEROSIS OF AORTA: ICD-10-CM

## 2024-11-07 LAB
LEFT ARM DIASTOLIC BLOOD PRESSURE: 58 MMHG
LEFT ARM SYSTOLIC BLOOD PRESSURE: 117 MMHG
LEFT CBA DIAS: 14 CM/S
LEFT CBA SYS: 49 CM/S
LEFT CCA DIST DIAS: 13 CM/S
LEFT CCA DIST SYS: 62 CM/S
LEFT CCA MID DIAS: 13 CM/S
LEFT CCA MID SYS: 81 CM/S
LEFT CCA PROX DIAS: 12 CM/S
LEFT CCA PROX SYS: 86 CM/S
LEFT ECA DIAS: 14 CM/S
LEFT ECA SYS: 102 CM/S
LEFT ICA DIST DIAS: 23 CM/S
LEFT ICA DIST SYS: 90 CM/S
LEFT ICA MID DIAS: 19 CM/S
LEFT ICA MID SYS: 69 CM/S
LEFT ICA PROX DIAS: 16 CM/S
LEFT ICA PROX SYS: 69 CM/S
LEFT VERTEBRAL DIAS: 9 CM/S
LEFT VERTEBRAL SYS: 39 CM/S
OHS CV CAROTID RIGHT ICA EDV HIGHEST: 19
OHS CV CAROTID ULTRASOUND LEFT ICA/CCA RATIO: 1.45
OHS CV CAROTID ULTRASOUND RIGHT ICA/CCA RATIO: 1.49
OHS CV PV CAROTID LEFT HIGHEST CCA: 86
OHS CV PV CAROTID LEFT HIGHEST ICA: 90
OHS CV PV CAROTID RIGHT HIGHEST CCA: 72
OHS CV PV CAROTID RIGHT HIGHEST ICA: 79
OHS CV US CAROTID LEFT HIGHEST EDV: 23
RIGHT ARM DIASTOLIC BLOOD PRESSURE: 57 MMHG
RIGHT ARM SYSTOLIC BLOOD PRESSURE: 110 MMHG
RIGHT CBA DIAS: 11 CM/S
RIGHT CBA SYS: 49 CM/S
RIGHT CCA DIST SYS: 53 CM/S
RIGHT CCA MID DIAS: 4 CM/S
RIGHT CCA MID SYS: 54 CM/S
RIGHT CCA PROX DIAS: 11 CM/S
RIGHT CCA PROX SYS: 72 CM/S
RIGHT ECA DIAS: 7 CM/S
RIGHT ECA SYS: 78 CM/S
RIGHT ICA DIST DIAS: 19 CM/S
RIGHT ICA DIST SYS: 79 CM/S
RIGHT ICA MID DIAS: 15 CM/S
RIGHT ICA MID SYS: 59 CM/S
RIGHT ICA PROX DIAS: 17 CM/S
RIGHT ICA PROX SYS: 57 CM/S
RIGHT VERTEBRAL DIAS: 17 CM/S
RIGHT VERTEBRAL SYS: 71 CM/S

## 2024-11-07 PROCEDURE — 93880 EXTRACRANIAL BILAT STUDY: CPT | Mod: PO

## 2024-11-11 ENCOUNTER — TELEPHONE (OUTPATIENT)
Dept: CARDIOLOGY | Facility: CLINIC | Age: 74
End: 2024-11-11
Payer: MEDICARE

## 2024-11-11 NOTE — TELEPHONE ENCOUNTER
Spoke with pt in regards to test results. Pt verbalized understanding information without any concerns.                 ----- Message from Bo Pinto MD sent at 11/11/2024 11:15 AM CST -----  Carotid artery US showed normal study.

## 2024-12-26 ENCOUNTER — NURSE TRIAGE (OUTPATIENT)
Dept: ADMINISTRATIVE | Facility: CLINIC | Age: 74
End: 2024-12-26
Payer: MEDICARE

## 2024-12-26 ENCOUNTER — HOSPITAL ENCOUNTER (EMERGENCY)
Facility: HOSPITAL | Age: 74
Discharge: HOME | End: 2024-12-26
Attending: EMERGENCY MEDICINE
Payer: MEDICARE

## 2024-12-26 VITALS
HEART RATE: 64 BPM | BODY MASS INDEX: 37.11 KG/M2 | DIASTOLIC BLOOD PRESSURE: 63 MMHG | OXYGEN SATURATION: 98 % | HEIGHT: 63 IN | RESPIRATION RATE: 20 BRPM | TEMPERATURE: 98 F | WEIGHT: 209.44 LBS | SYSTOLIC BLOOD PRESSURE: 134 MMHG

## 2024-12-26 DIAGNOSIS — R07.9 CHEST PAIN, UNSPECIFIED TYPE: ICD-10-CM

## 2024-12-26 DIAGNOSIS — I10 HYPERTENSION: Primary | ICD-10-CM

## 2024-12-26 LAB
ALBUMIN SERPL BCP-MCNC: 4.1 G/DL (ref 3.5–5.2)
ALP SERPL-CCNC: 60 U/L (ref 40–150)
ALT SERPL W/O P-5'-P-CCNC: 30 U/L (ref 10–44)
ANION GAP SERPL CALC-SCNC: 12 MMOL/L (ref 8–16)
AST SERPL-CCNC: 32 U/L (ref 10–40)
BASOPHILS # BLD AUTO: 0.03 K/UL (ref 0–0.2)
BASOPHILS NFR BLD: 0.3 % (ref 0–1.9)
BILIRUB SERPL-MCNC: 0.2 MG/DL (ref 0.1–1)
BNP SERPL-MCNC: 99 PG/ML (ref 0–99)
BUN SERPL-MCNC: 14 MG/DL (ref 8–23)
CALCIUM SERPL-MCNC: 9.5 MG/DL (ref 8.7–10.5)
CHLORIDE SERPL-SCNC: 105 MMOL/L (ref 95–110)
CO2 SERPL-SCNC: 23 MMOL/L (ref 23–29)
CREAT SERPL-MCNC: 0.9 MG/DL (ref 0.5–1.4)
DIFFERENTIAL METHOD BLD: NORMAL
EOSINOPHIL # BLD AUTO: 0.3 K/UL (ref 0–0.5)
EOSINOPHIL NFR BLD: 3.3 % (ref 0–8)
ERYTHROCYTE [DISTWIDTH] IN BLOOD BY AUTOMATED COUNT: 13.5 % (ref 11.5–14.5)
EST. GFR  (NO RACE VARIABLE): >60 ML/MIN/1.73 M^2
GLUCOSE SERPL-MCNC: 78 MG/DL (ref 70–110)
HCT VFR BLD AUTO: 42.2 % (ref 37–48.5)
HGB BLD-MCNC: 13.8 G/DL (ref 12–16)
IMM GRANULOCYTES # BLD AUTO: 0.02 K/UL (ref 0–0.04)
IMM GRANULOCYTES NFR BLD AUTO: 0.2 % (ref 0–0.5)
LYMPHOCYTES # BLD AUTO: 1.9 K/UL (ref 1–4.8)
LYMPHOCYTES NFR BLD: 20.3 % (ref 18–48)
MCH RBC QN AUTO: 29.2 PG (ref 27–31)
MCHC RBC AUTO-ENTMCNC: 32.7 G/DL (ref 32–36)
MCV RBC AUTO: 89 FL (ref 82–98)
MONOCYTES # BLD AUTO: 0.6 K/UL (ref 0.3–1)
MONOCYTES NFR BLD: 6.8 % (ref 4–15)
NEUTROPHILS # BLD AUTO: 6.3 K/UL (ref 1.8–7.7)
NEUTROPHILS NFR BLD: 69.1 % (ref 38–73)
NRBC BLD-RTO: 0 /100 WBC
PLATELET # BLD AUTO: 214 K/UL (ref 150–450)
PMV BLD AUTO: 11.4 FL (ref 9.2–12.9)
POTASSIUM SERPL-SCNC: 4.8 MMOL/L (ref 3.5–5.1)
PROT SERPL-MCNC: 7.3 G/DL (ref 6–8.4)
RBC # BLD AUTO: 4.73 M/UL (ref 4–5.4)
SODIUM SERPL-SCNC: 140 MMOL/L (ref 136–145)
TROPONIN I SERPL DL<=0.01 NG/ML-MCNC: <0.006 NG/ML (ref 0–0.03)
TSH SERPL DL<=0.005 MIU/L-ACNC: 1.38 UIU/ML (ref 0.4–4)
WBC # BLD AUTO: 9.12 K/UL (ref 3.9–12.7)

## 2024-12-26 PROCEDURE — 93005 ELECTROCARDIOGRAM TRACING: CPT | Mod: ER

## 2024-12-26 PROCEDURE — 84443 ASSAY THYROID STIM HORMONE: CPT | Mod: ER | Performed by: EMERGENCY MEDICINE

## 2024-12-26 PROCEDURE — 93010 ELECTROCARDIOGRAM REPORT: CPT | Mod: ,,, | Performed by: INTERNAL MEDICINE

## 2024-12-26 PROCEDURE — 83880 ASSAY OF NATRIURETIC PEPTIDE: CPT | Mod: ER | Performed by: EMERGENCY MEDICINE

## 2024-12-26 PROCEDURE — 80053 COMPREHEN METABOLIC PANEL: CPT | Mod: ER | Performed by: EMERGENCY MEDICINE

## 2024-12-26 PROCEDURE — 84484 ASSAY OF TROPONIN QUANT: CPT | Mod: ER | Performed by: EMERGENCY MEDICINE

## 2024-12-26 PROCEDURE — 85025 COMPLETE CBC W/AUTO DIFF WBC: CPT | Mod: ER | Performed by: EMERGENCY MEDICINE

## 2024-12-26 PROCEDURE — 94761 N-INVAS EAR/PLS OXIMETRY MLT: CPT | Mod: ER

## 2024-12-26 PROCEDURE — 99285 EMERGENCY DEPT VISIT HI MDM: CPT | Mod: 25,ER

## 2024-12-27 LAB
OHS QRS DURATION: 106 MS
OHS QTC CALCULATION: 440 MS

## 2024-12-27 NOTE — ED PROVIDER NOTES
Encounter Date: 12/26/2024       History     Chief Complaint   Patient presents with    Hypertension    Chest Pain     Pt not taking BP meds x 1 day, history of asthma, Pacemaker.     The history is provided by the patient.   Hypertension   This is a chronic problem. The current episode started today. The problem has been unchanged. Associated symptoms include chest pain and shortness of breath. Pertinent negatives include no orthopnea, no palpitations, no PND, no anxiety, no confusion, no malaise/fatigue, no blurred vision, no headaches, no tinnitus, no neck pain, no peripheral edema and no dizziness.   Chest Pain  The current episode started today. Chest pain occurs constantly. The chest pain is unchanged. At its most intense, the chest pain is at 7/10. The chest pain is currently at 3/10. The quality of the pain is described as dull. The pain does not radiate. Primary symptoms include shortness of breath. Pertinent negatives for primary symptoms include no fever, no palpitations, no nausea and no dizziness.   Pertinent negatives for associated symptoms include no orthopnea and no weakness.     Review of patient's allergies indicates:   Allergen Reactions    Lasix [furosemide]      Within 1 year of taking pt will get severe knee pain in both knees.     Past Medical History:   Diagnosis Date    A-fib     A-fib     Pace maker put in 5/12/20    Allergy     Angina pectoris     Anxiety     Arthritis     Asthma     cough variant    Back pain     Cancer 2018    basal cell c    Colon polyp     COPD (chronic obstructive pulmonary disease)     Depression     Hyperlipidemia     Hypertension     Obesity     Pneumonia      Past Surgical History:   Procedure Laterality Date    ABLATION Bilateral 06/03/2021    Procedure: ABLATION/CTI;  Surgeon: Juan Lazo MD;  Location: [facility] CATH LAB;  Service: Cardiology;  Laterality: Bilateral;  (RFA of the CTI,    COVID-19, MRNA, LN-S, PF (Pfizer) 3/20/2021, 2/27/2021   later date  RA lead extraction, RA lead addition    CATARACT EXTRACTION Right     MGM    CATARACT EXTRACTION Left 2022    MGM    COLONOSCOPY N/A 10/10/2022    Procedure: COLONOSCOPY --card clearance received per Dr Ling;  Surgeon: Nestor Sher MD;  Location: [facility] ENDO;  Service: Gastroenterology;  Laterality: N/A;    HYSTERECTOMY      IMPLANTATION OF BIVENTRICULAR HEART PACEMAKER  2020    Loop recorder also in but not working right now    PHLEBOGRAPHY  2021    Procedure: Venogram;  Surgeon: Juan Lazo MD;  Location: [facility] CATH LAB;  Service: Cardiology;;    REVISION OF PROCEDURE INVOLVING PACEMAKER LEAD N/A 2021    Procedure: REVISION, ELECTRODE LEAD, CARDIAC PACEMAKER;  Surgeon: Juan Lazo MD;  Location: [facility] CATH LAB;  Service: Cardiology;  Laterality: N/A;    REVISION OF PROCEDURE INVOLVING PACEMAKER LEAD Bilateral 07/15/2021    Procedure: REVISION, ELECTRODE LEAD, CARDIAC PACEMAKER/A lead;  Surgeon: Juan Lazo MD;  Location: [facility] CATH LAB;  Service: Cardiology;  Laterality: Bilateral;  biotronik device    REVISION OF SKIN POCKET FOR PACEMAKER  2021    Procedure: Revision, Skin Pocket, For Cardiac Pacemaker;  Surgeon: Juan Lazo MD;  Location: [facility] CATH LAB;  Service: Cardiology;;     Family History   Problem Relation Name Age of Onset    Cancer Mother      Cancer Father      Cancer Brother      Stroke Brother       Social History     Tobacco Use    Smoking status: Former     Current packs/day: 0.00     Average packs/day: 0.3 packs/day for 1 year (0.3 ttl pk-yrs)     Types: Cigarettes     Start date: 1984     Quit date: 1985     Years since quittin.0    Smokeless tobacco: Former    Tobacco comments:     Quit 30 - 40 years ago   Substance Use Topics    Alcohol use: Not Currently     Comment: Wine Occasionally     Drug use: Never     Review of Systems   Constitutional:  Negative for fever and malaise/fatigue.   HENT:  Negative for  sore throat and tinnitus.    Eyes:  Negative for blurred vision.   Respiratory:  Positive for shortness of breath.    Cardiovascular:  Positive for chest pain. Negative for palpitations, orthopnea and PND.   Gastrointestinal:  Negative for nausea.   Genitourinary:  Negative for dysuria.   Musculoskeletal:  Negative for back pain and neck pain.   Skin:  Negative for rash.   Neurological:  Negative for dizziness, weakness and headaches.   Hematological:  Does not bruise/bleed easily.   Psychiatric/Behavioral:  Negative for confusion.        Physical Exam     Initial Vitals [12/26/24 2131]   BP Pulse Resp Temp SpO2   (!) 161/72 67 (!) 22 98.1 °F (36.7 °C) 96 %      MAP       --         Physical Exam    Nursing note and vitals reviewed.  Constitutional: She appears well-developed and well-nourished. No distress.   HENT:   Head: Normocephalic and atraumatic. Mouth/Throat: Oropharynx is clear and moist.   Eyes: Conjunctivae and EOM are normal. Pupils are equal, round, and reactive to light.   Neck: Neck supple.   Normal range of motion.  Cardiovascular:  Normal rate, regular rhythm and normal heart sounds.           Pulmonary/Chest: Breath sounds normal. No respiratory distress.   Abdominal: Abdomen is soft. Bowel sounds are normal. She exhibits no distension. There is no abdominal tenderness.   Musculoskeletal:         General: Normal range of motion.      Cervical back: Normal range of motion and neck supple.     Neurological: She is alert and oriented to person, place, and time. She has normal strength.   Skin: Skin is warm and dry.   Psychiatric: She has a normal mood and affect. Thought content normal.         ED Course   Procedures  Labs Reviewed   CBC W/ AUTO DIFFERENTIAL       Result Value    WBC 9.12      RBC 4.73      Hemoglobin 13.8      Hematocrit 42.2      MCV 89      MCH 29.2      MCHC 32.7      RDW 13.5      Platelets 214      MPV 11.4      Immature Granulocytes 0.2      Gran # (ANC) 6.3      Immature Grans  (Abs) 0.02      Lymph # 1.9      Mono # 0.6      Eos # 0.3      Baso # 0.03      nRBC 0      Gran % 69.1      Lymph % 20.3      Mono % 6.8      Eosinophil % 3.3      Basophil % 0.3      Differential Method Automated     COMPREHENSIVE METABOLIC PANEL    Sodium 140      Potassium 4.8      Chloride 105      CO2 23      Glucose 78      BUN 14      Creatinine 0.9      Calcium 9.5      Total Protein 7.3      Albumin 4.1      Total Bilirubin 0.2      Alkaline Phosphatase 60      AST 32      ALT 30      eGFR >60.0      Anion Gap 12     B-TYPE NATRIURETIC PEPTIDE    BNP 99     TROPONIN I    Troponin I <0.006     TSH    TSH 1.376       EKG Readings: (Independently Interpreted)   Rhythm: Paced Rhythm. Heart Rate: 61. Ectopy: No Ectopy. ST Segments: Normal ST Segments. T Waves: Normal. Axis: Normal. Clinical Impression: Paced Rhythm       Imaging Results              X-Ray Chest 1 View (Final result)  Result time 12/26/24 22:13:33      Final result by Kathie Cardenas MD (12/26/24 22:13:33)                   Impression:      No active or adverse finding as correlated with 02/15/2024      Electronically signed by: Kathie Cardenas  Date:    12/26/2024  Time:    22:13               Narrative:    EXAMINATION:  XR CHEST 1 VIEW    CLINICAL HISTORY:  Essential (primary) hypertension    TECHNIQUE:  Single frontal portable view of the chest was performed.    COMPARISON:  None    FINDINGS:  No pulmonary consolidation or pleural effusion                                       Medications - No data to display  Medical Decision Making  DDxd Hypertension, Non-compliance, ACS    Problems Addressed:  Chest pain, unspecified type: acute illness or injury  Hypertension: chronic illness or injury with exacerbation, progression, or side effects of treatment    Amount and/or Complexity of Data Reviewed  Labs: ordered.  Radiology: ordered.  ECG/medicine tests: ordered and independent interpretation performed. Decision-making details documented  in ED Course.    Risk  Decision regarding hospitalization.    Pt feels improved and is ready for discharge.  I have discussed with patient and/or family/caretaker chest pain precautions, specifically to return for worsening chest pain, shortness of breath, fever, or any concern.  I have low suspicion for cardiopulmonary, vascular, infectious, respiratory, or other emergent medical condition based on my evaluation in the ED.                                    Clinical Impression:  Final diagnoses:  [I10] Hypertension (Primary)  [R07.9] Chest pain, unspecified type          ED Disposition Condition    Discharge Stable          ED Prescriptions    None       Follow-up Information       Follow up With Specialties Details Why Contact Info    Wayne Owens MD Family Medicine Schedule an appointment as soon as possible for a visit in 2 days  [address]  [place] [address]  [phone]      Cardiology  Schedule an appointment as soon as possible for a visit in 1 day      [facility] - Emergency Dept Emergency Medicine  If symptoms worsen [address]  Emergency Department  [facility] [id number] [phone]             Dayton Barahona MD  12/26/24 3004

## 2024-12-27 NOTE — TELEPHONE ENCOUNTER
Pt called with c/o left chest pressure that started yesterday. Pt reports having a cardiac history and stated he has a pacemaker. Care advice recommends call . Pt verbalized understanding.   Reason for Disposition   [1] Chest pain lasts > 5 minutes AND [2] age > 44    Additional Information   Negative: SEVERE difficulty breathing (e.g., struggling for each breath, speaks in single words)   Negative: Difficult to awaken or acting confused (e.g., disoriented, slurred speech)   Negative: Shock suspected (e.g., cold/pale/clammy skin, too weak to stand, low BP, rapid pulse)    Protocols used: Chest Pain-A-AH

## 2024-12-31 ENCOUNTER — OFFICE VISIT (OUTPATIENT)
Dept: CARDIOLOGY | Facility: CLINIC | Age: 74
End: 2024-12-31
Payer: MEDICARE

## 2024-12-31 DIAGNOSIS — Z95.0 CARDIAC PACEMAKER IN SITU: Chronic | ICD-10-CM

## 2024-12-31 DIAGNOSIS — I70.0 ATHEROSCLEROSIS OF AORTA: ICD-10-CM

## 2024-12-31 DIAGNOSIS — R07.89 OTHER CHEST PAIN: Primary | ICD-10-CM

## 2024-12-31 DIAGNOSIS — I48.92 ATRIAL FIBRILLATION AND FLUTTER: Chronic | ICD-10-CM

## 2024-12-31 DIAGNOSIS — I65.23 BILATERAL CAROTID ARTERY STENOSIS: ICD-10-CM

## 2024-12-31 DIAGNOSIS — I48.91 ATRIAL FIBRILLATION AND FLUTTER: Chronic | ICD-10-CM

## 2024-12-31 DIAGNOSIS — I10 ESSENTIAL HYPERTENSION: Chronic | ICD-10-CM

## 2024-12-31 DIAGNOSIS — E78.00 PURE HYPERCHOLESTEROLEMIA: Chronic | ICD-10-CM

## 2024-12-31 DIAGNOSIS — I49.5 SSS (SICK SINUS SYNDROME): Chronic | ICD-10-CM

## 2024-12-31 DIAGNOSIS — Z98.890 HISTORY OF CARDIAC RADIOFREQUENCY ABLATION (RFA): ICD-10-CM

## 2024-12-31 DIAGNOSIS — I48.0 PAROXYSMAL ATRIAL FIBRILLATION: Chronic | ICD-10-CM

## 2024-12-31 DIAGNOSIS — G45.1 TIA INVOLVING CAROTID ARTERY: ICD-10-CM

## 2024-12-31 PROCEDURE — 99214 OFFICE O/P EST MOD 30 MIN: CPT | Mod: 95,,, | Performed by: INTERNAL MEDICINE

## 2024-12-31 PROCEDURE — 3066F NEPHROPATHY DOC TX: CPT | Mod: CPTII,95,, | Performed by: INTERNAL MEDICINE

## 2024-12-31 PROCEDURE — 3061F NEG MICROALBUMINURIA REV: CPT | Mod: CPTII,95,, | Performed by: INTERNAL MEDICINE

## 2024-12-31 PROCEDURE — 3044F HG A1C LEVEL LT 7.0%: CPT | Mod: CPTII,95,, | Performed by: INTERNAL MEDICINE

## 2024-12-31 PROCEDURE — 1160F RVW MEDS BY RX/DR IN RCRD: CPT | Mod: CPTII,95,, | Performed by: INTERNAL MEDICINE

## 2024-12-31 PROCEDURE — 1159F MED LIST DOCD IN RCRD: CPT | Mod: CPTII,95,, | Performed by: INTERNAL MEDICINE

## 2024-12-31 PROCEDURE — 4010F ACE/ARB THERAPY RXD/TAKEN: CPT | Mod: CPTII,95,, | Performed by: INTERNAL MEDICINE

## 2024-12-31 RX ORDER — NITROGLYCERIN 0.4 MG/1
0.4 TABLET SUBLINGUAL EVERY 5 MIN PRN
Qty: 25 TABLET | Refills: 5 | Status: SHIPPED | OUTPATIENT
Start: 2024-12-31 | End: 2025-12-31

## 2024-12-31 NOTE — PROGRESS NOTES
Subjective:   Patient ID:  Antonio Urena is a 74 y.o. female who presents for follow up of No chief complaint on file.      75 yo female, came in for ER visit f/u Tele note  PMH AFIB s/p RFA and PPM, HTn HLD, h/o TIA h/o skin Ca. no h/o MI  Prior cardiologist Dr. Ling. F/u Dr. Martinez for afib s/p PPM.  2022 echo EF nl. Mild AI and mR  In the past 2 months, 4 spells of chest pain dyspnea dizziness fatigue. BP 90/50 mmHG at home, and 120/80 mmHG in ER. Troponin x2 and BNP negative, EKG A pacing and V sensing QTc 442 ms  Weight loss 25 lbs on purpose since 07/23 04/24 visit  02/24 phar nuke showed no ischemia. BNP and troponin wnl  Now BP at 120 to 130 mmHG. No episode of hypotension. Pt feels better    10/24 visit  EKG reviewed by myself today reveals A pacing and QTc 452 ms nonspecific STT change  No chest pain dizziness faint orthopnea palpitation leg swelling. Fatigue and declined MICHELLE eval  LDL 54 BP C  BMI 37     Interval history tele visit  12/26/24 had chest pain on left rib cage for 5 hrs, 2/10 in severity and took Pepcid. Went to ER at [facility], egk troponin and CXR unremarkable. No other symptoms.  D/c home and the pain better next day  Recurrent pain 2 days ago.                  Past Medical History:   Diagnosis Date    A-fib     A-fib     Pace maker put in 5/12/20    Allergy     Angina pectoris     Anxiety     Arthritis     Asthma     cough variant    Back pain     Cancer 2018    basal cell c    Colon polyp     COPD (chronic obstructive pulmonary disease)     Depression     Hyperlipidemia     Hypertension     Obesity     Pneumonia        Past Surgical History:   Procedure Laterality Date    ABLATION Bilateral 06/03/2021    Procedure: ABLATION/CTI;  Surgeon: Juan Lazo MD;  Location: [facility] CATH LAB;  Service: Cardiology;  Laterality: Bilateral;  (RFA of the CTI,    COVID-19, MRNA, LN-S, PF (Pfizer) 3/20/2021, 2/27/2021   later date RA lead extraction, RA lead addition    CATARACT  EXTRACTION Right     MGM    CATARACT EXTRACTION Left 2022    MGM    COLONOSCOPY N/A 10/10/2022    Procedure: COLONOSCOPY --card clearance received per Dr Ling;  Surgeon: Nestor Sher MD;  Location: [facility] ENDO;  Service: Gastroenterology;  Laterality: N/A;    HYSTERECTOMY      IMPLANTATION OF BIVENTRICULAR HEART PACEMAKER  2020    Loop recorder also in but not working right now    PHLEBOGRAPHY  2021    Procedure: Venogram;  Surgeon: Juan Lazo MD;  Location: [facility] CATH LAB;  Service: Cardiology;;    REVISION OF PROCEDURE INVOLVING PACEMAKER LEAD N/A 2021    Procedure: REVISION, ELECTRODE LEAD, CARDIAC PACEMAKER;  Surgeon: Juan Lazo MD;  Location: [facility] CATH LAB;  Service: Cardiology;  Laterality: N/A;    REVISION OF PROCEDURE INVOLVING PACEMAKER LEAD Bilateral 07/15/2021    Procedure: REVISION, ELECTRODE LEAD, CARDIAC PACEMAKER/A lead;  Surgeon: Juan Lazo MD;  Location: [facility] CATH LAB;  Service: Cardiology;  Laterality: Bilateral;  biotronik device    REVISION OF SKIN POCKET FOR PACEMAKER  2021    Procedure: Revision, Skin Pocket, For Cardiac Pacemaker;  Surgeon: Juan Lazo MD;  Location: [facility] CATH LAB;  Service: Cardiology;;       Social History     Tobacco Use    Smoking status: Former     Current packs/day: 0.00     Average packs/day: 0.3 packs/day for 1 year (0.3 ttl pk-yrs)     Types: Cigarettes     Start date: 1984     Quit date: 1985     Years since quittin.0    Smokeless tobacco: Former    Tobacco comments:     Quit 30 - 40 years ago   Substance Use Topics    Alcohol use: Not Currently     Comment: Wine Occasionally     Drug use: Never       Family History   Problem Relation Name Age of Onset    Cancer Mother      Cancer Father      Cancer Brother      Stroke Brother           ROS    Objective:   Physical Exam  HENT:      Head: Normocephalic.   Eyes:      Pupils: Pupils are equal, round, and reactive to light.    Neck:      Thyroid: No thyromegaly.      Vascular: Normal carotid pulses. No carotid bruit or JVD.   Cardiovascular:      Rate and Rhythm: Normal rate and regular rhythm. No extrasystoles are present.     Chest Wall: PMI is not displaced.      Pulses: Normal pulses.      Heart sounds: Normal heart sounds. No murmur heard.     No gallop. No S3 sounds.   Pulmonary:      Effort: No respiratory distress.      Breath sounds: Normal breath sounds. No stridor.   Abdominal:      General: Bowel sounds are normal.      Palpations: Abdomen is soft.      Tenderness: There is no abdominal tenderness. There is no rebound.   Skin:     Findings: No rash.   Neurological:      Mental Status: She is alert and oriented to person, place, and time.   Psychiatric:         Behavior: Behavior normal.         Lab Results   Component Value Date    CHOL 138 08/21/2024    CHOL 140 01/30/2024    CHOL 139 01/25/2023     Lab Results   Component Value Date    HDL 69 08/21/2024    HDL 62 01/30/2024    HDL 61 01/25/2023     Lab Results   Component Value Date    LDLCALC 54.4 (L) 08/21/2024    LDLCALC 60.2 (L) 01/30/2024    LDLCALC 62.2 (L) 01/25/2023     Lab Results   Component Value Date    TRIG 73 08/21/2024    TRIG 89 01/30/2024    TRIG 79 01/25/2023     Lab Results   Component Value Date    CHOLHDL 50.0 08/21/2024    CHOLHDL 44.3 01/30/2024    CHOLHDL 43.9 01/25/2023       Chemistry        Component Value Date/Time     12/26/2024 2143    K 4.8 12/26/2024 2143     12/26/2024 2143    CO2 23 12/26/2024 2143    BUN 14 12/26/2024 2143    CREATININE 0.9 12/26/2024 2143    GLU 78 12/26/2024 2143        Component Value Date/Time    CALCIUM 9.5 12/26/2024 2143    ALKPHOS 60 12/26/2024 2143    AST 32 12/26/2024 2143    ALT 30 12/26/2024 2143    BILITOT 0.2 12/26/2024 2143    ESTGFRAFRICA >60 03/11/2022 1743    EGFRNONAA >60 03/11/2022 1743          Lab Results   Component Value Date    HGBA1C 5.2 08/21/2024     Lab Results   Component Value  Date    TSH 1.376 12/26/2024     Lab Results   Component Value Date    INR 1.1 08/29/2021    INR 1.0 07/12/2021    INR 1.0 06/06/2021     Lab Results   Component Value Date    WBC 9.12 12/26/2024    HGB 13.8 12/26/2024    HCT 42.2 12/26/2024    MCV 89 12/26/2024     12/26/2024     BMP  Sodium   Date Value Ref Range Status   12/26/2024 140 136 - 145 mmol/L Final     Potassium   Date Value Ref Range Status   12/26/2024 4.8 3.5 - 5.1 mmol/L Final     Chloride   Date Value Ref Range Status   12/26/2024 105 95 - 110 mmol/L Final     CO2   Date Value Ref Range Status   12/26/2024 23 23 - 29 mmol/L Final     BUN   Date Value Ref Range Status   12/26/2024 14 8 - 23 mg/dL Final     Creatinine   Date Value Ref Range Status   12/26/2024 0.9 0.5 - 1.4 mg/dL Final     Calcium   Date Value Ref Range Status   12/26/2024 9.5 8.7 - 10.5 mg/dL Final     Anion Gap   Date Value Ref Range Status   12/26/2024 12 8 - 16 mmol/L Final     eGFR if    Date Value Ref Range Status   03/11/2022 >60 >60 mL/min/1.73 m^2 Final     eGFR if non    Date Value Ref Range Status   03/11/2022 >60 >60 mL/min/1.73 m^2 Final     Comment:     Calculation used to obtain the estimated glomerular filtration  rate (eGFR) is the CKD-EPI equation.        BNP  @LABRCNTIP(BNP,BNPTRIAGEBLO)@  @LABRCNTIP(troponini)@  CrCl cannot be calculated (Unknown ideal weight.).  No results found in the last 24 hours.  No results found in the last 24 hours.  No results found in the last 24 hours.    Assessment:      1. Other chest pain    2. Hx TIA involving carotid artery    3. Atherosclerosis of aorta    4. Cardiac pacemaker in situ    5. Bilateral carotid artery stenosis    6. Essential hypertension    7. History of cardiac radiofrequency ablation (RFA)    8. Atrial fibrillation and flutter    9. Paroxysmal atrial fibrillation    10. Pure hypercholesterolemia    11. SSS (sick sinus syndrome)      Atypical CP  Plan:   Add PPI daily  Refer  to GI for atypical CP  Add NTG SL prn     Continue current medication  F/u as scheduled    The patient location is: home  The chief complaint leading to consultation is: chest pain     Visit type: audiovisual    Face to Face time with patient: 15 minutes of total time spent on the encounter, which includes face to face time and non-face to face time preparing to see the patient (eg, review of tests), Obtaining and/or reviewing separately obtained history, Documenting clinical information in the electronic or other health record, Independently interpreting results (not separately reported) and communicating results to the patient/family/caregiver, or Care coordination (not separately reported).         Each patient to whom he or she provides medical services by telemedicine is:  (1) informed of the relationship between the physician and patient and the respective role of any other health care provider with respect to management of the patient; and (2) notified that he or she may decline to receive medical services by telemedicine and may withdraw from such care at any time.    Notes:

## 2025-01-09 ENCOUNTER — TELEPHONE (OUTPATIENT)
Dept: CARDIOLOGY | Facility: CLINIC | Age: 75
End: 2025-01-09
Payer: MEDICARE

## 2025-01-12 ENCOUNTER — NURSE TRIAGE (OUTPATIENT)
Dept: ADMINISTRATIVE | Facility: CLINIC | Age: 75
End: 2025-01-12
Payer: MEDICARE

## 2025-01-13 ENCOUNTER — OFFICE VISIT (OUTPATIENT)
Dept: PULMONOLOGY | Facility: CLINIC | Age: 75
End: 2025-01-13
Payer: MEDICARE

## 2025-01-13 ENCOUNTER — HOSPITAL ENCOUNTER (OUTPATIENT)
Dept: RADIOLOGY | Facility: HOSPITAL | Age: 75
Discharge: HOME | End: 2025-01-13
Attending: NURSE PRACTITIONER
Payer: MEDICARE

## 2025-01-13 VITALS
WEIGHT: 205.25 LBS | HEART RATE: 62 BPM | SYSTOLIC BLOOD PRESSURE: 136 MMHG | RESPIRATION RATE: 18 BRPM | HEIGHT: 63 IN | BODY MASS INDEX: 36.37 KG/M2 | OXYGEN SATURATION: 98 % | DIASTOLIC BLOOD PRESSURE: 66 MMHG

## 2025-01-13 DIAGNOSIS — J30.89 NON-SEASONAL ALLERGIC RHINITIS, UNSPECIFIED TRIGGER: Chronic | ICD-10-CM

## 2025-01-13 DIAGNOSIS — J44.89 ASTHMA WITH COPD: ICD-10-CM

## 2025-01-13 DIAGNOSIS — J44.89 ASTHMA WITH COPD: Primary | Chronic | ICD-10-CM

## 2025-01-13 DIAGNOSIS — J06.9 VIRAL UPPER RESPIRATORY TRACT INFECTION WITH COUGH: ICD-10-CM

## 2025-01-13 DIAGNOSIS — J44.89 ASTHMA WITH COPD: Chronic | ICD-10-CM

## 2025-01-13 DIAGNOSIS — J06.9 UPPER RESPIRATORY TRACT INFECTION, UNSPECIFIED TYPE: Primary | ICD-10-CM

## 2025-01-13 PROCEDURE — 71046 X-RAY EXAM CHEST 2 VIEWS: CPT | Mod: TC

## 2025-01-13 PROCEDURE — 71046 X-RAY EXAM CHEST 2 VIEWS: CPT | Mod: 26,,, | Performed by: STUDENT IN AN ORGANIZED HEALTH CARE EDUCATION/TRAINING PROGRAM

## 2025-01-13 PROCEDURE — 1159F MED LIST DOCD IN RCRD: CPT | Mod: CPTII,S$GLB,, | Performed by: NURSE PRACTITIONER

## 2025-01-13 PROCEDURE — 1160F RVW MEDS BY RX/DR IN RCRD: CPT | Mod: CPTII,S$GLB,, | Performed by: NURSE PRACTITIONER

## 2025-01-13 PROCEDURE — 3288F FALL RISK ASSESSMENT DOCD: CPT | Mod: CPTII,S$GLB,, | Performed by: NURSE PRACTITIONER

## 2025-01-13 PROCEDURE — 3075F SYST BP GE 130 - 139MM HG: CPT | Mod: CPTII,S$GLB,, | Performed by: NURSE PRACTITIONER

## 2025-01-13 PROCEDURE — 1101F PT FALLS ASSESS-DOCD LE1/YR: CPT | Mod: CPTII,S$GLB,, | Performed by: NURSE PRACTITIONER

## 2025-01-13 PROCEDURE — 99999 PR PBB SHADOW E&M-EST. PATIENT-LVL IV: CPT | Mod: PBBFAC,,, | Performed by: NURSE PRACTITIONER

## 2025-01-13 PROCEDURE — 3078F DIAST BP <80 MM HG: CPT | Mod: CPTII,S$GLB,, | Performed by: NURSE PRACTITIONER

## 2025-01-13 PROCEDURE — 99214 OFFICE O/P EST MOD 30 MIN: CPT | Mod: S$GLB,,, | Performed by: NURSE PRACTITIONER

## 2025-01-13 PROCEDURE — 1125F AMNT PAIN NOTED PAIN PRSNT: CPT | Mod: CPTII,S$GLB,, | Performed by: NURSE PRACTITIONER

## 2025-01-13 RX ORDER — PREDNISONE 20 MG/1
TABLET ORAL
Qty: 21 TABLET | Refills: 0 | Status: SHIPPED | OUTPATIENT
Start: 2025-01-13

## 2025-01-13 RX ORDER — FLUTICASONE PROPIONATE 50 MCG
2 SPRAY, SUSPENSION (ML) NASAL DAILY
Qty: 48 G | Refills: 4 | Status: SHIPPED | OUTPATIENT
Start: 2025-01-13

## 2025-01-13 RX ORDER — BENZONATATE 100 MG/1
100 CAPSULE ORAL 3 TIMES DAILY PRN
Qty: 30 CAPSULE | Refills: 3 | Status: SHIPPED | OUTPATIENT
Start: 2025-01-13

## 2025-01-13 NOTE — PROGRESS NOTES
"Subjective:      Patient ID: Antonio Urena is a 74 y.o. female.    Chief Complaint: Cough and Chest Congestion    Cough  Associated symptoms include postnasal drip, rhinorrhea and shortness of breath.     Presents with cough and congestion for 10 days. Started off as rhinitis and body aches/chills that have resolved. Continues to have cough and feels like it is now in the chest.   Takes advair for asthma.   No fever.     Patient Active Problem List   Diagnosis    Essential hypertension    Asthma with COPD    Anxiety    Non-seasonal allergic rhinitis    Pure hypercholesterolemia    Paroxysmal atrial fibrillation    SSS (sick sinus syndrome)    Cardiac pacemaker in situ    Atrial fibrillation and flutter    Hx TIA involving carotid artery    Typical atrial flutter    History of cardiac radiofrequency ablation (RFA)    hx Migraine with aura    Chronic anticoagulation- Eliquis    Atherosclerosis of aorta    Senile purpura    Osteopenia with high risk of fracture    Severe obesity (BMI 35.0-39.9) with comorbidity    Carotid arterial disease    Stage 3a chronic kidney disease    Other chest pain     /66 (Patient Position: Sitting)   Pulse 62   Resp 18   Ht 5' 3" (1.6 m)   Wt 93.1 kg (205 lb 4 oz)   SpO2 98%   BMI 36.36 kg/m²   Body mass index is 36.36 kg/m².    Review of Systems   Constitutional:  Positive for fatigue.   HENT:  Positive for postnasal drip, rhinorrhea and congestion.    Respiratory:  Positive for cough and shortness of breath.      Objective:      Physical Exam  Constitutional:       Appearance: Normal appearance. She is well-developed.   HENT:      Head: Normocephalic and atraumatic.      Nose: Congestion present.   Cardiovascular:      Rate and Rhythm: Normal rate and regular rhythm.      Heart sounds: No murmur heard.     No gallop.   Pulmonary:      Effort: Pulmonary effort is normal.      Breath sounds: Normal breath sounds.   Abdominal:      Palpations: Abdomen is soft.      " Tenderness: There is no abdominal tenderness.   Musculoskeletal:         General: Normal range of motion.      Cervical back: Normal range of motion and neck supple.   Skin:     General: Skin is warm and dry.   Neurological:      Mental Status: She is alert and oriented to person, place, and time.   Psychiatric:         Mood and Affect: Mood normal.         Behavior: Behavior normal.       Personal Diagnostic Review            Assessment:       1. Upper respiratory tract infection, unspecified type    2. Viral upper respiratory tract infection with cough    3. Non-seasonal allergic rhinitis, unspecified trigger    4. Asthma with COPD        Outpatient Encounter Medications as of 1/13/2025   Medication Sig Dispense Refill    apixaban (ELIQUIS) 5 mg Tab Take 1 tablet (5 mg total) by mouth 2 (two) times daily. 180 tablet 3    clobetasoL (TEMOVATE) 0.05 % external solution Apply topically once daily. Apply to the scalp as needed. 50 mL 3    diltiaZEM (DILT-XR) 240 MG CDCR Take 1 capsule (240 mg total) by mouth once daily. 7 capsule 2    elderberry fruit 350 mg Cap Take by mouth.      flecainide (TAMBOCOR) 100 MG Tab Take 1 tablet (100 mg total) by mouth every 12 (twelve) hours. 180 tablet 3    fluticasone-salmeterol diskus inhaler 250-50 mcg Inhale 1 puff into the lungs 2 (two) times daily. Controller.Wash out mouth after using. 180 each 3    levalbuterol (XOPENEX HFA) 45 mcg/actuation inhaler Inhale 2 puffs into the lungs every 6 (six) hours as needed for Wheezing or Shortness of Breath. 15 g 0    lisinopriL (PRINIVIL,ZESTRIL) 20 MG tablet Take 0.5 tablets (10 mg total) by mouth once daily. 45 tablet 3    multivitamin capsule Take 1 capsule by mouth once daily.      nitroGLYCERIN (NITROSTAT) 0.4 MG SL tablet Place 1 tablet (0.4 mg total) under the tongue every 5 (five) minutes as needed. 25 tablet 5    OPW TEST CLAIM - DO NOT FILL Inject into the muscle. OPW test claim. Do not fill. 1 capsule 0    rosuvastatin  (CRESTOR) 40 MG Tab Take 1 tablet (40 mg total) by mouth once daily. 90 tablet 3    tiotropium bromide (SPIRIVA RESPIMAT) 2.5 mcg/actuation inhaler Inhale 2 puffs into the lungs once daily. 12 g 3    triamcinolone acetonide 0.1% (KENALOG) 0.1 % cream Apply topically 2 (two) times daily. Use up to 2 weeks at a time 454 g 1    [DISCONTINUED] fluticasone propionate (FLONASE) 50 mcg/actuation nasal spray 2 sprays (100 mcg total) by Each Nostril route once daily. 48 g 4    benzonatate (TESSALON) 100 MG capsule Take 1 capsule (100 mg total) by mouth 3 (three) times daily as needed for Cough. 30 capsule 3    cetirizine (ZYRTEC) 10 MG tablet Take 10 mg by mouth once daily.  (Patient not taking: Reported on 1/13/2025)      fluticasone propionate (FLONASE) 50 mcg/actuation nasal spray 2 sprays (100 mcg total) by Each Nostril route once daily. 48 g 4    ketoconazole (NIZORAL) 2 % shampoo Apply topically twice a week. (Patient not taking: Reported on 1/13/2025) 120 mL 3    LORazepam (ATIVAN) 1 MG tablet Take 0.5-1 tablets (0.5-1 mg total) by mouth every 12 (twelve) hours as needed for Anxiety. 30 tablet 0    meloxicam (MOBIC) 15 MG tablet Take 1 tablet (15 mg total) by mouth once daily. (Patient not taking: Reported on 1/13/2025) 30 tablet 1    omega-3 fatty acids/fish oil (FISH OIL-OMEGA-3 FATTY ACIDS) 300-1,000 mg capsule Take 1 capsule by mouth once daily. (Patient not taking: Reported on 1/13/2025)      predniSONE (DELTASONE) 20 MG tablet 3 tablets for 3 days. 2 tablets for 3 days. 1 tablet for 3 days. One half tablet for 3 days. 21 tablet 0     No facility-administered encounter medications on file as of 1/13/2025.     No orders of the defined types were placed in this encounter.    Plan:     CXR    1. Upper respiratory tract infection, unspecified type  -     benzonatate (TESSALON) 100 MG capsule; Take 1 capsule (100 mg total) by mouth 3 (three) times daily as needed for Cough.  Dispense: 30 capsule; Refill: 3    2. Viral  upper respiratory tract infection with cough  -     predniSONE (DELTASONE) 20 MG tablet; 3 tablets for 3 days. 2 tablets for 3 days. 1 tablet for 3 days. One half tablet for 3 days.  Dispense: 21 tablet; Refill: 0    3. Non-seasonal allergic rhinitis, unspecified trigger  -     fluticasone propionate (FLONASE) 50 mcg/actuation nasal spray; 2 sprays (100 mcg total) by Each Nostril route once daily.  Dispense: 48 g; Refill: 4    4. Asthma with COPD  Overview:  Wixela, Spiriva.          Also add OTC cold/sinus, fluids.                 Elizabeth LeJeune, ACNP, ANP

## 2025-01-29 LAB
OHS CV AF BURDEN PERCENT: < 1
OHS CV DC REMOTE DEVICE TYPE: NORMAL
OHS CV RV PACING PERCENT: 6 %

## 2025-01-31 ENCOUNTER — PATIENT MESSAGE (OUTPATIENT)
Dept: ADMINISTRATIVE | Facility: OTHER | Age: 75
End: 2025-01-31
Payer: MEDICARE

## 2025-02-01 ENCOUNTER — CLINICAL SUPPORT (OUTPATIENT)
Dept: CARDIOLOGY | Facility: HOSPITAL | Age: 75
End: 2025-02-01
Attending: INTERNAL MEDICINE
Payer: MEDICARE

## 2025-02-01 ENCOUNTER — CLINICAL SUPPORT (OUTPATIENT)
Dept: CARDIOLOGY | Facility: HOSPITAL | Age: 75
End: 2025-02-01
Payer: MEDICARE

## 2025-02-01 DIAGNOSIS — Z95.0 PRESENCE OF CARDIAC PACEMAKER: ICD-10-CM

## 2025-02-01 DIAGNOSIS — I49.5 SICK SINUS SYNDROME: ICD-10-CM

## 2025-02-01 DIAGNOSIS — I48.0 PAROXYSMAL ATRIAL FIBRILLATION: ICD-10-CM

## 2025-02-01 PROCEDURE — 93296 REM INTERROG EVL PM/IDS: CPT | Performed by: INTERNAL MEDICINE

## 2025-02-07 DIAGNOSIS — J06.9 UPPER RESPIRATORY TRACT INFECTION, UNSPECIFIED TYPE: ICD-10-CM

## 2025-02-07 RX ORDER — BENZONATATE 100 MG/1
CAPSULE ORAL
Qty: 30 CAPSULE | Refills: 3 | Status: SHIPPED | OUTPATIENT
Start: 2025-02-07

## 2025-02-10 ENCOUNTER — TELEPHONE (OUTPATIENT)
Dept: CARDIOLOGY | Facility: CLINIC | Age: 75
End: 2025-02-10
Payer: MEDICARE

## 2025-02-10 NOTE — TELEPHONE ENCOUNTER
Spoke with pt in regards to questions about appt.                   ----- Message from Chanel sent at 2/10/2025  1:45 PM CST -----  Contact: self  Patient is requesting a call back regarding received letter in mail saying she need to schedule - already have a appt. Please call back at [phone]

## 2025-02-14 ENCOUNTER — NURSE TRIAGE (OUTPATIENT)
Dept: ADMINISTRATIVE | Facility: CLINIC | Age: 75
End: 2025-02-14
Payer: MEDICARE

## 2025-02-14 ENCOUNTER — PATIENT MESSAGE (OUTPATIENT)
Dept: PULMONOLOGY | Facility: CLINIC | Age: 75
End: 2025-02-14
Payer: MEDICARE

## 2025-02-14 NOTE — TELEPHONE ENCOUNTER
"Patient states recent diagnosis of an Upper Respiratory Tract Infection and Cough in January, 2025 that resolved after management with prescription medication. Patient states history of COPD, Asthma and heart disease. Patient states return of a productive cough and states phlegm "taste like popcorn". Patient states she ate popcorn last night but believes, after her research, that she may have a diagnosis of Popcorn Lung or Bronchiolitis Obliterans. Patient declined triage services at this time.    Patient advised that the [facility] does not provide diagnosis of conditions but should discuss her concerns with her Pulmonologist. Patient also advised to contact the [facility] if she has any worsening symptoms or desires triage/assessment. Patient states understanding of care advice.   Reason for Disposition   Health information question, no triage required and triager able to answer question    Additional Information   Negative: Caller is not with the adult (patient) and reporting urgent symptoms   Negative: Medicine question not related to refill or renewal   Negative: Requesting a renewal or refill of a medicine patient is currently taking   Negative: Questions or concerns about high blood pressure   Negative: Caller can't be reached by phone   Negative: Caller has already spoken to PCP (doctor or NP/PA) or another triager   Negative: Nursing judgment   Negative: Nursing judgment   Negative: Nursing judgment   Negative: Requesting lab results and adult stable (no new symptoms, not getting worse)   Negative: Requesting referral to a specialist   Negative: Questions about durable medical equipment ordered and triager unable to answer   Negative: Requesting regular office appointment and adult stable (no new symptoms, not getting worse)    Protocols used: Information Only Call - No Triage-A-[place]    "

## 2025-02-17 LAB
OHS CV AF BURDEN PERCENT: < 1
OHS CV DC REMOTE DEVICE TYPE: NORMAL
OHS CV RV PACING PERCENT: 6 %

## 2025-03-07 DIAGNOSIS — R06.02 SOB (SHORTNESS OF BREATH): ICD-10-CM

## 2025-03-07 DIAGNOSIS — I10 ESSENTIAL HYPERTENSION: ICD-10-CM

## 2025-03-07 DIAGNOSIS — R00.2 PALPITATIONS: ICD-10-CM

## 2025-03-07 DIAGNOSIS — E78.5 HYPERLIPIDEMIA, UNSPECIFIED HYPERLIPIDEMIA TYPE: ICD-10-CM

## 2025-03-07 DIAGNOSIS — Z95.0 CARDIAC PACEMAKER IN SITU: ICD-10-CM

## 2025-03-07 DIAGNOSIS — R07.9 CHEST PAIN, UNSPECIFIED TYPE: ICD-10-CM

## 2025-03-07 DIAGNOSIS — T82.9XXS DISORDER OF CARDIAC PACEMAKER SYSTEM, SEQUELA: ICD-10-CM

## 2025-03-07 DIAGNOSIS — I48.21 PERMANENT ATRIAL FIBRILLATION: ICD-10-CM

## 2025-03-07 RX ORDER — DILTIAZEM HYDROCHLORIDE 240 MG/1
240 CAPSULE, EXTENDED RELEASE ORAL DAILY
Qty: 7 CAPSULE | Refills: 2 | Status: SHIPPED | OUTPATIENT
Start: 2025-03-07 | End: 2026-03-07

## 2025-04-04 ENCOUNTER — OFFICE VISIT (OUTPATIENT)
Dept: DERMATOLOGY | Facility: CLINIC | Age: 75
End: 2025-04-04
Payer: MEDICARE

## 2025-04-04 DIAGNOSIS — L82.0 INFLAMED SEBORRHEIC KERATOSIS: ICD-10-CM

## 2025-04-04 DIAGNOSIS — L82.1 SEBORRHEIC KERATOSES: ICD-10-CM

## 2025-04-04 DIAGNOSIS — L30.9 DERMATITIS: Primary | ICD-10-CM

## 2025-04-04 DIAGNOSIS — Z12.83 SCREENING, MALIGNANT NEOPLASM, SKIN: ICD-10-CM

## 2025-04-04 PROCEDURE — 99999 PR PBB SHADOW E&M-EST. PATIENT-LVL III: CPT | Mod: PBBFAC,,, | Performed by: STUDENT IN AN ORGANIZED HEALTH CARE EDUCATION/TRAINING PROGRAM

## 2025-04-04 RX ORDER — MOMETASONE FUROATE 1 MG/G
OINTMENT TOPICAL DAILY
Qty: 45 G | Refills: 2 | Status: SHIPPED | OUTPATIENT
Start: 2025-04-04

## 2025-04-04 NOTE — PROGRESS NOTES
Subjective:       Patient ID:  Antonio Urena is a 75 y.o. female who presents for   Chief Complaint   Patient presents with    Skin Check     Fbse. Reports some new spots on face.       History of Present Illness: The patient presents for follow up of skin check. Last seen on 5/1/24. Reports having several brownish and crusted lesions on the face and arms. Denies any rapidly growing, bleeding or non healing skin lesions. Patient has also noticed several red, scaly and itchy growths on the arms and legs as well. Has tried TAC with a little improvement.         Review of Systems   Constitutional:  Negative for fever and chills.   Skin:  Positive for itching, rash and dry skin.        Objective:    Physical Exam   Constitutional: She appears well-developed and well-nourished. No distress.   Neurological: She is alert and oriented to person, place, and time. She is not disoriented.   Psychiatric: She has a normal mood and affect.   Skin:   Areas Examined (abnormalities noted in diagram):   Scalp / Hair Palpated and Inspected  Head / Face Inspection Performed  Neck Inspection Performed  Chest / Axilla Inspection Performed  Back Inspection Performed  RUE Inspected  LUE Inspection Performed                   Diagram Legend     Erythematous scaling macule/papule c/w actinic keratosis       Vascular papule c/w angioma      Pigmented verrucoid papule/plaque c/w seborrheic keratosis      Yellow umbilicated papule c/w sebaceous hyperplasia      Irregularly shaped tan macule c/w lentigo     1-2 mm smooth white papules consistent with Milia      Movable subcutaneous cyst with punctum c/w epidermal inclusion cyst      Subcutaneous movable cyst c/w pilar cyst      Firm pink to brown papule c/w dermatofibroma      Pedunculated fleshy papule(s) c/w skin tag(s)      Evenly pigmented macule c/w junctional nevus     Mildly variegated pigmented, slightly irregular-bordered macule c/w mildly atypical nevus      Flesh colored to  evenly pigmented papule c/w intradermal nevus       Pink pearly papule/plaque c/w basal cell carcinoma      Erythematous hyperkeratotic cursted plaque c/w SCC      Surgical scar with no sign of skin cancer recurrence      Open and closed comedones      Inflammatory papules and pustules      Verrucoid papule consistent consistent with wart     Erythematous eczematous patches and plaques     Dystrophic onycholytic nail with subungual debris c/w onychomycosis     Umbilicated papule    Erythematous-base heme-crusted tan verrucoid plaque consistent with inflamed seborrheic keratosis     Erythematous Silvery Scaling Plaque c/w Psoriasis     See annotation      Assessment / Plan:        Dermatitis - nummular eczema   -     mometasone (ELOCON) 0.1 % ointment; Apply topically once daily.  Dispense: 45 g; Refill: 2    Inflamed seborrheic keratosis  Cryosurgery procedure note:    Verbal consent from the patient is obtained including, but not limited to, risk of hypopigmentation/hyperpigmentation, scar, recurrence of lesion. Liquid nitrogen cryosurgery is applied to 5 lesions to produce a freeze injury. The patient is aware that blisters may form and is instructed on wound care with gentle cleansing and use of vaseline ointment to keep moist until healed. The patient is supplied a handout on cryosurgery and is instructed to call if lesions do not completely resolve.    Seborrheic keratoses  These are benign inherited growths without a malignant potential. Reassurance given to patient. No treatment is necessary.     Screening, malignant neoplasm, skin  upper body skin examination performed today including at least 6 points as noted in physical examination. No lesions suspicious for malignancy noted.  Reassurance provided.    Instructed patient to observe lesion(s) for changes and follow up in clinic if changes are noted. Patient to monitor skin at home for new or changing lesions and follow up in clinic if noted.      Follow up  in about 1 year (around 4/4/2026).

## 2025-04-11 ENCOUNTER — PATIENT MESSAGE (OUTPATIENT)
Dept: CARDIOLOGY | Facility: CLINIC | Age: 75
End: 2025-04-11
Payer: MEDICARE

## 2025-04-11 DIAGNOSIS — I10 ESSENTIAL HYPERTENSION: ICD-10-CM

## 2025-04-11 DIAGNOSIS — T82.9XXS DISORDER OF CARDIAC PACEMAKER SYSTEM, SEQUELA: ICD-10-CM

## 2025-04-11 DIAGNOSIS — I48.21 PERMANENT ATRIAL FIBRILLATION: ICD-10-CM

## 2025-04-11 DIAGNOSIS — R06.02 SOB (SHORTNESS OF BREATH): ICD-10-CM

## 2025-04-11 DIAGNOSIS — R00.2 PALPITATIONS: ICD-10-CM

## 2025-04-11 DIAGNOSIS — R07.9 CHEST PAIN, UNSPECIFIED TYPE: ICD-10-CM

## 2025-04-11 DIAGNOSIS — E78.5 HYPERLIPIDEMIA, UNSPECIFIED HYPERLIPIDEMIA TYPE: ICD-10-CM

## 2025-04-11 DIAGNOSIS — Z95.0 CARDIAC PACEMAKER IN SITU: ICD-10-CM

## 2025-04-11 RX ORDER — DILTIAZEM HYDROCHLORIDE 240 MG/1
240 CAPSULE, EXTENDED RELEASE ORAL DAILY
Qty: 30 CAPSULE | Refills: 11 | Status: SHIPPED | OUTPATIENT
Start: 2025-04-11 | End: 2025-05-11

## 2025-04-11 NOTE — TELEPHONE ENCOUNTER
----- Message from Chanel sent at 4/11/2025  9:30 AM CDT -----  Contact: self  Patient is requesting a call back regarding medication - will not make it to appt need refill now. Please call back at [phone]

## 2025-04-16 ENCOUNTER — OFFICE VISIT (OUTPATIENT)
Dept: SLEEP MEDICINE | Facility: CLINIC | Age: 75
End: 2025-04-16
Payer: MEDICARE

## 2025-04-16 ENCOUNTER — HOSPITAL ENCOUNTER (OUTPATIENT)
Dept: RADIOLOGY | Facility: HOSPITAL | Age: 75
Discharge: HOME | End: 2025-04-16
Attending: NURSE PRACTITIONER
Payer: MEDICARE

## 2025-04-16 ENCOUNTER — CLINICAL SUPPORT (OUTPATIENT)
Dept: PULMONOLOGY | Facility: CLINIC | Age: 75
End: 2025-04-16
Payer: MEDICARE

## 2025-04-16 VITALS
HEIGHT: 63 IN | DIASTOLIC BLOOD PRESSURE: 66 MMHG | WEIGHT: 209.69 LBS | RESPIRATION RATE: 20 BRPM | SYSTOLIC BLOOD PRESSURE: 122 MMHG | BODY MASS INDEX: 37.15 KG/M2 | OXYGEN SATURATION: 98 % | HEART RATE: 62 BPM

## 2025-04-16 DIAGNOSIS — J44.89 ASTHMA WITH COPD: Primary | Chronic | ICD-10-CM

## 2025-04-16 DIAGNOSIS — J44.89 ASTHMA WITH COPD: Chronic | ICD-10-CM

## 2025-04-16 DIAGNOSIS — E66.01 SEVERE OBESITY (BMI 35.0-39.9) WITH COMORBIDITY: ICD-10-CM

## 2025-04-16 DIAGNOSIS — J44.9 CHRONIC OBSTRUCTIVE PULMONARY DISEASE, UNSPECIFIED COPD TYPE: ICD-10-CM

## 2025-04-16 LAB
BRPFT: NORMAL
FEF 25 75 LLN: 0.94
FEF 25 75 PRE REF: 12.7 %
FEF 25 75 REF: 2.34
FEV1 FVC LLN: 63
FEV1 FVC PRE REF: 55.9 %
FEV1 FVC REF: 78
FEV1 LLN: 1.4
FEV1 PRE REF: 49.8 %
FEV1 REF: 1.97
FVC LLN: 1.82
FVC PRE REF: 88.1 %
FVC REF: 2.56
PEF LLN: 3.35
PEF PRE REF: 90.8 %
PEF REF: 5.01
PRE FEF 25 75: 0.3 L/S
PRE FET 100: 14.54 SEC
PRE FEV1 FVC: 43.38 %
PRE FEV1: 0.98 L
PRE FVC: 2.26 L
PRE PEF: 4.54 L/S

## 2025-04-16 PROCEDURE — 99999 PR PBB SHADOW E&M-EST. PATIENT-LVL IV: CPT | Mod: PBBFAC,,, | Performed by: NURSE PRACTITIONER

## 2025-04-16 PROCEDURE — 71046 X-RAY EXAM CHEST 2 VIEWS: CPT | Mod: 26,,, | Performed by: RADIOLOGY

## 2025-04-16 PROCEDURE — 71046 X-RAY EXAM CHEST 2 VIEWS: CPT | Mod: TC

## 2025-04-16 RX ORDER — TIOTROPIUM BROMIDE INHALATION SPRAY 3.12 UG/1
2 SPRAY, METERED RESPIRATORY (INHALATION) DAILY
Qty: 12 G | Refills: 3 | Status: SHIPPED | OUTPATIENT
Start: 2025-04-16

## 2025-04-16 RX ORDER — FLUTICASONE PROPIONATE AND SALMETEROL 250; 50 UG/1; UG/1
1 POWDER RESPIRATORY (INHALATION) 2 TIMES DAILY
Qty: 180 EACH | Refills: 3 | Status: SHIPPED | OUTPATIENT
Start: 2025-04-16

## 2025-04-16 NOTE — PROGRESS NOTES
"Subjective:      Patient ID: Antonio Urena is a 75 y.o. female.    Chief Complaint: Asthma and COPD      History of Present Illness    HPI:  Patient reports well-controlled asthma recently. She needs a refill for her rescue inhaler, which expires in June, and has used it twice since her last visit. She is planning a 2-week trip to [place] next month and wants to ensure sufficient medication.    As an adult, patient has allergies to animals and pets, especially birds. She had to give away pets due to severe allergic reactions, including instances requiring emergency care after cleaning a bird cage.    In January, patient had a 3-week cough, producing brown phlegm once. The cough subsided, but she still has a deep cough in the mornings and sometimes afternoons, producing white or slightly darker phlegm.    About 2-3 weeks ago, patient had an allergy flare-up triggered by grass pollen when multiple neighbors were cutting their grass simultaneously, causing a brief return of the cough which has since resolved.    Patient denies any current breathing difficulties or acute asthma symptoms.    SOCIAL HISTORY:  Smoking: Smoked for approximately 3 years in the past, was not a heavy smoker        Problem List[1]  /66   Pulse 62   Resp 20   Ht 5' 3" (1.6 m)   Wt 95.1 kg (209 lb 11.2 oz)   SpO2 98%   BMI 37.15 kg/m²   Body mass index is 37.15 kg/m².    Review of Systems   Constitutional: Negative.    HENT: Negative.     Respiratory: Negative.     Cardiovascular: Negative.    Musculoskeletal: Negative.    Gastrointestinal: Negative.    Neurological: Negative.    Psychiatric/Behavioral: Negative.         Objective:      Physical Exam  Constitutional:       Appearance: She is well-developed. She is obese.   HENT:      Head: Normocephalic and atraumatic.      Nose: Nose normal.   Cardiovascular:      Rate and Rhythm: Normal rate and regular rhythm.      Heart sounds: No murmur heard.     No gallop.   Pulmonary:    "   Effort: Pulmonary effort is normal.      Breath sounds: Normal breath sounds.   Abdominal:      Palpations: Abdomen is soft.      Tenderness: There is no abdominal tenderness.   Musculoskeletal:         General: Normal range of motion.      Cervical back: Normal range of motion and neck supple.   Skin:     General: Skin is warm and dry.   Neurological:      Mental Status: She is alert and oriented to person, place, and time.   Psychiatric:         Mood and Affect: Mood normal.         Behavior: Behavior normal.       Personal Diagnostic Review   Spirometry reviewed. FEV1 50 % of predicted. stable          Assessment:       1. Asthma with COPD    2. Severe obesity (BMI 35.0-39.9) with comorbidity    3. Chronic obstructive pulmonary disease, unspecified COPD type        Encounter Medications[2]  Orders Placed This Encounter   Procedures    Spirometry with/without bronchodilator     Standing Status:   Future     Expiration Date:   4/16/2026     Release to patient:   Immediate     Plan:       1. Asthma with COPD  Overview:  Wixela, Spiriva.     Orders:  -     fluticasone-salmeterol diskus inhaler 250-50 mcg; Inhale 1 puff into the lungs 2 (two) times daily. Controller.Wash out mouth after using.  Dispense: 180 each; Refill: 3  -     tiotropium bromide (SPIRIVA RESPIMAT) 2.5 mcg/actuation inhaler; Inhale 2 puffs into the lungs once daily.  Dispense: 12 g; Refill: 3  -     Spirometry with/without bronchodilator; Future    2. Severe obesity (BMI 35.0-39.9) with comorbidity    3. Chronic obstructive pulmonary disease, unspecified COPD type  -     tiotropium bromide (SPIRIVA RESPIMAT) 2.5 mcg/actuation inhaler; Inhale 2 puffs into the lungs once daily.  Dispense: 12 g; Refill: 3       Assessment & Plan    ASTHMA:   Explained the difference between asthma and COPD, clarifying that condition is primarily asthma.   Discussed pulmonary function test results, explaining the significance of FEV1 and lung capacity measurements.    Educated on the importance of proper inhaler technique for optimal medication delivery.   Continued Xopenex (levalbuterol) inhaler as needed.   Continued Advair.   Continued Spiriva.    PULMONARY FUNCTION TESTING:   Ordered pulmonary function test with post-bronchodilator challenge for next visit.    FOLLOW-UP:   Follow up in 1 year.           This note was generated with the assistance of ambient listening technology. Verbal consent was obtained by the patient and accompanying visitor(s) for the recording of patient appointment to facilitate this note. I attest to having reviewed and edited the generated note for accuracy, though some syntax or spelling errors may persist. Please contact the author of this note for any clarification.                Elizabeth LeJeune, ACNP, ANP       [1]   Patient Active Problem List  Diagnosis    Essential hypertension    Asthma with COPD    Anxiety    Non-seasonal allergic rhinitis    Pure hypercholesterolemia    Paroxysmal atrial fibrillation    SSS (sick sinus syndrome)    Cardiac pacemaker in situ    Atrial fibrillation and flutter    Hx TIA involving carotid artery    Typical atrial flutter    History of cardiac radiofrequency ablation (RFA)    hx Migraine with aura    Chronic anticoagulation- Eliquis    Atherosclerosis of aorta    Senile purpura    Osteopenia with high risk of fracture    Severe obesity (BMI 35.0-39.9) with comorbidity    Carotid arterial disease    Stage 3a chronic kidney disease    Other chest pain   [2]   Outpatient Encounter Medications as of 4/16/2025   Medication Sig Dispense Refill    apixaban (ELIQUIS) 5 mg Tab Take 1 tablet (5 mg total) by mouth 2 (two) times daily. 180 tablet 3    benzonatate (TESSALON) 100 MG capsule TAKE ONE CAPSULE BY MOUTH THREE TIMES DAILY AS-NEEDED COUGH (Patient not taking: Reported on 4/4/2025) 30 capsule 3    cetirizine (ZYRTEC) 10 MG tablet Take 10 mg by mouth once daily.      clobetasoL (TEMOVATE) 0.05 % external solution  Apply topically once daily. Apply to the scalp as needed. (Patient not taking: Reported on 4/4/2025) 50 mL 3    diltiaZEM (DILT-XR) 240 MG CDCR Take 1 capsule (240 mg total) by mouth once daily. 30 capsule 11    elderberry fruit 350 mg Cap Take by mouth.      flaxseed oiL Oil by Misc.(Non-Drug; Combo Route) route.      flecainide (TAMBOCOR) 100 MG Tab Take 1 tablet (100 mg total) by mouth every 12 (twelve) hours. 180 tablet 3    fluticasone propionate (FLONASE) 50 mcg/actuation nasal spray 2 sprays (100 mcg total) by Each Nostril route once daily. 48 g 4    fluticasone-salmeterol diskus inhaler 250-50 mcg Inhale 1 puff into the lungs 2 (two) times daily. Controller.Wash out mouth after using. 180 each 3    ketoconazole (NIZORAL) 2 % shampoo Apply topically twice a week. (Patient not taking: Reported on 10/8/2024) 120 mL 3    levalbuterol (XOPENEX HFA) 45 mcg/actuation inhaler Inhale 2 puffs into the lungs every 6 (six) hours as needed for Wheezing or Shortness of Breath. 15 g 0    lisinopriL (PRINIVIL,ZESTRIL) 20 MG tablet Take 0.5 tablets (10 mg total) by mouth once daily. 45 tablet 3    LORazepam (ATIVAN) 1 MG tablet Take 0.5-1 tablets (0.5-1 mg total) by mouth every 12 (twelve) hours as needed for Anxiety. 30 tablet 0    meloxicam (MOBIC) 15 MG tablet Take 1 tablet (15 mg total) by mouth once daily. (Patient not taking: Reported on 4/4/2025) 30 tablet 1    mometasone (ELOCON) 0.1 % ointment Apply topically once daily. 45 g 2    multivitamin capsule Take 1 capsule by mouth once daily.      nitroGLYCERIN (NITROSTAT) 0.4 MG SL tablet Place 1 tablet (0.4 mg total) under the tongue every 5 (five) minutes as needed. (Patient not taking: Reported on 4/4/2025) 25 tablet 5    omega-3 fatty acids/fish oil (FISH OIL-OMEGA-3 FATTY ACIDS) 300-1,000 mg capsule Take 1 capsule by mouth once daily. (Patient not taking: Reported on 10/8/2024)      OPW TEST CLAIM - DO NOT FILL Inject into the muscle. OPW test claim. Do not fill. 1  capsule 0    predniSONE (DELTASONE) 20 MG tablet 3 tablets for 3 days. 2 tablets for 3 days. 1 tablet for 3 days. One half tablet for 3 days. (Patient not taking: Reported on 4/4/2025) 21 tablet 0    rosuvastatin (CRESTOR) 40 MG Tab Take 1 tablet (40 mg total) by mouth once daily. 90 tablet 3    tiotropium bromide (SPIRIVA RESPIMAT) 2.5 mcg/actuation inhaler Inhale 2 puffs into the lungs once daily. 12 g 3    triamcinolone acetonide 0.1% (KENALOG) 0.1 % cream Apply topically 2 (two) times daily. Use up to 2 weeks at a time 454 g 1    [DISCONTINUED] diltiaZEM (DILT-XR) 240 MG CDCR Take 1 capsule (240 mg total) by mouth once daily. 7 capsule 2    [DISCONTINUED] fluticasone-salmeterol diskus inhaler 250-50 mcg Inhale 1 puff into the lungs 2 (two) times daily. Controller.Wash out mouth after using. 180 each 3    [DISCONTINUED] tiotropium bromide (SPIRIVA RESPIMAT) 2.5 mcg/actuation inhaler Inhale 2 puffs into the lungs once daily. 12 g 3     No facility-administered encounter medications on file as of 4/16/2025.

## 2025-04-17 ENCOUNTER — RESULTS FOLLOW-UP (OUTPATIENT)
Dept: PULMONOLOGY | Facility: CLINIC | Age: 75
End: 2025-04-17

## 2025-04-23 ENCOUNTER — OFFICE VISIT (OUTPATIENT)
Dept: SLEEP MEDICINE | Facility: CLINIC | Age: 75
End: 2025-04-23
Payer: MEDICARE

## 2025-04-23 ENCOUNTER — HOSPITAL ENCOUNTER (OUTPATIENT)
Dept: RADIOLOGY | Facility: HOSPITAL | Age: 75
Discharge: HOME | End: 2025-04-23
Attending: NURSE PRACTITIONER
Payer: MEDICARE

## 2025-04-23 VITALS — HEIGHT: 62 IN | BODY MASS INDEX: 37.17 KG/M2 | WEIGHT: 202 LBS

## 2025-04-23 DIAGNOSIS — R91.1 SOLID NODULE OF LUNG GREATER THAN 8 MM IN DIAMETER: Primary | ICD-10-CM

## 2025-04-23 DIAGNOSIS — R91.1 SOLITARY PULMONARY NODULE: ICD-10-CM

## 2025-04-23 PROCEDURE — 71250 CT THORAX DX C-: CPT | Mod: 26,,, | Performed by: RADIOLOGY

## 2025-04-23 PROCEDURE — 71250 CT THORAX DX C-: CPT | Mod: TC,PO

## 2025-04-23 NOTE — PATIENT INSTRUCTIONS
[facility]® testing. Discussed this consists of two blood-based proteomic tests, the Nodify CDT® & Nodify XL2® tests to help decipher risk of malignancy    Information on the lab -[facility] lung   Link to video  [email or web address]

## 2025-04-23 NOTE — PROGRESS NOTES
"  Patient ID: Antonio Urena is a 75 y.o. female.    Chief Complaint: Asthma and COPD    The patient location is: [place]  The chief complaint leading to consultation is: CT scan  Visit type: Virtual visit with synchronous audio and video  Total time spent with patient: 13 min   Each patient to whom he or she provides medical services by telemedicine is:  (1) informed of the relationship between the physician and patient and the respective role of any other health care provider with respect to management of the patient; and (2) notified that he or she may decline to receive medical services by telemedicine and may withdraw from such care at any time.    Subjective:      History of Present Illness    HPI:  Ms. Urena recently underwent a chest XR which showed a possible lung nodule. A follow-up CT this morning confirmed the presence of a small, sub-centimeter lung nodule that appears to be new. Ms. Urena reports a history of exposure to secondhand smoke during childhood, as her mother smoked in the house for approximately 20 years. She has had asthma for many years and has been using various inhalers for an extended period.   She had multiple pet birds in the past.    Ms. Urena denies any current symptoms related to the lung nodule.    SOCIAL HISTORY:  Smoking: Quit after smoking for about 3 years        Problem List[1]  Ht 5' 2" (1.575 m)   Wt 91.6 kg (202 lb)   BMI 36.95 kg/m²   Body mass index is 36.95 kg/m².    Review of Systems   Constitutional: Negative.    HENT: Negative.     Respiratory: Negative.     Cardiovascular: Negative.    Musculoskeletal: Negative.    Gastrointestinal: Negative.    Neurological: Negative.    Psychiatric/Behavioral: Negative.         Objective:      Physical Exam  Constitutional:       Appearance: She is well-developed.   HENT:      Head: Normocephalic and atraumatic.   Pulmonary:      Effort: Pulmonary effort is normal. No tachypnea, bradypnea, accessory muscle " usage or respiratory distress.   Musculoskeletal:      Cervical back: Normal range of motion.   Skin:     Findings: No rash.   Neurological:      Mental Status: She is alert and oriented to person, place, and time.   Psychiatric:         Behavior: Behavior normal.         Thought Content: Thought content normal.         Judgment: Judgment normal.                Results for orders placed during the hospital encounter of 04/16/25    X-Ray Chest PA And Lateral    Narrative  EXAM: XR CHEST PA AND LATERAL    CLINICAL HISTORY:  COPD.    COMPARISON:01/30/2024.    TECHNIQUE: 2 view chest x-ray    FINDINGS: The heart is normal in size.  Pacemaker on the left.  The aorta is atherosclerotic.  There is some scarring in both lung fields.  There is a questionable 9 mm nodule right lung base.    Impression  Questionable 9 mm nodule right lung base.  Consider follow-up CT scan.    Finalized on: 4/16/2025 5:20 PM By:  Jose L Arevalo MD  [facility]# [id number]      2025-04-16 17:22:[id number]     [facility]    CT Chest Without Contrast  Narrative: EXAM: CT CHEST WITHOUT CONTRAST    CLINICAL HISTORY: Follow-up chest x-ray abnormality    COMPARISON: Chest x-ray performed 04/16/2025    TECHNIQUE: Standard thin-section axial images, with reformatted sagittal and coronal images.    FINDINGS: There is a 9 mm pulmonary nodule identified in the right midlung zone in the right lower lobe corresponding to the recent chest x-ray findings (series 4, image 255).  Lungs are otherwise clear except for mild pulmonary scarring at both lung bases and anteriorly in the right middle lobe. No pathologic lymph node enlargement is visible in the mediastinum or axilla bilaterally.  Heart size is normal. No vascular calcification is visible in the major coronary arteries. No chest wall abnormalities are identified except for a cardiac pacer and implanted cardiac monitor in the left anterior chest wall.  Visualized structures of the upper abdomen appear normal.  Impression: 1.   9 mm noncalcified pulmonary nodule in the right lower lobe corresponding to recent chest x-ray findings.  Short-term follow-up CT in 3 months or evaluation by whole body PET/CT is recommended for Fleischner Society guidelines.  2.  No adenopathy or acute pulmonary disease is appreciated.    All CT scans at this location are performed using dose modulation techniques as appropriate to a performed exam including the following: Automated exposure control; adjustment of the mA and/or kV according to patient size (this includes techniques or standardized protocols for targeted exams where dose is matched to indication / reason for exam; i.e. extremities or head); use of iterative reconstruction technique.    Finalized on: 4/23/2025 11:24 AM By:  Mando Philip  [facility]# [id number]      2025-04-23 11:26:32.783     [facility]        Assessment:       1. Solid nodule of lung greater than 8 mm in diameter        Encounter Medications[2]  Orders Placed This Encounter   Procedures    NM PET CT FDG Skull Base to Mid Thigh     Standing Status:   Future     Expected Date:   4/23/2025     Expiration Date:   4/23/2026     May the Radiologist modify the order per protocol to meet the clinical needs of the patient?:   Yes     Plan:       1. Solid nodule of lung greater than 8 mm in diameter  -     NM PET CT FDG Skull Base to Mid Thigh; Future; Expected date: 04/23/2025       Assessment & Plan    IMPRESSION:   CT confirmed presence of a sub-cm lung nodule, apparently new.   Low risk for cancer due to size of nodule (approximately 9 mm) and limited smoking history. 6.2%   Considered possible etiologies including infection or residual effects from past bird exposure. No past CT scans to compare   Asthma medications not contributing factor to nodule formation.    PLAN SUMMARY:   Ordered Nodify Lung blood test   Ordered PET scan   Ms. Urena to watch educational video about Nodify Lung test   Contact office to schedule PET scan   Follow-up  after review of Nodify Lung test and PET scan results    SOLITARY PULMONARY NODULE:   Explained size of nodule using pen diameter for perspective.   Described PET scan process and its ability to detect metabolic activity in tissues.   Ordered Nodify Lung blood test.   Ordered PET scan.   Contact the office to schedule PET scan.    FOLLOW-UP:   Follow up after results of Nodify Lung test and PET scan are reviewed.          This note was generated with the assistance of ambient listening technology. Verbal consent was obtained by the patient and accompanying visitor(s) for the recording of patient appointment to facilitate this note. I attest to having reviewed and edited the generated note for accuracy, though some syntax or spelling errors may persist. Please contact the author of this note for any clarification.       I spent a total of 32 minutes on the day of the visit.  This includes face to face time and non-face to face time preparing to see the patient (eg, review of tests), obtaining and/or reviewing separately obtained history, documenting clinical information in the electronic or other health record, independently interpreting results and communicating results to the patient/family/caregiver, or care coordinator.           Elizabeth LeJeune, ACNP, ANP       [1]   Patient Active Problem List  Diagnosis    Essential hypertension    Asthma with COPD    Anxiety    Non-seasonal allergic rhinitis    Pure hypercholesterolemia    Paroxysmal atrial fibrillation    SSS (sick sinus syndrome)    Cardiac pacemaker in situ    Atrial fibrillation and flutter    Hx TIA involving carotid artery    Typical atrial flutter    History of cardiac radiofrequency ablation (RFA)    hx Migraine with aura    Chronic anticoagulation- Eliquis    Atherosclerosis of aorta    Senile purpura    Osteopenia with high risk of fracture    Severe obesity (BMI 35.0-39.9) with comorbidity    Carotid arterial disease    Stage 3a chronic kidney  disease    Other chest pain   [2]   Outpatient Encounter Medications as of 4/23/2025   Medication Sig Dispense Refill    apixaban (ELIQUIS) 5 mg Tab Take 1 tablet (5 mg total) by mouth 2 (two) times daily. 180 tablet 3    benzonatate (TESSALON) 100 MG capsule TAKE ONE CAPSULE BY MOUTH THREE TIMES DAILY AS-NEEDED COUGH (Patient not taking: Reported on 4/4/2025) 30 capsule 3    cetirizine (ZYRTEC) 10 MG tablet Take 10 mg by mouth once daily.      clobetasoL (TEMOVATE) 0.05 % external solution Apply topically once daily. Apply to the scalp as needed. (Patient not taking: Reported on 4/4/2025) 50 mL 3    diltiaZEM (DILT-XR) 240 MG CDCR Take 1 capsule (240 mg total) by mouth once daily. 30 capsule 11    elderberry fruit 350 mg Cap Take by mouth.      flaxseed oiL Oil by Misc.(Non-Drug; Combo Route) route.      flecainide (TAMBOCOR) 100 MG Tab Take 1 tablet (100 mg total) by mouth every 12 (twelve) hours. 180 tablet 3    fluticasone propionate (FLONASE) 50 mcg/actuation nasal spray 2 sprays (100 mcg total) by Each Nostril route once daily. 48 g 4    fluticasone-salmeterol diskus inhaler 250-50 mcg Inhale 1 puff into the lungs 2 (two) times daily. Controller.Wash out mouth after using. 180 each 3    ketoconazole (NIZORAL) 2 % shampoo Apply topically twice a week. (Patient not taking: Reported on 10/8/2024) 120 mL 3    levalbuterol (XOPENEX HFA) 45 mcg/actuation inhaler Inhale 2 puffs into the lungs every 6 (six) hours as needed for Wheezing or Shortness of Breath. 15 g 0    lisinopriL (PRINIVIL,ZESTRIL) 20 MG tablet Take 0.5 tablets (10 mg total) by mouth once daily. 45 tablet 3    LORazepam (ATIVAN) 1 MG tablet Take 0.5-1 tablets (0.5-1 mg total) by mouth every 12 (twelve) hours as needed for Anxiety. 30 tablet 0    meloxicam (MOBIC) 15 MG tablet Take 1 tablet (15 mg total) by mouth once daily. (Patient not taking: Reported on 4/4/2025) 30 tablet 1    mometasone (ELOCON) 0.1 % ointment Apply topically once daily. 45 g 2     multivitamin capsule Take 1 capsule by mouth once daily.      nitroGLYCERIN (NITROSTAT) 0.4 MG SL tablet Place 1 tablet (0.4 mg total) under the tongue every 5 (five) minutes as needed. (Patient not taking: Reported on 4/4/2025) 25 tablet 5    omega-3 fatty acids/fish oil (FISH OIL-OMEGA-3 FATTY ACIDS) 300-1,000 mg capsule Take 1 capsule by mouth once daily. (Patient not taking: Reported on 10/8/2024)      OPW TEST CLAIM - DO NOT FILL Inject into the muscle. OPW test claim. Do not fill. 1 capsule 0    predniSONE (DELTASONE) 20 MG tablet 3 tablets for 3 days. 2 tablets for 3 days. 1 tablet for 3 days. One half tablet for 3 days. (Patient not taking: Reported on 4/4/2025) 21 tablet 0    rosuvastatin (CRESTOR) 40 MG Tab Take 1 tablet (40 mg total) by mouth once daily. 90 tablet 3    tiotropium bromide (SPIRIVA RESPIMAT) 2.5 mcg/actuation inhaler Inhale 2 puffs into the lungs once daily. 12 g 3    triamcinolone acetonide 0.1% (KENALOG) 0.1 % cream Apply topically 2 (two) times daily. Use up to 2 weeks at a time 454 g 1     No facility-administered encounter medications on file as of 4/23/2025.

## 2025-04-29 ENCOUNTER — TUMOR BOARD CONFERENCE (OUTPATIENT)
Dept: PULMONOLOGY | Facility: CLINIC | Age: 75
End: 2025-04-29
Payer: MEDICARE

## 2025-04-29 NOTE — PROGRESS NOTES
Discussed with Patient. Will follow recommendations from meeting. Nodule tri lobular with tree and bud peripheral to nodule indicating possible infection. Treat with antibiotic and PET scan in 2 months to review for any changes. She is a nonsmoker.     Elizabeth LeJeune, ACNP [facility] Pulmonary Nodule Review     Patient Name: Antonio Urena    MRN: [id number]    Date of Tumor Board: 04/29/2025    Diagnosis:  Pulmonary Nodule    Referring Provider:  Elizabeth Lejeune, NP    Present PCTP Providers: Pari Pedraza MD, Jose Root MD, Sim Corley MD, Nehemias Burks MD,  Sarika Menon NP, KIERAN Juares, Elizabeth LeJeune, NP, KIERAN Salvador    Smoking hx: Quit 1985, 0.3 pack years    Diagnostic Work Up:    Nodify ID: pending  Imaging:  CT Chest 4/23/25- 9mm RLL nodule, no prior CT chests  CXR 4/16/25- nodular opacity right lower       Board Recommendations:    PET vs CT chest in 3 months          The Multidisciplinary Tumor Board (MTB) is intended to assist the treatment team in developing a coordinated, comprehensive management plan for the patient. The deliberations of the MTB are not intended and should not be assumed to indicate any particular course of treatment with regard to the diagnosis, treatment, or management of the patient. Deliberations made or treatment options explored by the MTB are not a substitute for the professional judgment of the treating physician in diagnosing and treating the patient in accordance with the appropriate standard of care, applicable regulations, and facility policies. The MTB shall not be deemed under any circumstance to prescribe, order, or require certain medical care or medical or diagnostic services. The treating physician will remain solely responsible for making all medical, diagnostic, or care decisions concerning the patient.

## 2025-05-01 ENCOUNTER — CLINICAL SUPPORT (OUTPATIENT)
Dept: CARDIOLOGY | Facility: HOSPITAL | Age: 75
End: 2025-05-01
Attending: INTERNAL MEDICINE
Payer: MEDICARE

## 2025-05-01 ENCOUNTER — OFFICE VISIT (OUTPATIENT)
Dept: CARDIOLOGY | Facility: CLINIC | Age: 75
End: 2025-05-01
Payer: MEDICARE

## 2025-05-01 VITALS
WEIGHT: 207 LBS | BODY MASS INDEX: 38.09 KG/M2 | SYSTOLIC BLOOD PRESSURE: 126 MMHG | HEART RATE: 62 BPM | DIASTOLIC BLOOD PRESSURE: 80 MMHG | HEIGHT: 62 IN | OXYGEN SATURATION: 94 %

## 2025-05-01 DIAGNOSIS — I49.5 SICK SINUS SYNDROME: ICD-10-CM

## 2025-05-01 DIAGNOSIS — I48.0 PAROXYSMAL ATRIAL FIBRILLATION: Primary | ICD-10-CM

## 2025-05-01 DIAGNOSIS — E78.00 PURE HYPERCHOLESTEROLEMIA: Chronic | ICD-10-CM

## 2025-05-01 DIAGNOSIS — Z95.0 CARDIAC PACEMAKER IN SITU: Primary | ICD-10-CM

## 2025-05-01 DIAGNOSIS — I10 ESSENTIAL HYPERTENSION: Chronic | ICD-10-CM

## 2025-05-01 DIAGNOSIS — I65.23 BILATERAL CAROTID ARTERY STENOSIS: ICD-10-CM

## 2025-05-01 DIAGNOSIS — Z95.0 CARDIAC PACEMAKER IN SITU: ICD-10-CM

## 2025-05-01 DIAGNOSIS — Z98.890 HISTORY OF CARDIAC RADIOFREQUENCY ABLATION (RFA): ICD-10-CM

## 2025-05-01 DIAGNOSIS — I48.3 TYPICAL ATRIAL FLUTTER: ICD-10-CM

## 2025-05-01 DIAGNOSIS — I70.0 ATHEROSCLEROSIS OF AORTA: ICD-10-CM

## 2025-05-01 DIAGNOSIS — Z95.0 CARDIAC PACEMAKER IN SITU: Chronic | ICD-10-CM

## 2025-05-01 PROCEDURE — 99999 PR PBB SHADOW E&M-EST. PATIENT-LVL I: CPT | Mod: PBBFAC,,,

## 2025-05-01 PROCEDURE — 1101F PT FALLS ASSESS-DOCD LE1/YR: CPT | Mod: CPTII,S$GLB,, | Performed by: INTERNAL MEDICINE

## 2025-05-01 PROCEDURE — 99214 OFFICE O/P EST MOD 30 MIN: CPT | Mod: S$GLB,,, | Performed by: INTERNAL MEDICINE

## 2025-05-01 PROCEDURE — 99999 PR PBB SHADOW E&M-EST. PATIENT-LVL V: CPT | Mod: PBBFAC,,, | Performed by: INTERNAL MEDICINE

## 2025-05-01 PROCEDURE — 1159F MED LIST DOCD IN RCRD: CPT | Mod: CPTII,S$GLB,, | Performed by: INTERNAL MEDICINE

## 2025-05-01 PROCEDURE — 3074F SYST BP LT 130 MM HG: CPT | Mod: CPTII,S$GLB,, | Performed by: INTERNAL MEDICINE

## 2025-05-01 PROCEDURE — 1126F AMNT PAIN NOTED NONE PRSNT: CPT | Mod: CPTII,S$GLB,, | Performed by: INTERNAL MEDICINE

## 2025-05-01 PROCEDURE — 3079F DIAST BP 80-89 MM HG: CPT | Mod: CPTII,S$GLB,, | Performed by: INTERNAL MEDICINE

## 2025-05-01 PROCEDURE — 1160F RVW MEDS BY RX/DR IN RCRD: CPT | Mod: CPTII,S$GLB,, | Performed by: INTERNAL MEDICINE

## 2025-05-01 PROCEDURE — 93280 PM DEVICE PROGR EVAL DUAL: CPT

## 2025-05-01 PROCEDURE — 3288F FALL RISK ASSESSMENT DOCD: CPT | Mod: CPTII,S$GLB,, | Performed by: INTERNAL MEDICINE

## 2025-05-01 PROCEDURE — G2211 COMPLEX E/M VISIT ADD ON: HCPCS | Mod: S$GLB,,, | Performed by: INTERNAL MEDICINE

## 2025-05-01 NOTE — PROGRESS NOTES
Subjective:   Patient ID:  Antonio Urena is a 75 y.o. female who presents for follow up of No chief complaint on file.      73 yo female, came in for 6m f/u  PMH AFIB s/p RFA and PPM, HTn HLD, h/o TIA h/o skin Ca. no h/o MI  Prior cardiologist Dr. Ling. F/u Dr. Martinez for afib s/p PPM.  2022 echo EF nl. Mild AI and mR  In the past 2 months, 4 spells of chest pain dyspnea dizziness fatigue. BP 90/50 mmHG at home, and 120/80 mmHG in ER. Troponin x2 and BNP negative, EKG A pacing and V sensing QTc 442 ms  Weight loss 25 lbs on purpose since 07/23 04/24 visit  02/24 phar deanna showed no ischemia. BNP and troponin wnl  Now BP at 120 to 130 mmHG. No episode of hypotension. Pt feels better    10/24 visit  EKG reviewed by myself today reveals A pacing and QTc 452 ms nonspecific STT change  No chest pain dizziness faint orthopnea palpitation leg swelling. Fatigue and declined MICHELLE eval  LDL 54 BP C  BMI 37     12/24 visit tele visit  12/26/24 had chest pain on left rib cage for 5 hrs, 2/10 in severity and took Pepcid. Went to ER at [facility], egk troponin and CXR unremarkable. No other symptoms.  D/c home and the pain better next day  Recurrent pain 2 days ago.    Interval history  The pain caused by the gas and no recurrent one  Occasional palpitation                  History of Present Illness    CHIEF COMPLAINT:  - Antonio presents for follow-up to discuss recent chest XR findings of a lung nodule and to address ongoing concerns about intermittent palpitations and anxiety.    HPI:  - Last seen 5-6 months ago in December for follow-up of ED visit due to chest pain  - Chest pain was determined to be gas-related and improved with Pepcid  - No recurrent chest pain episodes since then, attributed to changes in eating habits and posture while eating  - Reports ongoing intermittent palpitations  - Example of palpitation: on [id number]:00.000Z at 11:35 AM, at rest, heart rate increased to 70-72 BPM then returned to  normal  - Palpitations described as sporadic, sometimes going weeks without them, other times having them frequently throughout a day  - Palpitations can occur with minimal exertion, such as walking across a room, and may persist for varying durations  - Routine chest XR revealed a nodule in the right lung  - Follow-up CT was performed, believed to be an infection  - Antibiotics prescribed but not yet received from the pharmacy  - PET scan scheduled for August as a follow-up  - Reports significant anxiety, particularly related to upcoming vacation involving air travel  - First vacation in 5 years  - Has Ativan prescribed for anxiety management and plans to take it during the flight  - Reports sporadic vibration sensations, very intermittent  - Example of vibration sensation: had an episode on the way to the appointment, lasted for 10 counts and resolved when leaned forward and then back  - Vibration sensations can occur during travel, with varying frequency and duration  - Notes that stress exacerbates the palpitations  - Practicing breathing techniques to manage stress  - Prefers not to rely on Ativan for regular anxiety management, opting to take only half a tablet when necessary to avoid feeling overly sedated  - Denies chest pain, dyspnea, dizziness, or syncope during routine activities at home    CARDIAC HISTORY:  - Carotid US 11/2024: 19% stenosis  - CXR (recent): nodule found in right lung  - CT (recent): 9 mm nodule in right lung, suspected infection  - Intermittent palpitations, sometimes associated with activity or stress    MEDICATIONS:  - Eliquis  - Diltiazem  - Flecainide 10 mg  - Lisinopril 10 mg, decreased  - Rosuvastatin  - Ativan (lorazepam) 0.5 tablet PRN for anxiety    TEST RESULTS:  - LDL: 54 mg/dL, good control    MEDICAL HISTORY:  - AFib  - HTN  - Hypercholesterolemia          Past Medical History:   Diagnosis Date    A-fib     A-fib     Pace maker put in 5/12/20    Allergy     Angina pectoris      Anxiety     Arthritis     Asthma     cough variant    Back pain     Cancer 2018    basal cell c    Colon polyp     COPD (chronic obstructive pulmonary disease)     Depression     Hyperlipidemia     Hypertension     Obesity     Pneumonia        Past Surgical History:   Procedure Laterality Date    ABLATION Bilateral 06/03/2021    Procedure: ABLATION/CTI;  Surgeon: Juan Lazo MD;  Location: [facility] CATH LAB;  Service: Cardiology;  Laterality: Bilateral;  (RFA of the CTI,    COVID-19, MRNA, LN-S, PF (Pfizer) 3/20/2021, 2/27/2021   later date RA lead extraction, RA lead addition    CATARACT EXTRACTION Right     MGM    CATARACT EXTRACTION Left 07/28/2022    MGM    COLONOSCOPY N/A 10/10/2022    Procedure: COLONOSCOPY 8/31--card clearance received per Dr Ling;  Surgeon: Nestor Sher MD;  Location: [facility] ENDO;  Service: Gastroenterology;  Laterality: N/A;    HYSTERECTOMY      IMPLANTATION OF BIVENTRICULAR HEART PACEMAKER  05/12/2020    Loop recorder also in but not working right now    PHLEBOGRAPHY  06/08/2021    Procedure: Venogram;  Surgeon: Juan Lazo MD;  Location: [facility] CATH LAB;  Service: Cardiology;;    REVISION OF PROCEDURE INVOLVING PACEMAKER LEAD N/A 06/08/2021    Procedure: REVISION, ELECTRODE LEAD, CARDIAC PACEMAKER;  Surgeon: Juan Lazo MD;  Location: [facility] CATH LAB;  Service: Cardiology;  Laterality: N/A;    REVISION OF PROCEDURE INVOLVING PACEMAKER LEAD Bilateral 07/15/2021    Procedure: REVISION, ELECTRODE LEAD, CARDIAC PACEMAKER/A lead;  Surgeon: Juan Lazo MD;  Location: [facility] CATH LAB;  Service: Cardiology;  Laterality: Bilateral;  biotronik device    REVISION OF SKIN POCKET FOR PACEMAKER  06/08/2021    Procedure: Revision, Skin Pocket, For Cardiac Pacemaker;  Surgeon: Juan Lazo MD;  Location: [facility] CATH LAB;  Service: Cardiology;;       Social History[1]    Family History   Problem Relation Name Age of Onset    Cancer Mother      Cancer Father       Cancer Brother      Stroke Brother           ROS    Objective:   Physical Exam  HENT:      Head: Normocephalic.   Eyes:      Pupils: Pupils are equal, round, and reactive to light.   Neck:      Thyroid: No thyromegaly.      Vascular: Normal carotid pulses. No carotid bruit or JVD.   Cardiovascular:      Rate and Rhythm: Normal rate and regular rhythm. No extrasystoles are present.     Chest Wall: PMI is not displaced.      Pulses: Normal pulses.      Heart sounds: Normal heart sounds. No murmur heard.     No gallop. No S3 sounds.   Pulmonary:      Effort: No respiratory distress.      Breath sounds: Normal breath sounds. No stridor.   Abdominal:      General: Bowel sounds are normal.      Palpations: Abdomen is soft.      Tenderness: There is no abdominal tenderness. There is no rebound.   Skin:     Findings: No rash.   Neurological:      Mental Status: She is alert and oriented to person, place, and time.   Psychiatric:         Behavior: Behavior normal.         Lab Results   Component Value Date    CHOL 138 08/21/2024    CHOL 140 01/30/2024    CHOL 139 01/25/2023     Lab Results   Component Value Date    HDL 69 08/21/2024    HDL 62 01/30/2024    HDL 61 01/25/2023     Lab Results   Component Value Date    LDLCALC 54.4 (L) 08/21/2024    LDLCALC 60.2 (L) 01/30/2024    LDLCALC 62.2 (L) 01/25/2023     Lab Results   Component Value Date    TRIG 73 08/21/2024    TRIG 89 01/30/2024    TRIG 79 01/25/2023     Lab Results   Component Value Date    CHOLHDL 50.0 08/21/2024    CHOLHDL 44.3 01/30/2024    CHOLHDL 43.9 01/25/2023       Chemistry        Component Value Date/Time     12/26/2024 2143    K 4.8 12/26/2024 2143     12/26/2024 2143    CO2 23 12/26/2024 2143    BUN 14 12/26/2024 2143    CREATININE 0.9 12/26/2024 2143    GLU 78 12/26/2024 2143        Component Value Date/Time    CALCIUM 9.5 12/26/2024 2143    ALKPHOS 60 12/26/2024 2143    AST 32 12/26/2024 2143    ALT 30 12/26/2024 2143    BILITOT 0.2  12/26/2024 2143    ESTGFRAFRICA >60 03/11/2022 1743    EGFRNONAA >60 03/11/2022 1743          Lab Results   Component Value Date    HGBA1C 5.2 08/21/2024     Lab Results   Component Value Date    TSH 1.376 12/26/2024     Lab Results   Component Value Date    INR 1.1 08/29/2021    INR 1.0 07/12/2021    INR 1.0 06/06/2021     Lab Results   Component Value Date    WBC 9.12 12/26/2024    HGB 13.8 12/26/2024    HCT 42.2 12/26/2024    MCV 89 12/26/2024     12/26/2024     BMP  Sodium   Date Value Ref Range Status   12/26/2024 140 136 - 145 mmol/L Final     Potassium   Date Value Ref Range Status   12/26/2024 4.8 3.5 - 5.1 mmol/L Final     Chloride   Date Value Ref Range Status   12/26/2024 105 95 - 110 mmol/L Final     CO2   Date Value Ref Range Status   12/26/2024 23 23 - 29 mmol/L Final     BUN   Date Value Ref Range Status   12/26/2024 14 8 - 23 mg/dL Final     Creatinine   Date Value Ref Range Status   12/26/2024 0.9 0.5 - 1.4 mg/dL Final     Calcium   Date Value Ref Range Status   12/26/2024 9.5 8.7 - 10.5 mg/dL Final     Anion Gap   Date Value Ref Range Status   12/26/2024 12 8 - 16 mmol/L Final     eGFR if    Date Value Ref Range Status   03/11/2022 >60 >60 mL/min/1.73 m^2 Final     eGFR if non    Date Value Ref Range Status   03/11/2022 >60 >60 mL/min/1.73 m^2 Final     Comment:     Calculation used to obtain the estimated glomerular filtration  rate (eGFR) is the CKD-EPI equation.        BNP  @LABRCNTIP(BNP,BNPTRIAGEBLO)@  @LABRCNTIP(troponini)@  CrCl cannot be calculated (Patient's most recent lab result is older than the maximum 7 days allowed.).  No results found in the last 24 hours.  No results found in the last 24 hours.  No results found in the last 24 hours.    Assessment:      1. Paroxysmal atrial fibrillation    2. Typical atrial flutter    3. Pure hypercholesterolemia    4. History of cardiac radiofrequency ablation (RFA)    5. Essential hypertension    6.  Atherosclerosis of aorta    7. Cardiac pacemaker in situ    8. Bilateral carotid artery stenosis        Plan:     Assessment & Plan    IMPRESSION:  - Considered sleep apnea as potential cause of sleep issues.  - Significant unilateral carotid artery stenosis noted.  - BP medication needed due to consistently elevated readings.  - Discussed the relationship between emotional stress and increased perception of palpitations.    ATRIAL FIBRILLATION:  - Continued Eliquis for a-fib.  - Continued diltiazem for a-fib and high blood pressure.  - Continued Flecainide 100 mg for a-fib.    HYPERTENSION:  - Continued diltiazem for a-fib and high blood pressure.  - Continued Lisinopril 10 mg for HTN.    CAROTID ARTERY STENOSIS:  - Ordered carotid duplex to be repeated in 6 months.    HYPERLIPIDEMIA:  - Continued rosuvastatin for cholesterol.    ANXIETY DISORDER:  - Continued Ativan as needed for anxiety, particularly for air travel (patient to take half a tablet).  - Antonio to continue practicing breathing techniques for stress management.    FOLLOW-UP:  - Follow up at that time.            This note was generated with the assistance of ambient listening technology. Verbal consent was obtained by the patient and accompanying visitor(s) for the recording of patient appointment to facilitate this note. I attest to having reviewed and edited the generated note for accuracy, though some syntax or spelling errors may persist. Please contact the author of this note for any clarification.            [1]   Social History  Tobacco Use    Smoking status: Former     Current packs/day: 0.00     Average packs/day: 0.3 packs/day for 1 year (0.3 ttl pk-yrs)     Types: Cigarettes     Start date: 1984     Quit date: 1985     Years since quittin.3    Smokeless tobacco: Former    Tobacco comments:     Quit 30 - 40 years ago   Substance Use Topics    Alcohol use: Not Currently     Comment: Wine Occasionally     Drug use: Never

## 2025-05-02 ENCOUNTER — PATIENT MESSAGE (OUTPATIENT)
Dept: CARDIOLOGY | Facility: CLINIC | Age: 75
End: 2025-05-02
Payer: MEDICARE

## 2025-05-02 DIAGNOSIS — I10 ESSENTIAL HYPERTENSION: Chronic | ICD-10-CM

## 2025-05-02 RX ORDER — FLECAINIDE ACETATE 100 MG/1
100 TABLET ORAL EVERY 12 HOURS
Qty: 180 TABLET | Refills: 3 | Status: SHIPPED | OUTPATIENT
Start: 2025-05-02

## 2025-05-02 RX ORDER — LISINOPRIL 20 MG/1
10 TABLET ORAL DAILY
Qty: 45 TABLET | Refills: 3 | Status: SHIPPED | OUTPATIENT
Start: 2025-05-02

## 2025-05-02 RX ORDER — FLECAINIDE ACETATE 100 MG/1
100 TABLET ORAL EVERY 12 HOURS
Qty: 180 TABLET | Refills: 3 | Status: SHIPPED | OUTPATIENT
Start: 2025-05-02 | End: 2025-05-02 | Stop reason: SDUPTHER

## 2025-05-02 RX ORDER — LISINOPRIL 20 MG/1
10 TABLET ORAL DAILY
Qty: 45 TABLET | Refills: 3 | Status: SHIPPED | OUTPATIENT
Start: 2025-05-02 | End: 2025-05-02 | Stop reason: SDUPTHER

## 2025-05-03 ENCOUNTER — CLINICAL SUPPORT (OUTPATIENT)
Dept: CARDIOLOGY | Facility: HOSPITAL | Age: 75
End: 2025-05-03
Attending: INTERNAL MEDICINE
Payer: MEDICARE

## 2025-05-03 ENCOUNTER — CLINICAL SUPPORT (OUTPATIENT)
Dept: CARDIOLOGY | Facility: HOSPITAL | Age: 75
End: 2025-05-03
Payer: MEDICARE

## 2025-05-03 DIAGNOSIS — I49.5 SICK SINUS SYNDROME: ICD-10-CM

## 2025-05-03 DIAGNOSIS — Z95.0 PRESENCE OF CARDIAC PACEMAKER: ICD-10-CM

## 2025-05-03 DIAGNOSIS — I48.0 PAROXYSMAL ATRIAL FIBRILLATION: ICD-10-CM

## 2025-05-03 PROCEDURE — 93296 REM INTERROG EVL PM/IDS: CPT | Performed by: INTERNAL MEDICINE

## 2025-05-03 PROCEDURE — 93294 REM INTERROG EVL PM/LDLS PM: CPT | Mod: S$GLB,,, | Performed by: INTERNAL MEDICINE

## 2025-05-05 ENCOUNTER — PATIENT MESSAGE (OUTPATIENT)
Dept: SLEEP MEDICINE | Facility: CLINIC | Age: 75
End: 2025-05-05
Payer: MEDICARE

## 2025-05-18 LAB
OHS CV AF BURDEN PERCENT: < 1
OHS CV DC REMOTE DEVICE TYPE: NORMAL
OHS CV RV PACING PERCENT: 11 %

## 2025-05-29 ENCOUNTER — PATIENT MESSAGE (OUTPATIENT)
Dept: CARDIOLOGY | Facility: CLINIC | Age: 75
End: 2025-05-29
Payer: MEDICARE

## 2025-05-29 DIAGNOSIS — I10 ESSENTIAL HYPERTENSION: Chronic | ICD-10-CM

## 2025-06-05 ENCOUNTER — PATIENT MESSAGE (OUTPATIENT)
Dept: SLEEP MEDICINE | Facility: CLINIC | Age: 75
End: 2025-06-05
Payer: MEDICARE

## 2025-06-06 ENCOUNTER — HOSPITAL ENCOUNTER (OUTPATIENT)
Dept: RADIOLOGY | Facility: HOSPITAL | Age: 75
Discharge: HOME | End: 2025-06-06
Attending: NURSE PRACTITIONER
Payer: MEDICARE

## 2025-06-06 DIAGNOSIS — R05.9 COUGH, UNSPECIFIED TYPE: Primary | ICD-10-CM

## 2025-06-06 DIAGNOSIS — R05.9 COUGH, UNSPECIFIED TYPE: ICD-10-CM

## 2025-06-06 PROCEDURE — 71046 X-RAY EXAM CHEST 2 VIEWS: CPT | Mod: 26,,, | Performed by: RADIOLOGY

## 2025-06-06 PROCEDURE — 71046 X-RAY EXAM CHEST 2 VIEWS: CPT | Mod: TC,PO

## 2025-06-10 ENCOUNTER — OFFICE VISIT (OUTPATIENT)
Dept: SLEEP MEDICINE | Facility: CLINIC | Age: 75
End: 2025-06-10
Payer: MEDICARE

## 2025-06-10 VITALS
DIASTOLIC BLOOD PRESSURE: 84 MMHG | SYSTOLIC BLOOD PRESSURE: 130 MMHG | BODY MASS INDEX: 38.67 KG/M2 | HEIGHT: 62 IN | HEART RATE: 75 BPM | RESPIRATION RATE: 20 BRPM | WEIGHT: 210.13 LBS | OXYGEN SATURATION: 94 %

## 2025-06-10 DIAGNOSIS — J20.9 ACUTE BRONCHITIS, UNSPECIFIED ORGANISM: Primary | ICD-10-CM

## 2025-06-10 DIAGNOSIS — R91.1 SOLITARY PULMONARY NODULE: ICD-10-CM

## 2025-06-10 PROCEDURE — 99999 PR PBB SHADOW E&M-EST. PATIENT-LVL V: CPT | Mod: PBBFAC,,, | Performed by: NURSE PRACTITIONER

## 2025-06-10 RX ORDER — PRAVASTATIN SODIUM 40 MG/1
TABLET ORAL
COMMUNITY

## 2025-06-10 RX ORDER — LEVOFLOXACIN 750 MG/1
750 TABLET, FILM COATED ORAL DAILY
Qty: 5 TABLET | Refills: 0 | Status: SHIPPED | OUTPATIENT
Start: 2025-06-10 | End: 2025-06-15

## 2025-06-10 NOTE — PROGRESS NOTES
"Patient ID: Antonio Urena is a 75 y.o. female.    Chief Complaint: Pulmonary Nodules      Subjective:   Pulmonary Nodules  Associated symptoms include coughing.     Presents for continued cough.   Recent CXR /CT with  9 mm pulmonary nodule identified in the right midlung zone in the right lower lobe. There also appeared to be tree in bud changes indicating possible infection. Doxycycline prescribed. She states cough improved but returned.   She states she was recently coughing up something that looks like pus and contacted office.   This has resolved but continues to have productive cough.   CXR last week with continued nodular change.            Problem List[1]  /84   Pulse 75   Resp 20   Ht 5' 2" (1.575 m)   Wt 95.3 kg (210 lb 1.6 oz)   SpO2 (!) 94%   BMI 38.43 kg/m²   Body mass index is 38.43 kg/m².    Review of Systems   Constitutional: Negative.    HENT:  Positive for voice change.    Respiratory:  Positive for cough and sputum production.    Cardiovascular: Negative.    Musculoskeletal: Negative.    Gastrointestinal: Negative.    Neurological: Negative.    Psychiatric/Behavioral: Negative.       Objective:      Physical Exam  Constitutional:       Appearance: She is well-developed. She is obese.   HENT:      Head: Normocephalic and atraumatic.      Nose: Nose normal.   Cardiovascular:      Rate and Rhythm: Normal rate and regular rhythm.      Heart sounds: No murmur heard.     No gallop.   Pulmonary:      Effort: Pulmonary effort is normal.      Breath sounds: Normal breath sounds.   Abdominal:      Palpations: Abdomen is soft.      Tenderness: There is no abdominal tenderness.   Musculoskeletal:         General: Normal range of motion.      Cervical back: Normal range of motion and neck supple.   Skin:     General: Skin is warm and dry.   Neurological:      Mental Status: She is alert and oriented to person, place, and time.   Psychiatric:         Mood and Affect: Mood normal.         Behavior: " Behavior normal.       Personal Diagnostic Review      Results for orders placed during the hospital encounter of 06/06/25    X-Ray Chest PA And Lateral    Narrative  EXAMINATION:  XR CHEST PA AND LATERAL    CLINICAL HISTORY:  Cough, unspecified    TECHNIQUE:  PA and lateral views of the chest were performed.    COMPARISON:  04/16/2025    FINDINGS:  The lungs are clear and free of infiltrate.  There is unchanged appearance of a nodular opacity seen projecting over the right mid lung zone.  No pleural effusion or pneumothorax. A multi lead pacing device is present.  There is tortuosity of the descending thoracic aorta.  Loop recorder seen projecting over the chest.    Impression  As above      Electronically signed by: Enrrique Jackson DO  Date:    06/06/2025  Time:    11:01        Assessment:       1. Acute bronchitis, unspecified organism    2. Solitary pulmonary nodule        Encounter Medications[2]  Orders Placed This Encounter   Procedures    Culture, Respiratory with Gram Stain     Standing Status:   Future     Expected Date:   6/10/2025     Expiration Date:   8/9/2026     Send normal result to authorizing provider's In Basket if patient is active on MyChart::   Yes    AFB Culture & Smear     Standing Status:   Future     Expected Date:   6/10/2025     Expiration Date:   8/9/2026     Send normal result to authorizing provider's In Basket if patient is active on MyChart::   Yes    Fungus culture     Standing Status:   Future     Expected Date:   6/10/2025     Expiration Date:   8/9/2026     Send normal result to authorizing provider's In Basket if patient is active on MyChart::   Yes    X-Ray Chest PA And Lateral     Standing Status:   Future     Expected Date:   8/1/2025     Expiration Date:   6/10/2026     May the Radiologist modify the order per protocol to meet the clinical needs of the patient?:   Yes     Release to patient:   Immediate     Plan:       1. Acute bronchitis, unspecified organism  -      Culture, Respiratory with Gram Stain; Future; Expected date: 06/10/2025  -     AFB Culture & Smear; Future; Expected date: 06/10/2025  -     Fungus culture; Future; Expected date: 06/10/2025  -     levoFLOXacin (LEVAQUIN) 750 MG tablet; Take 1 tablet (750 mg total) by mouth once daily. for 5 days  Dispense: 5 tablet; Refill: 0  -     X-Ray Chest PA And Lateral; Future; Expected date: 08/01/2025    2. Solitary pulmonary nodule     Sputum cultures  Levaquin.  Repeat CXR on 8/1. If nodular opacity persist, proceed with PET scan                   Elizabeth LeJeune, ACNP, HEATH       [1]   Patient Active Problem List  Diagnosis    Essential hypertension    Asthma with COPD    Anxiety    Non-seasonal allergic rhinitis    Pure hypercholesterolemia    Paroxysmal atrial fibrillation    SSS (sick sinus syndrome)    Cardiac pacemaker in situ    Atrial fibrillation and flutter    Hx TIA involving carotid artery    Typical atrial flutter    History of cardiac radiofrequency ablation (RFA)    hx Migraine with aura    Chronic anticoagulation- Eliquis    Atherosclerosis of aorta    Senile purpura    Osteopenia with high risk of fracture    Severe obesity (BMI 35.0-39.9) with comorbidity    Carotid arterial disease    Stage 3a chronic kidney disease    Other chest pain   [2]   Outpatient Encounter Medications as of 6/10/2025   Medication Sig Dispense Refill    apixaban (ELIQUIS) 5 mg Tab Take 1 tablet (5 mg total) by mouth 2 (two) times daily. 180 tablet 3    benzonatate (TESSALON) 100 MG capsule TAKE ONE CAPSULE BY MOUTH THREE TIMES DAILY AS-NEEDED COUGH (Patient not taking: Reported on 5/1/2025) 30 capsule 3    cetirizine (ZYRTEC) 10 MG tablet Take 10 mg by mouth once daily.      clobetasoL (TEMOVATE) 0.05 % external solution Apply topically once daily. Apply to the scalp as needed. (Patient not taking: Reported on 5/1/2025) 50 mL 3    diltiaZEM (DILT-XR) 240 MG CDCR Take 1 capsule (240 mg total) by mouth once daily. 30 capsule 11     doxycycline (MONODOX) 100 MG capsule Take 1 capsule (100 mg total) by mouth 2 (two) times daily. 14 capsule 0    elderberry fruit 350 mg Cap Take by mouth.      flaxseed oiL Oil by Misc.(Non-Drug; Combo Route) route.      flecainide (TAMBOCOR) 100 MG Tab Take 1 tablet (100 mg total) by mouth every 12 (twelve) hours. 180 tablet 3    fluticasone propionate (FLONASE) 50 mcg/actuation nasal spray 2 sprays (100 mcg total) by Each Nostril route once daily. 48 g 4    fluticasone-salmeterol diskus inhaler 250-50 mcg Inhale 1 puff into the lungs 2 (two) times daily. Controller.Wash out mouth after using. 180 each 3    ketoconazole (NIZORAL) 2 % shampoo Apply topically twice a week. (Patient not taking: Reported on 5/1/2025) 120 mL 3    levalbuterol (XOPENEX HFA) 45 mcg/actuation inhaler Inhale 2 puffs into the lungs every 6 (six) hours as needed for Wheezing or Shortness of Breath. 15 g 0    levoFLOXacin (LEVAQUIN) 750 MG tablet Take 1 tablet (750 mg total) by mouth once daily. for 5 days 5 tablet 0    lisinopriL (PRINIVIL,ZESTRIL) 20 MG tablet Take 0.5 tablets (10 mg total) by mouth once daily. 45 tablet 3    LORazepam (ATIVAN) 1 MG tablet Take 0.5-1 tablets (0.5-1 mg total) by mouth every 12 (twelve) hours as needed for Anxiety. 30 tablet 0    meloxicam (MOBIC) 15 MG tablet Take 1 tablet (15 mg total) by mouth once daily. (Patient not taking: Reported on 1/13/2025) 30 tablet 1    mometasone (ELOCON) 0.1 % ointment Apply topically once daily. 45 g 2    multivitamin capsule Take 1 capsule by mouth once daily.      nitroGLYCERIN (NITROSTAT) 0.4 MG SL tablet Place 1 tablet (0.4 mg total) under the tongue every 5 (five) minutes as needed. (Patient not taking: Reported on 5/1/2025) 25 tablet 5    omega-3 fatty acids/fish oil (FISH OIL-OMEGA-3 FATTY ACIDS) 300-1,000 mg capsule Take 1 capsule by mouth once daily. (Patient not taking: Reported on 5/1/2025)      OPW TEST CLAIM - DO NOT FILL Inject into the muscle. OPW test claim.  Do not fill. 1 capsule 0    pravastatin (PRAVACHOL) 40 MG tablet       predniSONE (DELTASONE) 20 MG tablet 3 tablets for 3 days. 2 tablets for 3 days. 1 tablet for 3 days. One half tablet for 3 days. (Patient not taking: Reported on 5/1/2025) 21 tablet 0    rosuvastatin (CRESTOR) 40 MG Tab Take 1 tablet (40 mg total) by mouth once daily. 90 tablet 3    tiotropium bromide (SPIRIVA RESPIMAT) 2.5 mcg/actuation inhaler Inhale 2 puffs into the lungs once daily. 12 g 3    triamcinolone acetonide 0.1% (KENALOG) 0.1 % cream Apply topically 2 (two) times daily. Use up to 2 weeks at a time 454 g 1     No facility-administered encounter medications on file as of 6/10/2025.      Show Topical Anesthesia Variable?: Yes Spray Paint Technique: No Consent: The patient's consent was obtained including but not limited to risks of crusting, scabbing, blistering, scarring, darker or lighter pigmentary change, recurrence, incomplete removal and infection. Medical Necessity Information: It is in your best interest to select a reason for this procedure from the list below. All of these items fulfill various CMS LCD requirements except the new and changing color options. Detail Level: Detailed Number Of Freeze-Thaw Cycles: 2 freeze-thaw cycles Duration Of Freeze Thaw-Cycle (Seconds): 3 Spray Paint Text: The liquid nitrogen was applied to the skin utilizing a spray paint frosting technique. Medical Necessity Clause: This procedure was medically necessary because the lesions that were treated were: Post-Care Instructions: I reviewed with the patient in detail post-care instructions. Patient is to wear sunprotection, and avoid picking at any of the treated lesions. Pt may apply Vaseline to crusted or scabbing areas.

## 2025-06-12 ENCOUNTER — PATIENT MESSAGE (OUTPATIENT)
Dept: SLEEP MEDICINE | Facility: CLINIC | Age: 75
End: 2025-06-12
Payer: MEDICARE

## 2025-06-14 DIAGNOSIS — Z95.0 CARDIAC PACEMAKER IN SITU: ICD-10-CM

## 2025-06-14 DIAGNOSIS — R06.02 SOB (SHORTNESS OF BREATH): ICD-10-CM

## 2025-06-14 DIAGNOSIS — R00.2 PALPITATIONS: ICD-10-CM

## 2025-06-14 DIAGNOSIS — I10 ESSENTIAL HYPERTENSION: ICD-10-CM

## 2025-06-14 DIAGNOSIS — I48.21 PERMANENT ATRIAL FIBRILLATION: ICD-10-CM

## 2025-06-14 DIAGNOSIS — T82.9XXS DISORDER OF CARDIAC PACEMAKER SYSTEM, SEQUELA: ICD-10-CM

## 2025-06-14 DIAGNOSIS — E78.5 HYPERLIPIDEMIA, UNSPECIFIED HYPERLIPIDEMIA TYPE: ICD-10-CM

## 2025-06-14 DIAGNOSIS — R07.9 CHEST PAIN, UNSPECIFIED TYPE: ICD-10-CM

## 2025-06-17 RX ORDER — AMOXICILLIN AND CLAVULANATE POTASSIUM 875; 125 MG/1; MG/1
1 TABLET, FILM COATED ORAL 2 TIMES DAILY
Qty: 20 TABLET | Refills: 0 | Status: SHIPPED | OUTPATIENT
Start: 2025-06-17 | End: 2025-06-27

## 2025-06-18 ENCOUNTER — PATIENT MESSAGE (OUTPATIENT)
Dept: SLEEP MEDICINE | Facility: CLINIC | Age: 75
End: 2025-06-18
Payer: MEDICARE

## 2025-07-10 ENCOUNTER — PATIENT MESSAGE (OUTPATIENT)
Dept: SLEEP MEDICINE | Facility: CLINIC | Age: 75
End: 2025-07-10
Payer: MEDICARE

## 2025-07-30 ENCOUNTER — PATIENT MESSAGE (OUTPATIENT)
Dept: ADMINISTRATIVE | Facility: OTHER | Age: 75
End: 2025-07-30
Payer: MEDICARE

## 2025-08-01 ENCOUNTER — HOSPITAL ENCOUNTER (OUTPATIENT)
Dept: RADIOLOGY | Facility: HOSPITAL | Age: 75
Discharge: HOME | End: 2025-08-01
Attending: NURSE PRACTITIONER
Payer: MEDICARE

## 2025-08-01 DIAGNOSIS — J20.9 ACUTE BRONCHITIS, UNSPECIFIED ORGANISM: ICD-10-CM

## 2025-08-01 PROCEDURE — 71046 X-RAY EXAM CHEST 2 VIEWS: CPT | Mod: 26,,, | Performed by: RADIOLOGY

## 2025-08-01 PROCEDURE — 71046 X-RAY EXAM CHEST 2 VIEWS: CPT | Mod: TC,PO

## 2025-08-03 ENCOUNTER — CLINICAL SUPPORT (OUTPATIENT)
Dept: CARDIOLOGY | Facility: HOSPITAL | Age: 75
End: 2025-08-03
Attending: INTERNAL MEDICINE
Payer: MEDICARE

## 2025-08-03 ENCOUNTER — CLINICAL SUPPORT (OUTPATIENT)
Dept: CARDIOLOGY | Facility: HOSPITAL | Age: 75
End: 2025-08-03
Payer: MEDICARE

## 2025-08-03 DIAGNOSIS — I49.5 SICK SINUS SYNDROME: ICD-10-CM

## 2025-08-03 DIAGNOSIS — I48.0 PAROXYSMAL ATRIAL FIBRILLATION: ICD-10-CM

## 2025-08-03 DIAGNOSIS — Z95.0 PRESENCE OF CARDIAC PACEMAKER: ICD-10-CM

## 2025-08-03 PROCEDURE — 93296 REM INTERROG EVL PM/IDS: CPT | Performed by: INTERNAL MEDICINE

## 2025-08-03 PROCEDURE — 93294 REM INTERROG EVL PM/LDLS PM: CPT | Mod: S$GLB,,, | Performed by: INTERNAL MEDICINE

## 2025-08-04 DIAGNOSIS — J44.89 ASTHMA WITH COPD: Chronic | ICD-10-CM

## 2025-08-04 DIAGNOSIS — J44.9 CHRONIC OBSTRUCTIVE PULMONARY DISEASE, UNSPECIFIED COPD TYPE: ICD-10-CM

## 2025-08-05 RX ORDER — TIOTROPIUM BROMIDE INHALATION SPRAY 3.12 UG/1
2 SPRAY, METERED RESPIRATORY (INHALATION) DAILY
Qty: 12 G | Refills: 3 | Status: SHIPPED | OUTPATIENT
Start: 2025-08-05

## 2025-08-05 RX ORDER — LEVALBUTEROL TARTRATE 45 UG/1
2 AEROSOL, METERED ORAL EVERY 6 HOURS PRN
Qty: 15 G | Refills: 0 | Status: SHIPPED | OUTPATIENT
Start: 2025-08-05

## 2025-08-07 ENCOUNTER — HOSPITAL ENCOUNTER (OUTPATIENT)
Dept: RADIOLOGY | Facility: HOSPITAL | Age: 75
Discharge: HOME | End: 2025-08-07
Attending: NURSE PRACTITIONER
Payer: MEDICARE

## 2025-08-07 ENCOUNTER — HOSPITAL ENCOUNTER (EMERGENCY)
Facility: HOSPITAL | Age: 75
Discharge: HOME | End: 2025-08-08
Attending: EMERGENCY MEDICINE
Payer: MEDICARE

## 2025-08-07 ENCOUNTER — NURSE TRIAGE (OUTPATIENT)
Dept: ADMINISTRATIVE | Facility: CLINIC | Age: 75
End: 2025-08-07
Payer: MEDICARE

## 2025-08-07 DIAGNOSIS — R00.2 PALPITATIONS: Primary | ICD-10-CM

## 2025-08-07 DIAGNOSIS — R07.9 CHEST PAIN: ICD-10-CM

## 2025-08-07 DIAGNOSIS — R91.1 SOLID NODULE OF LUNG GREATER THAN 8 MM IN DIAMETER: ICD-10-CM

## 2025-08-07 LAB
ABSOLUTE EOSINOPHIL (OHS): 0.38 K/UL
ABSOLUTE MONOCYTE (OHS): 0.7 K/UL (ref 0.3–1)
ABSOLUTE NEUTROPHIL COUNT (OHS): 6.21 K/UL (ref 1.8–7.7)
ALBUMIN SERPL BCP-MCNC: 4.1 G/DL (ref 3.5–5.2)
ALP SERPL-CCNC: 61 UNIT/L (ref 40–150)
ALT SERPL W/O P-5'-P-CCNC: 24 UNIT/L (ref 10–44)
ANION GAP (OHS): 11 MMOL/L (ref 8–16)
AST SERPL-CCNC: 29 UNIT/L (ref 11–45)
BASOPHILS # BLD AUTO: 0.05 K/UL
BASOPHILS NFR BLD AUTO: 0.5 %
BILIRUB SERPL-MCNC: 0.2 MG/DL (ref 0.1–1)
BUN SERPL-MCNC: 26 MG/DL (ref 8–23)
CALCIUM SERPL-MCNC: 9 MG/DL (ref 8.7–10.5)
CHLORIDE SERPL-SCNC: 105 MMOL/L (ref 95–110)
CO2 SERPL-SCNC: 27 MMOL/L (ref 23–29)
CREAT SERPL-MCNC: 0.9 MG/DL (ref 0.5–1.4)
ERYTHROCYTE [DISTWIDTH] IN BLOOD BY AUTOMATED COUNT: 14.1 % (ref 11.5–14.5)
GFR SERPLBLD CREATININE-BSD FMLA CKD-EPI: >60 ML/MIN/1.73/M2
GLUCOSE SERPL-MCNC: 82 MG/DL (ref 70–110)
HCT VFR BLD AUTO: 41.5 % (ref 37–48.5)
HGB BLD-MCNC: 13.7 GM/DL (ref 12–16)
IMM GRANULOCYTES # BLD AUTO: 0.03 K/UL (ref 0–0.04)
IMM GRANULOCYTES NFR BLD AUTO: 0.3 % (ref 0–0.5)
LYMPHOCYTES # BLD AUTO: 2.02 K/UL (ref 1–4.8)
MCH RBC QN AUTO: 29.2 PG (ref 27–31)
MCHC RBC AUTO-ENTMCNC: 33 G/DL (ref 32–36)
MCV RBC AUTO: 89 FL (ref 82–98)
NT-PROBNP SERPL-MCNC: 129 PG/ML
NUCLEATED RBC (/100WBC) (OHS): 0 /100 WBC
PLATELET # BLD AUTO: 224 K/UL (ref 150–450)
PMV BLD AUTO: 11 FL (ref 9.2–12.9)
POTASSIUM SERPL-SCNC: 3.9 MMOL/L (ref 3.5–5.1)
PROT SERPL-MCNC: 6.8 GM/DL (ref 6–8.4)
RBC # BLD AUTO: 4.69 M/UL (ref 4–5.4)
RELATIVE EOSINOPHIL (OHS): 4 %
RELATIVE LYMPHOCYTE (OHS): 21.5 % (ref 18–48)
RELATIVE MONOCYTE (OHS): 7.5 % (ref 4–15)
RELATIVE NEUTROPHIL (OHS): 66.2 % (ref 38–73)
SODIUM SERPL-SCNC: 143 MMOL/L (ref 136–145)
TROPONIN I SERPL HS-MCNC: <3 NG/L
WBC # BLD AUTO: 9.39 K/UL (ref 3.9–12.7)

## 2025-08-07 PROCEDURE — 84484 ASSAY OF TROPONIN QUANT: CPT | Mod: ER | Performed by: EMERGENCY MEDICINE

## 2025-08-07 PROCEDURE — 85025 COMPLETE CBC W/AUTO DIFF WBC: CPT | Mod: ER | Performed by: EMERGENCY MEDICINE

## 2025-08-07 PROCEDURE — 25000003 PHARM REV CODE 250: Mod: ER | Performed by: EMERGENCY MEDICINE

## 2025-08-07 PROCEDURE — 86803 HEPATITIS C AB TEST: CPT | Performed by: EMERGENCY MEDICINE

## 2025-08-07 PROCEDURE — 78815 PET IMAGE W/CT SKULL-THIGH: CPT | Mod: TC

## 2025-08-07 PROCEDURE — 80053 COMPREHEN METABOLIC PANEL: CPT | Mod: ER | Performed by: EMERGENCY MEDICINE

## 2025-08-07 PROCEDURE — 87389 HIV-1 AG W/HIV-1&-2 AB AG IA: CPT | Performed by: EMERGENCY MEDICINE

## 2025-08-07 PROCEDURE — 94761 N-INVAS EAR/PLS OXIMETRY MLT: CPT | Mod: ER

## 2025-08-07 PROCEDURE — 99285 EMERGENCY DEPT VISIT HI MDM: CPT | Mod: 25,ER

## 2025-08-07 PROCEDURE — 83880 ASSAY OF NATRIURETIC PEPTIDE: CPT | Mod: ER | Performed by: EMERGENCY MEDICINE

## 2025-08-07 PROCEDURE — 93010 ELECTROCARDIOGRAM REPORT: CPT | Mod: ,,, | Performed by: INTERNAL MEDICINE

## 2025-08-07 PROCEDURE — 96360 HYDRATION IV INFUSION INIT: CPT | Mod: ER

## 2025-08-07 PROCEDURE — A9552 F18 FDG: HCPCS | Performed by: NURSE PRACTITIONER

## 2025-08-07 PROCEDURE — 93005 ELECTROCARDIOGRAM TRACING: CPT | Mod: ER

## 2025-08-07 PROCEDURE — 78815 PET IMAGE W/CT SKULL-THIGH: CPT | Mod: 26,PI,, | Performed by: RADIOLOGY

## 2025-08-07 RX ORDER — NAPROXEN SODIUM 220 MG/1
162 TABLET, FILM COATED ORAL
Status: COMPLETED | OUTPATIENT
Start: 2025-08-07 | End: 2025-08-07

## 2025-08-07 RX ORDER — FLUDEOXYGLUCOSE F18 500 MCI/ML
11.72 INJECTION INTRAVENOUS
Status: COMPLETED | OUTPATIENT
Start: 2025-08-07 | End: 2025-08-07

## 2025-08-07 RX ADMIN — SODIUM CHLORIDE 500 ML: 9 INJECTION, SOLUTION INTRAVENOUS at 10:08

## 2025-08-07 RX ADMIN — ASPIRIN 162 MG: 81 TABLET, CHEWABLE ORAL at 10:08

## 2025-08-07 RX ADMIN — FLUDEOXYGLUCOSE F-18 11.72 MILLICURIE: 500 INJECTION INTRAVENOUS at 09:08

## 2025-08-08 ENCOUNTER — TELEPHONE (OUTPATIENT)
Dept: CARDIOLOGY | Facility: CLINIC | Age: 75
End: 2025-08-08
Payer: MEDICARE

## 2025-08-08 ENCOUNTER — PATIENT MESSAGE (OUTPATIENT)
Dept: SLEEP MEDICINE | Facility: CLINIC | Age: 75
End: 2025-08-08
Payer: MEDICARE

## 2025-08-08 VITALS
HEART RATE: 79 BPM | WEIGHT: 210 LBS | BODY MASS INDEX: 38.64 KG/M2 | DIASTOLIC BLOOD PRESSURE: 65 MMHG | TEMPERATURE: 98 F | OXYGEN SATURATION: 93 % | HEIGHT: 62 IN | SYSTOLIC BLOOD PRESSURE: 135 MMHG | RESPIRATION RATE: 20 BRPM

## 2025-08-08 LAB
HCV AB SERPL QL IA: NORMAL
HIV 1+2 AB+HIV1 P24 AG SERPL QL IA: NORMAL
OHS QRS DURATION: 106 MS
OHS QRS DURATION: 108 MS
OHS QTC CALCULATION: 440 MS
OHS QTC CALCULATION: 444 MS
TROPONIN I SERPL HS-MCNC: <3 NG/L

## 2025-08-08 PROCEDURE — 84484 ASSAY OF TROPONIN QUANT: CPT | Mod: ER | Performed by: EMERGENCY MEDICINE

## 2025-08-08 NOTE — TELEPHONE ENCOUNTER
Returned call to patient, no answer, left message requesting she contact device clinic at [phone].    [employer] remote report from Aug 8, 2025, 2:47 AM:  Ap 89%,  6%  Mean PVC 3/hr over last 24 hours  Battery longevity 55%  No atrial or ventricular arrhythmias detected  Automatic lead testing trends are stable.

## 2025-08-08 NOTE — TELEPHONE ENCOUNTER
Patient states during her PET Scan on 8/7/25 her heart rate went up between 79 and 80 beats per minute. Patient states she is symptomatic but heart rate this morning continues to be 79. Patient reports B/P 122/77 HR 83 with blood pressure meter and 79 with pulse oximeter. Patient states she feels anxiety and has taken one-half tablet of lorazepam this morning. Patient states she would like for Cami Pablo RN in device Clinic to give her a call to discuss.

## 2025-08-08 NOTE — TELEPHONE ENCOUNTER
Had a CT scan today. When the solution was injected into her arm, her heart started racing. States that she has a pacemaker. States that the MD was not concerned about her heart rate. Heart rate is 79-80 and states it is usually 60. Concerned that her pacemaker is not working. Unable to provide reassurance, advised to ED now.  Reason for Disposition   Patient sounds very sick or weak to the triager    Additional Information   Negative: Passed out (e.g., fainted, lost consciousness, blacked out and was not responding)   Negative: Shock suspected (e.g., cold/pale/clammy skin, too weak to stand, low BP, rapid pulse)   Negative: Difficult to awaken or acting confused (e.g., disoriented, slurred speech)   Negative: Visible sweat on face or sweat dripping down face   Negative: Unable to walk, or can only walk with assistance (e.g., requires support)   Negative: [1] Received SHOCK from implantable cardiac defibrillator AND [2] persisting symptoms (i.e., palpitations, lightheadedness)   Negative: [1] Dizziness, lightheadedness, or weakness AND [2] heart beating very rapidly (e.g., > 140 / minute)   Negative: [1] Feeling weak or lightheaded (e.g., woozy, feeling like they might faint) AND [2] heart beating very slowly (e.g., < 50 / minute)   Negative: Sounds like a life-threatening emergency to the triager   Negative: Chest Pain   Negative: Implantable Cardiac Defibrillator (ICD) or a pacemaker symptoms or questions   Negative: Difficulty breathing   Negative: Feeling weak or lightheaded (e.g., woozy, feeling like they might faint)   Negative: [1] Heart beating very rapidly (e.g., > 140 / minute) AND [2] present now  (Exception: During exercise.)   Negative: [1] Heart beating very slowly (e.g., < 50 / minute) AND [2] present now  (Exception: Athlete and heart rate is normal for caller.)    Protocols used: Heart Rate and Heartbeat [id number]

## 2025-08-08 NOTE — TELEPHONE ENCOUNTER
Pt returned call to device clinic, states she is feeling a bit better today.  She is no longer having palpitations.  Reviewed remote report from this morning, all questions answered.

## 2025-08-09 ENCOUNTER — TELEPHONE (OUTPATIENT)
Facility: OTHER | Age: 75
End: 2025-08-09
Payer: MEDICARE

## 2025-08-09 NOTE — PROGRESS NOTES
Message sent to Dr. Pinto, Cardiology staff to reach out to Pt to assist with scheduling a ED f/u. Pt has concerns as to why her pacemaker settings is now 70-80. It was previously set at 60.

## 2025-08-10 LAB — HOLD SPECIMEN: NORMAL

## 2025-08-11 ENCOUNTER — PATIENT MESSAGE (OUTPATIENT)
Dept: CARDIOLOGY | Facility: CLINIC | Age: 75
End: 2025-08-11
Payer: MEDICARE

## 2025-08-11 ENCOUNTER — TELEPHONE (OUTPATIENT)
Dept: CARDIOLOGY | Facility: CLINIC | Age: 75
End: 2025-08-11
Payer: MEDICARE

## 2025-08-14 DIAGNOSIS — J44.89 ASTHMA WITH COPD: Chronic | ICD-10-CM

## 2025-08-14 DIAGNOSIS — J44.9 CHRONIC OBSTRUCTIVE PULMONARY DISEASE, UNSPECIFIED COPD TYPE: ICD-10-CM

## 2025-08-14 LAB
OHS CV AF BURDEN PERCENT: < 1
OHS CV DC REMOTE DEVICE TYPE: NORMAL
OHS CV RV PACING PERCENT: 6 %

## 2025-08-14 RX ORDER — LEVALBUTEROL TARTRATE 45 UG/1
2 AEROSOL, METERED ORAL EVERY 6 HOURS PRN
Qty: 15 G | Refills: 0 | Status: SHIPPED | OUTPATIENT
Start: 2025-08-14

## 2025-08-15 ENCOUNTER — PATIENT MESSAGE (OUTPATIENT)
Dept: SLEEP MEDICINE | Facility: CLINIC | Age: 75
End: 2025-08-15
Payer: MEDICARE

## 2025-08-19 ENCOUNTER — HOSPITAL ENCOUNTER (OUTPATIENT)
Dept: CARDIOLOGY | Facility: HOSPITAL | Age: 75
Discharge: HOME | End: 2025-08-19
Attending: INTERNAL MEDICINE
Payer: MEDICARE

## 2025-08-19 ENCOUNTER — OFFICE VISIT (OUTPATIENT)
Dept: CARDIOLOGY | Facility: CLINIC | Age: 75
End: 2025-08-19
Payer: MEDICARE

## 2025-08-19 VITALS
DIASTOLIC BLOOD PRESSURE: 64 MMHG | OXYGEN SATURATION: 96 % | SYSTOLIC BLOOD PRESSURE: 118 MMHG | BODY MASS INDEX: 38.32 KG/M2 | HEIGHT: 62 IN | HEART RATE: 60 BPM | WEIGHT: 208.25 LBS

## 2025-08-19 DIAGNOSIS — I49.5 SICK SINUS SYNDROME: ICD-10-CM

## 2025-08-19 DIAGNOSIS — I49.5 SSS (SICK SINUS SYNDROME): Chronic | ICD-10-CM

## 2025-08-19 DIAGNOSIS — R00.2 PALPITATIONS: Primary | ICD-10-CM

## 2025-08-19 DIAGNOSIS — Z79.01 CHRONIC ANTICOAGULATION: Chronic | ICD-10-CM

## 2025-08-19 DIAGNOSIS — I10 ESSENTIAL HYPERTENSION: Chronic | ICD-10-CM

## 2025-08-19 DIAGNOSIS — Z95.0 CARDIAC PACEMAKER IN SITU: ICD-10-CM

## 2025-08-19 DIAGNOSIS — Z95.0 CARDIAC PACEMAKER IN SITU: Chronic | ICD-10-CM

## 2025-08-19 LAB — OHS CV CPX PATIENT HEIGHT IN: 62

## 2025-08-19 PROCEDURE — 99214 OFFICE O/P EST MOD 30 MIN: CPT | Mod: S$GLB,,, | Performed by: NURSE PRACTITIONER

## 2025-08-19 PROCEDURE — 1101F PT FALLS ASSESS-DOCD LE1/YR: CPT | Mod: CPTII,S$GLB,, | Performed by: NURSE PRACTITIONER

## 2025-08-19 PROCEDURE — 1159F MED LIST DOCD IN RCRD: CPT | Mod: CPTII,S$GLB,, | Performed by: NURSE PRACTITIONER

## 2025-08-19 PROCEDURE — 3078F DIAST BP <80 MM HG: CPT | Mod: CPTII,S$GLB,, | Performed by: NURSE PRACTITIONER

## 2025-08-19 PROCEDURE — 3074F SYST BP LT 130 MM HG: CPT | Mod: CPTII,S$GLB,, | Performed by: NURSE PRACTITIONER

## 2025-08-19 PROCEDURE — 93280 PM DEVICE PROGR EVAL DUAL: CPT

## 2025-08-19 PROCEDURE — 3288F FALL RISK ASSESSMENT DOCD: CPT | Mod: CPTII,S$GLB,, | Performed by: NURSE PRACTITIONER

## 2025-08-19 PROCEDURE — 99999 PR PBB SHADOW E&M-EST. PATIENT-LVL IV: CPT | Mod: PBBFAC,,, | Performed by: NURSE PRACTITIONER

## 2025-08-25 ENCOUNTER — HOSPITAL ENCOUNTER (OUTPATIENT)
Dept: CARDIOLOGY | Facility: HOSPITAL | Age: 75
Discharge: HOME | End: 2025-08-25
Attending: NURSE PRACTITIONER
Payer: MEDICARE

## 2025-08-25 ENCOUNTER — OFFICE VISIT (OUTPATIENT)
Dept: INTERNAL MEDICINE | Facility: CLINIC | Age: 75
End: 2025-08-25
Payer: MEDICARE

## 2025-08-25 VITALS
DIASTOLIC BLOOD PRESSURE: 80 MMHG | BODY MASS INDEX: 38.86 KG/M2 | OXYGEN SATURATION: 95 % | RESPIRATION RATE: 18 BRPM | TEMPERATURE: 99 F | SYSTOLIC BLOOD PRESSURE: 132 MMHG | HEIGHT: 62 IN | WEIGHT: 211.19 LBS | HEART RATE: 66 BPM

## 2025-08-25 DIAGNOSIS — Z79.01 CHRONIC ANTICOAGULATION: Chronic | ICD-10-CM

## 2025-08-25 DIAGNOSIS — Z95.0 CARDIAC PACEMAKER IN SITU: Chronic | ICD-10-CM

## 2025-08-25 DIAGNOSIS — I49.5 SSS (SICK SINUS SYNDROME): Chronic | ICD-10-CM

## 2025-08-25 DIAGNOSIS — E66.01 SEVERE OBESITY (BMI 35.0-39.9) WITH COMORBIDITY: ICD-10-CM

## 2025-08-25 DIAGNOSIS — E78.00 PURE HYPERCHOLESTEROLEMIA: Chronic | ICD-10-CM

## 2025-08-25 DIAGNOSIS — R00.2 PALPITATIONS: ICD-10-CM

## 2025-08-25 DIAGNOSIS — I48.92 ATRIAL FIBRILLATION AND FLUTTER: Chronic | ICD-10-CM

## 2025-08-25 DIAGNOSIS — M85.80 OSTEOPENIA WITH HIGH RISK OF FRACTURE: ICD-10-CM

## 2025-08-25 DIAGNOSIS — J44.89 ASTHMA WITH COPD: Chronic | ICD-10-CM

## 2025-08-25 DIAGNOSIS — E78.49 OTHER HYPERLIPIDEMIA: ICD-10-CM

## 2025-08-25 DIAGNOSIS — I65.23 BILATERAL CAROTID ARTERY STENOSIS: ICD-10-CM

## 2025-08-25 DIAGNOSIS — I48.0 PAROXYSMAL ATRIAL FIBRILLATION: Chronic | ICD-10-CM

## 2025-08-25 DIAGNOSIS — I48.91 ATRIAL FIBRILLATION AND FLUTTER: Chronic | ICD-10-CM

## 2025-08-25 DIAGNOSIS — Z00.00 ENCOUNTER FOR MEDICARE ANNUAL WELLNESS EXAM: Primary | ICD-10-CM

## 2025-08-25 DIAGNOSIS — N18.31 STAGE 3A CHRONIC KIDNEY DISEASE: ICD-10-CM

## 2025-08-25 PROCEDURE — 93227 XTRNL ECG REC<48 HR R&I: CPT | Mod: ,,, | Performed by: INTERNAL MEDICINE

## 2025-08-25 PROCEDURE — 1101F PT FALLS ASSESS-DOCD LE1/YR: CPT | Mod: CPTII,S$GLB,, | Performed by: NURSE PRACTITIONER

## 2025-08-25 PROCEDURE — 1158F ADVNC CARE PLAN TLK DOCD: CPT | Mod: CPTII,S$GLB,, | Performed by: NURSE PRACTITIONER

## 2025-08-25 PROCEDURE — 3288F FALL RISK ASSESSMENT DOCD: CPT | Mod: CPTII,S$GLB,, | Performed by: NURSE PRACTITIONER

## 2025-08-25 PROCEDURE — G0439 PPPS, SUBSEQ VISIT: HCPCS | Mod: S$GLB,,, | Performed by: NURSE PRACTITIONER

## 2025-08-25 PROCEDURE — 93226 XTRNL ECG REC<48 HR SCAN A/R: CPT

## 2025-08-25 PROCEDURE — 3079F DIAST BP 80-89 MM HG: CPT | Mod: CPTII,S$GLB,, | Performed by: NURSE PRACTITIONER

## 2025-08-25 PROCEDURE — 3075F SYST BP GE 130 - 139MM HG: CPT | Mod: CPTII,S$GLB,, | Performed by: NURSE PRACTITIONER

## 2025-08-25 PROCEDURE — 1126F AMNT PAIN NOTED NONE PRSNT: CPT | Mod: CPTII,S$GLB,, | Performed by: NURSE PRACTITIONER

## 2025-08-25 PROCEDURE — 1170F FXNL STATUS ASSESSED: CPT | Mod: CPTII,S$GLB,, | Performed by: NURSE PRACTITIONER

## 2025-08-25 PROCEDURE — 99999 PR PBB SHADOW E&M-EST. PATIENT-LVL III: CPT | Mod: PBBFAC,,, | Performed by: NURSE PRACTITIONER

## 2025-08-27 LAB
OHS CV CPX PATIENT HEIGHT IN: 62
OHS CV EVENT MONITOR DAY: 0
OHS CV HOLTER LENGTH DECIMAL HOURS: 48
OHS CV HOLTER LENGTH HOURS: 48
OHS CV HOLTER LENGTH MINUTES: 0
OHS CV HOLTER SINUS AVERAGE HR: 64
OHS CV HOLTER SINUS MAX HR: 111
OHS CV HOLTER SINUS MIN HR: 59

## 2025-08-28 ENCOUNTER — TELEPHONE (OUTPATIENT)
Dept: CARDIOLOGY | Facility: CLINIC | Age: 75
End: 2025-08-28
Payer: MEDICARE

## 2025-08-28 DIAGNOSIS — I48.21 PERMANENT ATRIAL FIBRILLATION: ICD-10-CM

## 2025-08-28 DIAGNOSIS — R07.9 CHEST PAIN, UNSPECIFIED TYPE: ICD-10-CM

## 2025-08-28 DIAGNOSIS — E78.5 HYPERLIPIDEMIA, UNSPECIFIED HYPERLIPIDEMIA TYPE: ICD-10-CM

## 2025-08-28 DIAGNOSIS — Z95.0 CARDIAC PACEMAKER IN SITU: ICD-10-CM

## 2025-08-28 DIAGNOSIS — I10 ESSENTIAL HYPERTENSION: ICD-10-CM

## 2025-08-28 DIAGNOSIS — T82.9XXS DISORDER OF CARDIAC PACEMAKER SYSTEM, SEQUELA: ICD-10-CM

## 2025-08-28 DIAGNOSIS — R00.2 PALPITATIONS: ICD-10-CM

## 2025-08-28 DIAGNOSIS — R06.02 SOB (SHORTNESS OF BREATH): ICD-10-CM

## 2025-08-28 RX ORDER — DILTIAZEM HYDROCHLORIDE 180 MG/1
360 CAPSULE, EXTENDED RELEASE ORAL DAILY
Qty: 60 CAPSULE | Refills: 11 | Status: SHIPPED | OUTPATIENT
Start: 2025-08-28 | End: 2025-09-27

## (undated) DEVICE — PAD GROUNDING DISPER ELECTRODE

## (undated) DEVICE — SYR BULB 60CC IRRIGATION

## (undated) DEVICE — CATH SAFIRE BI-DIR 7FR MED

## (undated) DEVICE — INTRO 8.5FR 63CM SRO

## (undated) DEVICE — CAUTERY BOVIE PENCIL

## (undated) DEVICE — Device

## (undated) DEVICE — ADHESIVE DERMABOND ADVANCED

## (undated) DEVICE — GUIDEWIRE WHOLEY HI TORQ 175CM

## (undated) DEVICE — BLADE PLASMA WIDE SPATULA TIP

## (undated) DEVICE — PACK CATH LAB CUSTOM BR

## (undated) DEVICE — PACK PACER PERMANENT

## (undated) DEVICE — PAD DEFIB CADENCE ADULT R2

## (undated) DEVICE — SCALPEL SZ 10 RETRACTABLE

## (undated) DEVICE — NDL PERCUTANEOUS 21G 7CM VASC

## (undated) DEVICE — DRESSING AQUACEL AG ADV 3.5X6

## (undated) DEVICE — CONTRAST OMNIPAQUE 240 50ML

## (undated) DEVICE — CATH LIVEWIRE DCAPLR 7FRX115CM

## (undated) DEVICE — SUT 4/0 18IN COATED VICRYL

## (undated) DEVICE — SAFESHEATH WORLEY 9FR RIGHT

## (undated) DEVICE — SHEATH SAFE 8FR

## (undated) DEVICE — PATCH ENSITE PRECISION NAVX SE

## (undated) DEVICE — SHEATH INTRODUCER 7FR 11CM

## (undated) DEVICE — SUT 3-0 VICRYL / SH (J416)

## (undated) DEVICE — SAFESHEATH II 8FR 13CM